# Patient Record
Sex: FEMALE | Race: WHITE | URBAN - NONMETROPOLITAN AREA
[De-identification: names, ages, dates, MRNs, and addresses within clinical notes are randomized per-mention and may not be internally consistent; named-entity substitution may affect disease eponyms.]

---

## 2017-01-01 ENCOUNTER — HOSPITAL ENCOUNTER (OUTPATIENT)
Dept: ULTRASOUND IMAGING | Facility: HOSPITAL | Age: 67
Discharge: HOME | End: 2017-06-22
Attending: NURSE PRACTITIONER | Admitting: NURSE PRACTITIONER
Payer: MEDICARE

## 2017-01-01 ENCOUNTER — TRANSFERRED RECORDS (OUTPATIENT)
Dept: HEALTH INFORMATION MANAGEMENT | Facility: HOSPITAL | Age: 67
End: 2017-01-01

## 2017-01-01 ENCOUNTER — HOSPITAL ENCOUNTER (OUTPATIENT)
Dept: CT IMAGING | Facility: HOSPITAL | Age: 67
Discharge: HOME | End: 2017-11-28
Attending: NURSE PRACTITIONER | Admitting: NURSE PRACTITIONER
Payer: MEDICARE

## 2017-01-01 ENCOUNTER — ONCOLOGY VISIT (OUTPATIENT)
Dept: ONCOLOGY | Facility: OTHER | Age: 67
End: 2017-01-01
Attending: NURSE PRACTITIONER
Payer: MEDICARE

## 2017-01-01 ENCOUNTER — HOSPITAL ENCOUNTER (OUTPATIENT)
Dept: CT IMAGING | Facility: HOSPITAL | Age: 67
Discharge: HOME | End: 2017-11-22
Attending: NURSE PRACTITIONER | Admitting: NURSE PRACTITIONER
Payer: MEDICARE

## 2017-01-01 ENCOUNTER — HOSPITAL ENCOUNTER (OUTPATIENT)
Dept: GENERAL RADIOLOGY | Facility: HOSPITAL | Age: 67
End: 2017-11-28
Attending: RADIOLOGY
Payer: MEDICARE

## 2017-01-01 ENCOUNTER — ONCOLOGY VISIT (OUTPATIENT)
Dept: ONCOLOGY | Facility: OTHER | Age: 67
End: 2017-01-01
Attending: INTERNAL MEDICINE
Payer: MEDICARE

## 2017-01-01 ENCOUNTER — TRANSFERRED RECORDS (OUTPATIENT)
Dept: HEALTH INFORMATION MANAGEMENT | Facility: CLINIC | Age: 67
End: 2017-01-01

## 2017-01-01 ENCOUNTER — APPOINTMENT (OUTPATIENT)
Dept: LAB | Facility: HOSPITAL | Age: 67
End: 2017-01-01
Attending: FAMILY MEDICINE
Payer: MEDICARE

## 2017-01-01 ENCOUNTER — INFUSION THERAPY VISIT (OUTPATIENT)
Dept: INFUSION THERAPY | Facility: OTHER | Age: 67
End: 2017-01-01
Attending: INTERNAL MEDICINE
Payer: MEDICARE

## 2017-01-01 ENCOUNTER — TELEPHONE (OUTPATIENT)
Dept: ONCOLOGY | Facility: OTHER | Age: 67
End: 2017-01-01

## 2017-01-01 VITALS
RESPIRATION RATE: 16 BRPM | HEART RATE: 78 BPM | BODY MASS INDEX: 35.65 KG/M2 | SYSTOLIC BLOOD PRESSURE: 139 MMHG | OXYGEN SATURATION: 98 % | DIASTOLIC BLOOD PRESSURE: 94 MMHG | WEIGHT: 214 LBS | HEIGHT: 65 IN | TEMPERATURE: 97.1 F

## 2017-01-01 VITALS
BODY MASS INDEX: 35.79 KG/M2 | SYSTOLIC BLOOD PRESSURE: 135 MMHG | HEART RATE: 96 BPM | RESPIRATION RATE: 16 BRPM | DIASTOLIC BLOOD PRESSURE: 79 MMHG | OXYGEN SATURATION: 94 % | TEMPERATURE: 96.8 F | HEIGHT: 65 IN | WEIGHT: 214.8 LBS

## 2017-01-01 VITALS
HEART RATE: 80 BPM | WEIGHT: 212 LBS | DIASTOLIC BLOOD PRESSURE: 87 MMHG | OXYGEN SATURATION: 96 % | BODY MASS INDEX: 35.32 KG/M2 | HEIGHT: 65 IN | RESPIRATION RATE: 18 BRPM | TEMPERATURE: 97.4 F | SYSTOLIC BLOOD PRESSURE: 142 MMHG

## 2017-01-01 VITALS
RESPIRATION RATE: 18 BRPM | SYSTOLIC BLOOD PRESSURE: 155 MMHG | OXYGEN SATURATION: 99 % | DIASTOLIC BLOOD PRESSURE: 99 MMHG

## 2017-01-01 VITALS
HEIGHT: 65 IN | DIASTOLIC BLOOD PRESSURE: 79 MMHG | RESPIRATION RATE: 16 BRPM | SYSTOLIC BLOOD PRESSURE: 135 MMHG | HEART RATE: 96 BPM | WEIGHT: 214.8 LBS | TEMPERATURE: 96.8 F | OXYGEN SATURATION: 94 % | BODY MASS INDEX: 35.79 KG/M2

## 2017-01-01 VITALS
WEIGHT: 212.1 LBS | HEART RATE: 100 BPM | RESPIRATION RATE: 18 BRPM | OXYGEN SATURATION: 96 % | HEIGHT: 65 IN | BODY MASS INDEX: 35.34 KG/M2 | SYSTOLIC BLOOD PRESSURE: 153 MMHG | DIASTOLIC BLOOD PRESSURE: 97 MMHG | TEMPERATURE: 97.7 F

## 2017-01-01 VITALS
WEIGHT: 214 LBS | OXYGEN SATURATION: 98 % | TEMPERATURE: 97.1 F | BODY MASS INDEX: 35.61 KG/M2 | DIASTOLIC BLOOD PRESSURE: 94 MMHG | SYSTOLIC BLOOD PRESSURE: 139 MMHG | RESPIRATION RATE: 16 BRPM | HEART RATE: 78 BPM

## 2017-01-01 VITALS
WEIGHT: 214.4 LBS | DIASTOLIC BLOOD PRESSURE: 93 MMHG | TEMPERATURE: 97.4 F | HEART RATE: 91 BPM | BODY MASS INDEX: 35.72 KG/M2 | RESPIRATION RATE: 24 BRPM | HEIGHT: 65 IN | SYSTOLIC BLOOD PRESSURE: 165 MMHG | OXYGEN SATURATION: 94 %

## 2017-01-01 DIAGNOSIS — C50.911 MALIGNANT NEOPLASM OF RIGHT FEMALE BREAST, UNSPECIFIED SITE OF BREAST: Primary | ICD-10-CM

## 2017-01-01 DIAGNOSIS — C50.919 TRIPLE NEGATIVE MALIGNANT NEOPLASM OF BREAST (H): Primary | ICD-10-CM

## 2017-01-01 DIAGNOSIS — D05.11 DCIS (DUCTAL CARCINOMA IN SITU), RIGHT: ICD-10-CM

## 2017-01-01 DIAGNOSIS — D64.9 ANEMIA, UNSPECIFIED TYPE: ICD-10-CM

## 2017-01-01 DIAGNOSIS — Z98.890 HISTORY OF LUNG BIOPSY: ICD-10-CM

## 2017-01-01 DIAGNOSIS — Z17.421 TRIPLE NEGATIVE MALIGNANT NEOPLASM OF BREAST (H): Primary | ICD-10-CM

## 2017-01-01 DIAGNOSIS — Z79.811 AROMATASE INHIBITOR USE: ICD-10-CM

## 2017-01-01 DIAGNOSIS — C50.911 MALIGNANT NEOPLASM OF RIGHT FEMALE BREAST (H): ICD-10-CM

## 2017-01-01 DIAGNOSIS — D50.9 IRON DEFICIENCY ANEMIA, UNSPECIFIED IRON DEFICIENCY ANEMIA TYPE: ICD-10-CM

## 2017-01-01 DIAGNOSIS — R91.8 LUNG MASS: ICD-10-CM

## 2017-01-01 DIAGNOSIS — F41.9 ANXIETY: ICD-10-CM

## 2017-01-01 DIAGNOSIS — R91.1 LUNG NODULE: ICD-10-CM

## 2017-01-01 DIAGNOSIS — D05.11 DUCTAL CARCINOMA IN SITU (DCIS) OF RIGHT BREAST: ICD-10-CM

## 2017-01-01 DIAGNOSIS — Z17.421 TRIPLE NEGATIVE MALIGNANT NEOPLASM OF BREAST (H): ICD-10-CM

## 2017-01-01 DIAGNOSIS — C50.919 TRIPLE NEGATIVE MALIGNANT NEOPLASM OF BREAST (H): ICD-10-CM

## 2017-01-01 DIAGNOSIS — C50.911 MALIGNANT NEOPLASM OF RIGHT FEMALE BREAST, UNSPECIFIED ESTROGEN RECEPTOR STATUS, UNSPECIFIED SITE OF BREAST (H): Primary | ICD-10-CM

## 2017-01-01 DIAGNOSIS — J01.90 ACUTE SINUSITIS, RECURRENCE NOT SPECIFIED, UNSPECIFIED LOCATION: ICD-10-CM

## 2017-01-01 DIAGNOSIS — Z51.81 ENCOUNTER FOR MONITORING CARDIOTOXIC DRUG THERAPY: ICD-10-CM

## 2017-01-01 DIAGNOSIS — Z79.899 ENCOUNTER FOR MONITORING CARDIOTOXIC DRUG THERAPY: ICD-10-CM

## 2017-01-01 DIAGNOSIS — C78.00 CARCINOMA OF RIGHT BREAST METASTATIC TO LUNG (H): ICD-10-CM

## 2017-01-01 DIAGNOSIS — C50.911 CARCINOMA OF RIGHT BREAST METASTATIC TO LUNG (H): ICD-10-CM

## 2017-01-01 LAB
ALT SERPL-CCNC: 32 U/L (ref 7–45)
ALT SERPL-CCNC: 53 U/L (ref 19–47)
ALT SERPL-CCNC: 53 U/L (ref 19–47)
ANION GAP SERPL CALCULATED.3IONS-SCNC: 8 MMOL/L (ref 3–14)
APTT PPP: 32 SEC (ref 24–37)
AST SERPL-CCNC: 28 U/L (ref 8–43)
AST SERPL-CCNC: 31 U/L (ref 9–34)
AST SERPL-CCNC: 35 U/L (ref 9–34)
BUN SERPL-MCNC: 17 MG/DL (ref 7–30)
CALCIUM SERPL-MCNC: 9 MG/DL (ref 8.5–10.1)
CHLORIDE SERPL-SCNC: 104 MMOL/L (ref 94–109)
CO2 SERPL-SCNC: 28 MMOL/L (ref 20–32)
COPATH REPORT: NORMAL
CREAT SERPL-MCNC: 0.76 MG/DL (ref 0.52–1.04)
CREAT SERPL-MCNC: 0.9 MG/DL (ref 0.6–1.1)
CREAT SERPL-MCNC: 0.9 MG/DL (ref 0.7–1.4)
CREAT SERPL-MCNC: 1 MG/DL (ref 0.7–1.4)
ERYTHROCYTE [DISTWIDTH] IN BLOOD BY AUTOMATED COUNT: 13.6 % (ref 10–15)
GFR SERPL CREATININE-BSD FRML MDRD: 58 ML/MIN/1.73M2
GFR SERPL CREATININE-BSD FRML MDRD: 66 ML/MIN/1.73M2
GFR SERPL CREATININE-BSD FRML MDRD: 75 ML/MIN/1.7M2
GLUCOSE SERPL-MCNC: 106 MG/DL (ref 64–112)
GLUCOSE SERPL-MCNC: 111 MG/DL (ref 70–99)
GLUCOSE SERPL-MCNC: 92 MG/DL (ref 64–112)
HCT VFR BLD AUTO: 40.5 % (ref 35–47)
HGB BLD-MCNC: 13.9 G/DL (ref 11.7–15.7)
INR PPP: 0.95 (ref 0.8–1.2)
MCH RBC QN AUTO: 31.4 PG (ref 26.5–33)
MCHC RBC AUTO-ENTMCNC: 34.3 G/DL (ref 31.5–36.5)
MCV RBC AUTO: 92 FL (ref 78–100)
PLATELET # BLD AUTO: 147 10E9/L (ref 150–450)
POTASSIUM SERPL-SCNC: 4.1 MEQ/L (ref 3.5–5.3)
POTASSIUM SERPL-SCNC: 4.1 MMOL/L (ref 3.4–5.3)
POTASSIUM SERPL-SCNC: 4.2 MEQ/L (ref 3.5–5.3)
RBC # BLD AUTO: 4.42 10E12/L (ref 3.8–5.2)
SODIUM SERPL-SCNC: 140 MMOL/L (ref 133–144)
WBC # BLD AUTO: 4.8 10E9/L (ref 4–11)

## 2017-01-01 PROCEDURE — 99214 OFFICE O/P EST MOD 30 MIN: CPT | Performed by: NURSE PRACTITIONER

## 2017-01-01 PROCEDURE — 25000128 H RX IP 250 OP 636: Performed by: NURSE PRACTITIONER

## 2017-01-01 PROCEDURE — 36415 COLL VENOUS BLD VENIPUNCTURE: CPT | Performed by: RADIOLOGY

## 2017-01-01 PROCEDURE — 25000125 ZZHC RX 250

## 2017-01-01 PROCEDURE — 96523 IRRIG DRUG DELIVERY DEVICE: CPT

## 2017-01-01 PROCEDURE — 99212 OFFICE O/P EST SF 10 MIN: CPT

## 2017-01-01 PROCEDURE — 88305 TISSUE EXAM BY PATHOLOGIST: CPT | Mod: TC | Performed by: RADIOLOGY

## 2017-01-01 PROCEDURE — 85027 COMPLETE CBC AUTOMATED: CPT | Performed by: RADIOLOGY

## 2017-01-01 PROCEDURE — 25000128 H RX IP 250 OP 636: Performed by: RADIOLOGY

## 2017-01-01 PROCEDURE — 80048 BASIC METABOLIC PNL TOTAL CA: CPT | Performed by: RADIOLOGY

## 2017-01-01 PROCEDURE — 85610 PROTHROMBIN TIME: CPT | Performed by: RADIOLOGY

## 2017-01-01 PROCEDURE — 93308 TTE F-UP OR LMTD: CPT | Mod: 26 | Performed by: INTERNAL MEDICINE

## 2017-01-01 PROCEDURE — 71260 CT THORAX DX C+: CPT | Mod: TC

## 2017-01-01 PROCEDURE — 88173 CYTOPATH EVAL FNA REPORT: CPT | Mod: TC | Performed by: RADIOLOGY

## 2017-01-01 PROCEDURE — 85730 THROMBOPLASTIN TIME PARTIAL: CPT | Performed by: RADIOLOGY

## 2017-01-01 PROCEDURE — 93308 TTE F-UP OR LMTD: CPT | Mod: TC

## 2017-01-01 PROCEDURE — 40000986 XR CHEST 1 VW: Mod: TC

## 2017-01-01 PROCEDURE — 00000155 ZZHCL STATISTIC H-CELL BLOCK W/STAIN: Performed by: RADIOLOGY

## 2017-01-01 PROCEDURE — 99215 OFFICE O/P EST HI 40 MIN: CPT | Performed by: INTERNAL MEDICINE

## 2017-01-01 PROCEDURE — 99212 OFFICE O/P EST SF 10 MIN: CPT | Mod: 25

## 2017-01-01 PROCEDURE — 32405 CT LUNG MEDIASTINUM BIOPSY: CPT | Mod: TC

## 2017-01-01 PROCEDURE — 25000125 ZZHC RX 250: Performed by: RADIOLOGY

## 2017-01-01 RX ORDER — LIDOCAINE HYDROCHLORIDE 10 MG/ML
10 INJECTION, SOLUTION EPIDURAL; INFILTRATION; INTRACAUDAL; PERINEURAL ONCE
Status: COMPLETED | OUTPATIENT
Start: 2017-01-01 | End: 2017-01-01

## 2017-01-01 RX ORDER — HEPARIN SODIUM (PORCINE) LOCK FLUSH IV SOLN 100 UNIT/ML 100 UNIT/ML
5 SOLUTION INTRAVENOUS
Status: CANCELLED | OUTPATIENT
Start: 2017-01-01

## 2017-01-01 RX ORDER — HEPARIN SODIUM,PORCINE 10 UNIT/ML
5-10 VIAL (ML) INTRAVENOUS EVERY 24 HOURS
Status: DISCONTINUED | OUTPATIENT
Start: 2017-01-01 | End: 2017-01-01 | Stop reason: HOSPADM

## 2017-01-01 RX ORDER — HEPARIN SODIUM,PORCINE 10 UNIT/ML
5-10 VIAL (ML) INTRAVENOUS
Status: DISCONTINUED | OUTPATIENT
Start: 2017-01-01 | End: 2017-01-01 | Stop reason: HOSPADM

## 2017-01-01 RX ORDER — HEPARIN SODIUM (PORCINE) LOCK FLUSH IV SOLN 100 UNIT/ML 100 UNIT/ML
500 SOLUTION INTRAVENOUS EVERY 8 HOURS
Status: DISCONTINUED | OUTPATIENT
Start: 2017-01-01 | End: 2017-01-01 | Stop reason: HOSPADM

## 2017-01-01 RX ORDER — HEPARIN SODIUM (PORCINE) LOCK FLUSH IV SOLN 100 UNIT/ML 100 UNIT/ML
SOLUTION INTRAVENOUS
Status: DISPENSED
Start: 2017-01-01 | End: 2017-01-01

## 2017-01-01 RX ORDER — DEXAMETHASONE 4 MG/1
8 TABLET ORAL 2 TIMES DAILY WITH MEALS
Qty: 12 TABLET | Refills: 7 | Status: CANCELLED | OUTPATIENT
Start: 2018-01-01 | End: 2018-01-01

## 2017-01-01 RX ORDER — LEVOFLOXACIN 500 MG/1
500 TABLET, FILM COATED ORAL DAILY
Qty: 10 TABLET | Refills: 0 | Status: SHIPPED | OUTPATIENT
Start: 2017-01-01 | End: 2017-01-01

## 2017-01-01 RX ORDER — MORPHINE SULFATE 2 MG/ML
2-4 INJECTION, SOLUTION INTRAMUSCULAR; INTRAVENOUS
Status: DISCONTINUED | OUTPATIENT
Start: 2017-01-01 | End: 2017-01-01 | Stop reason: HOSPADM

## 2017-01-01 RX ORDER — PROCHLORPERAZINE MALEATE 10 MG
10 TABLET ORAL EVERY 6 HOURS PRN
Qty: 30 TABLET | Refills: 5 | Status: SHIPPED | OUTPATIENT
Start: 2017-01-01 | End: 2018-01-01

## 2017-01-01 RX ORDER — MORPHINE SULFATE 2 MG/ML
INJECTION, SOLUTION INTRAMUSCULAR; INTRAVENOUS
Status: DISPENSED
Start: 2017-01-01 | End: 2017-01-01

## 2017-01-01 RX ORDER — HEPARIN SODIUM (PORCINE) LOCK FLUSH IV SOLN 100 UNIT/ML 100 UNIT/ML
500 SOLUTION INTRAVENOUS EVERY 8 HOURS
Status: CANCELLED
Start: 2017-01-01

## 2017-01-01 RX ORDER — ANASTROZOLE 1 MG/1
1 TABLET ORAL DAILY
Qty: 90 TABLET | Refills: 3 | Status: SHIPPED | OUTPATIENT
Start: 2017-01-01 | End: 2018-01-01

## 2017-01-01 RX ORDER — HEPARIN SODIUM (PORCINE) LOCK FLUSH IV SOLN 100 UNIT/ML 100 UNIT/ML
5 SOLUTION INTRAVENOUS
Status: DISCONTINUED | OUTPATIENT
Start: 2017-01-01 | End: 2017-01-01 | Stop reason: HOSPADM

## 2017-01-01 RX ORDER — MORPHINE SULFATE 2 MG/ML
2-4 INJECTION, SOLUTION INTRAMUSCULAR; INTRAVENOUS
Status: CANCELLED | OUTPATIENT
Start: 2017-01-01

## 2017-01-01 RX ORDER — IOPAMIDOL 612 MG/ML
100 INJECTION, SOLUTION INTRAVASCULAR ONCE
Status: COMPLETED | OUTPATIENT
Start: 2017-01-01 | End: 2017-01-01

## 2017-01-01 RX ORDER — HEPARIN SODIUM,PORCINE 10 UNIT/ML
5-10 VIAL (ML) INTRAVENOUS
Status: CANCELLED | OUTPATIENT
Start: 2017-01-01

## 2017-01-01 RX ORDER — FERROUS SULFATE 325(65) MG
325 TABLET ORAL
Qty: 100 TABLET | Refills: 0 | COMMUNITY
Start: 2017-01-01 | End: 2018-01-01

## 2017-01-01 RX ORDER — LIDOCAINE HYDROCHLORIDE 10 MG/ML
10 INJECTION, SOLUTION EPIDURAL; INFILTRATION; INTRACAUDAL; PERINEURAL ONCE
Status: CANCELLED | OUTPATIENT
Start: 2017-01-01

## 2017-01-01 RX ORDER — LIDOCAINE HYDROCHLORIDE 10 MG/ML
INJECTION, SOLUTION EPIDURAL; INFILTRATION; INTRACAUDAL; PERINEURAL
Status: COMPLETED
Start: 2017-01-01 | End: 2017-01-01

## 2017-01-01 RX ORDER — HEPARIN SODIUM (PORCINE) LOCK FLUSH IV SOLN 100 UNIT/ML 100 UNIT/ML
SOLUTION INTRAVENOUS
Status: DISCONTINUED
Start: 2017-01-01 | End: 2017-01-01 | Stop reason: WASHOUT

## 2017-01-01 RX ORDER — DEXAMETHASONE 4 MG/1
8 TABLET ORAL 2 TIMES DAILY WITH MEALS
Qty: 12 TABLET | Refills: 7 | Status: SHIPPED | OUTPATIENT
Start: 2017-01-01 | End: 2018-01-01

## 2017-01-01 RX ORDER — LORAZEPAM 0.5 MG/1
0.5 TABLET ORAL EVERY 4 HOURS PRN
Qty: 30 TABLET | Refills: 5 | Status: SHIPPED | OUTPATIENT
Start: 2017-01-01 | End: 2018-01-01

## 2017-01-01 RX ORDER — HEPARIN SODIUM,PORCINE 10 UNIT/ML
5-10 VIAL (ML) INTRAVENOUS EVERY 24 HOURS
Status: CANCELLED | OUTPATIENT
Start: 2017-01-01

## 2017-01-01 RX ORDER — NALOXONE HYDROCHLORIDE 0.4 MG/ML
.1-.4 INJECTION, SOLUTION INTRAMUSCULAR; INTRAVENOUS; SUBCUTANEOUS
Status: DISCONTINUED | OUTPATIENT
Start: 2017-01-01 | End: 2017-01-01 | Stop reason: HOSPADM

## 2017-01-01 RX ADMIN — SODIUM CHLORIDE, PRESERVATIVE FREE 500 UNITS: 5 INJECTION INTRAVENOUS at 09:53

## 2017-01-01 RX ADMIN — LIDOCAINE HYDROCHLORIDE: 10 INJECTION, SOLUTION EPIDURAL; INFILTRATION; INTRACAUDAL; PERINEURAL at 10:17

## 2017-01-01 RX ADMIN — LIDOCAINE HYDROCHLORIDE 100 MG: 10 INJECTION, SOLUTION EPIDURAL; INFILTRATION; INTRACAUDAL; PERINEURAL at 09:55

## 2017-01-01 RX ADMIN — IOPAMIDOL 100 ML: 612 INJECTION, SOLUTION INTRAVENOUS at 11:07

## 2017-01-01 RX ADMIN — SODIUM CHLORIDE, PRESERVATIVE FREE 500 UNITS: 5 INJECTION INTRAVENOUS at 11:50

## 2017-01-01 RX ADMIN — SODIUM CHLORIDE, PRESERVATIVE FREE 5 ML: 5 INJECTION INTRAVENOUS at 11:56

## 2017-01-01 ASSESSMENT — PATIENT HEALTH QUESTIONNAIRE - PHQ9
SUM OF ALL RESPONSES TO PHQ QUESTIONS 1-9: 1
SUM OF ALL RESPONSES TO PHQ QUESTIONS 1-9: 0
SUM OF ALL RESPONSES TO PHQ QUESTIONS 1-9: 3
SUM OF ALL RESPONSES TO PHQ QUESTIONS 1-9: 1
SUM OF ALL RESPONSES TO PHQ QUESTIONS 1-9: 0

## 2017-01-01 ASSESSMENT — PAIN SCALES - GENERAL
PAINLEVEL: NO PAIN (0)

## 2017-01-10 ENCOUNTER — TRANSFERRED RECORDS (OUTPATIENT)
Dept: HEALTH INFORMATION MANAGEMENT | Facility: HOSPITAL | Age: 67
End: 2017-01-10

## 2017-02-13 DIAGNOSIS — C50.911 MALIGNANT NEOPLASM OF RIGHT FEMALE BREAST, UNSPECIFIED SITE OF BREAST: ICD-10-CM

## 2017-02-13 DIAGNOSIS — F41.9 ANXIETY: ICD-10-CM

## 2017-03-08 ENCOUNTER — TRANSFERRED RECORDS (OUTPATIENT)
Dept: HEALTH INFORMATION MANAGEMENT | Facility: HOSPITAL | Age: 67
End: 2017-03-08

## 2017-03-10 ENCOUNTER — TELEPHONE (OUTPATIENT)
Dept: ONCOLOGY | Facility: OTHER | Age: 67
End: 2017-03-10

## 2017-03-10 NOTE — TELEPHONE ENCOUNTER
Left message for patient to call as she did not have her labs done at [facility].  Her appointment is next Wednesday.

## 2017-03-14 ENCOUNTER — TELEPHONE (OUTPATIENT)
Dept: ONCOLOGY | Facility: OTHER | Age: 67
End: 2017-03-14

## 2017-03-14 NOTE — TELEPHONE ENCOUNTER
Pt leaves v/m to return call in regards to labs.     Call to pt.   Pt states that she had labs drawn at [facility] on 3/8/17   she is calling to confirm that we have received labs. Informed pt that we will check, and if they havent been received will call for lab results for upcoming appt.     Jayne Harkins RN

## 2017-03-15 NOTE — TELEPHONE ENCOUNTER
Patient had labs drawn at [facility] and we have received them for her appointment

## 2017-03-21 ENCOUNTER — ONCOLOGY VISIT (OUTPATIENT)
Dept: ONCOLOGY | Facility: OTHER | Age: 67
End: 2017-03-21
Attending: NURSE PRACTITIONER
Payer: MEDICARE

## 2017-03-21 ENCOUNTER — INFUSION THERAPY VISIT (OUTPATIENT)
Dept: INFUSION THERAPY | Facility: OTHER | Age: 67
End: 2017-03-21
Attending: INTERNAL MEDICINE
Payer: MEDICARE

## 2017-03-21 VITALS
HEART RATE: 101 BPM | DIASTOLIC BLOOD PRESSURE: 84 MMHG | TEMPERATURE: 98 F | SYSTOLIC BLOOD PRESSURE: 146 MMHG | OXYGEN SATURATION: 96 % | HEIGHT: 65 IN | WEIGHT: 212 LBS | RESPIRATION RATE: 16 BRPM | BODY MASS INDEX: 35.32 KG/M2

## 2017-03-21 VITALS
OXYGEN SATURATION: 96 % | WEIGHT: 212 LBS | BODY MASS INDEX: 35.32 KG/M2 | DIASTOLIC BLOOD PRESSURE: 84 MMHG | RESPIRATION RATE: 16 BRPM | SYSTOLIC BLOOD PRESSURE: 146 MMHG | TEMPERATURE: 98 F | HEIGHT: 65 IN | HEART RATE: 101 BPM

## 2017-03-21 DIAGNOSIS — N95.1 SYMPTOMATIC MENOPAUSAL OR FEMALE CLIMACTERIC STATES: ICD-10-CM

## 2017-03-21 DIAGNOSIS — Z78.0 POSTMENOPAUSAL STATUS: ICD-10-CM

## 2017-03-21 DIAGNOSIS — Z51.81 ENCOUNTER FOR MONITORING CARDIOTOXIC DRUG THERAPY: ICD-10-CM

## 2017-03-21 DIAGNOSIS — C50.511 MALIGNANT NEOPLASM OF LOWER-OUTER QUADRANT OF RIGHT FEMALE BREAST (H): Primary | ICD-10-CM

## 2017-03-21 DIAGNOSIS — Z79.899 ENCOUNTER FOR MONITORING CARDIOTOXIC DRUG THERAPY: ICD-10-CM

## 2017-03-21 DIAGNOSIS — Z79.811 AROMATASE INHIBITOR USE: ICD-10-CM

## 2017-03-21 DIAGNOSIS — C50.911 MALIGNANT NEOPLASM OF RIGHT FEMALE BREAST, UNSPECIFIED SITE OF BREAST: Primary | ICD-10-CM

## 2017-03-21 DIAGNOSIS — D64.9 ANEMIA, UNSPECIFIED TYPE: ICD-10-CM

## 2017-03-21 DIAGNOSIS — D05.11 DCIS (DUCTAL CARCINOMA IN SITU), RIGHT: ICD-10-CM

## 2017-03-21 PROCEDURE — 96523 IRRIG DRUG DELIVERY DEVICE: CPT

## 2017-03-21 PROCEDURE — 25000128 H RX IP 250 OP 636: Performed by: NURSE PRACTITIONER

## 2017-03-21 PROCEDURE — 99215 OFFICE O/P EST HI 40 MIN: CPT | Performed by: NURSE PRACTITIONER

## 2017-03-21 PROCEDURE — 99212 OFFICE O/P EST SF 10 MIN: CPT

## 2017-03-21 RX ORDER — HEPARIN SODIUM (PORCINE) LOCK FLUSH IV SOLN 100 UNIT/ML 100 UNIT/ML
500 SOLUTION INTRAVENOUS EVERY 8 HOURS
Status: CANCELLED
Start: 2017-03-21

## 2017-03-21 RX ORDER — HEPARIN SODIUM (PORCINE) LOCK FLUSH IV SOLN 100 UNIT/ML 100 UNIT/ML
500 SOLUTION INTRAVENOUS EVERY 8 HOURS
Status: DISCONTINUED | OUTPATIENT
Start: 2017-03-21 | End: 2017-03-21 | Stop reason: HOSPADM

## 2017-03-21 RX ADMIN — SODIUM CHLORIDE, PRESERVATIVE FREE 500 UNITS: 5 INJECTION INTRAVENOUS at 13:25

## 2017-03-21 ASSESSMENT — PAIN SCALES - GENERAL: PAINLEVEL: NO PAIN (0)

## 2017-03-21 NOTE — MR AVS SNAPSHOT
After Visit Summary   3/21/2017    Ana Simmons    MRN: [id number]           Patient Information     Date Of Birth          1950        Visit Information        Provider Department      3/21/2017 2:00 PM Pettinelli, Ceci D, NP [facility]        Today's Diagnoses     Malignant neoplasm of lower-outer quadrant of right female breast (H)    -  1    Anemia, unspecified type        DCIS (ductal carcinoma in situ), right        Postmenopausal status        Aromatase inhibitor use        Encounter for monitoring cardiotoxic drug therapy        Symptomatic menopausal or female climacteric states          Care Instructions    We would like to see you back in 3 months. Please come 1week(s) prior for lab work. Our diagnostic imaging department will call you to schedule your Echo same day as your 3 month appointment. Also you will need to obtain DEXA scan as soon as able.  Your prescription for Tamoxifen has been sent to:   [facility] [place] [address]  Phone: [phone] Fax: [phone]   When you are in need of a refill, please call your pharmacy and they will send us a request. If you have any questions please call [phone]          Follow-ups after your visit        Your next 10 appointments already scheduled     Jun 22, 2017 10:00 AM CDT   Radiology with [facility] ECHO [facility] ([facility] )    [address] [place] [address] [phone]            Jun 22, 2017 11:00 AM CDT   Return Visit with Ceci Amaral NP   [facility] ([facility])    [address] [place] [address] [phone]              Who to contact     If you have questions or need follow up information about today's clinic visit or your schedule please contact [facility] directly at [phone].  Normal or non-critical lab and imaging results will be communicated to you by  "MyChart, letter or phone within 4 business days after the clinic has received the results. If you do not hear from us within 7 days, please contact the clinic through [facility] or phone. If you have a critical or abnormal lab result, we will notify you by phone as soon as possible.  Submit refill requests through e[employer]or or call your pharmacy and they will forward the refill request to us. Please allow 3 business days for your refill to be completed.          Additional Information About Your Visit        [employer] Information     [employer] gives you secure access to your electronic health record. If you see a primary care provider, you can also send messages to your care team and make appointments. If you have questions, please call your primary care clinic.  If you do not have a primary care provider, please call [phone] and they will assist you.        Care EveryWhere ID     This is your Care EveryWhere ID. This could be used by other organizations to access your [place] medical records  [id number]        Your Vitals Were     Pulse Temperature Respirations Height Pulse Oximetry BMI (Body Mass Index)    101 98  F (36.7  C) (Tympanic) 16 1.651 m (5' 5\") 96% 35.28 kg/m2       Blood Pressure from Last 3 Encounters:   03/21/17 146/84   03/21/17 146/84   12/13/16 138/75    Weight from Last 3 Encounters:   03/21/17 96.2 kg (212 lb)   03/21/17 96.2 kg (212 lb)   12/13/16 95.5 kg (210 lb 9.6 oz)              We Performed the Following     Echocardiogram Limited        Primary Care Provider Office Phone # Fax #    Mya SARAH Park [phone]       [facility] [address]   [place] [facility] [address]        Thank you!     Thank you for choosing [facility]  for your care. Our goal is always to provide you with excellent care. Hearing back from our patients is one way we can continue to improve our services. Please take a few minutes to complete the written survey that you may receive " in the mail after your visit with us. Thank you!             Your Updated Medication List - Protect others around you: Learn how to safely use, store and throw away your medicines at www.disposemymeds.org.          This list is accurate as of: 3/21/17 11:59 PM.  Always use your most recent med list.                   Brand Name Dispense Instructions for use    anastrozole 1 MG tablet    ARIMIDEX    90 tablet    Take 1 tablet (1 mg) by mouth daily       calcium-vitamin D 600-400 MG-UNIT per tablet    CALTRATE    90 tablet    Take 1 tablet by mouth 2 times daily       cyanocobalamin 1000 MCG tablet    vitamin  B-12     Take 1,000 mcg by mouth daily       ferrous sulfate 325 (65 FE) MG tablet    IRON     Take 325 mg by mouth 2 times daily       lidocaine-prilocaine cream    EMLA    30 g    Apply 30 g topically as needed for moderate pain       METFORMIN HCL PO      Take 500 mg by mouth daily (with breakfast)       MULTIVITAMIN ADULT PO          sertraline 50 MG tablet    ZOLOFT    45 tablet    Take 1 tablet (50 mg) by mouth daily *Take one and a half tab to equal 75 MG daily*       SIMVASTATIN PO      Take 20 mg by mouth At Bedtime       VITAMIN B6 PO      Take 50 mg by mouth daily

## 2017-03-21 NOTE — PROGRESS NOTES
Oncology Follow-up Visit:  March 21, 2017    Reason for Visit:  Patient presents with:  RECHECK: Follow up breast  other: survivorship  *_* Health Care Directive *_*: Patient has paperwork     Nursing Note and documentation reviewed: yes    HPI: This is a 66-year-old female patient who presents to the oncology/hematology clinic today in follow up of breast cancer.   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 The patient is essentially triple-negative breast cancer.  She underwent treatment and is currently on Arimidex 1mg daily for the DCIS which was initiated 11/22/2017.    She presents to the clinic today accompanied by her  telling me she is feeling well.  She is on calcium with vitamin D and takes this once daily.She does experience frequent hot flashes which seem to be worse when she is up moving around and denies any night sweats.  She does state her energy is coming back and remains active.  She does continue on an iron tablet daily and is questioning if she needs to continue this.  She tells me she does feel she needs to go in for an eye exam as it has been 2 years and she feels her eyes are weaker and get tired easily since chemotherapy.  She does also have some mild neuropathy to the right first 3 fingers and states this is improved but still has difficulty at times with fine motor skills.  She sees Dr. Park for her general medical issues.      Oncologic History: Patient underwent mammogram in January 2016 which revealed abnormalities of the right breast.  On 02/09/2016, a stereotactic biopsy of the right breast lesion at the 8 o'clock position revealed grade 3 DCIS of a solid micropapillary subtype.  Estrogen receptors were positive.  Also a biopsy of the right breast lesion at the 9 o'clock  position revealed a grade 2 DCIS of the solid micropapillary subtype.  Estrogen receptors were positive.  She underwent bilateral mastectomies on 3/30/16 by Dr. 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  There was also angiosarcomatous, liposarcomatous, malignant spindle cell and chondroid differentiation.  The tumor in the lower quadrant was distinct in the 2 DCIS lesions noted in the right breast.  Estrogen receptor positive at 2% with progesterone receptor negative; tumor was HER-2/renetta negative.  In terms of her DCIS, there was a 1.5 cm DCIS in the right breast as well as a smaller focus noted.  There was a sentinel lymph node that was negative. Chemotherapy was recommended.  She was evaluated by Dr. Lam with [facility] on 4/18/2016 he felt patient had a metaplastic carcinoma as well as DCIS of the right breast.  He did note that there was no consensus best treatment options for this patient, but he felt given that she likely had triple negative disease and if she had a pure ductal adenocarcinoma, he would favor treating her a triple-negative breast cancer that is greater than 0.5 cm.  He felt that dose-dense AC x 4 cycles plus Taxol weekly x 12 weeks would be ideal.  She was evaluated here by Dr. Celis with Medical Oncology on 5/3/2016 as she wanted to receive treatment closer to home.     Echocardiogram on 4/22/2016 showed an ejection fraction of 65%.     Current Chemo Regime/TX:  arimidex 1mg daily initiated 11/2017  Current Cycle: n/a  # of completed cycles: n/a     Previous treatment:  Adriamycin 60mg/m2 and Cytoxan 600mg/m2 every 2 weeks with Neulasta support 6mg SQ injection day 2 x 4 cycles; Taxol 80mg/m2 weekly x 12 weeks    Past Medical History:   Diagnosis Date     Breast cancer (H)      DMII (diabetes mellitus, type 2) (H)      HLD (hyperlipidemia)       Nonhealing surgical wound        Past Surgical History:   Procedure Laterality Date     APPENDECTOMY OPEN       C VAGINAL HYSTERECTOMY       INSERT PORT VASCULAR ACCESS Left 6/2/2016    Procedure: INSERT PORT VASCULAR ACCESS;  Surgeon: Micheline Davis MD;  Location: [facility] OR     MASTECTOMY Bilateral        Family History   Problem Relation Age of Onset     Lung Cancer Father      Prostate Cancer Paternal Grandfather        Social History     Social History     Marital status:      Spouse name: Braden     Number of children: 2     Years of education: N/A     Occupational History      Retired     Social History Main Topics     Smoking status: Former Smoker     Smokeless tobacco: Never Used     Alcohol use No      Comment: 1 kamille/ night, with chemo is not drinking     Drug use: No     Sexual activity: Not on file     Other Topics Concern     Not on file     Social History Narrative       Current Outpatient Prescriptions   Medication     Pyridoxine HCl (VITAMIN B6 PO)     sertraline (ZOLOFT) 50 MG tablet     anastrozole (ARIMIDEX) 1 MG tablet     calcium-vitamin D (CALTRATE) 600-400 MG-UNIT per tablet     cyanocobalamin (VITAMIN  B-12) 1000 MCG tablet     ferrous sulfate (IRON) 325 (65 FE) MG tablet     lidocaine-prilocaine (EMLA) cream     METFORMIN HCL PO     SIMVASTATIN PO     Multiple Vitamins-Minerals (MULTIVITAMIN ADULT PO)     No current facility-administered medications for this visit.         Allergies   Allergen Reactions     Cats        Review Of Systems:  Constitutional: denies fever, weight changes, chills, and night sweats.  Eyes: see HPI  Ears/Nose/Throat: denies ear pain, nose problems, difficulty swallowing  Respiratory: denies shortness of breath, cough   Skin: denies rash, lesions  Breast/Chest wall: denies pain, lumps   Cardiovascular: denies chest pain, palpitations, edema  Gastrointestinal: denies abdominal pain, bloating, nausea, vomiting, early satiety  Genitourinary:  "denies difficulty with urination, blood in urine  Musculoskeletal:denies new muscle pain, bone pain  Neurologic: denies lightheadedness, headaches  Psychiatric: denies anxiety, depression-feels she is doing well  Hematologic/Lymphatic/Immunologic: denies easy bruising, easy bleeding, lumps or bumps noted  Endocrine: Denies increased thirst    Physical Exam:  /84 (BP Location: Left arm, Patient Position: Chair, Cuff Size: Adult Large)  Pulse 101  Temp 98  F (36.7  C) (Tympanic)  Resp 16  Ht 1.651 m (5' 5\")  Wt 96.2 kg (212 lb)  SpO2 96%  BMI 35.28 kg/m2    GENERAL APPEARANCE: Healthy, alert and in no acute distress.  HEENT: Normocephalic, Sclerae anicteric. Oropharynx without ulcers, lesions, or thrush.  NECK: Supple. No asymmetry or masses, no thyromegaly.  LYMPHATICS: No palpable cervical, supraclavicular, axillary, or inguinal nodes   RESP: Lungs clear to auscultation bilaterally, respirations regular and easy  CARDIOVASCULAR: Regular rate and rhythm. Normal S1, S2; no murmur, gallop, or rub.  ABDOMEN: Soft, nontender. Bowel sounds auscultated all 4 quadrants. No palpable organomegaly or masses.  BREAST/Chest wall: no concerning lumps, masses or tenderness bilaterally; thicker ridge of tissue (approxiamtely 7x2cm)  to the surgical scar on the right-patient states this is not changed  MUSCULOSKELETAL: Extremities without gross deformities noted. No edema of bilateral lower extremities.  SKIN: No suspicious lesions or rashes.  NEURO: Alert and oriented x 3.  Gait steady.  PSYCHIATRIC: Mentation and affect appear normal.  Mood appropriate.    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  The patient is essentially triple-negative breast cancer.  She is on arimidex 1mg daily for the DCIS.  She will see me back in 3 months for follow up with a CBC, CMP, LDH and CA 27.29.      #2  Anemia:  Hgb WNL.  Patient questions continuing the iron tablet.  We will obtain a Iron/TIBC and Ferritin along with B12 and folate an another CBC and call her with these results.      #3  Monitoring for cardiotoxic therapy:  Last ECHO was almost 1 year ago and prior to treatment. Will obtain another ECHO the day she sees me for her appointment.    #4  Aromatase Inhibitor Therapy:  Discussed the possible negative effects this can have ion her bones and discussed the need for a DEXA scan to evaluate the density of her bones and also the need for calcium 1200mg daily through her diet or supplements in divided doses along with 1000unitis of Vitamin D3.      #5  Hot Flashes:  Discussed various treatments to include changing her depression medication form sertraline to effexor.  Discussed instituting gabapentin.  We also discussed switching her to another aromatase inhibitor.  Patient declines and would like to see how it goes until next f/u.  I did recommend 400units of Vitamin E daily.    Patient's Survivorship careplan was reviewed in it's entirety.   A copy of patient's Survivorship Care Plan was given to patient, a copy was sent for scanning into hermedical record, and a copy will be sent to her primary care provider.    I encouraged patient to call with any questions or concerns.    Approximately 70 minutes spent with the patient, with >75% of this time spent in counseling regarding her cancer diagnoses and the ongoing need for follow up and the recommendations for follow up. Time was spent in the psychosocial aspects of her cancer and the treatment she underwent along with the completion of treatment and how this can affect her  mental health.  We discussed the need for a DEXA scan and why and also the need for calcium and vitamin D and why in relation to the aromatase inhibitor she is on.  We discussed other side effects of this medication also.  We discussed the improvement in her hgb and the need to check iron studies prior to discontinuing the iron pill. Time was spent in coordination of her care.    Ceci SOLISP-BC

## 2017-03-21 NOTE — PROGRESS NOTES
Hand hygiene performed: yes   Mask donned by caregiver: yes   Site prepped with CHG: yes   Labs drawn: no   Dressing applied using aseptic technique: yes    Patients left sided port accessed using non-coring, 22 gauge, 1 needle. Port accessed per facility protocol. Port flushed easily, blood return noted.  No signs and symptoms of infection or infiltration.  Port flushed 10 mLs Normal Saline then Heparin 5mLs of 100 unit/mL.  Needle removed, small dressing applied.  Patient discharged with no complaints.

## 2017-03-21 NOTE — MR AVS SNAPSHOT
After Visit Summary   3/21/2017    Ana Simmons    MRN: [id number]           Patient Information     Date Of Birth          1950        Visit Information        Provider Department      3/21/2017 1:30 PM [facility] INF [facility]        Today's Diagnoses     Malignant neoplasm of right female breast, unspecified site of breast (H)    -  1       Follow-ups after your visit        Future tests that were ordered for you today     Open Future Orders        Priority Expected Expires Ordered    Vitamin B12 Routine 3/22/2017 4/22/2017 3/21/2017    Ferritin Routine 3/22/2017 4/22/2017 3/21/2017    Folate Routine 3/22/2017 4/22/2017 3/21/2017    CBC with platelets differential Routine 3/22/2017 4/22/2017 3/21/2017    Iron and iron binding capacity Routine 3/22/2017 4/22/2017 3/21/2017            Who to contact     If you have questions or need follow up information about today's clinic visit or your schedule please contact [facility] directly at [phone].  Normal or non-critical lab and imaging results will be communicated to you by MyChart, letter or phone within 4 business days after the clinic has received the results. If you do not hear from us within 7 days, please contact the clinic through My[employer] or phone. If you have a critical or abnormal lab result, we will notify you by phone as soon as possible.  Submit refill requests through [employer] or call your pharmacy and they will forward the refill request to us. Please allow 3 business days for your refill to be completed.          Additional Information About Your Visit        MyChart Information     [employer] gives you secure access to your electronic health record. If you see a primary care provider, you can also send messages to your care team and make appointments. If you have questions, please call your primary care clinic.  If you do not have a primary care provider, please call [phone] and they will assist  "you.        Care EveryWhere ID     This is your Care EveryWhere ID. This could be used by other organizations to access your [place] medical records  [id number]        Your Vitals Were     Pulse Temperature Respirations Height Pulse Oximetry BMI (Body Mass Index)    101 98  F (36.7  C) (Tympanic) 16 1.651 m (5' 5\") 96% 35.28 kg/m2       Blood Pressure from Last 3 Encounters:   03/21/17 146/84   03/21/17 146/84   12/13/16 138/75    Weight from Last 3 Encounters:   03/21/17 96.2 kg (212 lb)   03/21/17 96.2 kg (212 lb)   12/13/16 95.5 kg (210 lb 9.6 oz)              Today, you had the following     No orders found for display       Primary Care Provider Office Phone # Fax #    Mya Park [phone]       [facility] [address]   [place] [address]        Thank you!     [facility] you [facility]  [facility] your [facility]. Our goal is always to provide you with excellent care. Hearing back from our patients is one way we can continue to improve our services. Please take a few minutes to complete the written survey that you may receive in the mail after your visit with us. Thank you!             Your Updated Medication List - Protect others around you: Learn how to safely use, store and throw away your medicines at www.disposemymeds.org.          This list is accurate as of: 3/21/17  4:31 PM.  Always use your most recent med list.                   Brand Name Dispense Instructions for use    anastrozole 1 MG tablet    ARIMIDEX    90 tablet    Take 1 tablet (1 mg) by mouth daily       calcium-vitamin D 600-400 MG-UNIT per tablet    CALTRATE    90 tablet    Take 1 tablet by mouth 2 times daily       cyanocobalamin 1000 MCG tablet    vitamin  B-12     Take 1,000 mcg by mouth daily       ferrous sulfate 325 (65 FE) MG tablet    IRON     Take 325 mg by mouth 2 times daily       lidocaine-prilocaine cream    EMLA    30 g    Apply 30 g topically as needed for moderate pain    "    METFORMIN HCL PO      Take 500 mg by mouth daily (with breakfast)       MULTIVITAMIN ADULT PO          sertraline 50 MG tablet    ZOLOFT    45 tablet    Take 1 tablet (50 mg) by mouth daily *Take one and a half tab to equal 75 MG daily*       SIMVASTATIN PO      Take 20 mg by mouth At Bedtime       VITAMIN B6 PO      Take 50 mg by mouth daily

## 2017-03-21 NOTE — LETTER
March 22, 2017      Re:  Ana Simmons  [address]  [facility] [address]      Dear Dr. Park:  Enclosed is a copy of the survivorship care plan (SCP) that has been completed on your patient Ana Simmons.  This is being sent for your review and to include in your patient's medical record.  The SCP is to be used as a tool to facilitate your patient's care after their cancer treatment.  It includes diagnoses, treatment and potential physical and or psychosocial issues that may affect your patient after therapy has been completed.  Some issues may not develop until years after therapy.  Thank you in advance for taking the time to review your patient's survivorship care plan and for allowing our oncology team to participate in your patient's care.  We are happy to help facilitate any future care needs and look forward to continuing the patient's follow up in relation to their cancer diagnosis.  Feel free to contact our oncology team with any questions, suggestions or concerns.    Sincerely,    Nathalia Amaral [facility]-BC  Contact:  [phone]    Oncology Care Coordinator contact:  Briana Tran RN  [phone]

## 2017-03-22 PROBLEM — Z79.811 AROMATASE INHIBITOR USE: Status: ACTIVE | Noted: 2017-03-22

## 2017-03-22 NOTE — PATIENT INSTRUCTIONS
We would like to see you back in 3 months. Please come 1week(s) prior for lab work. Our diagnostic imaging department will call you to schedule your Echo same day as your 3 month appointment. Also you will need to obtain DEXA scan as soon as able.  Your prescription for Tamoxifen has been sent to:   [facility] - [place], [place] - [address]  [address]  [place] [address]  Phone: [phone] Fax: [phone]   When you are in need of a refill, please call your pharmacy and they will send us a request. If you have any questions please call [phone]

## 2017-03-23 DIAGNOSIS — Z78.0 POSTMENOPAUSAL STATUS: ICD-10-CM

## 2017-03-23 DIAGNOSIS — D05.11 DCIS (DUCTAL CARCINOMA IN SITU), RIGHT: ICD-10-CM

## 2017-03-23 DIAGNOSIS — C50.511 MALIGNANT NEOPLASM OF LOWER-OUTER QUADRANT OF RIGHT FEMALE BREAST (H): Primary | ICD-10-CM

## 2017-04-03 ENCOUNTER — TRANSFERRED RECORDS (OUTPATIENT)
Dept: HEALTH INFORMATION MANAGEMENT | Facility: HOSPITAL | Age: 67
End: 2017-04-03

## 2017-04-10 ENCOUNTER — TELEPHONE (OUTPATIENT)
Dept: ONCOLOGY | Facility: OTHER | Age: 67
End: 2017-04-10

## 2017-04-10 NOTE — TELEPHONE ENCOUNTER
Left message for patient to call so I can give her dexa results that are normal and  That she should do calcium and vitamin D discussed    Next dexa in two years

## 2017-04-11 ENCOUNTER — TRANSFERRED RECORDS (OUTPATIENT)
Dept: HEALTH INFORMATION MANAGEMENT | Facility: HOSPITAL | Age: 67
End: 2017-04-11

## 2017-04-12 ENCOUNTER — DOCUMENTATION ONLY (OUTPATIENT)
Dept: ONCOLOGY | Facility: OTHER | Age: 67
End: 2017-04-12

## 2017-04-21 NOTE — TELEPHONE ENCOUNTER
I let patient know that dexa is normal and to do calcium and vitamin D as discussed.  Next dexa in two years

## 2017-05-09 ENCOUNTER — TRANSFERRED RECORDS (OUTPATIENT)
Dept: HEALTH INFORMATION MANAGEMENT | Facility: HOSPITAL | Age: 67
End: 2017-05-09

## 2017-06-22 NOTE — MR AVS SNAPSHOT
After Visit Summary   6/22/2017    Ana Simmons    MRN: [id number]           Patient Information     Date Of Birth          1950        Visit Information        Provider Department      6/22/2017 11:00 AM Ceci Amaral NP [place] [facility]        Today's Diagnoses     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    -  1    DCIS (ductal carcinoma in situ), right          Care Instructions    We would like to see you back in 3 months. Please come 1week(s) prior for lab work.  When you are in need of a refill, please call your pharmacy and they will send us a request. If you have any questions please call [phone]          Follow-ups after your visit        Your next 10 appointments already scheduled     Sep 21, 2017 10:30 AM CDT   (Arrive by 10:15 AM)   Return Visit with JEFF Caballeroview Calin Hernandes ([facility] )    [address]  [place] [address]   [phone]              Future tests that were ordered for you today     Open Future Orders        Priority Expected Expires Ordered    CBC with platelets differential Routine 9/22/2017 11/22/2017 6/22/2017    Comprehensive metabolic panel Routine 9/22/2017 11/22/2017 6/22/2017    Lactate Dehydrogenase Routine 9/22/2017 11/22/2017 6/22/2017    Ca27.29  breast tumor marker Routine 9/22/2017 11/22/2017 6/22/2017    Folate Routine 9/22/2017 11/22/2017 6/22/2017    Ferritin Routine 9/22/2017 11/22/2017 6/22/2017    Vitamin B12 Routine 9/22/2017 11/22/2017 6/22/2017    Iron and iron binding capacity Routine 9/22/2017 11/22/2017 6/22/2017            Who to contact     If you have questions or need follow up information about today's clinic visit or your schedule please contact [facility] directly at [phone].  Normal or non-critical lab and imaging results will be communicated to you by MyChart, letter or phone within 4 business days after the clinic has received  the results. If you do not hear from us within 7 days, please contact the clinic through [employer]hake or phone. If you have a critical or abnormal lab result, we will notify you by phone as soon as possible.  Submit refill requests through [employer] or call your pharmacy and they will forward the refill request to us. Please allow 3 business days for your refill to be completed.          Additional Information About Your Visit        Wondershake Information     [employer] gives you secure access to your electronic health record. If you see a primary care provider, you can also send messages to your care team and make appointments. If you have questions, please call your primary care clinic.  If you do not have a primary care provider, please call [phone] and they will assist you.        Care EveryWhere ID     This is your Care EveryWhere ID. This could be used by other organizations to access your [place] medical records  [id number]        Your Vitals Were     Pulse Temperature Respirations Pulse Oximetry BMI (Body Mass Index)       78 97.1  F (36.2  C) (Tympanic) 16 98% 35.61 kg/m2        Blood Pressure from Last 3 Encounters:   06/22/17 (!) 144/94   03/21/17 146/84   03/21/17 146/84    Weight from Last 3 Encounters:   06/22/17 97.1 kg (214 lb)   03/21/17 96.2 kg (212 lb)   03/21/17 96.2 kg (212 lb)                 Today's Medication Changes          These changes are accurate as of: 6/22/17 11:37 AM.  If you have any questions, ask your nurse or doctor.               These medicines have changed or have updated prescriptions.        Dose/Directions    sertraline 50 MG tablet   Commonly known as:  ZOLOFT   This may have changed:    - how much to take  - additional instructions   Used for:  Malignant neoplasm of right female breast, unspecified site of breast (H), Anxiety        Dose:  50 mg   Take 1 tablet (50 mg) by mouth daily *Take one and a half tab to equal 75 MG daily*   Quantity:  45 tablet   Refills:  3                 Primary Care Provider Office Phone # Fax #    Mya Park [phone]       [facility] [address]   [facility] [place] [address]        Equal Access to Services     JOSE MARKS : Hadii aad ku hadnataliedale Soavril, waaxda luqadaha, qaybta kaalmada adeegyada, candy willardalireza pineda waltjacqueline gil. So [facility] [phone].    ATENCIÓN: Si habla español, tiene a stiles disposición servicios gratuitos de asistencia lingüística. Llame al [phone].    We comply with applicable federal civil rights laws and [place] laws. We do not discriminate on the basis of race, color, national origin, age, disability sex, sexual orientation or gender identity.            Thank you!     Thank you for choosing [facility]  for your care. Our goal is always to provide you with excellent care. Hearing back from our patients is one way we can continue to improve our services. Please take a few minutes to complete the written survey that you may receive in the mail after your visit with us. Thank you!             Your Updated Medication List - Protect others around you: Learn how to safely use, store and throw away your medicines at www.disposemymeds.org.          This list is accurate as of: 6/22/17 11:37 AM.  Always use your most recent med list.                   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       calcium-vitamin D 600-400 MG-UNIT per tablet    CALTRATE    90 tablet    Take 1 tablet by mouth 2 times daily    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       cyanocobalamin 1000 MCG tablet    vitamin  B-12     Take 250 mcg by mouth daily    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       ferrous sulfate 325 (65 FE) MG tablet    IRON     Take 325 mg by mouth 2 times daily    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        lidocaine-prilocaine cream    EMLA    30 g    Apply 30 g topically as needed for moderate pain    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       METFORMIN HCL PO      Take 500 mg by mouth daily (with breakfast)        sertraline 50 MG tablet    ZOLOFT    45 tablet    Take 1 tablet (50 mg) by mouth daily *Take one and a half tab to equal 75 MG daily*    Malignant neoplasm of right female breast, unspecified site of breast (H), Anxiety       SIMVASTATIN PO      Take 20 mg by mouth At Bedtime        VITAMIN B6 PO      Take 50 mg by mouth daily    Anemia, unspecified type, Malignant neoplasm of lower-outer quadrant of right female breast (H), DCIS (ductal carcinoma in situ), right, Postmenopausal status, Aromatase inhibitor use, Encounter for monitoring cardiotoxic drug therapy

## 2017-06-22 NOTE — MR AVS SNAPSHOT
After Visit Summary   6/22/2017    Ana Simmons    MRN: [id number]           Patient Information     Date Of Birth          1950        Visit Information        Provider Department      6/22/2017 11:30 AM HC INF RM [facility]        Today's Diagnoses     Malignant neoplasm of right female breast, unspecified site of breast (H)    -  1      Care Instructions    We will see you back in September for your port flush          Follow-ups after your visit        Your next 10 appointments already scheduled     Sep 21, 2017 10:00 AM CDT   Level O with HC INF RM 3308   [facility] ([facility] [place] )    [address]  [place] [address]   [phone]            Sep 21, 2017 10:30 AM CDT   (Arrive by 10:15 AM)   Return Visit with Ceci Amaral NP   [facility] ([facility] )    [address]  [place] [address]   [phone]              Future tests that were ordered for you today     Open Future Orders        Priority Expected Expires Ordered    CBC with platelets differential Routine 9/22/2017 11/22/2017 6/22/2017    Comprehensive metabolic panel Routine 9/22/2017 11/22/2017 6/22/2017    Lactate Dehydrogenase Routine 9/22/2017 11/22/2017 6/22/2017    Ca27.29  breast tumor marker Routine 9/22/2017 11/22/2017 6/22/2017    Folate Routine 9/22/2017 11/22/2017 6/22/2017    Ferritin Routine 9/22/2017 11/22/2017 6/22/2017    Vitamin B12 Routine 9/22/2017 11/22/2017 6/22/2017    Iron and iron binding capacity Routine 9/22/2017 11/22/2017 6/22/2017            Who to contact     If you have questions or need follow up information about today's clinic visit or your schedule please contact [facility] directly at [phone].  Normal or non-critical lab and imaging results will be communicated to you by MyChart, letter or phone within 4 business days after the clinic has received the results. If you do  "not hear from us within 7 days, please contact the clinic through [employer] or phone. If you have a critical or abnormal lab result, we will notify you by phone as soon as possible.  Submit refill requests through Peer[employer] or call your pharmacy and they will forward the refill request to us. Please allow 3 business days for your refill to be completed.          Additional Information About Your Visit        [employer] Information     [employer] gives you secure access to your electronic health record. If you see a primary care provider, you can also send messages to your care team and make appointments. If you have questions, please call your primary care clinic.  If you do not have a primary care provider, please call [phone] and they will assist you.        Care EveryWhere ID     This is your Care EveryWhere ID. This could be used by other organizations to access your [facility] medical records  [id number]        Your Vitals Were     Pulse Temperature Respirations Height Pulse Oximetry BMI (Body Mass Index)    78 97.1  F (36.2  C) (Tympanic) 16 1.651 m (5' 5\") 98% 35.61 kg/m2       Blood Pressure from Last 3 Encounters:   06/22/17 (!) 139/94   06/22/17 (!) 139/94   03/21/17 146/84    Weight from Last 3 Encounters:   06/22/17 97.1 kg (214 lb)   06/22/17 97.1 kg (214 lb)   03/21/17 96.2 kg (212 lb)              Today, you had the following     No orders found for display         Today's Medication Changes          These changes are accurate as of: 6/22/17 11:57 AM.  If you have any questions, ask your nurse or doctor.               These medicines have changed or have updated prescriptions.        Dose/Directions    sertraline 50 MG tablet   Commonly known as:  ZOLOFT   This may have changed:    - how much to take  - additional instructions   Used for:  Malignant neoplasm of right female breast, unspecified site of breast (H), Anxiety        Dose:  50 mg   Take 1 tablet (50 mg) by mouth daily *Take one and a half tab to " equal 75 MG daily*   Quantity:  45 tablet   Refills:  3                Primary Care Provider Office Phone # Fax #    [facility] [phone]       [facility] [address]   [facility] [address]        Equal Access to Services     JOSE MARKS AH: Rocio garcía hadnatalieo Soomaali, waaxda luqadaha, qaybta kaalmada adeegyada, waxcaty leroy montsealireza swainayanajacqueline gil. So [facility] [phone].    ATENCIÓN: Si habla español, tiene a stiles disposición servicios gratuitos de asistencia lingüística. Llame al [phone].    We comply with applicable federal civil rights laws and [place] laws. We do not discriminate on the basis of race, color, national origin, age, disability sex, sexual orientation or gender identity.            Thank you!     Thank you for choosing [facility]  for your care. Our goal is always to provide you with excellent care. Hearing back from our patients is one way we can continue to improve our services. Please take a few minutes to complete the written survey that you may receive in the mail after your visit with us. Thank you!             Your Updated Medication List - Protect others around you: Learn how to safely use, store and throw away your medicines at www.disposemymeds.org.          This list is accurate as of: 6/22/17 11:57 AM.  Always use your most recent med list.                   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       calcium-vitamin D 600-400 MG-UNIT per tablet    CALTRATE    90 tablet    Take 1 tablet by mouth 2 times daily    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       cyanocobalamin 1000 MCG tablet    vitamin  B-12     Take 250 mcg by mouth daily    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       ferrous sulfate 325 (65 FE) MG tablet    IRON     Take 325 mg by mouth  2 times daily    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       lidocaine-prilocaine cream    EMLA    30 g    Apply 30 g topically as needed for moderate pain    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       METFORMIN HCL PO      Take 500 mg by mouth daily (with breakfast)        sertraline 50 MG tablet    ZOLOFT    45 tablet    Take 1 tablet (50 mg) by mouth daily *Take one and a half tab to equal 75 MG daily*    Malignant neoplasm of right female breast, unspecified site of breast (H), Anxiety       SIMVASTATIN PO      Take 20 mg by mouth At Bedtime        VITAMIN B6 PO      Take 50 mg by mouth daily    Anemia, unspecified type, Malignant neoplasm of lower-outer quadrant of right female breast (H), DCIS (ductal carcinoma in situ), right, Postmenopausal status, Aromatase inhibitor use, Encounter for monitoring cardiotoxic drug therapy

## 2017-06-22 NOTE — PROGRESS NOTES
Hand hygiene performed: yes   Mask donned by caregiver: yes   Site prepped with CHG: yes   Labs drawn: no   Dressing applied using aseptic technique: yes   Comment: Receives monthly port flushes in [place] when not seeing a provider here.    Patients left sided port accessed using non-coring, 22 gauge, 3/4 needle. Port accessed per facility protocol. Port flushed easily, blood return noted.  No signs and symptoms of infection or infiltration.  Port flushed 10 mLs Normal Saline then Heparin 5mLs of 100 unit/mL.  Needle removed, small dressing applied.  Patient discharged with no complaints.

## 2017-06-22 NOTE — PROGRESS NOTES
Oncology Follow-up Visit:  June 22, 2017    Reason for Visit:  Patient presents with:  RECHECK: Follow up breast cancer  *_* Health Care Directive *_*: have paperwork     Nursing Note and documentation reviewed: yes    HPI:  This is a 66-year-old female patient who presents to the oncology/hematology clinic today in follow up of breast cancer.   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 The patient is essentially triple-negative breast cancer.  She underwent treatment and is currently on Arimidex 1mg daily for the DCIS which was initiated 11/22/2017.    She presents to the clinic today accompanied by her  stating she has been feeling well.  She feels her is energy has gotten much better and she remains very active.  She continues on 1 iron tablet daily and does question what her iron studies show today and if she should continue this.  She is tolerating and the Arimidex well and has started taking this at night per recommendation of her PCP related to the hot flashes and she feels this has improved.  She was experiencing lower extremity discomfort and was taken off of her simvastatin approximately a week ago and states she feels that the leg pain has improved.  She continues on Caltrate twice a day and did have a DEXA scan completed in April that was normal.  She does admit to increased anxiety just prior to this appointment and her BP is slightly elevated today.  She does follow every 6 months with Dr. Mya Park and will be seeing her again in December 2017.      Echocardiogram was completed today and those results are not available for for this appointment.    Oncologic History: Patient underwent mammogram in January 2016 which revealed abnormalities of the right breast.  On 02/09/2016, a  stereotactic biopsy of the right breast lesion at the 8 o'clock position revealed grade 3 DCIS of a solid micropapillary subtype.  Estrogen receptors were positive.  Also a biopsy of the right breast lesion at the 9 o'clock position revealed a grade 2 DCIS of the solid micropapillary subtype.  Estrogen receptors were positive.  She underwent bilateral mastectomies on 3/30/16 by Dr. 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  There was also angiosarcomatous, liposarcomatous, malignant spindle cell and chondroid differentiation.  The tumor in the lower quadrant was distinct in the 2 DCIS lesions noted in the right breast.  Estrogen receptor positive at 2% with progesterone receptor negative; tumor was HER-2/renetta negative.  In terms of her DCIS, there was a 1.5 cm DCIS in the right breast as well as a smaller focus noted.  There was a sentinel lymph node that was negative. Chemotherapy was recommended.  She was evaluated by Dr. Lam with [facility] on 4/18/2016 he felt patient had a metaplastic carcinoma as well as DCIS of the right breast.  He did note that there was no consensus best treatment options for this patient, but he felt given that she likely had triple negative disease and if she had a pure ductal adenocarcinoma, he would favor treating her a triple-negative breast cancer that is greater than 0.5 cm.  He felt that dose-dense AC x 4 cycles plus Taxol weekly x 12 weeks would be ideal.  She was evaluated here by Dr. Celis with Medical Oncology on 5/3/2016 as she wanted to receive treatment closer to home.      Echocardiogram on 4/22/2016 showed an ejection fraction of 65%.      Current Chemo Regime/TX:  arimidex 1mg daily initiated 11/2017  Current Cycle: n/a  # of completed cycles: n/a      Previous treatment:  Adriamycin 60mg/m2 and Cytoxan 600mg/m2 every 2  weeks with Neulasta support 6mg SQ injection day 2 x 4 cycles; Taxol 80mg/m2 weekly x 12 weeks    Current Chemo Regime/TX:  Current Cycle:  # of completed cycles:    Previous treatment:    Past Medical History:   Diagnosis Date     Breast cancer (H)      DMII (diabetes mellitus, type 2) (H)      HLD (hyperlipidemia)      Nonhealing surgical wound        Past Surgical History:   Procedure Laterality Date     APPENDECTOMY OPEN       C VAGINAL HYSTERECTOMY       INSERT PORT VASCULAR ACCESS Left 6/2/2016    Procedure: INSERT PORT VASCULAR ACCESS;  Surgeon: Micheline Davis MD;  Location: [facility] OR     MASTECTOMY Bilateral        Family History   Problem Relation Age of Onset     Lung Cancer Father      Prostate Cancer Paternal Grandfather        Social History     Social History     Marital status:      Spouse name: Braden     Number of children: 2     Years of education: N/A     Occupational History      Retired     Social History Main Topics     Smoking status: Former Smoker     Smokeless tobacco: Never Used     Alcohol use No      Comment: 1 kamille/ night, with chemo is not drinking     Drug use: No     Sexual activity: Not on file     Other Topics Concern     Not on file     Social History Narrative       Current Outpatient Prescriptions   Medication     Pyridoxine HCl (VITAMIN B6 PO)     sertraline (ZOLOFT) 50 MG tablet     anastrozole (ARIMIDEX) 1 MG tablet     calcium-vitamin D (CALTRATE) 600-400 MG-UNIT per tablet     cyanocobalamin (VITAMIN  B-12) 1000 MCG tablet     ferrous sulfate (IRON) 325 (65 FE) MG tablet     lidocaine-prilocaine (EMLA) cream     METFORMIN HCL PO     SIMVASTATIN PO     No current facility-administered medications for this visit.      Facility-Administered Medications Ordered in Other Visits   Medication     sodium chloride (PF) 0.9% PF flush 10 mL     heparin 100 UNIT/ML injection 500 Units        Allergies   Allergen Reactions     Cats        Review Of  Systems:  Constitutional: denies fever, weight changes, chills, and night sweats.  Eyes: denies blurred or double vision  Ears/Nose/Throat: denies ear pain, nose problems, difficulty swallowing  Respiratory: denies shortness of breath, cough   Skin: denies rash, lesions  Breast/Chest wall: denies pain, lumps  Cardiovascular: denies chest pain, palpitations, edema  Gastrointestinal: denies abdominal pain, bloating, nausea, vomiting, early satiety  Genitourinary: denies difficulty with urination, blood in urine  Musculoskeletal:denies new muscle pain, bone pain  Neurologic: denies lightheadedness, headaches, numbness or tingling  Psychiatric: denies anxiety, depression  Hematologic/Lymphatic/Immunologic: denies easy bruising, easy bleeding, lumps or bumps noted  Endocrine: Denies increased thirst      Physical Exam:  BP (!) 139/94  Pulse 78  Temp 97.1  F (36.2  C) (Tympanic)  Resp 16  Wt 97.1 kg (214 lb)  SpO2 98%  BMI 35.61 kg/m2    GENERAL APPEARANCE: Healthy, alert and in no acute distress.  HEENT: Normocephalic, Sclerae anicteric. Oropharynx without ulcers, lesions, or thrush.  NECK: Supple. No asymmetry or masses, no thyromegaly.  LYMPHATICS: No palpable cervical, supraclavicular, axillary, or inguinal nodes   RESP: Lungs clear to auscultation bilaterally, respirations regular and easy  CARDIOVASCULAR: Regular rate and rhythm. Normal S1, S2; no murmur, gallop, or rub.  ABDOMEN: Soft, nontender. Bowel sounds auscultated all 4 quadrants. No palpable organomegaly or masses.  BREAST/Chest wall: no concerning lumps, masses or tenderness bilaterally  MUSCULOSKELETAL: Extremities without gross deformities noted. No edema of bilateral lower extremities.  SKIN: No suspicious lesions or rashes.  NEURO: Alert and oriented x 3.  Gait steady.  PSYCHIATRIC: Mentation and affect appear normal.  Mood appropriate.    Laboratory:    Labwork completed on 6/13/2017 at the 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  The patient is essentially triple-negative breast cancer.  She is on arimidex 1mg daily for the DCIS.  She will follow up in 3 months with a CBC, CMP, LDH and CA 2729 along with CT scan of the chest.    #2  Monitoring for cardiotoxic therapy:  ECHO completed today.  Will call her with this result.     #3  Aromatase Inhibitor Therapy:  DEXA scan completed 4/2017 was normal.  She will continue on the calcium and vitamin D.  Next DEXA scan will be completed in April 2019.    #4 Iron deficiency anemia: Resolved.  Ferritin is a 51 and a did recommend that she continue with the 1 iron tablet daily.  We will recheck her iron studies when she returns in 3 months.  In addition we will obtain a B12 and folate.    I encouraged patient to call with any questions or concerns.      Ceci Amaral  [facility]

## 2017-06-22 NOTE — NURSING NOTE
"Chief Complaint   Patient presents with     RECHECK     Follow up breast cancer       Initial BP (!) 144/94 (BP Location: Left arm, Patient Position: Chair, Cuff Size: Adult Large)  Pulse 78  Temp 97.1  F (36.2  C) (Tympanic)  Resp 16  Wt 97.1 kg (214 lb)  SpO2 98%  BMI 35.61 kg/m2 Estimated body mass index is 35.61 kg/(m^2) as calculated from the following:    Height as of 3/21/17: 1.651 m (5' 5\").    Weight as of this encounter: 97.1 kg (214 lb).  Medication Reconciliation: complete     Valerie Garibay    "

## 2017-06-22 NOTE — PATIENT INSTRUCTIONS
We would like to see you back in 3 months. Please come 1week(s) prior for lab work.  When you are in need of a refill, please call your pharmacy and they will send us a request. If you have any questions please call [phone]

## 2017-09-05 NOTE — TELEPHONE ENCOUNTER
sertraline (ZOLOFT) 50 MG tablet     Last Written Prescription Date: 2/13/17  Last Fill Quantity: 45, # refills: 3  Last Office Visit with [facility] primary care provider:  6/22/17   Next 5 appointments (look out 90 days)     Sep 21, 2017 10:30 AM CDT   (Arrive by 10:15 AM)   Return Visit with Ceci Amaral NP   [facility] [place] ([facility] [place] )    [address] [place] [address]   [phone]                   Last PHQ-9 score on record=   PHQ-9 SCORE 6/22/2017   Total Score 1

## 2017-09-21 NOTE — NURSING NOTE
"Chief Complaint   Patient presents with     RECHECK     triple negative malignant neoplasm of breast       Initial /79  Pulse 96  Temp 96.8  F (36  C) (Tympanic)  Resp 16  Ht 1.651 m (5' 5\")  Wt 97.4 kg (214 lb 12.8 oz)  SpO2 94%  BMI 35.74 kg/m2 Estimated body mass index is 35.74 kg/(m^2) as calculated from the following:    Height as of this encounter: 1.651 m (5' 5\").    Weight as of this encounter: 97.4 kg (214 lb 12.8 oz).  Medication Reconciliation: complete   Padmini Cardenas      "

## 2017-09-21 NOTE — PROGRESS NOTES
Hand hygiene performed: yes   Mask donned by caregiver: yes   Site prepped with CHG: yes   Labs drawn: no   Dressing applied using aseptic technique: yes   Patients left sided port accessed using non-coring, 22 gauge, 3/4 inch needle. Port accessed per facility protocol. Port flushed easily, blood return noted.  No signs and symptoms of infection or infiltration.  Port flushed 10 mLs Normal Saline then Heparin 5mLs of 100 unit/mL.  Needle removed, small dressing applied.  Patient discharged with no complaints.  Erlinda Tejeda RN

## 2017-09-21 NOTE — PROGRESS NOTES
Oncology Follow-up Visit:  September 21, 2017    Reason for Visit:  Patient presents with:  RECHECK: triple negative malignant neoplasm of breast     Nursing Note and documentation reviewed: yes    HPI:  This is a 66-year-old female patient who presents to the oncology/hematology clinic today in follow up of breast cancer.   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 The patient is essentially triple-negative breast cancer.  She underwent treatment and is currently on Arimidex 1mg daily for the DCIS which was initiated 11/22/2017.      Patient presents to the clinic today accompanied by her .  She does state she's been having 2-3 weeks of nasal congestion and cough and denies any fevers.  She denies any shortness of breath.  The cough is productive at times of a green sputum.  She does complain of occasional sinus headache.  She denies any new areas of pain and does complain of occasional hot flashes that seem to occur with activity.  She is on calcium and vitamin D twice daily and she does continue on 1 iron tablet daily and questions whether or not she can discontinue this today.  She follows with Dr. Mya Romero as her PCP.    [place] do plan [place] mid[place] early November.    Oncologic History: Patient underwent mammogram in January 2016 which revealed abnormalities of the right breast.  On 02/09/2016, a stereotactic biopsy of the right breast lesion at the 8 o'clock position revealed grade 3 DCIS of a solid micropapillary subtype.  Estrogen receptors were positive.  Also a biopsy of the right breast lesion at the 9 o'clock position revealed a grade 2 DCIS of the solid micropapillary subtype.  Estrogen receptors were positive.  She underwent bilateral mastectomies on 3/30/16 by   Sara [facility] at [facility] with pathology revealing a 2 cm metaplastic carcinoma with epithelial mesenchymal components including adenocarcinoma.  There was also angiosarcomatous, liposarcomatous, malignant spindle cell and chondroid differentiation.  The tumor in the lower quadrant was distinct in the 2 DCIS lesions noted in the right breast.  Estrogen receptor positive at 2% with progesterone receptor negative; tumor was HER-2/renetta negative.  In terms of her DCIS, there was a 1.5 cm DCIS in the right breast as well as a smaller focus noted.  There was a sentinel lymph node that was negative. Chemotherapy was recommended.  She was evaluated by Dr. Lam with [facility] on 4/18/2016 he felt patient had a metaplastic carcinoma as well as DCIS of the right breast.  He did note that there was no consensus best treatment options for this patient, but he felt given that she likely had triple negative disease and if she had a pure ductal adenocarcinoma, he would favor treating her a triple-negative breast cancer that is greater than 0.5 cm.  He felt that dose-dense AC x 4 cycles plus Taxol weekly x 12 weeks would be ideal.  She was evaluated here by Dr. Celis with Medical Oncology on 5/3/2016 as she wanted to receive treatment closer to home.       Current Chemo Regime/TX:  arimidex 1mg daily initiated 11/2017  Current Cycle: n/a  # of completed cycles: n/a      Previous treatment:  Adriamycin 60mg/m2 and Cytoxan 600mg/m2 every 2 weeks with Neulasta support 6mg SQ injection day 2 x 4 cycles; Taxol 80mg/m2 weekly x 12 weeks    Past Medical History:   Diagnosis Date     Breast cancer (H)      DMII (diabetes mellitus, type 2) (H)      HLD (hyperlipidemia)      Nonhealing surgical wound        Past Surgical History:   Procedure Laterality Date     APPENDECTOMY OPEN       C VAGINAL HYSTERECTOMY       INSERT PORT VASCULAR ACCESS Left 6/2/2016    Procedure: INSERT PORT VASCULAR  ACCESS;  Surgeon: Micheline Davis MD;  Location: [facility] OR     MASTECTOMY Bilateral        Family History   Problem Relation Age of Onset     Lung Cancer Father      Prostate Cancer Paternal Grandfather        Social History     Social History     Marital status:      Spouse name: Braden     Number of children: 2     Years of education: N/A     Occupational History      Retired     Social History Main Topics     Smoking status: Former Smoker     Smokeless tobacco: Never Used     Alcohol use No      Comment: 1 kamille/ night, with chemo is not drinking     Drug use: No     Sexual activity: Not on file     Other Topics Concern     Not on file     Social History Narrative       Current Outpatient Prescriptions   Medication     sertraline (ZOLOFT) 50 MG tablet     Pyridoxine HCl (VITAMIN B6 PO)     anastrozole (ARIMIDEX) 1 MG tablet     calcium-vitamin D (CALTRATE) 600-400 MG-UNIT per tablet     cyanocobalamin (VITAMIN  B-12) 1000 MCG tablet     ferrous sulfate (IRON) 325 (65 FE) MG tablet     lidocaine-prilocaine (EMLA) cream     METFORMIN HCL PO     SIMVASTATIN PO     No current facility-administered medications for this visit.      Facility-Administered Medications Ordered in Other Visits   Medication     sodium chloride (PF) 0.9% PF flush 10 mL     heparin 100 UNIT/ML injection 500 Units        Allergies   Allergen Reactions     Cats        Review Of Systems:  Constitutional: denies fever, weight changes, chills, and night sweats.  Eyes: denies blurred or double vision  Ears/Nose/Throat: see HPI  Respiratory: denies shortness of breath  Skin: denies rash, lesions  Breast/Chest wall: denies pain, lumps   Cardiovascular: denies chest pain, palpitations, edema  Gastrointestinal: denies abdominal pain, bloating, nausea, vomiting, early satiety  Genitourinary: denies difficulty with urination, blood in urine  Musculoskeletal:denies new muscle pain, bone pain  Neurologic: denies lightheadedness,  "headaches, numbness or tingling  Psychiatric: denies anxiety, depression  Hematologic/Lymphatic/Immunologic: denies easy bruising, easy bleeding, lumps or bumps noted  Endocrine: Denies increased thirst    Physical Exam:  /79  Pulse 96  Temp 96.8  F (36  C) (Tympanic)  Resp 16  Ht 1.651 m (5' 5\")  Wt 97.4 kg (214 lb 12.8 oz)  SpO2 94%  BMI 35.74 kg/m2    GENERAL APPEARANCE: Healthy, alert and in no acute distress.  HEENT: Normocephalic, Sclerae anicteric. Oropharynx without ulcers, lesions, or thrush.  NECK: Supple. No asymmetry or masses, no thyromegaly.  LYMPHATICS: No palpable cervical, supraclavicular, axillary, or inguinal nodes   RESP: Lungs clear to auscultation bilaterally, respirations regular and easy  CARDIOVASCULAR: Regular rate and rhythm. Normal S1, S2; no murmur, gallop, or rub.  ABDOMEN: Soft, nontender. Bowel sounds auscultated all 4 quadrants. No palpable organomegaly or masses.  BREAST/Chest wall: no lumps, masses or tenderness bilaterally  MUSCULOSKELETAL: Extremities without gross deformities noted. No edema of bilateral lower extremities.  SKIN: No suspicious lesions or rashes.  NEURO: Alert and oriented x 3.  Gait steady.  PSYCHIATRIC: Mentation and affect appear normal.  Mood appropriate.    Laboratory:  Completed at [facility] on 9/14/2017:  CBC shows a platelet count of 143,000 otherwise essentially within normal limits  CMP: BUN of 20, creatinine 0.9, GFR 66, ALT 53, AST 31, alkaline phosphatase 83, sodium 140, potassium 4.1  B12 greater than 2000, folate 15.8  Iron 73/TIBC 358/UIBC 285/iron saturation 20  Ferritin 73     CA 27.29 16    Imaging Studies:      Completed at [facility] on 9/14/2017:  CT scan of the chest: New nodular pleural parenchymal density on the right.  Metastatic disease does need to be considered given the patient's clinical history.  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  The patient is essentially triple-negative breast cancer.  She is on arimidex 1mg daily for the DCIS.  She will follow up in 2 months with a CBC, CMP, LDH and CA 27.29 along with CT scan of the chest.  Labwork will be completed at [facility] one week prior to her appointment    #2  Aromatase Inhibitor Therapy:  DEXA scan completed 4/2017 was normal.  She will continue on the calcium and vitamin D.  Next DEXA scan will be completed in April 2019.     #4 Iron deficiency anemia: Resolved on 1 iron tablet daily.  She will discontinue and we will recheck in 2 months.    #4  Sinusitis:  Will treat with a 10 day course of Levaquin and I asked that she follow up with her PCP if any worsening or no improvement.    #5  Lung nodule:  Discussion with Dr. Celis and also Dr. Maciel with radiology in regards to recommendations.  Per Dr. Maciel, the area may be amenable to biopsy.  Discussed alternative with patient of waiting 2 months and rechecking this.  She is in favor of waiting 2 months.  We also discussed that this scan was completed while she has been sick with the upper respiratory symptoms.  Plan to repeat a CT scan of the chest here in 2 months.    I encouraged patient to call with any questions or concerns.      Ceci Amaral  FNP-BC

## 2017-09-21 NOTE — MR AVS SNAPSHOT
After Visit Summary   9/21/2017    Ana Simmons    MRN: [id number]           Patient Information     Date Of Birth          1950        Visit Information        Provider Department      9/21/2017 10:00 AM [facility] [address] [facility]        Today's Diagnoses     Malignant neoplasm of right female breast, unspecified estrogen receptor status, unspecified site of breast (H)    -  1      Care Instructions    If you have any questions or concerns please do not hesitate to call us! [phone]          Follow-ups after your visit        Your next 10 appointments already scheduled     Sep 21, 2017 10:30 AM CDT   (Arrive by 10:15 AM)   Return Visit with Ceci Amaral NP   [facility] [place] ([facility] [place] )    [address]  [place] [id number] [phone]              Who to contact     If you have questions or need follow up information about today's clinic visit or your schedule please contact [facility] directly at [phone].  Normal or non-critical lab and imaging results will be communicated to you by FLENShart, letter or phone within 4 business days after the clinic has received the results. If you do not hear from us within 7 days, please contact the clinic through FLENShart or phone. If you have a critical or abnormal lab result, we will notify you by phone as soon as possible.  Submit refill requests through Zoom Telephonics or call your pharmacy and they will forward the refill request to us. Please allow 3 business days for your refill to be completed.          Additional Information About Your Visit        FLENShart Information     [employer] Tele[employer] gives you secure access to your electronic health record. If you see a primary care provider, you can also send messages to your care team and make appointments. If you have questions, please call your primary care clinic.  If you do not have a primary care provider, please call [phone] and they  "will assist you.        Care EveryWhere ID     This is your Care EveryWhere ID. This could be used by other organizations to access your [place] medical records  [id number]        Your Vitals Were     Pulse Temperature Respirations Height Pulse Oximetry BMI (Body Mass Index)    96 96.8  F (36  C) (Tympanic) 16 1.651 m (5' 5\") 94% 35.74 kg/m2       Blood Pressure from Last 3 Encounters:   09/21/17 135/79   06/22/17 (!) 139/94   06/22/17 (!) 139/94    Weight from Last 3 Encounters:   09/21/17 97.4 kg (214 lb 12.8 oz)   06/22/17 97.1 kg (214 lb)   06/22/17 97.1 kg (214 lb)              Today, you had the following     No orders found for display       Primary Care Provider Office Phone # Fax #    Mya SMITH Selinbartolome [phone]       [facility] [address]   [facility] [id number]        Equal Access to Services     [facility] HospitalFIORDALIZA : Hadii janette garcía hadasho Soomaali, waaxda luqadaha, qaybta kaalmada adeegyada, candy nieves . So [facility] [phone].    ATENCIÓN: Si habla español, tiene a stiles disposición servicios gratuitos de asistencia lingüística. Llame al [phone].    We comply with applicable federal civil rights laws and [place] laws. We do not discriminate on the basis of race, color, national origin, age, disability sex, sexual orientation or gender identity.            Thank you!     Thank you for choosing [facility]3] [place]  for your care. Our goal is always to provide you with excellent care. Hearing back from our patients is one way we can continue to improve our services. Please take a few minutes to complete the written survey that you may receive in the mail after your visit with us. Thank you!             Your Updated Medication List - Protect others around you: Learn how to safely use, store and throw away your medicines at www.disposemymeds.org.          This list is accurate as of: 9/21/17 10:06 AM.  Always use your most recent med list.       "             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       calcium-vitamin D 600-400 MG-UNIT per tablet    CALTRATE    90 tablet    Take 1 tablet by mouth 2 times daily    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       cyanocobalamin 1000 MCG tablet    vitamin  B-12     Take 250 mcg by mouth daily    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       ferrous sulfate 325 (65 FE) MG tablet    IRON     Take 325 mg by mouth 2 times daily    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       lidocaine-prilocaine cream    EMLA    30 g    Apply 30 g topically as needed for moderate pain    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       METFORMIN HCL PO      Take 500 mg by mouth daily (with breakfast)        sertraline 50 MG tablet    ZOLOFT    45 tablet    TAKE 1 AND 1/2 TABLETS BY MOUTH ONCE DAILY TO EQUAL 75MG DAILY    Malignant neoplasm of right female breast (H), Anxiety       SIMVASTATIN PO      Take 20 mg by mouth At Bedtime        VITAMIN B6 PO      Take 50 mg by mouth daily    Anemia, unspecified type, Malignant neoplasm of lower-outer quadrant of right female breast (H), DCIS (ductal carcinoma in situ), right, Postmenopausal status, Aromatase inhibitor use, Encounter for monitoring cardiotoxic drug therapy

## 2017-09-21 NOTE — PATIENT INSTRUCTIONS
We would like to see you back in 2 months (on a Wednesday).  We will fax orders for you to have your labs done 1 week prior. We will plan the CT scan of your chest the same day of your appointment with me.  I would like you to start an antibiotic. Your prescription has been sent to:   [facility] - [place], [place] - [address]  [address]  [place] [address]  Phone: [phone] Fax: [phone]   When you are in need of a refill, please call your pharmacy and they will send us a request. If you have any questions please call [phone]  Other instructions:  Stop the iron tablet and we will recheck in 2 months

## 2017-09-21 NOTE — MR AVS SNAPSHOT
After Visit Summary   9/21/2017    Ana Simmons    MRN: [id number]           Patient Information     Date Of Birth          1950        Visit Information        Provider Department      9/21/2017 10:30 AM Ceci Amaral NP [facility]        Today's Diagnoses     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    -  1    Lung nodule        Acute sinusitis, recurrence not specified, unspecified location        Ductal carcinoma in situ (DCIS) of right breast        Iron deficiency anemia, unspecified iron deficiency anemia type        Aromatase inhibitor use          Care Instructions    We would like to see you back in 2 months (on a Wednesday).  We will fax orders for you to have your labs done 1 week prior. We will plan the CT scan of your chest the same day of your appointment with me.  I would like you to start an antibiotic. Your prescription has been sent to:   [facility] - [place], [place] - [address]  [address]  [place] [address]  Phone: [phone] Fax: [phone]   When you are in need of a refill, please call your pharmacy and they will send us a request. If you have any questions please call [phone]  Other instructions:  Stop the iron tablet and we will recheck in 2 months          Follow-ups after your visit        Your next 10 appointments already scheduled     Nov 22, 2017 10:30 AM CST   Radiology with HI CT SCAN   HI CT Scan ([facility] )    [address] [id number] [phone]            Nov 22, 2017 11:30 AM CST   (Arrive by 11:15 AM)   Return Visit with Ceci Amaral NP   [facility] ([facility] )    [address]  [facility] [address]   [phone]            Nov 22, 2017 12:00 PM CST   Level O with HC INF RM 3312   [facility] )    [address]  [facility] [address]   [phone]              [facility]  tests that were ordered for you today     Open Future Orders        Priority Expected Expires Ordered    CBC with platelets differential Routine 11/22/2017 12/22/2017 9/21/2017    Comprehensive metabolic panel Routine 11/22/2017 12/22/2017 9/21/2017    Lactate Dehydrogenase Routine 11/22/2017 12/22/2017 9/21/2017    Ca27.29  breast tumor marker Routine 11/22/2017 12/22/2017 9/21/2017    Ferritin Routine 11/22/2017 12/22/2017 9/21/2017    Iron and iron binding capacity Routine 11/22/2017 12/22/2017 9/21/2017            Who to contact     If you have questions or need follow up information about today's clinic visit or your schedule please contact [facility] directly at [phone].  Normal or non-critical lab and imaging results will be communicated to you by MyChart, letter or phone within 4 business days after the clinic has received the results. If you do not hear from us within 7 days, please contact the clinic through MyChart or phone. If you have a critical or abnormal lab result, we will notify you by phone as soon as possible.  Submit refill requests through Progeniq or call your pharmacy and they will forward the refill request to us. Please allow 3 business days for your refill to be completed.          Additional Information About Your Visit        Reddithart Information     Progeniq gives you secure access to your electronic health record. If you see a primary care provider, you can also send messages to your care team and make appointments. If you have questions, please call your primary care clinic.  If you do not have a primary care provider, please call [phone] and they will assist you.        Care EveryWhere ID     This is your Care EveryWhere ID. This could be used by other organizations to access your [facility] medical records  [id number]        Your Vitals Were     Pulse Temperature Respirations Height Pulse Oximetry BMI (Body Mass Index)    96 96.8  F (36  C) (Tympanic) 16 1.651 m  "(5' 5\") 94% 35.74 kg/m2       Blood Pressure from Last 3 Encounters:   09/21/17 135/79   09/21/17 135/79   06/22/17 (!) 139/94    Weight from Last 3 Encounters:   09/21/17 97.4 kg (214 lb 12.8 oz)   09/21/17 97.4 kg (214 lb 12.8 oz)   06/22/17 97.1 kg (214 lb)                 Today's Medication Changes          These changes are accurate as of: 9/21/17  2:37 PM.  If you have any questions, ask your nurse or doctor.               Start taking these medicines.        Dose/Directions    levofloxacin 500 MG tablet   Commonly known as:  LEVAQUIN   Used for:  Acute sinusitis, recurrence not specified, unspecified location, Lung nodule, 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   Started by:  Ceci Amaral NP        Dose:  500 mg   Take 1 tablet (500 mg) by mouth daily   Quantity:  10 tablet   Refills:  0         These medicines have changed or have updated prescriptions.        Dose/Directions    ferrous sulfate 325 (65 FE) MG tablet   Commonly known as:  IRON   This may have changed:  when to take this   Used for:  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, Lung nodule   Changed by:  Ceci Amaral NP        Dose:  325 mg   Take 1 tablet (325 mg) by mouth daily (with breakfast)   Quantity:  100 tablet   Refills:  0            Where to get your medicines      These medications were sent to [facility] [place] [address]   [address] [facility] [address]     Phone:  [phone]     levofloxacin 500 MG tablet                Primary Care Provider Office Phone # Fax #    [facility] [phone]       [facility] [address]   [facility] [address]        Equal Access to Services     JOSE MARKS AH: Rocio Meredith, momo johnson, starr zavala, candy [facility] [phone].    ATENCIÓN: Si habla español, tiene a stiles disposición servicios gratuitos de asistencia " lingüística. Clint al [phone].    We comply with applicable federal civil rights laws and [place] laws. We do not discriminate on the basis of race, color, national origin, age, disability sex, sexual orientation or gender identity.            Thank you!     Thank you for choosing [facility]  for your care. Our goal is always to provide you with excellent care. Hearing back from our patients is one way we can continue to improve our services. Please take a few minutes to complete the written survey that you may receive in the mail after your visit with us. Thank you!             Your Updated Medication List - Protect others around you: Learn how to safely use, store and throw away your medicines at www.disposemymeds.org.          This list is accurate as of: 9/21/17  2:37 PM.  Always use your most recent med list.                   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       calcium-vitamin D 600-400 MG-UNIT per tablet    CALTRATE    90 tablet    Take 1 tablet by mouth 2 times daily    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       cyanocobalamin 1000 MCG tablet    vitamin  B-12     Take 250 mcg by mouth daily    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       ferrous sulfate 325 (65 FE) MG tablet    IRON    100 tablet    Take 1 tablet (325 mg) by mouth daily (with breakfast)    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, Lung nodule       levofloxacin 500 MG tablet    LEVAQUIN    10 tablet    Take 1 tablet (500 mg) by mouth daily    Acute sinusitis, recurrence not specified, unspecified location, Lung nodule, 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       lidocaine-prilocaine cream    EMLA    30 g    Apply 30 g topically as needed for moderate pain    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        METFORMIN HCL PO      Take 500 mg by mouth daily (with breakfast)        sertraline 50 MG tablet    ZOLOFT    45 tablet    TAKE 1 AND 1/2 TABLETS BY MOUTH ONCE DAILY TO EQUAL 75MG DAILY    Malignant neoplasm of right female breast (H), Anxiety       SIMVASTATIN PO      Take 20 mg by mouth At Bedtime        VITAMIN B6 PO      Take 50 mg by mouth daily    Anemia, unspecified type, Malignant neoplasm of lower-outer quadrant of right female breast (H), DCIS (ductal carcinoma in situ), right, Postmenopausal status, Aromatase inhibitor use, Encounter for monitoring cardiotoxic drug therapy

## 2017-11-22 NOTE — PATIENT INSTRUCTIONS
We will plan a biopsy and call you with this appointment.      Other instructions: stay off of iron tablets

## 2017-11-22 NOTE — MR AVS SNAPSHOT
After Visit Summary   11/22/2017    Ana Simmons    MRN: [id number]           Patient Information     Date Of Birth          1950        Visit Information        Provider Department      11/22/2017 11:30 AM Ceci Amaral NP [facility]        Today's Diagnoses     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    -  1    Ductal carcinoma in situ (DCIS) of right breast        Lung mass        Anemia, unspecified type        Aromatase inhibitor use          Care Instructions    We will plan a biopsy and call you with this appointment.      Other instructions: stay off of iron tablets          Follow-ups after your visit        Your next 10 appointments already scheduled     Nov 28, 2017  9:00 AM CST   CT LUNG MEDIASTINUM BIOPSY with HICT1, HIIRRAD, HIXRRN   HI CT SCAN ([facility] )    [address] [place] [id number] [phone]           Plan for an adult to drive you home and stay with you until morning.  Tell your doctor in advance:   If you have any allergies.   If you are breastfeeding or there s any chance you are pregnant.  Please bring any scans or X-rays taken at other hospitals, if they may be helpful. Also bring a list of your medicines, including vitamins, minerals and over-the-counter drugs. It is safest to leave valuables at home.  If you take blood thinners, you may need to stop taking them a few days before treatment. Talk to your doctor before stopping these medicines. You will need a blood test the morning of your exam.   Stop taking Coumadin (warfarin) 5 days before treatment. Restart the day after treatment.   If you take aspirin, you may need to stop taking it 3 days before treatment.   If you take Plavix, Ticlid, Pletal or Persantine, you may need to stop taking them 5 days before your scan.  If you have diabetes:   If you take insulin, call your diabetes care team. Ask if you should adjust your insulin before this test.   If  your kidney function is normal, continue taking your metformin (Avandamet, Glucophage, Glucovance, Metaglip) on the day of your exam.   If your kidney function is abnormal, wait 48 hours before restarting this medicine.  If you have any questions, please call the imaging department where you will have your exam.  The day before your exam: Drink extra fluids at least six 8-ounce glasses (unless your doctor tells you to restrict fluids). The day of your exam: No eating or drinking for 4 hours before your test. You may take medicine with small sips of water.              Who to contact     If you have questions or need follow up information about today's clinic visit or your schedule please contact [facility] directly at [phone].  Normal or non-critical lab and imaging results will be communicated to you by AppVaulthart, letter or phone within 4 business days after the clinic has received the results. If you do not hear from us within 7 days, please contact the clinic through AppVaulthart or phone. If you have a critical or abnormal lab result, we will notify you by phone as soon as possible.  Submit refill requests through Splice or call your pharmacy and they will forward the refill request to us. Please allow 3 business days for your refill to be completed.          Additional Information About Your Visit        Splice Information     Splice gives you secure access to your electronic health record. If you see a primary care provider, you can also send messages to your care team and make appointments. If you have questions, please call your primary care clinic.  If you do not have a primary care provider, please call [phone] and they will assist you.        Care EveryWhere ID     This is your Care EveryWhere ID. This could be used by other organizations to access your [facility] medical records  [id number]        Your Vitals Were     Pulse Temperature Respirations Height Pulse Oximetry BMI (Body Mass  "Index)    80 97.4  F (36.3  C) (Tympanic) 18 1.651 m (5' 5\") 96% 35.28 kg/m2       Blood Pressure from Last 3 Encounters:   11/22/17 142/87   09/21/17 135/79   09/21/17 135/79    Weight from Last 3 Encounters:   11/22/17 96.2 kg (212 lb)   09/21/17 97.4 kg (214 lb 12.8 oz)   09/21/17 97.4 kg (214 lb 12.8 oz)                 Where to get your medicines      These medications were sent to [facility] - [place], [place] - [address]  [address], [facility] [address]     Phone:  [phone]     anastrozole 1 MG tablet          Primary Care Provider Office Phone # Fax #    Mya Park [phone]       [facility] [address]   [place] [facility] [address]        Equal Access to Services     ASHLEY [facility] AH: Hadii janette ku hadasho Soomaali, waaxda luqadaha, qaybta kaalmada adeegyada, waxay juan manuelin minerva nieves . So [facility] [phone].    ATENCIÓN: Si habla español, tiene a stiles disposición servicios gratuitos de asistencia lingüística. Llame al [phone].    We comply with applicable federal civil rights laws and [place] laws. We do not discriminate on the basis of race, color, national origin, age, disability, sex, sexual orientation, or gender identity.            Thank you!     Thank you for choosing [facility]  for your care. Our goal is always to provide you with excellent care. Hearing back from our patients is one way we can continue to improve our services. Please take a few minutes to complete the written survey that you may receive in the mail after your visit with us. Thank you!             Your Updated Medication List - Protect others around you: Learn how to safely use, store and throw away your medicines at www.disposemymeds.org.          This list is accurate as of: 11/22/17 11:59 PM.  Always use your most recent med list.                   Brand Name Dispense Instructions for use Diagnosis    anastrozole 1 MG tablet    ARIMIDEX    90 " tablet    Take 1 tablet (1 mg) by mouth daily    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, Ductal carcinoma in situ (DCIS) of right breast, Lung mass       calcium-vitamin D 600-400 MG-UNIT per tablet    CALTRATE    90 tablet    Take 1 tablet by mouth 2 times daily    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       cyanocobalamin 1000 MCG tablet    vitamin  B-12     Take 250 mcg by mouth daily    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       ferrous sulfate 325 (65 FE) MG tablet    IRON    100 tablet    Take 1 tablet (325 mg) by mouth daily (with breakfast)    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, Lung nodule       lidocaine-prilocaine cream    EMLA    30 g    Apply 30 g topically as needed for moderate pain    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       METFORMIN HCL PO      Take 500 mg by mouth daily (with breakfast)        sertraline 50 MG tablet    ZOLOFT    45 tablet    TAKE 1 AND 1/2 TABLETS BY MOUTH ONCE DAILY TO EQUAL 75MG DAILY    Malignant neoplasm of right female breast (H), Anxiety       SIMVASTATIN PO      Take 20 mg by mouth At Bedtime        VITAMIN B6 PO      Take 50 mg by mouth daily    Anemia, unspecified type, Malignant neoplasm of lower-outer quadrant of right female breast (H), DCIS (ductal carcinoma in situ), right, Postmenopausal status, Aromatase inhibitor use, Encounter for monitoring cardiotoxic drug therapy

## 2017-11-22 NOTE — NURSING NOTE
"Chief Complaint   Patient presents with     Cancer/recheck     2 month follow up/ triple neg breast cancer       Initial BP (!) 155/98 (BP Location: Left arm, Cuff Size: Adult Large)  Pulse 80  Temp 97.4  F (36.3  C) (Tympanic)  Resp 18  Ht 1.651 m (5' 5\")  Wt 96.2 kg (212 lb)  SpO2 96%  BMI 35.28 kg/m2 Estimated body mass index is 35.28 kg/(m^2) as calculated from the following:    Height as of this encounter: 1.651 m (5' 5\").    Weight as of this encounter: 96.2 kg (212 lb).  Medication Reconciliation: sandie Arguelles      "

## 2017-11-28 NOTE — DISCHARGE INSTRUCTIONS
[facility]  Interventional Radiology  Patient Instructions Following Biopsy    AFTER YOU GO HOME:    If you were given sedation DO NOT drive or operate machinery at home or at work for at least 24 hours    DO relax and take it easy for 48 hours, no strenuous activity for 24 hours    DO drink plenty of fluids    DO resume your regular diet, unless otherwise instructed by your Primary Physician    Keep the dressing dry and in place for 24 hours to prevent the site from re-opening and bleeding    DO NOT SMOKE FOR AT LEAST 24 HOURS. If you have been given any mediations that were to help you relax or sedate youd uring your procedure    DO NOT drink alcoholic beverages the day of your procedure    DO NOT take Aspirin or Ibuprofen for 48 hours    DO NOT do any strenuous exercise or lifting (>10 lbs) for at least 7 days following your procedure    DO NOT take a bath or shower for at least 12 hours following your procedure    DO NOT make any important or legal decisions for 24 hours following your procedure    There should be minimum drainage from the biopsy site    CALL THE PHYSICIAN IF:    You start bleeding from procedure site. If you do start to bleed from that site, lie down flat and hold pressure on the site for a minimum of 10 minutes. Your physician will tell you if you need to return to the hospital    You develop nausea or vomiting    You have excessive swelling, redness, or tenderness at the site    You have drainage that is green, yellow, thick white, or has a bad odor    You experience severe pain    You develop hives or a rash or unexplained itching    You develop shortness of breath    You develop a temperature of 101 degrees F or greater    {Call Physician IF Additional Adds:[id number]}        [facility] INTERVENTIONAL RADIOLOGY DEPARTMENT:    Procedure Physician Dr. Stout Date of Procedure: 11/27/2017                                 I have received and understand my  discharge instructions and I have all of my personal belongings.      __________________________________________________      Patient/Significant Other's Signature     Relationship          After Lung Biopsy procedure:    During this test or treatment, a needle will sometimes enter the lung. This can cause the lung to collapse (called a pneumothorax). Symptoms include severe chest pain, breathing problems or increased blood in your sputum (phlegm). Call 911 or go to the emergency room.

## 2017-11-28 NOTE — IP AVS SNAPSHOT
MRN:[id number]                      After Visit Summary   11/28/2017    Ana Simmons    MRN: [id number]           Visit Information        Provider Department      11/28/2017  9:00 AM HIXRRN; HIIRRAD; HICT1 [facility] CT SCAN           Review of your medicines      UNREVIEWED medicines. 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        Dose:  1 tablet   Take 1 tablet by mouth 2 times daily   Quantity:  90 tablet   Refills:  3       cyanocobalamin 1000 MCG tablet   Commonly known as:  vitamin  B-12   Used for:  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        Dose:  250 mcg   Take 250 mcg by mouth daily   Refills:  0       ferrous sulfate 325 (65 FE) MG tablet   Commonly known as:  IRON   Used for:  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, Lung nodule        Dose:  325 mg   Take 1 tablet (325 mg) by mouth daily (with breakfast)   Quantity:  100 tablet   Refills:  0       lidocaine-prilocaine cream   Commonly known as:  EMLA   Used for:  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        Dose:  30 g   Apply 30 g topically as needed for moderate pain   Quantity:  30 g   Refills:  3       METFORMIN HCL PO        Dose:  500 mg   Take 500 mg by mouth daily (with breakfast)   Refills:  0       sertraline 50 MG tablet   Commonly known as:  ZOLOFT   Used for:  Malignant neoplasm of right female breast (H), Anxiety        TAKE 1 AND 1/2 TABLETS BY MOUTH ONCE DAILY TO EQUAL 75MG DAILY   Quantity:  45 tablet   Refills:  2       SIMVASTATIN PO        Dose:  20 mg    Take 20 mg by mouth At Bedtime   Refills:  0       VITAMIN B6 PO   Used for:  Anemia, unspecified type, Malignant neoplasm of lower-outer quadrant of right female breast (H), DCIS (ductal carcinoma in situ), right, Postmenopausal status, Aromatase inhibitor use, Encounter for monitoring cardiotoxic drug therapy        Dose:  50 mg   Take 50 mg by mouth daily   Refills:  0                Protect others around you: Learn how to safely use, store and throw away your medicines at www.disposemymeds.org.         Follow-ups after your visit        Your next 10 appointments already scheduled     Dec 01, 2017 11:00 AM CST   (Arrive by 10:45 AM)   Return Visit with Ceci Amaral NP   [facility] [place] ([facility] [place] )    [address]  [place] [address]   [phone]               Care Instructions        Further instructions from your care team       [facility]  Interventional Radiology  Patient Instructions Following Biopsy    AFTER YOU GO HOME:    If you were given sedation DO NOT drive or operate machinery at home or at work for at least 24 hours    DO relax and take it easy for 48 hours, no strenuous activity for 24 hours    DO drink plenty of fluids    DO resume your regular diet, unless otherwise instructed by your Primary Physician    Keep the dressing dry and in place for 24 hours to prevent the site from re-opening and bleeding    DO NOT SMOKE FOR AT LEAST 24 HOURS. If you have been given any mediations that were to help you relax or sedate youd uring your procedure    DO NOT drink alcoholic beverages the day of your procedure    DO NOT take Aspirin or Ibuprofen for 48 hours    DO NOT do any strenuous exercise or lifting (>10 lbs) for at least 7 days following your procedure    DO NOT take a bath or shower for at least 12 hours following your procedure    DO NOT make any important or legal decisions for 24 hours following your  procedure    There should be minimum drainage from the biopsy site    CALL THE PHYSICIAN IF:    You start bleeding from procedure site. If you do start to bleed from that site, lie down flat and hold pressure on the site for a minimum of 10 minutes. Your physician will tell you if you need to return to the hospital    You develop nausea or vomiting    You have excessive swelling, redness, or tenderness at the site    You have drainage that is green, yellow, thick white, or has a bad odor    You experience severe pain    You develop hives or a rash or unexplained itching    You develop shortness of breath    You develop a temperature of 101 degrees F or greater    {Call Physician IF Additional Adds:[id number]}        [facility] INTERVENTIONAL RADIOLOGY DEPARTMENT:    Procedure Physician Dr. Stout Date of Procedure: 11/27/2017                                 I have received and understand my discharge instructions and I have all of my personal belongings.      __________________________________________________      Patient/Significant Other's Signature     Relationship          After Lung Biopsy procedure:    During this test or treatment, a needle will sometimes enter the lung. This can cause the lung to collapse (called a pneumothorax). Symptoms include severe chest pain, breathing problems or increased blood in your sputum (phlegm). Call 911 or go to the emergency room.        Additional Information About Your Visit        MyChart Information     [facility] gives you secure access to your electronic health record. If you see a primary care provider, you can also send messages to your care team and make appointments. If you have questions, please call your primary care clinic.  If you do not have a primary care provider, please call [phone] and they will assist you.        Care EveryWhere ID     This is your Care EveryWhere ID. This could be used by other organizations to access your [facility]  records  [id number]        Your Vitals Were     Blood Pressure Respirations Pulse Oximetry             147/91 (BP Location: Left arm) 18 98%          Primary Care Provider Office Phone # Fax #    [facility] [phone]      Equal Access to Services     TANAASHLEY SELINA : Rocio garcía hadasho Soomaali, waaxda luqadaha, qaybta kaalmada adeegyada, candy mariaa montsealireza campbellcuong godoyjacqueline nieves . So [facility] [phone].    ATENCIÓN: Si habla español, tiene a stiles disposición servicios gratuitos de asistencia lingüística. Clint al [phone].    We comply with applicable federal civil rights laws and [place] laws. We do not discriminate on the basis of race, color, national origin, age, disability, sex, sexual orientation, or gender identity.            Thank you!     Thank you for choosing [facility] for your care. Our goal is always to provide you with excellent care. Hearing back from our patients is one way we can continue to improve our services. Please take a few minutes to complete the written survey that you may receive in the mail after you visit with us. Thank you!             Medication List: This is a list of all your medications and when to take them. Check marks below indicate your daily home schedule. Keep this list as a reference.      Medications           Morning Afternoon Evening Bedtime As Needed    anastrozole 1 MG tablet   Commonly known as:  ARIMIDEX   Take 1 tablet (1 mg) by mouth daily                                calcium-vitamin D 600-400 MG-UNIT per tablet   Commonly known as:  CALTRATE   Take 1 tablet by mouth 2 times daily                                cyanocobalamin 1000 MCG tablet   Commonly known as:  vitamin  B-12   Take 250 mcg by mouth daily                                ferrous sulfate 325 (65 FE) MG tablet   Commonly known as:  IRON   Take 1 tablet (325 mg) by mouth daily (with breakfast)                                lidocaine-prilocaine cream   Commonly known as:   EMLA   Apply 30 g topically as needed for moderate pain                                METFORMIN HCL PO   Take 500 mg by mouth daily (with breakfast)                                sertraline 50 MG tablet   Commonly known as:  ZOLOFT   TAKE 1 AND 1/2 TABLETS BY MOUTH ONCE DAILY TO EQUAL 75MG DAILY                                SIMVASTATIN PO   Take 20 mg by mouth At Bedtime                                VITAMIN B6 PO   Take 50 mg by mouth daily

## 2017-11-28 NOTE — PROGRESS NOTES
Patient tolerated right lung biopsy well.  Did not require pain medication.  Lidocaine given prior to procedure.  Patient currently denies pain.  Post procedural xray completed.  Family presently with patient.  Vital signs as documented.

## 2017-11-28 NOTE — PROGRESS NOTES
Interventional Radiology Procedure Notes    Procedure: right lung biopsy    Radiologist:Dr Stout    Time Out: Prior to the start of the procedure and with procedural staff participation, I verbally confirmed the patient s identity using two indicators, relevant allergies, that the procedure was appropriate and matched the consent or emergent situation, and that the correct equipment/implants were available. Immediately prior to starting the procedure I conducted the Time Out with the procedural staff and re-confirmed the patient s name, procedure, and site/side. (The Joint Commission universal protocol was followed.)  Yes    Sedation:None. Local Anesthestic used  No sedation    Estimated Blood Loss: None     Complications: no complications    Condition: Stable    Comments: See dictated procedure note for full details     Gloria Lowe RN

## 2017-11-28 NOTE — IP AVS SNAPSHOT
HI CT SCAN    [address] [address]    Phone:  [phone]    Fax:  [phone]                                       After Visit Summary   11/28/2017    Ana Simmons    MRN: [id number]           After Visit Summary Signature Page     I have received my discharge instructions, and my questions have been answered. I have discussed any challenges I see with this plan with the nurse or doctor.    ..........................................................................................................................................  Patient/Patient Representative Signature      ..........................................................................................................................................  Patient Representative Print Name and Relationship to Patient    ..................................................               ................................................  Date                                            Time    ..........................................................................................................................................  Reviewed by Signature/Title    ...................................................              ..............................................  Date                                                            Time

## 2017-12-01 NOTE — PATIENT INSTRUCTIONS
We will place a referral for [facility].  We would like to see you back after your [facility] appointment.  Please let us know when this is scheduled.  When you are in need of a refill, please call your pharmacy and they will send us a request.     If you have any questions please call [phone]    Other instructions:  none

## 2017-12-01 NOTE — PROGRESS NOTES
Oncology Follow-up Visit:  November 30, 2017    Reason for Visit:  Patient presents with:  RECHECK: follow up going over biopsy results.     Nursing Note and documentation reviewed: yes    HPI:  This is a 67-year-old female patient who presents to the oncology/hematology clinic today for results of recent lung biopsy.   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 The patient is essentially triple-negative breast cancer.  She underwent treatment and is currently on Arimidex 1mg daily for the DCIS which was initiated 11/22/2016.      Patient had presented last week for regular follow up and CT scan of the chest was repeated related to a new small nodule noted on CT 2 months prior.  CT showed significant increase in the size of this mass and biopsy was recommended and completed.  She presents for these results today.    She admits to anxiety today naturally.  She has no further issues with any chest pains and has no SOB, no fevers.  She does have a slight cough.    Oncologic History:   Patient underwent mammogram in January 2016 which revealed abnormalities of the right breast.  On 02/09/2016, a stereotactic biopsy of the right breast lesion at the 8 o'clock position revealed grade 3 DCIS of a solid micropapillary subtype.  Estrogen receptors were positive.  Also a biopsy of the right breast lesion at the 9 o'clock position revealed a grade 2 DCIS of the solid micropapillary subtype.  Estrogen receptors were positive.  She underwent bilateral mastectomies on 3/30/16 by Dr. 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  There was also angiosarcomatous, liposarcomatous, malignant spindle cell  and chondroid differentiation.  The tumor in the lower quadrant was distinct in the 2 DCIS lesions noted in the right breast.  Estrogen receptor positive at 2% with progesterone receptor negative; tumor was HER-2/renetta negative.  In terms of her DCIS, there was a 1.5 cm DCIS in the right breast as well as a smaller focus noted.  There was a sentinel lymph node that was negative. Chemotherapy was recommended.  She was evaluated by Dr. Lam with [facility] Oncology/Hematology on 4/18/2016 he felt patient had a metaplastic carcinoma as well as DCIS of the right breast.  He did note that there was no consensus best treatment options for this patient, but he felt given that she likely had triple negative disease and if she had a pure ductal adenocarcinoma, he would favor treating her a triple-negative breast cancer that is greater than 0.5 cm.  He felt that dose-dense AC x 4 cycles plus Taxol weekly x 12 weeks would be ideal.  She was evaluated here by Dr. Celis with Medical Oncology on 5/3/2016 as she wanted to receive treatment closer to home.      Current Chemo Regime/TX:  arimidex 1mg daily initiated 11/2016  Current Cycle: n/a  # of completed cycles: n/a      Previous treatment:  Adriamycin 60mg/m2 and Cytoxan 600mg/m2 every 2 weeks with Neulasta support 6mg SQ injection day 2 x 4 cycles; Taxol 80mg/m2 weekly x 12 weeks    Past Medical History:   Diagnosis Date     Breast cancer (H)      DMII (diabetes mellitus, type 2) (H)      HLD (hyperlipidemia)      Nonhealing surgical wound        Past Surgical History:   Procedure Laterality Date     APPENDECTOMY OPEN       C VAGINAL HYSTERECTOMY       INSERT PORT VASCULAR ACCESS Left 6/2/2016    Procedure: INSERT PORT VASCULAR ACCESS;  Surgeon: Micheline Davis MD;  Location: [facility] OR     MASTECTOMY Bilateral        Family History   Problem Relation Age of Onset     Lung Cancer Father      Prostate Cancer Paternal Grandfather        Social History     Social History      "Marital status:      Spouse name: Braden     Number of children: 2     Years of education: N/A     Occupational History      Retired     Social History Main Topics     Smoking status: Former Smoker     Smokeless tobacco: Never Used     Alcohol use No      Comment: 1 kamille/ night, with chemo is not drinking     Drug use: No     Sexual activity: Not on file     Other Topics Concern     Not on file     Social History Narrative       Current Outpatient Prescriptions   Medication     anastrozole (ARIMIDEX) 1 MG tablet     ferrous sulfate (IRON) 325 (65 FE) MG tablet     sertraline (ZOLOFT) 50 MG tablet     Pyridoxine HCl (VITAMIN B6 PO)     calcium-vitamin D (CALTRATE) 600-400 MG-UNIT per tablet     cyanocobalamin (VITAMIN  B-12) 1000 MCG tablet     lidocaine-prilocaine (EMLA) cream     METFORMIN HCL PO     SIMVASTATIN PO     No current facility-administered medications for this visit.         Allergies   Allergen Reactions     Cats        Physical Exam:  BP (!) 153/97 (BP Location: Left arm, Cuff Size: Adult Large)  Pulse 100  Temp 97.7  F (36.5  C) (Tympanic)  Resp 18  Ht 1.651 m (5' 5\")  Wt 96.2 kg (212 lb 1.6 oz)  SpO2 96%  BMI 35.3 kg/m2    GENERAL APPEARANCE: Healthy, alert and in no acute distress.  HEENT: Normocephalic, Sclerae anicteric. Oropharynx without ulcers, lesions, or thrush.  NECK: Supple. No asymmetry or masses, no thyromegaly.  LYMPHATICS: No palpable cervical, supraclavicular, axillary, or inguinal nodes   RESP: Lungs clear to auscultation bilaterally, respirations regular and easy  CARDIOVASCULAR: Regular rate and rhythm. Normal S1, S2; no murmur, gallop, or rub.  NEURO: Alert and oriented x 3.  Gait steady.  PSYCHIATRIC: Mentation and affect appear normal.  Mood appropriate.    Laboratory:  None for today      Imaging Studies:      Results for orders placed or performed during the hospital encounter of 11/28/17   XR Chest 1 View    Narrative    PROCEDURE:  XR CHEST 1 " VW    HISTORY:  ; History of lung biopsy.     COMPARISON:  None.    FINDINGS:   The cardiac silhouette is normal in size. The pulmonary vasculature is  normal.  There is a mass seen in the right middle lobe anteriorly.  There is an infusion port with its tip in superior vena cava. No  pleural effusion or pneumothorax.      Impression    IMPRESSION:  Right middle lobe mass no pneumothorax is seen following  biopsy      ANDREW BACA MD     CT CHEST W CONTRAST  11/22/2017 11:15 AM     CLINICAL HISTORY: Female, age 67 years,  follow up breast cancer/PET  scan 11/2016; 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; DCIS  (ductal carcinoma in situ), right;     Comparison:  Outside CT scan of the chest dated 9/14/2017 performed at  [facility].     TECHNIQUE:  CT was performed of the chest  with IV contrast.   Sagittal, coronal and axial reconstructions were reviewed.      FINDINGS:  Chest CT:   Lungs : Dependent atelectasis in the right lung, compressive  atelectasis from pleural-based mass anteriorly within the right lung.  Thyroid: The visualized portions are normal.  Heart and Great Vessels:  Normal.  Lymph Nodes:  Normal.  Pleura: 7.5 x 3.7 x 5.3 cm pleural-based mass anteriorly within the  right hemithorax has increased significantly in size, previously  measuring approximately 8.5 x 12 mm. There is decreased density  anteriorly suggests central necrosis.     The patient has also developed a small right-sided pleural effusion.     Bony Structures:  Mild degenerative changes of the thoracic spine.  Esophagus/stomach: Small hiatal hernia is unchanged.     Visualized portions of the abdomen:  Liver:  The visualized portions are normal.  Gallbladder: Evidence gallstones, unchanged.  Spleen: The visualized portions are normal.  Pancreas: The visualized portions are normal.  Adrenal Glands: Normal  Kidneys: The visualized portions are normal.  Ureters: Not included.  Abdominal Aorta: The visualized  portions are normal.     Other Findings:  No lymphadenopathy or ascites in the upper abdomen.         IMPRESSION:  1.   Significant interval enlargement of pleural-based mass anteriorly  within the right hemithorax. The mass currently measures 7.5 cm x 3.7  cm x 5.3 cm transverse, AP and craniocaudal dimensions respectively.  Central necrosis is appreciated.     2.  No evidence of new lung nodule nor evidence of developing  lymphadenopathy.     3.  Small right-sided pleural effusion which is new compared to prior  study.     4.  Cholelithiasis, similar in appearance.     CELESTINO FARRELL MD    ASSESSMENT/PLAN:    #1  Lung Mass:  Significant increase in size of lung nodule with biopsy consistent with metastasis from the metaplastic carcinoma of the right breast.  Will send a referral to [facility] per her request.  Will plan follow up with Dr. Celis after this visit.    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  The patient is essentially triple-negative breast cancer.  See above.  Follow up to be determined.    I encouraged patient to call with any questions or concerns.    Approximately 30 minutes spent with the patient, with >75% of this time spent in counseling and discussion of the pathology of the lung mass along with the need for further recommendations.  Time was spent in discussion of next step. We discussed also the psychosocial aspect of now metastatic disease.  Active listening provided and all questions were answered to best of my ability.    Ceci Amaral  P-BC

## 2017-12-01 NOTE — MR AVS SNAPSHOT
After Visit Summary   12/1/2017    Ana Simmons    MRN: [id number]           Patient Information     Date Of Birth          1950        Visit Information        Provider Department      12/1/2017 11:00 AM Ceci Amaral NP [facility]        Care Instructions    We will place a referral for [facility].  We would like to see you back after your [facility] appointment.  Please let us know when this is scheduled.  When you are in need of a refill, please call your pharmacy and they will send us a request.     If you have any questions please call [phone]    Other instructions:  none          Follow-ups after your visit        Who to contact     If you have questions or need follow up information about today's clinic visit or your schedule please contact [facility] directly at [phone].  Normal or non-critical lab and imaging results will be communicated to you by MyChart, letter or phone within 4 business days after the clinic has received the results. If you do not hear from us within 7 days, please contact the clinic through Macromillhart or phone. If you have a critical or abnormal lab result, we will notify you by phone as soon as possible.  Submit refill requests through Flirtomatic or call your pharmacy and they will forward the refill request to us. Please allow 3 business days for your refill to be completed.          Additional Information About Your Visit        Macromillhart Information     Flirtomatic gives you secure access to your electronic health record. If you see a primary care provider, you can also send messages to your care team and make appointments. If you have questions, please call your primary care clinic.  If you do not have a primary care provider, please call [phone] and they will assist you.        Care EveryWhere ID     This is your Care EveryWhere ID. This could be used by other organizations to access your [employer]  "records  [id number]        Your Vitals Were     Pulse Temperature Respirations Height Pulse Oximetry BMI (Body Mass Index)    100 97.7  F (36.5  C) (Tympanic) 18 1.651 m (5' 5\") 96% 35.3 kg/m2       Blood Pressure from Last 3 Encounters:   12/01/17 (!) 153/97   11/28/17 (!) 155/99   11/22/17 142/87    Weight from Last 3 Encounters:   12/01/17 96.2 kg (212 lb 1.6 oz)   11/22/17 96.2 kg (212 lb)   09/21/17 97.4 kg (214 lb 12.8 oz)              Today, you had the following     No orders found for display       Primary Care Provider Office Phone # Fax #    Mya Park [phone]       [facility] [address]   [place] [address]        Equal Access to Services     ASHLEY MARKS : Hadii janette garciao Soavril, waaxda luqadaha, qaybta kaalmada adecuongyada, candy nieves . So [facility] [phone].    ATENCIÓN: Si warren horn, tiene a stiles disposición servicios gratuitos de asistencia lingüística. Llame al [phone].    We comply with applicable federal civil rights laws and [place] laws. We do not discriminate on the basis of race, color, national origin, age, disability, sex, sexual orientation, or gender identity.            Thank you!     Thank you for choosing [facility]  for your care. Our goal is always to provide you with excellent care. Hearing back from our patients is one way we can continue to improve our services. Please take a few minutes to complete the written survey that you may receive in the mail after your visit with us. Thank you!             Your Updated Medication List - Protect others around you: Learn how to safely use, store and throw away your medicines at www.disposemymeds.org.          This list is accurate as of: 12/1/17 11:36 AM.  Always use your most recent med list.                   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       ferrous sulfate 325 (65 FE) MG tablet    IRON    100 tablet    Take 1 tablet (325 mg) by mouth daily (with breakfast)    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, Lung nodule       lidocaine-prilocaine cream    EMLA    30 g    Apply 30 g topically as needed for moderate pain    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       METFORMIN HCL PO      Take 500 mg by mouth daily (with breakfast)        sertraline 50 MG tablet    ZOLOFT    45 tablet    TAKE 1 AND 1/2 TABLETS BY MOUTH ONCE DAILY TO EQUAL 75MG DAILY    Malignant neoplasm of right female breast (H), Anxiety       SIMVASTATIN PO      Take 20 mg by mouth At Bedtime        VITAMIN B6 PO      Take 50 mg by mouth daily    Anemia, unspecified type, Malignant neoplasm of lower-outer quadrant of right female breast (H), DCIS (ductal carcinoma in situ), right, Postmenopausal status, Aromatase inhibitor use, Encounter for monitoring cardiotoxic drug therapy

## 2017-12-01 NOTE — NURSING NOTE
"Chief Complaint   Patient presents with     RECHECK     follow up going over biopsy results.       Initial BP (!) 153/97 (BP Location: Left arm, Cuff Size: Adult Large)  Pulse 100  Temp 97.7  F (36.5  C) (Tympanic)  Resp 18  Ht 1.651 m (5' 5\")  Wt 96.2 kg (212 lb 1.6 oz)  SpO2 96%  BMI 35.3 kg/m2 Estimated body mass index is 35.3 kg/(m^2) as calculated from the following:    Height as of this encounter: 1.651 m (5' 5\").    Weight as of this encounter: 96.2 kg (212 lb 1.6 oz).  Medication Reconciliation: sandie Arguelles      "

## 2017-12-26 NOTE — TELEPHONE ENCOUNTER
Patient called and left message stating she needs to talk to Nathalia Amaral or her nurse, but did not specify a reason. She may be reached at [phone]. Thank you.

## 2017-12-27 NOTE — TELEPHONE ENCOUNTER
"Called patient and she's states,\"No worries I just panicked because I was coughing yesterday, I am ok\". Advised to seek UC/ER care if needed.Verbalized understanding.  "

## 2017-12-29 NOTE — PATIENT INSTRUCTIONS
We would like to see you back per calendar. Please come 1minute(s) prior for lab work.  When you are in need of a refill of your medications, please call your pharmacy and they will send us the request. If you have any questions please call [phone]

## 2017-12-29 NOTE — MR AVS SNAPSHOT
After Visit Summary   12/29/2017    Ana Simmons    MRN: [id number]           Patient Information     Date Of Birth          1950        Visit Information        Provider Department      12/29/2017 3:30 PM Mickie Celis MD [facility]        Today's Diagnoses     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    -  1      Care Instructions    We would like to see you back per calendar. Please come 1minute(s) prior for lab work.  When you are in need of a refill of your medications, please call your pharmacy and they will send us the request. If you have any questions please call [phone]            Follow-ups after your visit        Additional Services     Pharmacy IP Consult       Start IV infusion on 1.3.18 Gemzar and Docetaxel                  Your next 10 appointments already scheduled     Jan 03, 2018  2:00 PM CST   Level 3 with HC INF RM 3310   [facility] ([facility] [place] )    [address]  [place] [address]   [phone]            Ash 10, 2018  8:30 AM CST   Level O with HC INF RM 3310   [facility] ([facility] [place] )    [address]  [place] [address]   [phone]            Ash 10, 2018  9:00 AM CST   (Arrive by 8:45 AM)   Return Visit with Ceci Amaral NP   [facility] ([facility] [place] )    [address]  [place] [address]   [phone]            Ash 10, 2018  9:30 AM CST   Level 4 with HC INF RM 3310   [facility] ([facility] [place] )    [address]  [place] [address]   [phone]              Who to contact     If you have questions or need follow up information about today's clinic visit or your schedule please contact [facility] directly at [phone].  Normal or non-critical lab and imaging results will be communicated to you by MyChart, letter or phone within 4 business days after the  "clinic has received the results. If you do not hear from us within 7 days, please contact the clinic through Smart Mocha or phone. If you have a critical or abnormal lab result, we will notify you by phone as soon as possible.  Submit refill requests through Smart Mocha or call your pharmacy and they will forward the refill request to us. Please allow 3 business days for your refill to be completed.          Additional Information About Your Visit        Service Management GroupharGraduway Information     Smart Mocha gives you secure access to your electronic health record. If you see a primary care provider, you can also send messages to your care team and make appointments. If you have questions, please call your primary care clinic.  If you do not have a primary care provider, please call [phone] and they will assist you.        Care EveryWhere ID     This is your Care EveryWhere ID. This could be used by other organizations to access your [facility] medical records  [id number]        Your Vitals Were     Pulse Temperature Respirations Height Pulse Oximetry BMI (Body Mass Index)    91 97.4  F (36.3  C) (Tympanic) 24 1.651 m (5' 5\") 94% 35.68 kg/m2       Blood Pressure from Last 3 Encounters:   12/29/17 (!) 165/93   12/01/17 (!) 153/97   11/28/17 (!) 155/99    Weight from Last 3 Encounters:   12/29/17 97.3 kg (214 lb 6.4 oz)   12/01/17 96.2 kg (212 lb 1.6 oz)   11/22/17 96.2 kg (212 lb)              We Performed the Following     Pharmacy IP Consult          Today's Medication Changes          These changes are accurate as of: 12/29/17  4:29 PM.  If you have any questions, ask your nurse or doctor.               Start taking these medicines.        Dose/Directions    dexamethasone 4 MG tablet   Commonly known as:  DECADRON   Used for:  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   Started by:  Mickie Celis MD        Dose:  8 mg   Take 2 tablets (8 mg) by mouth 2 times daily (with meals) for 4 days Begin evening before DOCetaxel " treatment, continue on morning of treatment, and then for 4 additional doses.   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        Dose/Directions    ZOLOFT 50 MG tablet   This may have changed:  Another medication with the same name was removed. Continue taking this medication, and follow the directions you see here.   Generic drug:  sertraline   Changed by:  Mickie Celis MD        Dose:  50 mg   Take 50 mg by mouth daily   Refills:  0            Where to get your medicines      These medications were sent to [facility] [address]   [address], [facility] [address]     Phone:  [phone]     prochlorperazine 10 MG tablet         Some of these will need a paper prescription and others can be bought over the counter.  Ask your nurse if you have questions.     Bring a paper prescription for each of these medications     dexamethasone 4 MG tablet    LORazepam 0.5 MG tablet                Primary Care Provider Office Phone # Fax #    Mya Park [phone]       [facility] [address]   [facility] [address]        Equal Access to Services     ASHLEY MARKS AH: Rocio Meredith, momo johnson, qaybta adriannaalcandy gibbons. So [facility]  [phone].    ATENCIÓN: Si warren horn, tiene a stiles disposición servicios gratuitos de asistencia lingüística. Clint khan [phone].    We comply with applicable federal civil rights laws and [place] laws. We do not discriminate on the basis of race, color, national origin, age, disability, sex, sexual orientation, or gender identity.            Thank you!     Thank you for choosing [facility]  for your care. Our goal is always to provide you with excellent care. Hearing back from our patients is one way we can continue to improve our services. Please take a few minutes to complete the written survey that you may receive in the mail after your visit with us. Thank you!             Your Updated Medication List - Protect others around you: Learn how to safely use, store and throw away your medicines at www.disposemymeds.org.          This list is accurate as of: 12/29/17  4:29 PM.  Always use your most recent med list.                   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       LORazepam 0.5 MG tablet    ATIVAN    30 tablet    Take 1 tablet (0.5 mg) by mouth every 4 hours as needed (Anxiety, Nausea/Vomiting or Sleep)    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       METFORMIN HCL PO      Take 500 mg by mouth daily (with breakfast)        prochlorperazine 10 MG tablet    COMPAZINE    30 tablet    Take 1 tablet (10 mg) by mouth every 6 hours as needed (Nausea/Vomiting)    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       ZOLOFT 50 MG tablet   Generic drug:  sertraline      Take 50 mg by mouth daily

## 2017-12-29 NOTE — NURSING NOTE
"Chief Complaint   Patient presents with     RECHECK     follow up triple neg malignant neoplasn of breast and [place] follow up       Initial /90  Pulse 91  Temp 97.4  F (36.3  C) (Tympanic)  Resp 20  Ht 1.651 m (5' 5\")  Wt 97.3 kg (214 lb 6.4 oz)  SpO2 94%  BMI 35.68 kg/m2 Estimated body mass index is 35.68 kg/(m^2) as calculated from the following:    Height as of this encounter: 1.651 m (5' 5\").    Weight as of this encounter: 97.3 kg (214 lb 6.4 oz).  Medication Reconciliation: complete   Cristina Dye LPN      "

## 2018-01-01 ENCOUNTER — INFUSION THERAPY VISIT (OUTPATIENT)
Dept: INFUSION THERAPY | Facility: OTHER | Age: 68
End: 2018-01-01
Attending: NURSE PRACTITIONER
Payer: MEDICARE

## 2018-01-01 ENCOUNTER — TELEPHONE (OUTPATIENT)
Dept: ONCOLOGY | Facility: OTHER | Age: 68
End: 2018-01-01

## 2018-01-01 ENCOUNTER — ONCOLOGY VISIT (OUTPATIENT)
Dept: ONCOLOGY | Facility: OTHER | Age: 68
End: 2018-01-01
Attending: NURSE PRACTITIONER
Payer: MEDICARE

## 2018-01-01 ENCOUNTER — CARE COORDINATION (OUTPATIENT)
Dept: CARE COORDINATION | Facility: OTHER | Age: 68
End: 2018-01-01

## 2018-01-01 ENCOUNTER — APPOINTMENT (OUTPATIENT)
Dept: ULTRASOUND IMAGING | Facility: HOSPITAL | Age: 68
End: 2018-01-01
Attending: FAMILY MEDICINE
Payer: MEDICARE

## 2018-01-01 ENCOUNTER — INFUSION THERAPY VISIT (OUTPATIENT)
Dept: INFUSION THERAPY | Facility: OTHER | Age: 68
End: 2018-01-01
Attending: INTERNAL MEDICINE
Payer: MEDICARE

## 2018-01-01 ENCOUNTER — HOSPITAL ENCOUNTER (EMERGENCY)
Facility: HOSPITAL | Age: 68
Discharge: HOME | End: 2018-01-13
Attending: FAMILY MEDICINE | Admitting: FAMILY MEDICINE
Payer: MEDICARE

## 2018-01-01 ENCOUNTER — COMMUNICATION - GICH (OUTPATIENT)
Dept: ONCOLOGY | Facility: OTHER | Age: 68
End: 2018-01-01

## 2018-01-01 ENCOUNTER — TRANSFERRED RECORDS (OUTPATIENT)
Dept: HEALTH INFORMATION MANAGEMENT | Facility: CLINIC | Age: 68
End: 2018-01-01

## 2018-01-01 ENCOUNTER — APPOINTMENT (OUTPATIENT)
Dept: GENERAL RADIOLOGY | Facility: HOSPITAL | Age: 68
End: 2018-01-01
Attending: RADIOLOGY
Payer: MEDICARE

## 2018-01-01 ENCOUNTER — HOSPITAL ENCOUNTER (OUTPATIENT)
Dept: CT IMAGING | Facility: HOSPITAL | Age: 68
Discharge: HOME | End: 2018-03-12
Attending: NURSE PRACTITIONER | Admitting: NURSE PRACTITIONER
Payer: MEDICARE

## 2018-01-01 ENCOUNTER — PATIENT OUTREACH (OUTPATIENT)
Dept: ONCOLOGY | Facility: OTHER | Age: 68
End: 2018-01-01

## 2018-01-01 ENCOUNTER — APPOINTMENT (OUTPATIENT)
Dept: CT IMAGING | Facility: HOSPITAL | Age: 68
End: 2018-01-01
Attending: FAMILY MEDICINE
Payer: MEDICARE

## 2018-01-01 ENCOUNTER — HOSPITAL ENCOUNTER (OUTPATIENT)
Dept: GENERAL RADIOLOGY | Facility: HOSPITAL | Age: 68
Discharge: HOME | End: 2018-04-27
Attending: NURSE PRACTITIONER | Admitting: NURSE PRACTITIONER
Payer: MEDICARE

## 2018-01-01 ENCOUNTER — APPOINTMENT (OUTPATIENT)
Dept: ULTRASOUND IMAGING | Facility: HOSPITAL | Age: 68
End: 2018-01-01
Attending: EMERGENCY MEDICINE
Payer: MEDICARE

## 2018-01-01 ENCOUNTER — TELEPHONE (OUTPATIENT)
Dept: INFUSION THERAPY | Facility: OTHER | Age: 68
End: 2018-01-01

## 2018-01-01 ENCOUNTER — APPOINTMENT (OUTPATIENT)
Dept: GENERAL RADIOLOGY | Facility: HOSPITAL | Age: 68
End: 2018-01-01
Attending: EMERGENCY MEDICINE
Payer: MEDICARE

## 2018-01-01 ENCOUNTER — HOSPITAL ENCOUNTER (OUTPATIENT)
Dept: GENERAL RADIOLOGY | Facility: HOSPITAL | Age: 68
Discharge: HOME | End: 2018-03-22
Attending: NURSE PRACTITIONER | Admitting: NURSE PRACTITIONER
Payer: MEDICARE

## 2018-01-01 ENCOUNTER — ONCOLOGY VISIT (OUTPATIENT)
Dept: ONCOLOGY | Facility: OTHER | Age: 68
End: 2018-01-01
Attending: GENETIC COUNSELOR, MS
Payer: MEDICARE

## 2018-01-01 ENCOUNTER — TRANSFERRED RECORDS (OUTPATIENT)
Dept: HEALTH INFORMATION MANAGEMENT | Facility: HOSPITAL | Age: 68
End: 2018-01-01

## 2018-01-01 ENCOUNTER — HOSPITAL ENCOUNTER (EMERGENCY)
Facility: HOSPITAL | Age: 68
Discharge: HOME | End: 2018-01-10
Attending: FAMILY MEDICINE | Admitting: FAMILY MEDICINE
Payer: MEDICARE

## 2018-01-01 ENCOUNTER — HOSPITAL ENCOUNTER (OUTPATIENT)
Dept: ULTRASOUND IMAGING | Facility: HOSPITAL | Age: 68
Discharge: HOME | End: 2018-05-04
Attending: NURSE PRACTITIONER | Admitting: NURSE PRACTITIONER
Payer: MEDICARE

## 2018-01-01 ENCOUNTER — HOSPITAL ENCOUNTER (OUTPATIENT)
Dept: ULTRASOUND IMAGING | Facility: HOSPITAL | Age: 68
Discharge: HOME | End: 2018-01-19
Attending: EMERGENCY MEDICINE | Admitting: EMERGENCY MEDICINE
Payer: MEDICARE

## 2018-01-01 ENCOUNTER — HOSPITAL ENCOUNTER (OUTPATIENT)
Dept: CARDIOLOGY | Facility: HOSPITAL | Age: 68
Discharge: HOME | End: 2018-03-14
Attending: INTERNAL MEDICINE | Admitting: INTERNAL MEDICINE
Payer: MEDICARE

## 2018-01-01 ENCOUNTER — APPOINTMENT (OUTPATIENT)
Dept: GENERAL RADIOLOGY | Facility: HOSPITAL | Age: 68
End: 2018-01-01
Attending: FAMILY MEDICINE
Payer: MEDICARE

## 2018-01-01 ENCOUNTER — HOSPITAL ENCOUNTER (EMERGENCY)
Facility: HOSPITAL | Age: 68
Discharge: HOME | End: 2018-01-15
Attending: EMERGENCY MEDICINE | Admitting: EMERGENCY MEDICINE
Payer: MEDICARE

## 2018-01-01 ENCOUNTER — HOSPITAL ENCOUNTER (OUTPATIENT)
Dept: GENERAL RADIOLOGY | Facility: HOSPITAL | Age: 68
Discharge: HOME | End: 2018-02-15
Attending: NURSE PRACTITIONER | Admitting: NURSE PRACTITIONER
Payer: MEDICARE

## 2018-01-01 ENCOUNTER — HOSPITAL ENCOUNTER (OUTPATIENT)
Dept: CT IMAGING | Facility: HOSPITAL | Age: 68
Discharge: HOME | End: 2018-05-11
Attending: NURSE PRACTITIONER | Admitting: NURSE PRACTITIONER
Payer: MEDICARE

## 2018-01-01 ENCOUNTER — HOSPITAL ENCOUNTER (OUTPATIENT)
Dept: ULTRASOUND IMAGING | Facility: HOSPITAL | Age: 68
End: 2018-03-22
Attending: INTERNAL MEDICINE
Payer: MEDICARE

## 2018-01-01 ENCOUNTER — TELEPHONE (OUTPATIENT)
Dept: INTERVENTIONAL RADIOLOGY/VASCULAR | Facility: HOSPITAL | Age: 68
End: 2018-01-01

## 2018-01-01 VITALS
TEMPERATURE: 97.2 F | WEIGHT: 193.6 LBS | HEIGHT: 65 IN | HEART RATE: 112 BPM | BODY MASS INDEX: 32.26 KG/M2 | SYSTOLIC BLOOD PRESSURE: 123 MMHG | OXYGEN SATURATION: 91 % | RESPIRATION RATE: 18 BRPM | DIASTOLIC BLOOD PRESSURE: 67 MMHG

## 2018-01-01 VITALS
HEIGHT: 65 IN | BODY MASS INDEX: 32.04 KG/M2 | DIASTOLIC BLOOD PRESSURE: 67 MMHG | WEIGHT: 192.3 LBS | TEMPERATURE: 97.8 F | RESPIRATION RATE: 20 BRPM | OXYGEN SATURATION: 94 % | SYSTOLIC BLOOD PRESSURE: 128 MMHG | HEART RATE: 87 BPM

## 2018-01-01 VITALS
HEIGHT: 65 IN | WEIGHT: 213.3 LBS | RESPIRATION RATE: 24 BRPM | OXYGEN SATURATION: 89 % | SYSTOLIC BLOOD PRESSURE: 157 MMHG | TEMPERATURE: 96.9 F | DIASTOLIC BLOOD PRESSURE: 91 MMHG | HEART RATE: 105 BPM | BODY MASS INDEX: 35.54 KG/M2

## 2018-01-01 VITALS
RESPIRATION RATE: 20 BRPM | SYSTOLIC BLOOD PRESSURE: 145 MMHG | TEMPERATURE: 97.4 F | DIASTOLIC BLOOD PRESSURE: 66 MMHG | HEIGHT: 65 IN | WEIGHT: 202.1 LBS | HEART RATE: 100 BPM | BODY MASS INDEX: 33.67 KG/M2 | OXYGEN SATURATION: 95 %

## 2018-01-01 VITALS
OXYGEN SATURATION: 90 % | HEIGHT: 65 IN | TEMPERATURE: 97 F | RESPIRATION RATE: 20 BRPM | WEIGHT: 212 LBS | HEART RATE: 90 BPM | DIASTOLIC BLOOD PRESSURE: 78 MMHG | BODY MASS INDEX: 35.32 KG/M2 | SYSTOLIC BLOOD PRESSURE: 184 MMHG

## 2018-01-01 VITALS
SYSTOLIC BLOOD PRESSURE: 137 MMHG | BODY MASS INDEX: 34.47 KG/M2 | RESPIRATION RATE: 18 BRPM | TEMPERATURE: 98.8 F | OXYGEN SATURATION: 93 % | WEIGHT: 206.9 LBS | HEART RATE: 100 BPM | HEIGHT: 65 IN | DIASTOLIC BLOOD PRESSURE: 59 MMHG

## 2018-01-01 VITALS
HEART RATE: 80 BPM | HEIGHT: 65 IN | DIASTOLIC BLOOD PRESSURE: 77 MMHG | RESPIRATION RATE: 18 BRPM | BODY MASS INDEX: 33.02 KG/M2 | OXYGEN SATURATION: 99 % | WEIGHT: 198.2 LBS | SYSTOLIC BLOOD PRESSURE: 139 MMHG | TEMPERATURE: 96.7 F

## 2018-01-01 VITALS
OXYGEN SATURATION: 95 % | TEMPERATURE: 97.4 F | HEIGHT: 65 IN | SYSTOLIC BLOOD PRESSURE: 138 MMHG | WEIGHT: 202.1 LBS | DIASTOLIC BLOOD PRESSURE: 72 MMHG | BODY MASS INDEX: 33.67 KG/M2 | HEART RATE: 78 BPM | RESPIRATION RATE: 20 BRPM

## 2018-01-01 VITALS
OXYGEN SATURATION: 90 % | WEIGHT: 213.2 LBS | SYSTOLIC BLOOD PRESSURE: 149 MMHG | RESPIRATION RATE: 18 BRPM | DIASTOLIC BLOOD PRESSURE: 87 MMHG | TEMPERATURE: 98.3 F | HEART RATE: 100 BPM | BODY MASS INDEX: 35.52 KG/M2 | HEIGHT: 65 IN

## 2018-01-01 VITALS
BODY MASS INDEX: 32.09 KG/M2 | HEIGHT: 65 IN | DIASTOLIC BLOOD PRESSURE: 62 MMHG | TEMPERATURE: 97 F | OXYGEN SATURATION: 93 % | RESPIRATION RATE: 20 BRPM | HEART RATE: 104 BPM | SYSTOLIC BLOOD PRESSURE: 136 MMHG | WEIGHT: 192.6 LBS

## 2018-01-01 VITALS
TEMPERATURE: 98.5 F | RESPIRATION RATE: 18 BRPM | DIASTOLIC BLOOD PRESSURE: 77 MMHG | BODY MASS INDEX: 32.77 KG/M2 | SYSTOLIC BLOOD PRESSURE: 137 MMHG | HEIGHT: 65 IN | HEART RATE: 107 BPM | OXYGEN SATURATION: 96 % | WEIGHT: 196.7 LBS

## 2018-01-01 VITALS
TEMPERATURE: 98.3 F | BODY MASS INDEX: 33.87 KG/M2 | OXYGEN SATURATION: 94 % | HEIGHT: 65 IN | RESPIRATION RATE: 24 BRPM | WEIGHT: 203.3 LBS | HEART RATE: 58 BPM | SYSTOLIC BLOOD PRESSURE: 126 MMHG | DIASTOLIC BLOOD PRESSURE: 62 MMHG

## 2018-01-01 VITALS
OXYGEN SATURATION: 94 % | HEART RATE: 110 BPM | WEIGHT: 203.3 LBS | HEIGHT: 65 IN | DIASTOLIC BLOOD PRESSURE: 65 MMHG | TEMPERATURE: 98.3 F | BODY MASS INDEX: 33.87 KG/M2 | RESPIRATION RATE: 24 BRPM | SYSTOLIC BLOOD PRESSURE: 117 MMHG

## 2018-01-01 VITALS
TEMPERATURE: 98 F | SYSTOLIC BLOOD PRESSURE: 151 MMHG | RESPIRATION RATE: 24 BRPM | HEART RATE: 107 BPM | WEIGHT: 213.5 LBS | HEIGHT: 65 IN | BODY MASS INDEX: 35.57 KG/M2 | OXYGEN SATURATION: 88 % | DIASTOLIC BLOOD PRESSURE: 101 MMHG

## 2018-01-01 VITALS
HEART RATE: 96 BPM | TEMPERATURE: 98.8 F | BODY MASS INDEX: 32.07 KG/M2 | RESPIRATION RATE: 18 BRPM | WEIGHT: 192.5 LBS | OXYGEN SATURATION: 99 % | HEIGHT: 65 IN

## 2018-01-01 VITALS
OXYGEN SATURATION: 91 % | RESPIRATION RATE: 18 BRPM | TEMPERATURE: 97 F | SYSTOLIC BLOOD PRESSURE: 136 MMHG | WEIGHT: 192.6 LBS | DIASTOLIC BLOOD PRESSURE: 62 MMHG | HEIGHT: 65 IN | BODY MASS INDEX: 32.09 KG/M2 | HEART RATE: 106 BPM

## 2018-01-01 VITALS
HEART RATE: 105 BPM | DIASTOLIC BLOOD PRESSURE: 61 MMHG | BODY MASS INDEX: 32.36 KG/M2 | SYSTOLIC BLOOD PRESSURE: 124 MMHG | HEIGHT: 65 IN | OXYGEN SATURATION: 94 % | WEIGHT: 194.2 LBS | RESPIRATION RATE: 18 BRPM | TEMPERATURE: 98.9 F

## 2018-01-01 VITALS
RESPIRATION RATE: 22 BRPM | HEART RATE: 105 BPM | DIASTOLIC BLOOD PRESSURE: 60 MMHG | BODY MASS INDEX: 31.54 KG/M2 | SYSTOLIC BLOOD PRESSURE: 126 MMHG | HEIGHT: 65 IN | WEIGHT: 189.3 LBS | TEMPERATURE: 97.4 F | OXYGEN SATURATION: 95 %

## 2018-01-01 VITALS
HEART RATE: 58 BPM | WEIGHT: 203.3 LBS | HEIGHT: 65 IN | TEMPERATURE: 98.3 F | OXYGEN SATURATION: 94 % | SYSTOLIC BLOOD PRESSURE: 126 MMHG | RESPIRATION RATE: 24 BRPM | DIASTOLIC BLOOD PRESSURE: 62 MMHG | BODY MASS INDEX: 33.87 KG/M2

## 2018-01-01 VITALS
BODY MASS INDEX: 34.47 KG/M2 | HEIGHT: 65 IN | DIASTOLIC BLOOD PRESSURE: 59 MMHG | OXYGEN SATURATION: 93 % | TEMPERATURE: 98.8 F | RESPIRATION RATE: 18 BRPM | SYSTOLIC BLOOD PRESSURE: 137 MMHG | WEIGHT: 206.9 LBS | HEART RATE: 100 BPM

## 2018-01-01 VITALS
DIASTOLIC BLOOD PRESSURE: 82 MMHG | RESPIRATION RATE: 18 BRPM | SYSTOLIC BLOOD PRESSURE: 139 MMHG | OXYGEN SATURATION: 94 %

## 2018-01-01 VITALS
HEIGHT: 65 IN | BODY MASS INDEX: 32.09 KG/M2 | OXYGEN SATURATION: 91 % | RESPIRATION RATE: 18 BRPM | WEIGHT: 192.6 LBS | HEART RATE: 100 BPM | DIASTOLIC BLOOD PRESSURE: 62 MMHG | SYSTOLIC BLOOD PRESSURE: 132 MMHG | TEMPERATURE: 97 F

## 2018-01-01 VITALS
HEIGHT: 65 IN | OXYGEN SATURATION: 93 % | BODY MASS INDEX: 34.47 KG/M2 | TEMPERATURE: 98.8 F | RESPIRATION RATE: 18 BRPM | HEART RATE: 100 BPM | DIASTOLIC BLOOD PRESSURE: 59 MMHG | WEIGHT: 206.9 LBS | SYSTOLIC BLOOD PRESSURE: 137 MMHG

## 2018-01-01 VITALS
DIASTOLIC BLOOD PRESSURE: 67 MMHG | OXYGEN SATURATION: 91 % | SYSTOLIC BLOOD PRESSURE: 123 MMHG | BODY MASS INDEX: 32.26 KG/M2 | TEMPERATURE: 97.2 F | HEIGHT: 65 IN | RESPIRATION RATE: 18 BRPM | HEART RATE: 112 BPM | WEIGHT: 193.6 LBS

## 2018-01-01 VITALS
HEIGHT: 65 IN | HEART RATE: 102 BPM | OXYGEN SATURATION: 88 % | TEMPERATURE: 98.2 F | SYSTOLIC BLOOD PRESSURE: 138 MMHG | RESPIRATION RATE: 18 BRPM | DIASTOLIC BLOOD PRESSURE: 91 MMHG

## 2018-01-01 VITALS
HEIGHT: 65 IN | SYSTOLIC BLOOD PRESSURE: 133 MMHG | OXYGEN SATURATION: 93 % | RESPIRATION RATE: 18 BRPM | HEART RATE: 90 BPM | DIASTOLIC BLOOD PRESSURE: 71 MMHG | TEMPERATURE: 98.9 F

## 2018-01-01 VITALS
HEIGHT: 65 IN | HEART RATE: 92 BPM | SYSTOLIC BLOOD PRESSURE: 132 MMHG | RESPIRATION RATE: 18 BRPM | BODY MASS INDEX: 32.92 KG/M2 | OXYGEN SATURATION: 94 % | TEMPERATURE: 97.7 F | DIASTOLIC BLOOD PRESSURE: 68 MMHG | WEIGHT: 197.6 LBS

## 2018-01-01 VITALS
RESPIRATION RATE: 18 BRPM | DIASTOLIC BLOOD PRESSURE: 66 MMHG | OXYGEN SATURATION: 95 % | HEART RATE: 106 BPM | BODY MASS INDEX: 32.76 KG/M2 | HEIGHT: 65 IN | WEIGHT: 196.6 LBS | SYSTOLIC BLOOD PRESSURE: 124 MMHG | TEMPERATURE: 98.6 F

## 2018-01-01 VITALS
SYSTOLIC BLOOD PRESSURE: 108 MMHG | DIASTOLIC BLOOD PRESSURE: 60 MMHG | OXYGEN SATURATION: 95 % | RESPIRATION RATE: 18 BRPM | TEMPERATURE: 96.1 F

## 2018-01-01 VITALS
BODY MASS INDEX: 33.47 KG/M2 | HEART RATE: 94 BPM | DIASTOLIC BLOOD PRESSURE: 83 MMHG | TEMPERATURE: 98.4 F | HEIGHT: 65 IN | SYSTOLIC BLOOD PRESSURE: 140 MMHG | WEIGHT: 200.9 LBS | OXYGEN SATURATION: 97 % | RESPIRATION RATE: 20 BRPM

## 2018-01-01 VITALS — WEIGHT: 196.4 LBS | BODY MASS INDEX: 32.72 KG/M2 | HEIGHT: 65 IN

## 2018-01-01 VITALS
BODY MASS INDEX: 32.9 KG/M2 | TEMPERATURE: 98.1 F | WEIGHT: 197.5 LBS | OXYGEN SATURATION: 95 % | HEIGHT: 65 IN | DIASTOLIC BLOOD PRESSURE: 59 MMHG | SYSTOLIC BLOOD PRESSURE: 115 MMHG | HEART RATE: 101 BPM | RESPIRATION RATE: 18 BRPM

## 2018-01-01 VITALS
OXYGEN SATURATION: 92 % | HEART RATE: 110 BPM | SYSTOLIC BLOOD PRESSURE: 154 MMHG | DIASTOLIC BLOOD PRESSURE: 105 MMHG | RESPIRATION RATE: 18 BRPM | TEMPERATURE: 98 F

## 2018-01-01 VITALS
OXYGEN SATURATION: 92 % | HEART RATE: 93 BPM | BODY MASS INDEX: 33.54 KG/M2 | RESPIRATION RATE: 16 BRPM | SYSTOLIC BLOOD PRESSURE: 138 MMHG | TEMPERATURE: 96.7 F | HEIGHT: 65 IN | DIASTOLIC BLOOD PRESSURE: 81 MMHG | WEIGHT: 201.3 LBS

## 2018-01-01 DIAGNOSIS — C50.911 BREAST CANCER METASTASIZED TO LUNG, RIGHT (H): ICD-10-CM

## 2018-01-01 DIAGNOSIS — C50.919: Primary | ICD-10-CM

## 2018-01-01 DIAGNOSIS — C50.911 MALIGNANT NEOPLASM OF RIGHT FEMALE BREAST, UNSPECIFIED ESTROGEN RECEPTOR STATUS, UNSPECIFIED SITE OF BREAST (H): ICD-10-CM

## 2018-01-01 DIAGNOSIS — C78.01 MALIGNANT NEOPLASM METASTATIC TO RIGHT LUNG (H): ICD-10-CM

## 2018-01-01 DIAGNOSIS — Z17.1 MALIGNANT NEOPLASM OF LOWER-OUTER QUADRANT OF RIGHT BREAST OF FEMALE, ESTROGEN RECEPTOR NEGATIVE (H): Primary | ICD-10-CM

## 2018-01-01 DIAGNOSIS — D48.60 PHYLLODES NEOPLASM OF BREAST: Primary | ICD-10-CM

## 2018-01-01 DIAGNOSIS — C50.919 TRIPLE NEGATIVE MALIGNANT NEOPLASM OF BREAST (H): Primary | ICD-10-CM

## 2018-01-01 DIAGNOSIS — D48.60 PHYLLODES NEOPLASM OF BREAST: ICD-10-CM

## 2018-01-01 DIAGNOSIS — C50.919: ICD-10-CM

## 2018-01-01 DIAGNOSIS — G89.3 NEOPLASM RELATED PAIN: Primary | ICD-10-CM

## 2018-01-01 DIAGNOSIS — C78.01 MALIGNANT NEOPLASM METASTATIC TO RIGHT LUNG (H): Primary | ICD-10-CM

## 2018-01-01 DIAGNOSIS — R79.81 LOW OXYGEN SATURATION: ICD-10-CM

## 2018-01-01 DIAGNOSIS — Z53.9 ERRONEOUS ENCOUNTER--DISREGARD: Primary | ICD-10-CM

## 2018-01-01 DIAGNOSIS — C50.919 TRIPLE NEGATIVE MALIGNANT NEOPLASM OF BREAST (H): ICD-10-CM

## 2018-01-01 DIAGNOSIS — Z17.421 TRIPLE NEGATIVE MALIGNANT NEOPLASM OF BREAST (H): Primary | ICD-10-CM

## 2018-01-01 DIAGNOSIS — J90 PLEURAL EFFUSION: ICD-10-CM

## 2018-01-01 DIAGNOSIS — R10.11 RUQ ABDOMINAL PAIN: ICD-10-CM

## 2018-01-01 DIAGNOSIS — R06.02 SHORTNESS OF BREATH: ICD-10-CM

## 2018-01-01 DIAGNOSIS — D05.11 DUCTAL CARCINOMA IN SITU (DCIS) OF RIGHT BREAST: ICD-10-CM

## 2018-01-01 DIAGNOSIS — Z17.421 TRIPLE NEGATIVE MALIGNANT NEOPLASM OF BREAST (H): ICD-10-CM

## 2018-01-01 DIAGNOSIS — Z17.1 MALIGNANT NEOPLASM OF LOWER-OUTER QUADRANT OF RIGHT BREAST OF FEMALE, ESTROGEN RECEPTOR NEGATIVE (H): ICD-10-CM

## 2018-01-01 DIAGNOSIS — C50.511 MALIGNANT NEOPLASM OF LOWER-OUTER QUADRANT OF RIGHT BREAST OF FEMALE, ESTROGEN RECEPTOR NEGATIVE (H): Primary | ICD-10-CM

## 2018-01-01 DIAGNOSIS — R06.02 SOB (SHORTNESS OF BREATH): ICD-10-CM

## 2018-01-01 DIAGNOSIS — C50.511 MALIGNANT NEOPLASM OF LOWER-OUTER QUADRANT OF RIGHT BREAST OF FEMALE, ESTROGEN RECEPTOR NEGATIVE (H): ICD-10-CM

## 2018-01-01 DIAGNOSIS — I82.90 FIBRIN THROMBUS: ICD-10-CM

## 2018-01-01 DIAGNOSIS — C78.01 METASTATIC LUNG CARCINOMA, RIGHT (H): Primary | ICD-10-CM

## 2018-01-01 DIAGNOSIS — F51.01 PRIMARY INSOMNIA: ICD-10-CM

## 2018-01-01 DIAGNOSIS — R07.89 CHEST HEAVINESS: ICD-10-CM

## 2018-01-01 DIAGNOSIS — D64.9 ANEMIA, UNSPECIFIED TYPE: ICD-10-CM

## 2018-01-01 DIAGNOSIS — R60.9 EDEMA, UNSPECIFIED TYPE: ICD-10-CM

## 2018-01-01 DIAGNOSIS — R53.81 DECLINING PERFORMANCE STATUS: ICD-10-CM

## 2018-01-01 DIAGNOSIS — R05.9 COUGH: ICD-10-CM

## 2018-01-01 DIAGNOSIS — F41.9 ANXIETY AND DEPRESSION: ICD-10-CM

## 2018-01-01 DIAGNOSIS — C50.911 MALIGNANT NEOPLASM OF RIGHT FEMALE BREAST, UNSPECIFIED ESTROGEN RECEPTOR STATUS, UNSPECIFIED SITE OF BREAST (H): Primary | ICD-10-CM

## 2018-01-01 DIAGNOSIS — Z79.899 ENCOUNTER FOR MONITORING CARDIOTOXIC DRUG THERAPY: ICD-10-CM

## 2018-01-01 DIAGNOSIS — J90 PLEURAL EFFUSION ON RIGHT: ICD-10-CM

## 2018-01-01 DIAGNOSIS — Z71.89 ADVANCED CARE PLANNING/COUNSELING DISCUSSION: ICD-10-CM

## 2018-01-01 DIAGNOSIS — Z51.81 ENCOUNTER FOR MONITORING CARDIOTOXIC DRUG THERAPY: ICD-10-CM

## 2018-01-01 DIAGNOSIS — F32.A ANXIETY AND DEPRESSION: ICD-10-CM

## 2018-01-01 DIAGNOSIS — R21 RASH AND NONSPECIFIC SKIN ERUPTION: ICD-10-CM

## 2018-01-01 DIAGNOSIS — Z79.01 LONG TERM CURRENT USE OF ANTICOAGULANT THERAPY: ICD-10-CM

## 2018-01-01 DIAGNOSIS — C78.00 BREAST CANCER METASTASIZED TO LUNG, RIGHT (H): ICD-10-CM

## 2018-01-01 DIAGNOSIS — K59.00 CONSTIPATION, UNSPECIFIED CONSTIPATION TYPE: ICD-10-CM

## 2018-01-01 DIAGNOSIS — J32.9 SINUSITIS, UNSPECIFIED CHRONICITY, UNSPECIFIED LOCATION: ICD-10-CM

## 2018-01-01 DIAGNOSIS — R63.0 DECREASED APPETITE: ICD-10-CM

## 2018-01-01 DIAGNOSIS — R10.11 ABDOMINAL PAIN, RIGHT UPPER QUADRANT: ICD-10-CM

## 2018-01-01 DIAGNOSIS — T82.9XXA: ICD-10-CM

## 2018-01-01 DIAGNOSIS — F43.23 ADJUSTMENT DISORDER WITH MIXED ANXIETY AND DEPRESSED MOOD: ICD-10-CM

## 2018-01-01 DIAGNOSIS — Z80.8 FAMILY HISTORY OF BONE CANCER: ICD-10-CM

## 2018-01-01 DIAGNOSIS — C50.911 MALIGNANT PHYLLODES TUMOR OF RIGHT BREAST (H): Primary | ICD-10-CM

## 2018-01-01 DIAGNOSIS — G89.3 NEOPLASM RELATED PAIN: ICD-10-CM

## 2018-01-01 DIAGNOSIS — C50.911 MALIGNANT PHYLLODES TUMOR OF RIGHT BREAST (H): ICD-10-CM

## 2018-01-01 DIAGNOSIS — Z76.89 HEALTH CARE HOME: Primary | ICD-10-CM

## 2018-01-01 LAB
ALBUMIN SERPL-MCNC: 2.5 G/DL (ref 3.4–5)
ALBUMIN SERPL-MCNC: 2.6 G/DL (ref 3.4–5)
ALBUMIN SERPL-MCNC: 2.6 G/DL (ref 3.4–5)
ALBUMIN SERPL-MCNC: 2.8 G/DL (ref 3.4–5)
ALBUMIN SERPL-MCNC: 2.9 G/DL (ref 3.4–5)
ALBUMIN SERPL-MCNC: 2.9 G/DL (ref 3.4–5)
ALBUMIN SERPL-MCNC: 3 G/DL (ref 3.4–5)
ALBUMIN SERPL-MCNC: 3.1 G/DL (ref 3.4–5)
ALBUMIN SERPL-MCNC: 3.2 G/DL (ref 3.4–5)
ALBUMIN SERPL-MCNC: 3.5 G/DL (ref 3.4–5)
ALP SERPL-CCNC: 105 U/L (ref 40–150)
ALP SERPL-CCNC: 108 U/L (ref 40–150)
ALP SERPL-CCNC: 145 U/L (ref 40–150)
ALP SERPL-CCNC: 157 U/L (ref 40–150)
ALP SERPL-CCNC: 158 U/L (ref 40–150)
ALP SERPL-CCNC: 170 U/L (ref 40–150)
ALP SERPL-CCNC: 182 U/L (ref 40–150)
ALP SERPL-CCNC: 80 U/L (ref 40–150)
ALP SERPL-CCNC: 83 U/L (ref 40–150)
ALP SERPL-CCNC: 87 U/L (ref 40–150)
ALP SERPL-CCNC: 91 U/L (ref 40–150)
ALP SERPL-CCNC: 93 U/L (ref 40–150)
ALP SERPL-CCNC: 95 U/L (ref 40–150)
ALP SERPL-CCNC: 99 U/L (ref 40–150)
ALT SERPL W P-5'-P-CCNC: 100 U/L (ref 0–50)
ALT SERPL W P-5'-P-CCNC: 123 U/L (ref 0–50)
ALT SERPL W P-5'-P-CCNC: 124 U/L (ref 0–50)
ALT SERPL W P-5'-P-CCNC: 124 U/L (ref 0–50)
ALT SERPL W P-5'-P-CCNC: 22 U/L (ref 0–50)
ALT SERPL W P-5'-P-CCNC: 25 U/L (ref 0–50)
ALT SERPL W P-5'-P-CCNC: 26 U/L (ref 0–50)
ALT SERPL W P-5'-P-CCNC: 32 U/L (ref 0–50)
ALT SERPL W P-5'-P-CCNC: 40 U/L (ref 0–50)
ALT SERPL W P-5'-P-CCNC: 41 U/L (ref 0–50)
ALT SERPL W P-5'-P-CCNC: 47 U/L (ref 0–50)
ALT SERPL W P-5'-P-CCNC: 49 U/L (ref 0–50)
ALT SERPL W P-5'-P-CCNC: 62 U/L (ref 0–50)
ALT SERPL W P-5'-P-CCNC: 66 U/L (ref 0–50)
ALT SERPL-CCNC: 120 U/L (ref 19–47)
ALT SERPL-CCNC: 36 U/L (ref 19–47)
AMYLASE SERPL-CCNC: 32 U/L (ref 30–110)
ANION GAP SERPL CALCULATED.3IONS-SCNC: 10 MMOL/L (ref 3–14)
ANION GAP SERPL CALCULATED.3IONS-SCNC: 10 MMOL/L (ref 3–14)
ANION GAP SERPL CALCULATED.3IONS-SCNC: 5 MMOL/L (ref 3–14)
ANION GAP SERPL CALCULATED.3IONS-SCNC: 6 MMOL/L (ref 3–14)
ANION GAP SERPL CALCULATED.3IONS-SCNC: 6 MMOL/L (ref 3–14)
ANION GAP SERPL CALCULATED.3IONS-SCNC: 7 MMOL/L (ref 3–14)
ANION GAP SERPL CALCULATED.3IONS-SCNC: 7 MMOL/L (ref 3–14)
ANION GAP SERPL CALCULATED.3IONS-SCNC: 8 MMOL/L (ref 3–14)
ANION GAP SERPL CALCULATED.3IONS-SCNC: 9 MMOL/L (ref 3–14)
ANION GAP SERPL CALCULATED.3IONS-SCNC: 9 MMOL/L (ref 3–14)
APPEARANCE FLD: NORMAL
APTT PPP: 35 SEC (ref 24–37)
AST SERPL W P-5'-P-CCNC: 19 U/L (ref 0–45)
AST SERPL W P-5'-P-CCNC: 22 U/L (ref 0–45)
AST SERPL W P-5'-P-CCNC: 22 U/L (ref 0–45)
AST SERPL W P-5'-P-CCNC: 24 U/L (ref 0–45)
AST SERPL W P-5'-P-CCNC: 25 U/L (ref 0–45)
AST SERPL W P-5'-P-CCNC: 25 U/L (ref 0–45)
AST SERPL W P-5'-P-CCNC: 26 U/L (ref 0–45)
AST SERPL W P-5'-P-CCNC: 27 U/L (ref 0–45)
AST SERPL W P-5'-P-CCNC: 35 U/L (ref 0–45)
AST SERPL W P-5'-P-CCNC: 35 U/L (ref 0–45)
AST SERPL W P-5'-P-CCNC: 36 U/L (ref 0–45)
AST SERPL W P-5'-P-CCNC: 44 U/L (ref 0–45)
AST SERPL W P-5'-P-CCNC: 56 U/L (ref 0–45)
AST SERPL W P-5'-P-CCNC: 58 U/L (ref 0–45)
AST SERPL-CCNC: 38 U/L (ref 9–34)
AST SERPL-CCNC: 52 U/L (ref 9–34)
BACTERIA SPEC CULT: NO GROWTH
BACTERIA SPEC CULT: NORMAL
BASOPHILS # BLD AUTO: 0 10E9/L (ref 0–0.2)
BASOPHILS NFR BLD AUTO: 0 %
BASOPHILS NFR BLD AUTO: 0.1 %
BASOPHILS NFR BLD AUTO: 0.2 %
BASOPHILS NFR BLD AUTO: 0.3 %
BASOPHILS NFR BLD AUTO: 0.4 %
BASOPHILS NFR BLD AUTO: 2 %
BILIRUB DIRECT SERPL-MCNC: 0.1 MG/DL (ref 0–0.2)
BILIRUB DIRECT SERPL-MCNC: 0.2 MG/DL (ref 0–0.2)
BILIRUB SERPL-MCNC: 0.3 MG/DL (ref 0.2–1.3)
BILIRUB SERPL-MCNC: 0.4 MG/DL (ref 0.2–1.3)
BILIRUB SERPL-MCNC: 0.5 MG/DL (ref 0.2–1.3)
BUN SERPL-MCNC: 10 MG/DL (ref 7–30)
BUN SERPL-MCNC: 12 MG/DL (ref 7–30)
BUN SERPL-MCNC: 12 MG/DL (ref 7–30)
BUN SERPL-MCNC: 13 MG/DL (ref 7–30)
BUN SERPL-MCNC: 14 MG/DL (ref 7–30)
BUN SERPL-MCNC: 15 MG/DL (ref 7–30)
BUN SERPL-MCNC: 16 MG/DL (ref 7–30)
BUN SERPL-MCNC: 17 MG/DL (ref 7–30)
BUN SERPL-MCNC: 19 MG/DL (ref 7–30)
BUN SERPL-MCNC: 19 MG/DL (ref 7–30)
CALCIUM SERPL-MCNC: 7.8 MG/DL (ref 8.5–10.1)
CALCIUM SERPL-MCNC: 8.4 MG/DL (ref 8.5–10.1)
CALCIUM SERPL-MCNC: 8.5 MG/DL (ref 8.5–10.1)
CALCIUM SERPL-MCNC: 8.5 MG/DL (ref 8.5–10.1)
CALCIUM SERPL-MCNC: 8.7 MG/DL (ref 8.5–10.1)
CALCIUM SERPL-MCNC: 8.8 MG/DL (ref 8.5–10.1)
CALCIUM SERPL-MCNC: 8.9 MG/DL (ref 8.5–10.1)
CALCIUM SERPL-MCNC: 9.2 MG/DL (ref 8.5–10.1)
CANCER AG27-29 SERPL-ACNC: 18 U/ML (ref 0–39)
CANCER AG27-29 SERPL-ACNC: 24 U/ML (ref 0–39)
CANCER AG27-29 SERPL-ACNC: 6 U/ML (ref 0–39)
CEA SERPL-MCNC: <0.5 UG/L (ref 0–2.5)
CEA SERPL-MCNC: <0.5 UG/L (ref 0–2.5)
CHLORIDE SERPL-SCNC: 100 MMOL/L (ref 94–109)
CHLORIDE SERPL-SCNC: 101 MMOL/L (ref 94–109)
CHLORIDE SERPL-SCNC: 102 MMOL/L (ref 94–109)
CHLORIDE SERPL-SCNC: 104 MMOL/L (ref 94–109)
CHLORIDE SERPL-SCNC: 105 MMOL/L (ref 94–109)
CHLORIDE SERPL-SCNC: 105 MMOL/L (ref 94–109)
CHLORIDE SERPL-SCNC: 106 MMOL/L (ref 94–109)
CO2 SERPL-SCNC: 25 MMOL/L (ref 20–32)
CO2 SERPL-SCNC: 25 MMOL/L (ref 20–32)
CO2 SERPL-SCNC: 26 MMOL/L (ref 20–32)
CO2 SERPL-SCNC: 26 MMOL/L (ref 20–32)
CO2 SERPL-SCNC: 28 MMOL/L (ref 20–32)
CO2 SERPL-SCNC: 28 MMOL/L (ref 20–32)
CO2 SERPL-SCNC: 29 MMOL/L (ref 20–32)
CO2 SERPL-SCNC: 31 MMOL/L (ref 20–32)
COLOR FLD: NORMAL
COPATH REPORT: NORMAL
CREAT SERPL-MCNC: 0.59 MG/DL (ref 0.52–1.04)
CREAT SERPL-MCNC: 0.63 MG/DL (ref 0.52–1.04)
CREAT SERPL-MCNC: 0.66 MG/DL (ref 0.52–1.04)
CREAT SERPL-MCNC: 0.69 MG/DL (ref 0.52–1.04)
CREAT SERPL-MCNC: 0.7 MG/DL (ref 0.7–1.4)
CREAT SERPL-MCNC: 0.71 MG/DL (ref 0.52–1.04)
CREAT SERPL-MCNC: 0.72 MG/DL (ref 0.52–1.04)
CREAT SERPL-MCNC: 0.74 MG/DL (ref 0.52–1.04)
CREAT SERPL-MCNC: 0.75 MG/DL (ref 0.52–1.04)
CREAT SERPL-MCNC: 0.76 MG/DL (ref 0.52–1.04)
CREAT SERPL-MCNC: 0.93 MG/DL (ref 0.52–1.04)
D DIMER PPP DDU-MCNC: 993 NG/ML D-DU (ref 0–300)
DIFFERENTIAL METHOD BLD: ABNORMAL
EOSINOPHIL # BLD AUTO: 0 10E9/L (ref 0–0.7)
EOSINOPHIL # BLD AUTO: 0.1 10E9/L (ref 0–0.7)
EOSINOPHIL # BLD AUTO: 0.3 10E9/L (ref 0–0.7)
EOSINOPHIL # BLD AUTO: 0.3 10E9/L (ref 0–0.7)
EOSINOPHIL NFR BLD AUTO: 0 %
EOSINOPHIL NFR BLD AUTO: 0.7 %
EOSINOPHIL NFR BLD AUTO: 0.8 %
EOSINOPHIL NFR BLD AUTO: 0.9 %
EOSINOPHIL NFR BLD AUTO: 1 %
EOSINOPHIL NFR BLD AUTO: 1 %
EOSINOPHIL NFR BLD AUTO: 1.4 %
EOSINOPHIL NFR BLD AUTO: 1.7 %
EOSINOPHIL NFR BLD AUTO: 1.7 %
EOSINOPHIL NFR BLD AUTO: 2 %
EOSINOPHIL NFR BLD AUTO: 3.3 %
EOSINOPHIL NFR BLD AUTO: 5 %
EOSINOPHIL NFR BLD AUTO: 5 %
ERYTHROCYTE [DISTWIDTH] IN BLOOD BY AUTOMATED COUNT: 13 % (ref 10–15)
ERYTHROCYTE [DISTWIDTH] IN BLOOD BY AUTOMATED COUNT: 13.2 % (ref 10–15)
ERYTHROCYTE [DISTWIDTH] IN BLOOD BY AUTOMATED COUNT: 13.2 % (ref 10–15)
ERYTHROCYTE [DISTWIDTH] IN BLOOD BY AUTOMATED COUNT: 13.3 % (ref 10–15)
ERYTHROCYTE [DISTWIDTH] IN BLOOD BY AUTOMATED COUNT: 13.6 % (ref 10–15)
ERYTHROCYTE [DISTWIDTH] IN BLOOD BY AUTOMATED COUNT: 14.1 % (ref 10–15)
ERYTHROCYTE [DISTWIDTH] IN BLOOD BY AUTOMATED COUNT: 15.9 % (ref 10–15)
ERYTHROCYTE [DISTWIDTH] IN BLOOD BY AUTOMATED COUNT: 16.1 % (ref 10–15)
ERYTHROCYTE [DISTWIDTH] IN BLOOD BY AUTOMATED COUNT: 16.1 % (ref 10–15)
ERYTHROCYTE [DISTWIDTH] IN BLOOD BY AUTOMATED COUNT: 16.3 % (ref 10–15)
ERYTHROCYTE [DISTWIDTH] IN BLOOD BY AUTOMATED COUNT: 16.4 % (ref 10–15)
ERYTHROCYTE [DISTWIDTH] IN BLOOD BY AUTOMATED COUNT: 16.5 % (ref 10–15)
ERYTHROCYTE [DISTWIDTH] IN BLOOD BY AUTOMATED COUNT: 17.2 % (ref 10–15)
ERYTHROCYTE [DISTWIDTH] IN BLOOD BY AUTOMATED COUNT: 17.2 % (ref 10–15)
ERYTHROCYTE [DISTWIDTH] IN BLOOD BY AUTOMATED COUNT: 17.7 % (ref 10–15)
ERYTHROCYTE [DISTWIDTH] IN BLOOD BY AUTOMATED COUNT: 18 % (ref 10–15)
ERYTHROCYTE [DISTWIDTH] IN BLOOD BY AUTOMATED COUNT: 18 % (ref 10–15)
FERRITIN SERPL-MCNC: 630 NG/ML (ref 8–252)
GFR SERPL CREATININE-BSD FRML MDRD: 59 ML/MIN/1.7M2
GFR SERPL CREATININE-BSD FRML MDRD: 75 ML/MIN/1.7M2
GFR SERPL CREATININE-BSD FRML MDRD: 77 ML/MIN/1.7M2
GFR SERPL CREATININE-BSD FRML MDRD: 78 ML/MIN/1.7M2
GFR SERPL CREATININE-BSD FRML MDRD: 80 ML/MIN/1.7M2
GFR SERPL CREATININE-BSD FRML MDRD: 82 ML/MIN/1.7M2
GFR SERPL CREATININE-BSD FRML MDRD: 84 ML/MIN/1.7M2
GFR SERPL CREATININE-BSD FRML MDRD: 88 ML/MIN/1.73M2
GFR SERPL CREATININE-BSD FRML MDRD: 89 ML/MIN/1.7M2
GFR SERPL CREATININE-BSD FRML MDRD: >90 ML/MIN/1.7M2
GFR SERPL CREATININE-BSD FRML MDRD: >90 ML/MIN/1.7M2
GLUCOSE SERPL-MCNC: 104 MG/DL (ref 64–112)
GLUCOSE SERPL-MCNC: 106 MG/DL (ref 70–99)
GLUCOSE SERPL-MCNC: 107 MG/DL (ref 64–112)
GLUCOSE SERPL-MCNC: 107 MG/DL (ref 70–99)
GLUCOSE SERPL-MCNC: 118 MG/DL (ref 70–99)
GLUCOSE SERPL-MCNC: 119 MG/DL (ref 70–99)
GLUCOSE SERPL-MCNC: 121 MG/DL (ref 70–99)
GLUCOSE SERPL-MCNC: 125 MG/DL (ref 64–112)
GLUCOSE SERPL-MCNC: 136 MG/DL (ref 70–99)
GLUCOSE SERPL-MCNC: 139 MG/DL (ref 70–99)
GLUCOSE SERPL-MCNC: 146 MG/DL (ref 70–99)
GLUCOSE SERPL-MCNC: 167 MG/DL (ref 70–99)
GLUCOSE SERPL-MCNC: 221 MG/DL (ref 70–99)
GRAM STN SPEC: NORMAL
HCT VFR BLD AUTO: 27.9 % (ref 35–47)
HCT VFR BLD AUTO: 28.2 % (ref 35–47)
HCT VFR BLD AUTO: 30 % (ref 35–47)
HCT VFR BLD AUTO: 30.4 % (ref 35–47)
HCT VFR BLD AUTO: 31.2 % (ref 35–47)
HCT VFR BLD AUTO: 32.1 % (ref 35–47)
HCT VFR BLD AUTO: 32.2 % (ref 35–47)
HCT VFR BLD AUTO: 32.4 % (ref 35–47)
HCT VFR BLD AUTO: 33.3 % (ref 35–47)
HCT VFR BLD AUTO: 33.9 % (ref 35–47)
HCT VFR BLD AUTO: 34.2 % (ref 35–47)
HCT VFR BLD AUTO: 34.5 % (ref 35–47)
HCT VFR BLD AUTO: 35 % (ref 35–47)
HCT VFR BLD AUTO: 36 % (ref 35–47)
HCT VFR BLD AUTO: 36.4 % (ref 35–47)
HCT VFR BLD AUTO: 37.4 % (ref 35–47)
HCT VFR BLD AUTO: 38.4 % (ref 35–47)
HGB BLD-MCNC: 10 G/DL (ref 11.7–15.7)
HGB BLD-MCNC: 10.5 G/DL (ref 11.7–15.7)
HGB BLD-MCNC: 10.8 G/DL (ref 11.7–15.7)
HGB BLD-MCNC: 10.9 G/DL (ref 11.7–15.7)
HGB BLD-MCNC: 10.9 G/DL (ref 11.7–15.7)
HGB BLD-MCNC: 11.3 G/DL (ref 11.7–15.7)
HGB BLD-MCNC: 11.5 G/DL (ref 11.7–15.7)
HGB BLD-MCNC: 11.7 G/DL (ref 11.7–15.7)
HGB BLD-MCNC: 12 G/DL (ref 11.7–15.7)
HGB BLD-MCNC: 12.2 G/DL (ref 11.7–15.7)
HGB BLD-MCNC: 12.8 G/DL (ref 11.7–15.7)
HGB BLD-MCNC: 8.9 G/DL (ref 11.7–15.7)
HGB BLD-MCNC: 9 G/DL (ref 11.7–15.7)
HGB BLD-MCNC: 9.5 G/DL (ref 11.7–15.7)
HGB BLD-MCNC: 9.8 G/DL (ref 11.7–15.7)
HGB BLD-MCNC: 9.9 G/DL (ref 11.7–15.7)
HGB BLD-MCNC: 9.9 G/DL (ref 11.7–15.7)
IMM GRANULOCYTES # BLD: 0 10E9/L (ref 0–0.4)
IMM GRANULOCYTES # BLD: 0.1 10E9/L (ref 0–0.4)
IMM GRANULOCYTES NFR BLD: 0 %
IMM GRANULOCYTES NFR BLD: 0.2 %
IMM GRANULOCYTES NFR BLD: 0.3 %
IMM GRANULOCYTES NFR BLD: 0.4 %
IMM GRANULOCYTES NFR BLD: 0.6 %
IMM GRANULOCYTES NFR BLD: 0.7 %
IMM GRANULOCYTES NFR BLD: 0.9 %
IMM GRANULOCYTES NFR BLD: 0.9 %
IMM GRANULOCYTES NFR BLD: 1 %
IMM GRANULOCYTES NFR BLD: 1.1 %
IMM GRANULOCYTES NFR BLD: 1.3 %
INR PPP: 1.04 (ref 0.8–1.2)
INR PPP: 1.08 (ref 0.8–1.2)
INR PPP: 1.13 (ref 0.8–1.2)
INR PPP: 1.27 (ref 0.8–1.2)
INR PPP: 1.37 (ref 0.8–1.2)
INR PPP: 1.68 (ref 0.8–1.2)
IRON SATN MFR SERPL: 16 % (ref 15–46)
IRON SERPL-MCNC: 41 UG/DL (ref 35–180)
LAB SCANNED RESULT: ABNORMAL
LDH SERPL L TO P-CCNC: 167 U/L (ref 81–234)
LDH SERPL L TO P-CCNC: 167 U/L (ref 81–234)
LIPASE SERPL-CCNC: 62 U/L (ref 73–393)
LYMPHOCYTES # BLD AUTO: 0.2 10E9/L (ref 0.8–5.3)
LYMPHOCYTES # BLD AUTO: 0.3 10E9/L (ref 0.8–5.3)
LYMPHOCYTES # BLD AUTO: 0.4 10E9/L (ref 0.8–5.3)
LYMPHOCYTES # BLD AUTO: 0.5 10E9/L (ref 0.8–5.3)
LYMPHOCYTES # BLD AUTO: 0.6 10E9/L (ref 0.8–5.3)
LYMPHOCYTES # BLD AUTO: 0.7 10E9/L (ref 0.8–5.3)
LYMPHOCYTES # BLD AUTO: 0.8 10E9/L (ref 0.8–5.3)
LYMPHOCYTES # BLD AUTO: 0.8 10E9/L (ref 0.8–5.3)
LYMPHOCYTES # BLD AUTO: 0.9 10E9/L (ref 0.8–5.3)
LYMPHOCYTES # BLD AUTO: 0.9 10E9/L (ref 0.8–5.3)
LYMPHOCYTES # BLD AUTO: 1 10E9/L (ref 0.8–5.3)
LYMPHOCYTES # BLD AUTO: 1 10E9/L (ref 0.8–5.3)
LYMPHOCYTES # BLD AUTO: 1.1 10E9/L (ref 0.8–5.3)
LYMPHOCYTES # BLD AUTO: 1.2 10E9/L (ref 0.8–5.3)
LYMPHOCYTES # BLD AUTO: 1.3 10E9/L (ref 0.8–5.3)
LYMPHOCYTES NFR BLD AUTO: 10.9 %
LYMPHOCYTES NFR BLD AUTO: 11.5 %
LYMPHOCYTES NFR BLD AUTO: 11.8 %
LYMPHOCYTES NFR BLD AUTO: 13.2 %
LYMPHOCYTES NFR BLD AUTO: 13.6 %
LYMPHOCYTES NFR BLD AUTO: 15.1 %
LYMPHOCYTES NFR BLD AUTO: 15.2 %
LYMPHOCYTES NFR BLD AUTO: 15.3 %
LYMPHOCYTES NFR BLD AUTO: 16.8 %
LYMPHOCYTES NFR BLD AUTO: 19.3 %
LYMPHOCYTES NFR BLD AUTO: 20.9 %
LYMPHOCYTES NFR BLD AUTO: 3 %
LYMPHOCYTES NFR BLD AUTO: 30.6 %
LYMPHOCYTES NFR BLD AUTO: 40 %
LYMPHOCYTES NFR BLD AUTO: 5.8 %
LYMPHOCYTES NFR BLD AUTO: 7.3 %
LYMPHOCYTES NFR BLD AUTO: 7.9 %
MCH RBC QN AUTO: 28.5 PG (ref 26.5–33)
MCH RBC QN AUTO: 28.8 PG (ref 26.5–33)
MCH RBC QN AUTO: 28.9 PG (ref 26.5–33)
MCH RBC QN AUTO: 29 PG (ref 26.5–33)
MCH RBC QN AUTO: 29 PG (ref 26.5–33)
MCH RBC QN AUTO: 29.1 PG (ref 26.5–33)
MCH RBC QN AUTO: 29.2 PG (ref 26.5–33)
MCH RBC QN AUTO: 29.3 PG (ref 26.5–33)
MCH RBC QN AUTO: 29.6 PG (ref 26.5–33)
MCH RBC QN AUTO: 29.8 PG (ref 26.5–33)
MCH RBC QN AUTO: 30 PG (ref 26.5–33)
MCH RBC QN AUTO: 30.2 PG (ref 26.5–33)
MCH RBC QN AUTO: 30.2 PG (ref 26.5–33)
MCH RBC QN AUTO: 30.3 PG (ref 26.5–33)
MCH RBC QN AUTO: 30.3 PG (ref 26.5–33)
MCH RBC QN AUTO: 30.6 PG (ref 26.5–33)
MCH RBC QN AUTO: 30.9 PG (ref 26.5–33)
MCHC RBC AUTO-ENTMCNC: 30.5 G/DL (ref 31.5–36.5)
MCHC RBC AUTO-ENTMCNC: 30.9 G/DL (ref 31.5–36.5)
MCHC RBC AUTO-ENTMCNC: 31.3 G/DL (ref 31.5–36.5)
MCHC RBC AUTO-ENTMCNC: 31.3 G/DL (ref 31.5–36.5)
MCHC RBC AUTO-ENTMCNC: 31.6 G/DL (ref 31.5–36.5)
MCHC RBC AUTO-ENTMCNC: 31.7 G/DL (ref 31.5–36.5)
MCHC RBC AUTO-ENTMCNC: 31.9 G/DL (ref 31.5–36.5)
MCHC RBC AUTO-ENTMCNC: 32.6 G/DL (ref 31.5–36.5)
MCHC RBC AUTO-ENTMCNC: 33 G/DL (ref 31.5–36.5)
MCHC RBC AUTO-ENTMCNC: 33.3 G/DL (ref 31.5–36.5)
MCHC RBC AUTO-ENTMCNC: 33.5 G/DL (ref 31.5–36.5)
MCHC RBC AUTO-ENTMCNC: 33.9 G/DL (ref 31.5–36.5)
MCHC RBC AUTO-ENTMCNC: 34.3 G/DL (ref 31.5–36.5)
MCV RBC AUTO: 90 FL (ref 78–100)
MCV RBC AUTO: 91 FL (ref 78–100)
MCV RBC AUTO: 92 FL (ref 78–100)
MCV RBC AUTO: 93 FL (ref 78–100)
MCV RBC AUTO: 94 FL (ref 78–100)
MCV RBC AUTO: 95 FL (ref 78–100)
MISCELLANEOUS TEST: NORMAL
MONOCYTES # BLD AUTO: 0.1 10E9/L (ref 0–1.3)
MONOCYTES # BLD AUTO: 0.2 10E9/L (ref 0–1.3)
MONOCYTES # BLD AUTO: 0.3 10E9/L (ref 0–1.3)
MONOCYTES # BLD AUTO: 0.4 10E9/L (ref 0–1.3)
MONOCYTES # BLD AUTO: 0.5 10E9/L (ref 0–1.3)
MONOCYTES # BLD AUTO: 0.6 10E9/L (ref 0–1.3)
MONOCYTES NFR BLD AUTO: 0.8 %
MONOCYTES NFR BLD AUTO: 1 %
MONOCYTES NFR BLD AUTO: 1.9 %
MONOCYTES NFR BLD AUTO: 2.9 %
MONOCYTES NFR BLD AUTO: 3.6 %
MONOCYTES NFR BLD AUTO: 4.5 %
MONOCYTES NFR BLD AUTO: 4.5 %
MONOCYTES NFR BLD AUTO: 4.7 %
MONOCYTES NFR BLD AUTO: 5.2 %
MONOCYTES NFR BLD AUTO: 5.2 %
MONOCYTES NFR BLD AUTO: 5.4 %
MONOCYTES NFR BLD AUTO: 6.6 %
MONOCYTES NFR BLD AUTO: 7.2 %
MONOCYTES NFR BLD AUTO: 7.8 %
MONOCYTES NFR BLD AUTO: 8.1 %
MONOCYTES NFR BLD AUTO: 9 %
MONOCYTES NFR BLD AUTO: 9.2 %
MONOS+MACROS NFR FLD MANUAL: 85 %
NEUTROPHILS # BLD AUTO: 1 10E9/L (ref 1.6–8.3)
NEUTROPHILS # BLD AUTO: 1.7 10E9/L (ref 1.6–8.3)
NEUTROPHILS # BLD AUTO: 1.9 10E9/L (ref 1.6–8.3)
NEUTROPHILS # BLD AUTO: 2.4 10E9/L (ref 1.6–8.3)
NEUTROPHILS # BLD AUTO: 3.5 10E9/L (ref 1.6–8.3)
NEUTROPHILS # BLD AUTO: 4.3 10E9/L (ref 1.6–8.3)
NEUTROPHILS # BLD AUTO: 4.8 10E9/L (ref 1.6–8.3)
NEUTROPHILS # BLD AUTO: 4.9 10E9/L (ref 1.6–8.3)
NEUTROPHILS # BLD AUTO: 5.1 10E9/L (ref 1.6–8.3)
NEUTROPHILS # BLD AUTO: 5.3 10E9/L (ref 1.6–8.3)
NEUTROPHILS # BLD AUTO: 5.4 10E9/L (ref 1.6–8.3)
NEUTROPHILS # BLD AUTO: 6.4 10E9/L (ref 1.6–8.3)
NEUTROPHILS # BLD AUTO: 7.2 10E9/L (ref 1.6–8.3)
NEUTROPHILS # BLD AUTO: 7.4 10E9/L (ref 1.6–8.3)
NEUTROPHILS # BLD AUTO: 7.9 10E9/L (ref 1.6–8.3)
NEUTROPHILS # BLD AUTO: 8.4 10E9/L (ref 1.6–8.3)
NEUTROPHILS # BLD AUTO: 8.8 10E9/L (ref 1.6–8.3)
NEUTROPHILS NFR BLD AUTO: 49 %
NEUTROPHILS NFR BLD AUTO: 58.4 %
NEUTROPHILS NFR BLD AUTO: 67.7 %
NEUTROPHILS NFR BLD AUTO: 69.9 %
NEUTROPHILS NFR BLD AUTO: 75.1 %
NEUTROPHILS NFR BLD AUTO: 75.4 %
NEUTROPHILS NFR BLD AUTO: 75.5 %
NEUTROPHILS NFR BLD AUTO: 75.8 %
NEUTROPHILS NFR BLD AUTO: 76.3 %
NEUTROPHILS NFR BLD AUTO: 79.4 %
NEUTROPHILS NFR BLD AUTO: 82.3 %
NEUTROPHILS NFR BLD AUTO: 82.5 %
NEUTROPHILS NFR BLD AUTO: 84.9 %
NEUTROPHILS NFR BLD AUTO: 85.2 %
NEUTROPHILS NFR BLD AUTO: 89.7 %
NEUTROPHILS NFR BLD AUTO: 92 %
NEUTROPHILS NFR BLD AUTO: 93.1 %
NEUTS BAND # BLD AUTO: 0.3 10E9/L (ref 0–0.6)
NEUTS BAND NFR BLD MANUAL: 4 %
NEUTS BAND NFR FLD MANUAL: 15 %
NRBC # BLD AUTO: 0 10*3/UL
NRBC BLD AUTO-RTO: 0 /100
NT-PROBNP SERPL-MCNC: 478 PG/ML (ref 0–900)
PLATELET # BLD AUTO: 124 10E9/L (ref 150–450)
PLATELET # BLD AUTO: 154 10E9/L (ref 150–450)
PLATELET # BLD AUTO: 181 10E9/L (ref 150–450)
PLATELET # BLD AUTO: 198 10E9/L (ref 150–450)
PLATELET # BLD AUTO: 202 10E9/L (ref 150–450)
PLATELET # BLD AUTO: 213 10E9/L (ref 150–450)
PLATELET # BLD AUTO: 215 10E9/L (ref 150–450)
PLATELET # BLD AUTO: 238 10E9/L (ref 150–450)
PLATELET # BLD AUTO: 253 10E9/L (ref 150–450)
PLATELET # BLD AUTO: 256 10E9/L (ref 150–450)
PLATELET # BLD AUTO: 277 10E9/L (ref 150–450)
PLATELET # BLD AUTO: 293 10E9/L (ref 150–450)
PLATELET # BLD AUTO: 308 10E9/L (ref 150–450)
PLATELET # BLD AUTO: 327 10E9/L (ref 150–450)
PLATELET # BLD AUTO: 327 10E9/L (ref 150–450)
PLATELET # BLD AUTO: 85 10E9/L (ref 150–450)
PLATELET # BLD AUTO: 86 10E9/L (ref 150–450)
POTASSIUM SERPL-SCNC: 3.4 MMOL/L (ref 3.4–5.3)
POTASSIUM SERPL-SCNC: 3.7 MEQ/L (ref 3.5–5.3)
POTASSIUM SERPL-SCNC: 3.7 MMOL/L (ref 3.4–5.3)
POTASSIUM SERPL-SCNC: 3.7 MMOL/L (ref 3.4–5.3)
POTASSIUM SERPL-SCNC: 3.8 MEQ/L (ref 3.5–5.3)
POTASSIUM SERPL-SCNC: 3.8 MEQ/L (ref 3.5–5.3)
POTASSIUM SERPL-SCNC: 3.8 MMOL/L (ref 3.4–5.3)
POTASSIUM SERPL-SCNC: 3.8 MMOL/L (ref 3.4–5.3)
POTASSIUM SERPL-SCNC: 3.9 MMOL/L (ref 3.4–5.3)
POTASSIUM SERPL-SCNC: 3.9 MMOL/L (ref 3.4–5.3)
POTASSIUM SERPL-SCNC: 4 MMOL/L (ref 3.4–5.3)
POTASSIUM SERPL-SCNC: 4.1 MMOL/L (ref 3.4–5.3)
POTASSIUM SERPL-SCNC: 4.5 MMOL/L (ref 3.4–5.3)
PROT SERPL-MCNC: 5.9 G/DL (ref 6.8–8.8)
PROT SERPL-MCNC: 6.4 G/DL (ref 6.8–8.8)
PROT SERPL-MCNC: 6.5 G/DL (ref 6.8–8.8)
PROT SERPL-MCNC: 6.5 G/DL (ref 6.8–8.8)
PROT SERPL-MCNC: 6.6 G/DL (ref 6.8–8.8)
PROT SERPL-MCNC: 6.6 G/DL (ref 6.8–8.8)
PROT SERPL-MCNC: 6.7 G/DL (ref 6.8–8.8)
PROT SERPL-MCNC: 6.9 G/DL (ref 6.8–8.8)
PROT SERPL-MCNC: 7 G/DL (ref 6.8–8.8)
PROT SERPL-MCNC: 7.1 G/DL (ref 6.8–8.8)
RBC # BLD AUTO: 2.97 10E12/L (ref 3.8–5.2)
RBC # BLD AUTO: 3.02 10E12/L (ref 3.8–5.2)
RBC # BLD AUTO: 3.24 10E12/L (ref 3.8–5.2)
RBC # BLD AUTO: 3.28 10E12/L (ref 3.8–5.2)
RBC # BLD AUTO: 3.38 10E12/L (ref 3.8–5.2)
RBC # BLD AUTO: 3.42 10E12/L (ref 3.8–5.2)
RBC # BLD AUTO: 3.44 10E12/L (ref 3.8–5.2)
RBC # BLD AUTO: 3.48 10E12/L (ref 3.8–5.2)
RBC # BLD AUTO: 3.68 10E12/L (ref 3.8–5.2)
RBC # BLD AUTO: 3.69 10E12/L (ref 3.8–5.2)
RBC # BLD AUTO: 3.7 10E12/L (ref 3.8–5.2)
RBC # BLD AUTO: 3.76 10E12/L (ref 3.8–5.2)
RBC # BLD AUTO: 3.88 10E12/L (ref 3.8–5.2)
RBC # BLD AUTO: 4 10E12/L (ref 3.8–5.2)
RBC # BLD AUTO: 4.03 10E12/L (ref 3.8–5.2)
RBC # BLD AUTO: 4.11 10E12/L (ref 3.8–5.2)
RBC # BLD AUTO: 4.22 10E12/L (ref 3.8–5.2)
RBC # FLD: NORMAL /UL
SODIUM SERPL-SCNC: 136 MMOL/L (ref 133–144)
SODIUM SERPL-SCNC: 137 MMOL/L (ref 133–144)
SODIUM SERPL-SCNC: 138 MMOL/L (ref 133–144)
SODIUM SERPL-SCNC: 139 MMOL/L (ref 133–144)
SODIUM SERPL-SCNC: 140 MMOL/L (ref 133–144)
SPECIMEN SOURCE FLD: NORMAL
SPECIMEN SOURCE: NORMAL
TIBC SERPL-MCNC: 250 UG/DL (ref 240–430)
TOXIC GRANULES BLD QL SMEAR: PRESENT
TROPONIN I SERPL-MCNC: <0.015 UG/L (ref 0–0.04)
TROPONIN I SERPL-MCNC: <0.015 UG/L (ref 0–0.04)
WBC # BLD AUTO: 10 10E9/L (ref 4–11)
WBC # BLD AUTO: 10.2 10E9/L (ref 4–11)
WBC # BLD AUTO: 10.3 10E9/L (ref 4–11)
WBC # BLD AUTO: 2.1 10E9/L (ref 4–11)
WBC # BLD AUTO: 2.7 10E9/L (ref 4–11)
WBC # BLD AUTO: 2.8 10E9/L (ref 4–11)
WBC # BLD AUTO: 2.8 10E9/L (ref 4–11)
WBC # BLD AUTO: 5 10E9/L (ref 4–11)
WBC # BLD AUTO: 5.4 10E9/L (ref 4–11)
WBC # BLD AUTO: 5.6 10E9/L (ref 4–11)
WBC # BLD AUTO: 6 10E9/L (ref 4–11)
WBC # BLD AUTO: 6.8 10E9/L (ref 4–11)
WBC # BLD AUTO: 7 10E9/L (ref 4–11)
WBC # BLD AUTO: 7.2 10E9/L (ref 4–11)
WBC # BLD AUTO: 7.8 10E9/L (ref 4–11)
WBC # BLD AUTO: 7.9 10E9/L (ref 4–11)
WBC # BLD AUTO: 8.3 10E9/L (ref 4–11)
WBC # FLD AUTO: 342 /UL

## 2018-01-01 PROCEDURE — 96374 THER/PROPH/DIAG INJ IV PUSH: CPT

## 2018-01-01 PROCEDURE — G0463 HOSPITAL OUTPT CLINIC VISIT: HCPCS

## 2018-01-01 PROCEDURE — 96413 CHEMO IV INFUSION 1 HR: CPT

## 2018-01-01 PROCEDURE — 99285 EMERGENCY DEPT VISIT HI MDM: CPT | Mod: 25

## 2018-01-01 PROCEDURE — 99215 OFFICE O/P EST HI 40 MIN: CPT | Performed by: NURSE PRACTITIONER

## 2018-01-01 PROCEDURE — 36415 COLL VENOUS BLD VENIPUNCTURE: CPT | Mod: ZL | Performed by: NURSE PRACTITIONER

## 2018-01-01 PROCEDURE — 85025 COMPLETE CBC W/AUTO DIFF WBC: CPT | Mod: ZL | Performed by: INTERNAL MEDICINE

## 2018-01-01 PROCEDURE — 25000128 H RX IP 250 OP 636: Performed by: INTERNAL MEDICINE

## 2018-01-01 PROCEDURE — 25000128 H RX IP 250 OP 636

## 2018-01-01 PROCEDURE — G0463 HOSPITAL OUTPT CLINIC VISIT: HCPCS | Mod: 25

## 2018-01-01 PROCEDURE — 85730 THROMBOPLASTIN TIME PARTIAL: CPT | Performed by: FAMILY MEDICINE

## 2018-01-01 PROCEDURE — 25000128 H RX IP 250 OP 636: Performed by: NURSE PRACTITIONER

## 2018-01-01 PROCEDURE — 99214 OFFICE O/P EST MOD 30 MIN: CPT | Performed by: NURSE PRACTITIONER

## 2018-01-01 PROCEDURE — 99285 EMERGENCY DEPT VISIT HI MDM: CPT | Performed by: FAMILY MEDICINE

## 2018-01-01 PROCEDURE — 83540 ASSAY OF IRON: CPT | Mod: ZL | Performed by: NURSE PRACTITIONER

## 2018-01-01 PROCEDURE — 93010 ELECTROCARDIOGRAM REPORT: CPT | Performed by: INTERNAL MEDICINE

## 2018-01-01 PROCEDURE — 80053 COMPREHEN METABOLIC PANEL: CPT | Mod: ZL | Performed by: INTERNAL MEDICINE

## 2018-01-01 PROCEDURE — 96375 TX/PRO/DX INJ NEW DRUG ADDON: CPT

## 2018-01-01 PROCEDURE — 94640 AIRWAY INHALATION TREATMENT: CPT

## 2018-01-01 PROCEDURE — 71260 CT THORAX DX C+: CPT | Mod: TC

## 2018-01-01 PROCEDURE — 71046 X-RAY EXAM CHEST 2 VIEWS: CPT | Mod: TC

## 2018-01-01 PROCEDURE — 96417 CHEMO IV INFUS EACH ADDL SEQ: CPT

## 2018-01-01 PROCEDURE — 85610 PROTHROMBIN TIME: CPT | Mod: ZL | Performed by: NURSE PRACTITIONER

## 2018-01-01 PROCEDURE — 96367 TX/PROPH/DG ADDL SEQ IV INF: CPT

## 2018-01-01 PROCEDURE — 25000128 H RX IP 250 OP 636: Performed by: RADIOLOGY

## 2018-01-01 PROCEDURE — 25000128 H RX IP 250 OP 636: Mod: JW | Performed by: INTERNAL MEDICINE

## 2018-01-01 PROCEDURE — 83615 LACTATE (LD) (LDH) ENZYME: CPT | Mod: ZL | Performed by: NURSE PRACTITIONER

## 2018-01-01 PROCEDURE — 96415 CHEMO IV INFUSION ADDL HR: CPT

## 2018-01-01 PROCEDURE — 82728 ASSAY OF FERRITIN: CPT | Mod: ZL | Performed by: NURSE PRACTITIONER

## 2018-01-01 PROCEDURE — 36415 COLL VENOUS BLD VENIPUNCTURE: CPT | Performed by: FAMILY MEDICINE

## 2018-01-01 PROCEDURE — 96523 IRRIG DRUG DELIVERY DEVICE: CPT

## 2018-01-01 PROCEDURE — 93005 ELECTROCARDIOGRAM TRACING: CPT | Mod: 59

## 2018-01-01 PROCEDURE — 85025 COMPLETE CBC W/AUTO DIFF WBC: CPT | Mod: ZL | Performed by: NURSE PRACTITIONER

## 2018-01-01 PROCEDURE — 76604 US EXAM CHEST: CPT | Mod: TC

## 2018-01-01 PROCEDURE — 96377 APPLICATON ON-BODY INJECTOR: CPT

## 2018-01-01 PROCEDURE — 94640 AIRWAY INHALATION TREATMENT: CPT | Mod: 76

## 2018-01-01 PROCEDURE — 96377 APPLICATON ON-BODY INJECTOR: CPT | Mod: 59

## 2018-01-01 PROCEDURE — 27210238 US THORACENTESIS

## 2018-01-01 PROCEDURE — 85610 PROTHROMBIN TIME: CPT | Performed by: FAMILY MEDICINE

## 2018-01-01 PROCEDURE — 99213 OFFICE O/P EST LOW 20 MIN: CPT | Performed by: NURSE PRACTITIONER

## 2018-01-01 PROCEDURE — 80053 COMPREHEN METABOLIC PANEL: CPT | Mod: ZL | Performed by: NURSE PRACTITIONER

## 2018-01-01 PROCEDURE — 88305 TISSUE EXAM BY PATHOLOGIST: CPT | Mod: TC | Performed by: RADIOLOGY

## 2018-01-01 PROCEDURE — 80076 HEPATIC FUNCTION PANEL: CPT | Mod: ZL | Performed by: NURSE PRACTITIONER

## 2018-01-01 PROCEDURE — 83615 LACTATE (LD) (LDH) ENZYME: CPT | Mod: ZL | Performed by: INTERNAL MEDICINE

## 2018-01-01 PROCEDURE — 87205 SMEAR GRAM STAIN: CPT | Performed by: RADIOLOGY

## 2018-01-01 PROCEDURE — 89051 BODY FLUID CELL COUNT: CPT | Performed by: RADIOLOGY

## 2018-01-01 PROCEDURE — 00000155 ZZHCL STATISTIC H-CELL BLOCK W/STAIN: Performed by: RADIOLOGY

## 2018-01-01 PROCEDURE — 86300 IMMUNOASSAY TUMOR CA 15-3: CPT | Mod: ZL | Performed by: NURSE PRACTITIONER

## 2018-01-01 PROCEDURE — 93306 TTE W/DOPPLER COMPLETE: CPT | Mod: TC

## 2018-01-01 PROCEDURE — 40000986 XR CHEST 1 VW: Mod: TC

## 2018-01-01 PROCEDURE — 96365 THER/PROPH/DIAG IV INF INIT: CPT

## 2018-01-01 PROCEDURE — 36415 COLL VENOUS BLD VENIPUNCTURE: CPT | Mod: ZL | Performed by: INTERNAL MEDICINE

## 2018-01-01 PROCEDURE — 96374 THER/PROPH/DIAG INJ IV PUSH: CPT | Mod: 59

## 2018-01-01 PROCEDURE — 25000125 ZZHC RX 250: Performed by: RADIOLOGY

## 2018-01-01 PROCEDURE — 85025 COMPLETE CBC W/AUTO DIFF WBC: CPT | Performed by: FAMILY MEDICINE

## 2018-01-01 PROCEDURE — 83550 IRON BINDING TEST: CPT | Mod: ZL | Performed by: NURSE PRACTITIONER

## 2018-01-01 PROCEDURE — 76705 ECHO EXAM OF ABDOMEN: CPT | Mod: TC

## 2018-01-01 PROCEDURE — 40000275 ZZH STATISTIC RCP TIME EA 10 MIN

## 2018-01-01 PROCEDURE — 25000128 H RX IP 250 OP 636: Performed by: FAMILY MEDICINE

## 2018-01-01 PROCEDURE — 27210238 US THORACENTESIS: Mod: TC

## 2018-01-01 PROCEDURE — 82378 CARCINOEMBRYONIC ANTIGEN: CPT | Mod: ZL | Performed by: NURSE PRACTITIONER

## 2018-01-01 PROCEDURE — 25000132 ZZH RX MED GY IP 250 OP 250 PS 637: Mod: GY | Performed by: NURSE PRACTITIONER

## 2018-01-01 PROCEDURE — 87075 CULTR BACTERIA EXCEPT BLOOD: CPT | Performed by: RADIOLOGY

## 2018-01-01 PROCEDURE — 80076 HEPATIC FUNCTION PANEL: CPT | Mod: ZL | Performed by: INTERNAL MEDICINE

## 2018-01-01 PROCEDURE — 82150 ASSAY OF AMYLASE: CPT | Mod: ZL | Performed by: NURSE PRACTITIONER

## 2018-01-01 PROCEDURE — 87070 CULTURE OTHR SPECIMN AEROBIC: CPT | Performed by: RADIOLOGY

## 2018-01-01 PROCEDURE — 85025 COMPLETE CBC W/AUTO DIFF WBC: CPT | Performed by: EMERGENCY MEDICINE

## 2018-01-01 PROCEDURE — 25000132 ZZH RX MED GY IP 250 OP 250 PS 637: Mod: GY | Performed by: FAMILY MEDICINE

## 2018-01-01 PROCEDURE — 25000125 ZZHC RX 250: Performed by: NURSE PRACTITIONER

## 2018-01-01 PROCEDURE — 84484 ASSAY OF TROPONIN QUANT: CPT | Performed by: FAMILY MEDICINE

## 2018-01-01 PROCEDURE — 36415 COLL VENOUS BLD VENIPUNCTURE: CPT | Mod: ZL

## 2018-01-01 PROCEDURE — A9270 NON-COVERED ITEM OR SERVICE: HCPCS | Mod: GY | Performed by: NURSE PRACTITIONER

## 2018-01-01 PROCEDURE — 99284 EMERGENCY DEPT VISIT MOD MDM: CPT | Performed by: FAMILY MEDICINE

## 2018-01-01 PROCEDURE — 99215 OFFICE O/P EST HI 40 MIN: CPT | Performed by: INTERNAL MEDICINE

## 2018-01-01 PROCEDURE — 80053 COMPREHEN METABOLIC PANEL: CPT | Performed by: EMERGENCY MEDICINE

## 2018-01-01 PROCEDURE — 71046 X-RAY EXAM CHEST 2 VIEWS: CPT | Mod: TC,59

## 2018-01-01 PROCEDURE — 99284 EMERGENCY DEPT VISIT MOD MDM: CPT | Performed by: EMERGENCY MEDICINE

## 2018-01-01 PROCEDURE — 86300 IMMUNOASSAY TUMOR CA 15-3: CPT | Mod: ZL | Performed by: INTERNAL MEDICINE

## 2018-01-01 PROCEDURE — A9270 NON-COVERED ITEM OR SERVICE: HCPCS | Mod: GY | Performed by: FAMILY MEDICINE

## 2018-01-01 PROCEDURE — 25000128 H RX IP 250 OP 636: Mod: JW

## 2018-01-01 PROCEDURE — 85610 PROTHROMBIN TIME: CPT | Performed by: EMERGENCY MEDICINE

## 2018-01-01 PROCEDURE — 82378 CARCINOEMBRYONIC ANTIGEN: CPT | Mod: ZL | Performed by: INTERNAL MEDICINE

## 2018-01-01 PROCEDURE — 36415 COLL VENOUS BLD VENIPUNCTURE: CPT | Performed by: EMERGENCY MEDICINE

## 2018-01-01 PROCEDURE — 88108 CYTOPATH CONCENTRATE TECH: CPT | Mod: TC | Performed by: RADIOLOGY

## 2018-01-01 PROCEDURE — 25000125 ZZHC RX 250: Performed by: FAMILY MEDICINE

## 2018-01-01 PROCEDURE — 71275 CT ANGIOGRAPHY CHEST: CPT | Mod: TC

## 2018-01-01 PROCEDURE — 40000797 XR CHEST 1 VW

## 2018-01-01 PROCEDURE — 83690 ASSAY OF LIPASE: CPT | Mod: ZL | Performed by: NURSE PRACTITIONER

## 2018-01-01 PROCEDURE — 93306 TTE W/DOPPLER COMPLETE: CPT | Mod: 26 | Performed by: INTERNAL MEDICINE

## 2018-01-01 PROCEDURE — 36598 INJ W/FLUOR EVAL CV DEVICE: CPT | Mod: TC

## 2018-01-01 PROCEDURE — 83880 ASSAY OF NATRIURETIC PEPTIDE: CPT | Performed by: FAMILY MEDICINE

## 2018-01-01 RX ORDER — SODIUM CHLORIDE 9 MG/ML
1000 INJECTION, SOLUTION INTRAVENOUS CONTINUOUS PRN
Status: CANCELLED
Start: 2018-01-01

## 2018-01-01 RX ORDER — ALBUTEROL SULFATE 0.83 MG/ML
2.5 SOLUTION RESPIRATORY (INHALATION)
Status: CANCELLED | OUTPATIENT
Start: 2018-01-01

## 2018-01-01 RX ORDER — LORAZEPAM 2 MG/ML
0.5 INJECTION INTRAMUSCULAR EVERY 4 HOURS PRN
Status: CANCELLED
Start: 2018-01-01

## 2018-01-01 RX ORDER — ALBUTEROL SULFATE 90 UG/1
1-2 AEROSOL, METERED RESPIRATORY (INHALATION)
Status: CANCELLED
Start: 2018-01-01

## 2018-01-01 RX ORDER — DEXAMETHASONE SODIUM PHOSPHATE 10 MG/ML
12 INJECTION, SOLUTION INTRAMUSCULAR; INTRAVENOUS ONCE
Status: COMPLETED | OUTPATIENT
Start: 2018-01-01 | End: 2018-01-01

## 2018-01-01 RX ORDER — METHYLPREDNISOLONE SODIUM SUCCINATE 125 MG/2ML
125 INJECTION, POWDER, LYOPHILIZED, FOR SOLUTION INTRAMUSCULAR; INTRAVENOUS
Status: CANCELLED
Start: 2018-01-01

## 2018-01-01 RX ORDER — HEPARIN SODIUM (PORCINE) LOCK FLUSH IV SOLN 100 UNIT/ML 100 UNIT/ML
500 SOLUTION INTRAVENOUS ONCE
Status: CANCELLED
Start: 2018-01-01 | End: 2018-01-01

## 2018-01-01 RX ORDER — DEXAMETHASONE 4 MG/1
8 TABLET ORAL SEE ADMIN INSTRUCTIONS
Qty: 2 TABLET | Refills: 0 | Status: SHIPPED | OUTPATIENT
Start: 2018-01-01 | End: 2018-01-01

## 2018-01-01 RX ORDER — HEPARIN SODIUM (PORCINE) LOCK FLUSH IV SOLN 100 UNIT/ML 100 UNIT/ML
500 SOLUTION INTRAVENOUS ONCE
Status: COMPLETED | OUTPATIENT
Start: 2018-01-01 | End: 2018-01-01

## 2018-01-01 RX ORDER — HEPARIN SODIUM (PORCINE) LOCK FLUSH IV SOLN 100 UNIT/ML 100 UNIT/ML
5 SOLUTION INTRAVENOUS
Status: DISCONTINUED | OUTPATIENT
Start: 2018-01-01 | End: 2018-01-01 | Stop reason: HOSPADM

## 2018-01-01 RX ORDER — HEPARIN SODIUM,PORCINE 10 UNIT/ML
5-10 VIAL (ML) INTRAVENOUS
Status: DISCONTINUED | OUTPATIENT
Start: 2018-01-01 | End: 2018-01-01 | Stop reason: HOSPADM

## 2018-01-01 RX ORDER — ALBUTEROL SULFATE 90 UG/1
2 AEROSOL, METERED RESPIRATORY (INHALATION) EVERY 4 HOURS PRN
COMMUNITY

## 2018-01-01 RX ORDER — PALONOSETRON 0.05 MG/ML
0.25 INJECTION, SOLUTION INTRAVENOUS ONCE
Status: CANCELLED
Start: 2018-01-01

## 2018-01-01 RX ORDER — MEPERIDINE HYDROCHLORIDE 25 MG/ML
25 INJECTION INTRAMUSCULAR; INTRAVENOUS; SUBCUTANEOUS EVERY 30 MIN PRN
Status: CANCELLED | OUTPATIENT
Start: 2018-01-01

## 2018-01-01 RX ORDER — DIPHENHYDRAMINE HYDROCHLORIDE 50 MG/ML
50 INJECTION INTRAMUSCULAR; INTRAVENOUS
Status: CANCELLED
Start: 2018-01-01

## 2018-01-01 RX ORDER — HEPARIN SODIUM (PORCINE) LOCK FLUSH IV SOLN 100 UNIT/ML 100 UNIT/ML
SOLUTION INTRAVENOUS
Status: COMPLETED
Start: 2018-01-01 | End: 2018-01-01

## 2018-01-01 RX ORDER — EPINEPHRINE 0.3 MG/.3ML
0.3 INJECTION SUBCUTANEOUS EVERY 5 MIN PRN
Status: CANCELLED | OUTPATIENT
Start: 2018-01-01

## 2018-01-01 RX ORDER — EPINEPHRINE 1 MG/ML
0.3 INJECTION, SOLUTION, CONCENTRATE INTRAVENOUS EVERY 5 MIN PRN
Status: CANCELLED | OUTPATIENT
Start: 2018-01-01

## 2018-01-01 RX ORDER — WARFARIN SODIUM 1 MG/1
1 TABLET ORAL DAILY
Qty: 90 TABLET | Refills: 1 | Status: SHIPPED | OUTPATIENT
Start: 2018-01-01

## 2018-01-01 RX ORDER — ACETAMINOPHEN 325 MG/1
650 TABLET ORAL ONCE
Status: COMPLETED | OUTPATIENT
Start: 2018-01-01 | End: 2018-01-01

## 2018-01-01 RX ORDER — HEPARIN SODIUM (PORCINE) LOCK FLUSH IV SOLN 100 UNIT/ML 100 UNIT/ML
500 SOLUTION INTRAVENOUS EVERY 8 HOURS
Status: DISCONTINUED | OUTPATIENT
Start: 2018-01-01 | End: 2018-01-01 | Stop reason: HOSPADM

## 2018-01-01 RX ORDER — CODEINE PHOSPHATE AND GUAIFENESIN 10; 100 MG/5ML; MG/5ML
1-2 SOLUTION ORAL EVERY 4 HOURS PRN
Qty: 420 ML | Refills: 0 | Status: SHIPPED | OUTPATIENT
Start: 2018-01-01 | End: 2018-01-01

## 2018-01-01 RX ORDER — HEPARIN SODIUM (PORCINE) LOCK FLUSH IV SOLN 100 UNIT/ML 100 UNIT/ML
5 SOLUTION INTRAVENOUS
Status: CANCELLED | OUTPATIENT
Start: 2018-01-01

## 2018-01-01 RX ORDER — FUROSEMIDE 20 MG
10 TABLET ORAL 2 TIMES DAILY
Qty: 60 TABLET | Refills: 0 | Status: SHIPPED | OUTPATIENT
Start: 2018-01-01

## 2018-01-01 RX ORDER — PALONOSETRON 0.05 MG/ML
0.25 INJECTION, SOLUTION INTRAVENOUS ONCE
Status: COMPLETED | OUTPATIENT
Start: 2018-01-01 | End: 2018-01-01

## 2018-01-01 RX ORDER — HEPARIN SODIUM (PORCINE) LOCK FLUSH IV SOLN 100 UNIT/ML 100 UNIT/ML
500 SOLUTION INTRAVENOUS EVERY 8 HOURS
Status: CANCELLED
Start: 2018-01-01

## 2018-01-01 RX ORDER — MORPHINE SULFATE 15 MG/1
15 TABLET, FILM COATED, EXTENDED RELEASE ORAL EVERY 12 HOURS
Qty: 60 TABLET | Refills: 0 | Status: SHIPPED | OUTPATIENT
Start: 2018-01-01 | End: 2018-01-01 | Stop reason: DRUGHIGH

## 2018-01-01 RX ORDER — PREDNISONE 20 MG/1
20 TABLET ORAL DAILY
Qty: 4 TABLET | Refills: 0 | Status: SHIPPED | OUTPATIENT
Start: 2018-01-01 | End: 2018-01-01

## 2018-01-01 RX ORDER — HYDROMORPHONE HYDROCHLORIDE 2 MG/1
2 TABLET ORAL EVERY 4 HOURS PRN
Qty: 60 TABLET | Refills: 0 | Status: SHIPPED | OUTPATIENT
Start: 2018-01-01 | End: 2018-01-01

## 2018-01-01 RX ORDER — DEXAMETHASONE 4 MG/1
8 TABLET ORAL SEE ADMIN INSTRUCTIONS
Qty: 4 TABLET | Refills: 0 | Status: SHIPPED | OUTPATIENT
Start: 2018-01-01 | End: 2018-01-01

## 2018-01-01 RX ORDER — LOPERAMIDE HYDROCHLORIDE 2 MG/1
2 TABLET ORAL 4 TIMES DAILY PRN
COMMUNITY

## 2018-01-01 RX ORDER — LIDOCAINE 40 MG/G
CREAM TOPICAL
Status: DISCONTINUED | OUTPATIENT
Start: 2018-01-01 | End: 2018-01-01 | Stop reason: HOSPADM

## 2018-01-01 RX ORDER — SENNOSIDES 8.6 MG
1 TABLET ORAL DAILY
COMMUNITY

## 2018-01-01 RX ORDER — DEXAMETHASONE 4 MG/1
8 TABLET ORAL SEE ADMIN INSTRUCTIONS
Qty: 2 TABLET | Refills: 0 | Status: CANCELLED | OUTPATIENT
Start: 2018-01-01

## 2018-01-01 RX ORDER — LORAZEPAM 0.5 MG/1
0.5 TABLET ORAL EVERY 4 HOURS PRN
Qty: 30 TABLET | Refills: 5 | Status: SHIPPED | OUTPATIENT
Start: 2018-01-01

## 2018-01-01 RX ORDER — IOPAMIDOL 612 MG/ML
100 INJECTION, SOLUTION INTRAVASCULAR ONCE
Status: COMPLETED | OUTPATIENT
Start: 2018-01-01 | End: 2018-01-01

## 2018-01-01 RX ORDER — DRONABINOL 2.5 MG/1
2.5 CAPSULE ORAL
Qty: 60 CAPSULE | Refills: 0 | Status: SHIPPED | OUTPATIENT
Start: 2018-01-01

## 2018-01-01 RX ORDER — MORPHINE SULFATE 30 MG/1
30 TABLET, FILM COATED, EXTENDED RELEASE ORAL EVERY 12 HOURS
Qty: 60 TABLET | Refills: 0 | Status: SHIPPED | OUTPATIENT
Start: 2018-01-01

## 2018-01-01 RX ORDER — DOXYCYCLINE 100 MG/1
100 CAPSULE ORAL 2 TIMES DAILY
Qty: 20 CAPSULE | Refills: 0 | Status: SHIPPED | OUTPATIENT
Start: 2018-01-01 | End: 2018-01-01

## 2018-01-01 RX ORDER — SODIUM CHLORIDE 9 MG/ML
INJECTION, SOLUTION INTRAVENOUS CONTINUOUS
Status: DISCONTINUED | OUTPATIENT
Start: 2018-01-01 | End: 2018-01-01 | Stop reason: HOSPADM

## 2018-01-01 RX ORDER — PROCHLORPERAZINE MALEATE 10 MG
10 TABLET ORAL EVERY 6 HOURS PRN
Qty: 30 TABLET | Refills: 5 | Status: SHIPPED | OUTPATIENT
Start: 2018-01-01

## 2018-01-01 RX ORDER — PREDNISONE 20 MG/1
20 TABLET ORAL ONCE
Status: COMPLETED | OUTPATIENT
Start: 2018-01-01 | End: 2018-01-01

## 2018-01-01 RX ORDER — ALBUTEROL SULFATE 0.83 MG/ML
2.5 SOLUTION RESPIRATORY (INHALATION)
Status: COMPLETED | OUTPATIENT
Start: 2018-01-01 | End: 2018-01-01

## 2018-01-01 RX ORDER — LORAZEPAM 0.5 MG/1
0.5 TABLET ORAL ONCE
Status: COMPLETED | OUTPATIENT
Start: 2018-01-01 | End: 2018-01-01

## 2018-01-01 RX ORDER — DEXAMETHASONE 4 MG/1
TABLET ORAL
Qty: 12 TABLET | Refills: 0 | Status: SHIPPED | OUTPATIENT
Start: 2018-01-01 | End: 2018-01-01

## 2018-01-01 RX ORDER — LIDOCAINE 40 MG/G
CREAM TOPICAL
Status: CANCELLED | OUTPATIENT
Start: 2018-01-01

## 2018-01-01 RX ORDER — MORPHINE SULFATE 15 MG/1
15 TABLET ORAL EVERY 4 HOURS PRN
Qty: 60 TABLET | Refills: 0 | Status: SHIPPED | OUTPATIENT
Start: 2018-01-01

## 2018-01-01 RX ORDER — MORPHINE SULFATE 15 MG/1
15 TABLET ORAL EVERY 4 HOURS PRN
Qty: 60 TABLET | Refills: 0 | Status: SHIPPED | OUTPATIENT
Start: 2018-01-01 | End: 2018-01-01

## 2018-01-01 RX ORDER — HEPARIN SODIUM,PORCINE 10 UNIT/ML
5-10 VIAL (ML) INTRAVENOUS
Status: CANCELLED | OUTPATIENT
Start: 2018-01-01

## 2018-01-01 RX ORDER — CODEINE PHOSPHATE AND GUAIFENESIN 10; 100 MG/5ML; MG/5ML
1-2 SOLUTION ORAL EVERY 4 HOURS PRN
Qty: 420 ML | Refills: 0 | Status: SHIPPED | OUTPATIENT
Start: 2018-01-01

## 2018-01-01 RX ORDER — IOPAMIDOL 755 MG/ML
75 INJECTION, SOLUTION INTRAVASCULAR ONCE
Status: COMPLETED | OUTPATIENT
Start: 2018-01-01 | End: 2018-01-01

## 2018-01-01 RX ORDER — SIMVASTATIN 20 MG
20 TABLET ORAL
COMMUNITY
End: 2018-01-01

## 2018-01-01 RX ORDER — IOPAMIDOL 612 MG/ML
50 INJECTION, SOLUTION INTRAVASCULAR ONCE
Status: COMPLETED | OUTPATIENT
Start: 2018-01-01 | End: 2018-01-01

## 2018-01-01 RX ORDER — NYSTATIN 10B UNIT
1 POWDER (EA) MISCELLANEOUS 2 TIMES DAILY
Qty: 1 EACH | Refills: 1 | Status: SHIPPED | OUTPATIENT
Start: 2018-01-01

## 2018-01-01 RX ORDER — DEXAMETHASONE 4 MG/1
8 TABLET ORAL
Qty: 6 TABLET | Refills: 7 | Status: SHIPPED | OUTPATIENT
Start: 2018-01-01 | End: 2019-04-05

## 2018-01-01 RX ADMIN — SODIUM CHLORIDE 150 MG: 9 INJECTION, SOLUTION INTRAVENOUS at 11:51

## 2018-01-01 RX ADMIN — PEGFILGRASTIM 6 MG: KIT SUBCUTANEOUS at 12:08

## 2018-01-01 RX ADMIN — SODIUM CHLORIDE, PRESERVATIVE FREE 500 UNITS: 5 INJECTION INTRAVENOUS at 14:03

## 2018-01-01 RX ADMIN — LIDOCAINE HYDROCHLORIDE 1 ML: 10 INJECTION, SOLUTION INFILTRATION; PERINEURAL at 12:35

## 2018-01-01 RX ADMIN — SODIUM CHLORIDE 250 ML: 9 INJECTION, SOLUTION INTRAVENOUS at 10:54

## 2018-01-01 RX ADMIN — DOXORUBICIN HYDROCHLORIDE 60 MG: 2 INJECTABLE, LIPOSOMAL INTRAVENOUS at 13:14

## 2018-01-01 RX ADMIN — ALTEPLASE 2 MG: 2.2 INJECTION, POWDER, LYOPHILIZED, FOR SOLUTION INTRAVENOUS at 09:50

## 2018-01-01 RX ADMIN — SODIUM CHLORIDE 250 ML: 9 INJECTION, SOLUTION INTRAVENOUS at 10:55

## 2018-01-01 RX ADMIN — IOPAMIDOL 75 ML: 755 INJECTION, SOLUTION INTRAVENOUS at 10:31

## 2018-01-01 RX ADMIN — SODIUM CHLORIDE 1330 MG: 9 INJECTION, SOLUTION INTRAVENOUS at 11:52

## 2018-01-01 RX ADMIN — SODIUM CHLORIDE, PRESERVATIVE FREE 500 UNITS: 5 INJECTION INTRAVENOUS at 15:09

## 2018-01-01 RX ADMIN — DEXAMETHASONE SODIUM PHOSPHATE 12 MG: 10 INJECTION, SOLUTION INTRAMUSCULAR; INTRAVENOUS at 11:49

## 2018-01-01 RX ADMIN — SODIUM CHLORIDE, PRESERVATIVE FREE 500 UNITS: 5 INJECTION INTRAVENOUS at 11:51

## 2018-01-01 RX ADMIN — SODIUM CHLORIDE 150 MG: 9 INJECTION, SOLUTION INTRAVENOUS at 11:21

## 2018-01-01 RX ADMIN — SODIUM CHLORIDE 1330 MG: 9 INJECTION, SOLUTION INTRAVENOUS at 11:12

## 2018-01-01 RX ADMIN — IOPAMIDOL 100 ML: 612 INJECTION, SOLUTION INTRAVENOUS at 10:52

## 2018-01-01 RX ADMIN — ALBUTEROL SULFATE 2.5 MG: 2.5 SOLUTION RESPIRATORY (INHALATION) at 10:44

## 2018-01-01 RX ADMIN — DEXTROSE MONOHYDRATE 250 ML: 50 INJECTION, SOLUTION INTRAVENOUS at 13:11

## 2018-01-01 RX ADMIN — DIATRIZOATE MEGLUMINE AND DIATRIZOATE SODIUM 30 ML: 660; 100 SOLUTION ORAL; RECTAL at 11:49

## 2018-01-01 RX ADMIN — PALONOSETRON HYDROCHLORIDE 0.25 MG: 0.25 INJECTION INTRAVENOUS at 10:56

## 2018-01-01 RX ADMIN — SODIUM CHLORIDE, PRESERVATIVE FREE 500 UNITS: 5 INJECTION INTRAVENOUS at 14:46

## 2018-01-01 RX ADMIN — SODIUM CHLORIDE, PRESERVATIVE FREE 500 UNITS: 5 INJECTION INTRAVENOUS at 14:53

## 2018-01-01 RX ADMIN — DEXAMETHASONE SODIUM PHOSPHATE 12 MG: 10 INJECTION, SOLUTION INTRAMUSCULAR; INTRAVENOUS at 09:54

## 2018-01-01 RX ADMIN — SODIUM CHLORIDE 1330 MG: 9 INJECTION, SOLUTION INTRAVENOUS at 12:44

## 2018-01-01 RX ADMIN — DEXAMETHASONE SODIUM PHOSPHATE 12 MG: 10 INJECTION, SOLUTION INTRAMUSCULAR; INTRAVENOUS at 09:35

## 2018-01-01 RX ADMIN — SODIUM CHLORIDE 250 ML: 9 INJECTION, SOLUTION INTRAVENOUS at 08:51

## 2018-01-01 RX ADMIN — ALTEPLASE 2 MG: 2.2 INJECTION, POWDER, LYOPHILIZED, FOR SOLUTION INTRAVENOUS at 12:26

## 2018-01-01 RX ADMIN — DEXAMETHASONE SODIUM PHOSPHATE 12 MG: 10 INJECTION, SOLUTION INTRAMUSCULAR; INTRAVENOUS at 12:32

## 2018-01-01 RX ADMIN — SODIUM CHLORIDE 1330 MG: 9 INJECTION, SOLUTION INTRAVENOUS at 10:03

## 2018-01-01 RX ADMIN — GEMCITABINE 1500 MG: 38 INJECTION, SOLUTION INTRAVENOUS at 09:35

## 2018-01-01 RX ADMIN — DEXAMETHASONE SODIUM PHOSPHATE 12 MG: 10 INJECTION, SOLUTION INTRAMUSCULAR; INTRAVENOUS at 14:58

## 2018-01-01 RX ADMIN — SODIUM CHLORIDE, PRESERVATIVE FREE 500 UNITS: 5 INJECTION INTRAVENOUS at 14:25

## 2018-01-01 RX ADMIN — ALTEPLASE 2 MG: 2.2 INJECTION, POWDER, LYOPHILIZED, FOR SOLUTION INTRAVENOUS at 12:00

## 2018-01-01 RX ADMIN — DEXAMETHASONE SODIUM PHOSPHATE 12 MG: 10 INJECTION, SOLUTION INTRAMUSCULAR; INTRAVENOUS at 11:13

## 2018-01-01 RX ADMIN — IOPAMIDOL 10 ML: 612 INJECTION, SOLUTION INTRAVENOUS at 12:29

## 2018-01-01 RX ADMIN — ALBUTEROL SULFATE 2.5 MG: 2.5 SOLUTION RESPIRATORY (INHALATION) at 10:04

## 2018-01-01 RX ADMIN — SODIUM CHLORIDE 1330 MG: 9 INJECTION, SOLUTION INTRAVENOUS at 13:30

## 2018-01-01 RX ADMIN — DOCETAXEL 160 MG: 80 INJECTION, SOLUTION, CONCENTRATE INTRAVENOUS at 11:11

## 2018-01-01 RX ADMIN — SODIUM CHLORIDE, PRESERVATIVE FREE 500 UNITS: 5 INJECTION INTRAVENOUS at 14:27

## 2018-01-01 RX ADMIN — PREDNISONE 20 MG: 20 TABLET ORAL at 10:56

## 2018-01-01 RX ADMIN — SODIUM CHLORIDE 1330 MG: 900 INJECTION, SOLUTION INTRAVENOUS at 11:58

## 2018-01-01 RX ADMIN — SODIUM CHLORIDE, PRESERVATIVE FREE 500 UNITS: 5 INJECTION INTRAVENOUS at 11:20

## 2018-01-01 RX ADMIN — SODIUM CHLORIDE, PRESERVATIVE FREE 500 UNITS: 5 INJECTION INTRAVENOUS at 10:12

## 2018-01-01 RX ADMIN — HEPARIN 5 ML: 100 SYRINGE at 12:01

## 2018-01-01 RX ADMIN — SODIUM CHLORIDE, PRESERVATIVE FREE 500 UNITS: 5 INJECTION INTRAVENOUS at 12:17

## 2018-01-01 RX ADMIN — PALONOSETRON HYDROCHLORIDE 0.25 MG: 0.25 INJECTION INTRAVENOUS at 11:10

## 2018-01-01 RX ADMIN — PEGFILGRASTIM 6 MG: KIT SUBCUTANEOUS at 14:04

## 2018-01-01 RX ADMIN — DEXTROSE 500 ML: 5 SOLUTION INTRAVENOUS at 13:57

## 2018-01-01 RX ADMIN — SODIUM CHLORIDE 250 ML: 9 INJECTION, SOLUTION INTRAVENOUS at 13:26

## 2018-01-01 RX ADMIN — SODIUM CHLORIDE 250 ML: 9 INJECTION, SOLUTION INTRAVENOUS at 09:49

## 2018-01-01 RX ADMIN — GEMCITABINE 2000 MG: 38 INJECTION, SOLUTION INTRAVENOUS at 09:33

## 2018-01-01 RX ADMIN — GEMCITABINE 1500 MG: 38 INJECTION, SOLUTION INTRAVENOUS at 10:36

## 2018-01-01 RX ADMIN — SODIUM CHLORIDE 250 ML: 9 INJECTION, SOLUTION INTRAVENOUS at 12:31

## 2018-01-01 RX ADMIN — PALONOSETRON HYDROCHLORIDE 0.25 MG: 0.25 INJECTION INTRAVENOUS at 11:46

## 2018-01-01 RX ADMIN — SODIUM CHLORIDE 250 ML: 9 INJECTION, SOLUTION INTRAVENOUS at 11:10

## 2018-01-01 RX ADMIN — GEMCITABINE 1500 MG: 38 INJECTION, SOLUTION INTRAVENOUS at 10:17

## 2018-01-01 RX ADMIN — GEMCITABINE 1900 MG: 38 INJECTION, SOLUTION INTRAVENOUS at 15:39

## 2018-01-01 RX ADMIN — HEPARIN SODIUM (PORCINE) LOCK FLUSH IV SOLN 100 UNIT/ML 5 ML: 100 SOLUTION at 12:01

## 2018-01-01 RX ADMIN — DEXAMETHASONE SODIUM PHOSPHATE 12 MG: 10 INJECTION, SOLUTION INTRAMUSCULAR; INTRAVENOUS at 09:02

## 2018-01-01 RX ADMIN — ALBUTEROL SULFATE 2.5 MG: 2.5 SOLUTION RESPIRATORY (INHALATION) at 11:12

## 2018-01-01 RX ADMIN — HEPARIN SODIUM (PORCINE) LOCK FLUSH IV SOLN 100 UNIT/ML 5 ML: 100 SOLUTION at 11:03

## 2018-01-01 RX ADMIN — IOPAMIDOL 100 ML: 612 INJECTION, SOLUTION INTRAVENOUS at 11:49

## 2018-01-01 RX ADMIN — DOCETAXEL 120 MG: 80 INJECTION, SOLUTION, CONCENTRATE INTRAVENOUS at 14:43

## 2018-01-01 RX ADMIN — SODIUM CHLORIDE, PRESERVATIVE FREE 5 ML: 5 INJECTION INTRAVENOUS at 11:03

## 2018-01-01 RX ADMIN — DEXAMETHASONE SODIUM PHOSPHATE 12 MG: 10 INJECTION, SOLUTION INTRAMUSCULAR; INTRAVENOUS at 08:51

## 2018-01-01 RX ADMIN — SODIUM CHLORIDE, PRESERVATIVE FREE 500 UNITS: 5 INJECTION INTRAVENOUS at 17:12

## 2018-01-01 RX ADMIN — DOXORUBICIN HYDROCHLORIDE 60 MG: 2 INJECTION, SUSPENSION, LIPOSOMAL INTRAVENOUS at 13:59

## 2018-01-01 RX ADMIN — GEMCITABINE 1500 MG: 38 INJECTION, SOLUTION INTRAVENOUS at 13:01

## 2018-01-01 RX ADMIN — SODIUM CHLORIDE 250 ML: 9 INJECTION, SOLUTION INTRAVENOUS at 14:57

## 2018-01-01 RX ADMIN — SODIUM CHLORIDE, PRESERVATIVE FREE 500 UNITS: 5 INJECTION INTRAVENOUS at 11:15

## 2018-01-01 RX ADMIN — PEGFILGRASTIM 6 MG: KIT SUBCUTANEOUS at 15:21

## 2018-01-01 RX ADMIN — DOXORUBICIN HYDROCHLORIDE 60 MG: 2 INJECTABLE, LIPOSOMAL INTRAVENOUS at 13:34

## 2018-01-01 RX ADMIN — LORAZEPAM 0.5 MG: 0.5 TABLET ORAL at 14:29

## 2018-01-01 RX ADMIN — SODIUM CHLORIDE, PRESERVATIVE FREE 500 UNITS: 5 INJECTION INTRAVENOUS at 17:04

## 2018-01-01 RX ADMIN — SODIUM CHLORIDE: 9 INJECTION, SOLUTION INTRAVENOUS at 11:40

## 2018-01-01 RX ADMIN — ACETAMINOPHEN 650 MG: 325 TABLET, FILM COATED ORAL at 11:16

## 2018-01-01 RX ADMIN — SODIUM CHLORIDE 250 ML: 9 INJECTION, SOLUTION INTRAVENOUS at 09:34

## 2018-01-01 RX ADMIN — SODIUM CHLORIDE 150 MG: 9 INJECTION, SOLUTION INTRAVENOUS at 11:22

## 2018-01-01 RX ADMIN — DEXTROSE MONOHYDRATE 250 ML: 50 INJECTION, SOLUTION INTRAVENOUS at 13:26

## 2018-01-01 RX ADMIN — SODIUM CHLORIDE, PRESERVATIVE FREE 500 UNITS: 5 INJECTION INTRAVENOUS at 14:44

## 2018-01-01 RX ADMIN — DIPHENHYDRAMINE HYDROCHLORIDE 50 MG: 50 INJECTION, SOLUTION INTRAMUSCULAR; INTRAVENOUS at 11:41

## 2018-01-01 RX ADMIN — DEXAMETHASONE SODIUM PHOSPHATE 12 MG: 10 INJECTION, SOLUTION INTRAMUSCULAR; INTRAVENOUS at 11:00

## 2018-01-01 RX ADMIN — PREDNISONE 20 MG: 20 TABLET ORAL at 10:27

## 2018-01-01 RX ADMIN — SODIUM CHLORIDE: 9 INJECTION, SOLUTION INTRAVENOUS at 10:18

## 2018-01-01 RX ADMIN — DOCETAXEL 120 MG: 80 INJECTION, SOLUTION, CONCENTRATE INTRAVENOUS at 12:38

## 2018-01-01 RX ADMIN — HEPARIN 500 UNITS: 100 SYRINGE at 12:25

## 2018-01-01 RX ADMIN — SODIUM CHLORIDE, PRESERVATIVE FREE 500 UNITS: 5 INJECTION INTRAVENOUS at 15:50

## 2018-01-01 ASSESSMENT — ENCOUNTER SYMPTOMS
CHOKING: 0
COUGH: 1
CHILLS: 0
PSYCHIATRIC NEGATIVE: 1
PSYCHIATRIC NEGATIVE: 1
NAUSEA: 1
EYES NEGATIVE: 1
ABDOMINAL DISTENTION: 0
HEADACHES: 0
WHEEZING: 0
FREQUENCY: 0
ABDOMINAL PAIN: 0
FEVER: 0
ARTHRALGIAS: 0
BACK PAIN: 0
NAUSEA: 0
WEAKNESS: 1
SHORTNESS OF BREATH: 1
CHEST TIGHTNESS: 0
FATIGUE: 1
FEVER: 0
FATIGUE: 1
NEUROLOGICAL NEGATIVE: 1
SHORTNESS OF BREATH: 1
WHEEZING: 0
CHEST TIGHTNESS: 1
ABDOMINAL PAIN: 0
DIAPHORESIS: 0
APPETITE CHANGE: 1
DYSURIA: 0
DIZZINESS: 0
ACTIVITY CHANGE: 1
PALPITATIONS: 0
DIAPHORESIS: 0
DYSURIA: 0
ACTIVITY CHANGE: 1
SHORTNESS OF BREATH: 1

## 2018-01-01 ASSESSMENT — PAIN SCALES - GENERAL
PAINLEVEL: NO PAIN (0)
PAINLEVEL: MILD PAIN (2)
PAINLEVEL: MILD PAIN (3)
PAINLEVEL: MODERATE PAIN (4)
PAINLEVEL: NO PAIN (0)
PAINLEVEL: SEVERE PAIN (7)
PAINLEVEL: MODERATE PAIN (4)
PAINLEVEL: NO PAIN (0)

## 2018-01-01 ASSESSMENT — PATIENT HEALTH QUESTIONNAIRE - PHQ9
SUM OF ALL RESPONSES TO PHQ QUESTIONS 1-9: 1
SUM OF ALL RESPONSES TO PHQ QUESTIONS 1-9: 0
SUM OF ALL RESPONSES TO PHQ QUESTIONS 1-9: 4
SUM OF ALL RESPONSES TO PHQ QUESTIONS 1-9: 0
SUM OF ALL RESPONSES TO PHQ QUESTIONS 1-9: 2
SUM OF ALL RESPONSES TO PHQ QUESTIONS 1-9: 5
SUM OF ALL RESPONSES TO PHQ QUESTIONS 1-9: 5
SUM OF ALL RESPONSES TO PHQ QUESTIONS 1-9: 1

## 2018-01-01 NOTE — PROGRESS NOTES
HEMATOLOGY ONCOLOGY CLINIC NOTE       DATE OF VISIT:  12/29/2017.       HISTORY OF PRESENT ILLNESS:  Ms. 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  We had seen her originally in consultation at the request of Dr. Mya Park on 05/03/2016.  At that time we were asked to evaluate her for DCIS of the right breast as well as metaplastic carcinoma of the right breast.  The patient had presented with an abnormal mammogram through her primary care provider on 06/11/2016.  Findings were there was residual calcification in the upper outer quadrant of the right breast.  On 02/09/2016, she underwent stereotactic biopsy right breast lesion at the 8 o'clock position, which revealed grade 3 DCIS with solid micropapillary subtype.  Estrogen receptor was positive.  Biopsy of the right breast lesion at 9 o'clock position revealed a grade 2 DCIS with solid micropapillary subtype.  Estrogen receptors were positive.  The patient went on to have bilateral mastectomy performed at the [facility] [place].  Pathology revealed that she had a 2 cm metaplastic carcinoma with  mesenchymal components including adenocarcinoma.  There was also evidence of angiosarcoma, liposarcoma spindle cells contrary differentiation.  Estrogen receptor was positive at 2%, progesterone receptor was negative, HER-2/renetta was negative.  The tumor was triple negative.  There was also a 1.5 cm DCIS component of the right breast.  There was 1 sentinel lymph node that was obtained that was negative.  She was seen at the [facility] and was told that she would likely need chemotherapy.  The patient subsequently was seen by Dr. Misty Lam at [facility] who saw the patient on 04/18/2016.  Assessment was that the patient had metaplastic carcinoma as well as DCIS of the right breast and had noted a metaplastic tumor which did contain an adenocarcinoma,  angiosarcoma spindle cell component.  He did note, there were concerns for treatment.  We felt given the fact that she likely had triple negative disease and appeared to have a ductal adenocarcinoma it was favored treating her for triple-negative breast cancer.  Given the fact that the tumor size is greater than 0.5 cm, felt that dose-dense AC followed by weekly paclitaxel would be ideal.  The patient subsequently was seen by us on 05/03/2016.  We did note that she had a CT chest which revealed 2 small lung nodules, one was a tiny 2 mm lung nodule in the left lower lobe as well as a small 4 mm nodule in the right upper lobe.  There was no lymphadenopathy.  CT chest, abdomen and pelvis revealed no findings of metastatic disease in the abdomen and pelvis.  Bone scan revealed abnormal uptake in the cervical and thoracic spine, right consistent with arthritis.  There was no evidence of metastatic disease.  When we saw the patient, she had been seen by surgery who had noted a wound infection of the left post-mastectomy site.  She was placed on antibiotics.  When we saw the patient we felt we would treat her as a triple negative patient with dose-dense AC as well as followed by 12 weeks of Taxol.  The patient also was seen by Dr. Khanh Deluna of Radiation therapy.  Follow up radiation therapy was not indicated.  Patient had an echocardiogram on 04/21/2016 with ejection fraction of 55%, normal ventricular size and systolic function.  She also had a PET scan done on 05/05/2016.  The findings were there was no evidence of metastatic disease.  She also had an MRI of the brain at [facility] which was negative for metastatic disease.  This was performed on 05/09/2016.  She was seen by the wound care center for wound care.  She was cleared for chemotherapy.  Since we saw the patient, we elected who felt that the patient was stable enough to start dose-dense AC.  She tolerated it reasonably well with some bone pain  associated with Neulasta injections completed 4 cycles of AC then went on to weekly Taxol.  She  completed 12 weekly cycles.  Last dose of Taxol was on 10/31/2016.  She subsequently was restaged with a PET scan.  This was performed on 11/17.  The findings were there was the PET scan was negative for metastatic disease.  MRI brain was also negative.  When we saw the patient on we felt given the fact she was ER positive, IL positive DCIS would be a candidate for Arimidex therapy and was started on Arimidex.  She did reasonably well.  The plan was to continue surveillance.  Subsequently, had a CT chest done at [facility] on 07/14/2017 and the findings were that there was a new nodule in the lower lumbar nodes on the right measuring 8.5 mm in size and a repeat CT was suggested.  Subsequently repeated CT chest was done on 11/26/2017 and the findings were that there was significant interval enlargement of a pleural-based mass anteriorly within the right hemithorax mass currently measured 7.5 cm, grade 3.7 cm x 5.3 cm transverse AP craniocaudal dimensions respectively, central necrosis was appreciated with a small right-sided pleural effusion.  We elected to proceed with CT-guided biopsy of this and it came back consistent metaplastic carcinoma consistent with previous diagnosis of breast cancer.  We elected to send the patient for a second opinion given the complex nature of the diagnosis.  The patient saw Dr. Sean Tineo, breast oncologist on 12/27/2017.  His impression was that the patient had received a dose of between 222 - 240 mg per meter squared with her doxorubicin.  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  Also reviewed pathology from the original mastectomy specimen revealed DCIS as well as a 2 cm phyllodes tumor.  This is typically treated with  anthracyclines and as such, the use of doxorubicin was very reasonable.  Her tumor was acting very aggressively substantial to grow the last 3 months.  She did have a CT chest at the [facility] as well as potential growth in November 20th.  Her pleural space is probably involved.  She also had a CT of the abdomen and pelvis did not show any evidence of metastatic disease.  She did note a couple of small nodular lesions in the pleural space as well.  He recommended chemotherapy felt that given her previous doxorubicin exposure the best treatment would be docetaxel and gemcitabine.  We did discuss the case with Dr. Castelan today and he confirmed that the patient would require sarcoma like treatment for her phyllodes tumor with docetaxel given at a dose of 75 mg/m2 on day 8 and Gemzar given at a dose of 900 mg/m2 on day 1 and day 8 with day 9 Neulasta support a 21-day cycle.  Given the growth of the tumor, we felt she did not need any further imaging including PET scans or bone scans.  The patient is now here to start therapy.  She still has her PET scans.  She says she still gets shortness of breath and has some pain when she takes a deep breath.  Her pulse ox is 94%.  She denies any fevers, night sweats, weight loss, abdominal pain.      PHYSICAL EXAMINATION:   GENERAL:  She is a middle-aged white female in no acute distress.   VITAL SIGNS:  Blood pressure 160/89, pulse 71, respirations 20, temperature 97.4.   HEENT:  Atraumatic, normocephalic.  Oropharynx erythematous.   NECK:  Supple.   LUNGS:  Reveal decreased breath sounds at the right base.   HEART:  Regular rhythm, S1, S2 normal.   ABDOMEN:  Obese, soft, nontender.   LYMPHATICS:  No cervical, supraclavicular or axillary nodes.   EXTREMITIES:  No edema.   NEUROLOGIC:  Nonfocal.      LABORATORY DATA:  Not done.      IMPRESSION:  Stage I, N0 M0 by anaplastic carcinoma of the right breast, felt to be a triple-negative breast cancer.  The patient with  dose-dense AC followed by Taxol.  The patient completed chemotherapy.  Course complicated by minimal neuropathy completed chemotherapy on 10/31/2016 and was treated with Arimidex.  Given the ER positive DCIS now with development of metastatic disease in the right lung, enlarging right lung mass as per [place] the diagnosis of phyllodes tumor or metastatic.  The patient was treated promptly with Adriamycin-based therapy, but now has progressed and will need as per Dr. Sean Tineo docetaxel gemcitabine, which is effective in the sarcoma malignant tumors.  She will be treated with gemcitabine 900 mg/m2 on day 1 and day 8 and docetaxel 75 mg per meter squared daily given every 21 days.  Will start next week on .  Otherwise, will have the patient be seen by acute admission for tissue disease.  The chemotherapy would like to restage after 3 cycles of chemotherapy.  Otherwise, we will see the patient with this second cycle of chemotherapy.  Side effects chemotherapy was addressed include alopecia, cytopenias, nausea, fluid overload neuropathy.  Patient in 3 months to proceed as soon as possible.      40 minutes was spent with the patient, greater than half of that time spent on counseling, spent time ordering chemotherapy, lab work and pre meds.  Will obtain CBC, CMP, LDH, CA 27-29 with the first cycle day 1.         LEO LEE MD             D: 2017 18:16   T: 2017 12:16   MT: ALINE      Name:     LESLIE BENAVIDEZ   MRN:      [id number]        Account:      [id number]   :      1950           Visit Date:   2017      Document: [id number]       cc: Mya Tineo MD

## 2018-01-03 NOTE — LETTER
[facility] [place]  [address]  [facility] [id number] [phone]      January 11, 2018      Ana Melgarxon  [address] [facility] [address]      EMERGENCY CARE PLAN  Presenting Problem Treatment Plan   Questions or concerns during clinic hours    24 hour Nurse line available - Call main clinic line and follow prompts I will call the clinic directly: [phone]  ONCOLOGY [phone]    24 Hour Nurse Line [phone]- Follow prompts and press option for nurse advisor    [facility]  [address] [place] [address]   Patient needs to schedule an appointment  I will call the scheduling team during business hours at [phone]   Same day treatment   I will call the clinic first, then urgent care if needed   Clinic Care Coordinators [facility]  RN ONCOLOGY CARE COORDINATOR  [phone]  RN Clinic Care Coordinator  [phone]    [phone]   Crisis Services:  Behavioral or Mental Health [facility]al [facility]  [phone]   Emergency treatment--Immediately CALL 911

## 2018-01-03 NOTE — PATIENT INSTRUCTIONS
Gemcitabine Hydrochloride Solution for injection  What is this medicine?  GEMCITABINE (chani SIT a been) is a chemotherapy drug. This medicine is used to treat many types of cancer like breast cancer, lung cancer, pancreatic cancer, and ovarian cancer.  This medicine may be used for other purposes; ask your health care provider or pharmacist if you have questions.  What should I tell my health care provider before I take this medicine?  They need to know if you have any of these conditions:    blood disorders    infection    kidney disease    liver disease    recent or ongoing radiation therapy    an unusual or allergic reaction to gemcitabine, other chemotherapy, other medicines, foods, dyes, or preservatives    pregnant or trying to get pregnant    breast-feeding  How should I use this medicine?  This drug is given as an infusion into a vein. It is administered in a hospital or clinic by a specially trained health care professional.  Talk to your pediatrician regarding the use of this medicine in children. Special care may be needed.  Overdosage: If you think you have taken too much of this medicine contact a poison control center or emergency room at once.  NOTE: This medicine is only for you. Do not share this medicine with others.  What if I miss a dose?  It is important not to miss your dose. Call your doctor or health care professional if you are unable to keep an appointment.  What may interact with this medicine?    medicines to increase blood counts like filgrastim, pegfilgrastim, sargramostim    some other chemotherapy drugs like cisplatin    vaccines  Talk to your doctor or health care professional before taking any of these medicines:    acetaminophen    aspirin    ibuprofen    ketoprofen    naproxen  This list may not describe all possible interactions. Give your health care provider a list of all the medicines, herbs, non-prescription drugs, or dietary supplements you use. Also tell them if you smoke,  drink alcohol, or use illegal drugs. Some items may interact with your medicine.  What should I watch for while using this medicine?  Visit your doctor for checks on your progress. This drug may make you feel generally unwell. This is not uncommon, as chemotherapy can affect healthy cells as well as cancer cells. Report any side effects. Continue your course of treatment even though you feel ill unless your doctor tells you to stop.  In some cases, you may be given additional medicines to help with side effects. Follow all directions for their use.  Call your doctor or health care professional for advice if you get a fever, chills or sore throat, or other symptoms of a cold or flu. Do not treat yourself. This drug decreases your body's ability to fight infections. Try to avoid being around people who are sick.  This medicine may increase your risk to bruise or bleed. Call your doctor or health care professional if you notice any unusual bleeding.  Be careful brushing and flossing your teeth or using a toothpick because you may get an infection or bleed more easily. If you have any dental work done, tell your dentist you are receiving this medicine.  Avoid taking products that contain aspirin, acetaminophen, ibuprofen, naproxen, or ketoprofen unless instructed by your doctor. These medicines may hide a fever.  Women should inform their doctor if they wish to become pregnant or think they might be pregnant. There is a potential for serious side effects to an unborn child. Talk to your health care professional or pharmacist for more information. Do not breast-feed an infant while taking this medicine.  What side effects may I notice from receiving this medicine?  Side effects that you should report to your doctor or health care professional as soon as possible:    allergic reactions like skin rash, itching or hives, swelling of the face, lips, or tongue    low blood counts - this medicine may decrease the number of  white blood cells, red blood cells and platelets. You may be at increased risk for infections and bleeding.    signs of infection - fever or chills, cough, sore throat, pain or difficulty passing urine    signs of decreased platelets or bleeding - bruising, pinpoint red spots on the skin, black, tarry stools, blood in the urine    signs of decreased red blood cells - unusually weak or tired, fainting spells, lightheadedness    breathing problems    chest pain    mouth sores    nausea and vomiting    pain, swelling, redness at site where injected    pain, tingling, numbness in the hands or feet    stomach pain    swelling of ankles, feet, hands    unusual bleeding  Side effects that usually do not require medical attention (report to your doctor or health care professional if they continue or are bothersome):    constipation    diarrhea    hair loss    loss of appetite    stomach upset  This list may not describe all possible side effects. Call your doctor for medical advice about side effects. You may report side effects to FDA at 5-234-FDA-5551.  Where should I keep my medicine?  This drug is given in a hospital or clinic and will not be stored at home.  NOTE:This sheet is a summary. It may not cover all possible information. If you have questions about this medicine, talk to your doctor, pharmacist, or health care provider. Copyright  2016 Gold Standard

## 2018-01-03 NOTE — PROGRESS NOTES
"Chemotherapy Education  Patient is a 67 year old  here today for chemotherapy education, accompanied by spouse, Braden.  Pt has a cancer diagnosis of breast with mets to lung and she has no main concern.  Patient has been treated in this department in the past.  Their Oncologist is Dr. Celis.  Reviewed the following with the patient and their support person:  General chemotherapy information, including ways it is excreted from the body and cleaning and containment of vomitus or other bodily fluid, use of the bathroom, sexual health and intimacy, what to do if needing to miss a treatment, when to call a provider and the need for staff to wear protective equipment.  Importance of Central line care (port) or IV site care.  Treatment regimen; Gemcitabine on day 1 and 8, docetaxel on day 8.  21 day cycle.   and rationale for strict adherence, specific medication names including pre-treatment medications (including dexamethasone day before  and at home scheduled or as needed medications, delivery methods, and side effects and management; including skin changes/hand-foot syndrome, anemia, neutropenia, thrombocytopenia, diarrhea/constipation, hair loss syndrome, memory changes/ \"chemobrain\", mouth sores, taste changes, neuropathy, fatigue, myelosuppression, and risk of extravasation or infiltration.  Infection prevention, and monitoring of lab values, what lab tests and what changes of these values meant, along with the possibility of hydration or blood product transfusion, or the need to defer or hold treatment.    General orientation to the Medical Oncology department, Infusion Services department, Huc/scheduling, bathrooms and usual flow of the treatment day provided as well as introduction to the Infusion nurses.  Introduced to Care Coordinator  No barriers to learning identified. Patient and family verbalized understanding of all written and verbal information. All questions answered to patients satisfaction.  Informed " consent reviewed and accurate.    Left sided power port accessed with 19 gauge 3/4 inch 90 degree bent non coring needle.  Line flushed with 10 cc's normal saline.  Needle secured with sterile transparent dressing.  10 cc's blood discarded, and blood taken for 4 tubes of ordered labs.  Patient tolerated well.  Denies pain and discomfort at this time.  Port flushes easily without resistance.    Hand hygiene performed: yes   Mask donned by caregiver: yes Site prepped with CHG: yes Labs drawn: yes Dressing applied using aseptic technique: yes   Todays lab values: WBC 7.9, ANC 7.4, , HGB 12.8, AST 35, ALT 62, Alkaline Phosphatase 91, Creatinine 0.75.     Patient meets parameters for today's infusion.  Denies questions or concerns regarding today's infusion and/or medications being administered.      Patient identified with two identifiers, order verified, and verbal consent for today's infusion obtained from patient. Written consent for treatment is on file and valid.    1539: IV pump verified with Gemcitabine 1,900 dose, drug, and rate of administration by second RNDenise. Infusion administered per protocol. Patient tolerated infusion well, no signs or symptoms of adverse reaction noted. Patient denies pain nor discomfort.     Needle removed, tip intact. Site clean, dry and intact. Covered with a sterile bandage, slight pressure applied for 30 seconds. Pt instructed to leave bandage intact for a minimum of one hour, and to call with questions or concerns. Copy of appointments, discharge instructions, and after visit summary (AVS) provided to patient. Patient states understanding, discharged ambulatory.

## 2018-01-03 NOTE — MR AVS SNAPSHOT
After Visit Summary   1/3/2018    Ana Simmons    MRN: [id number]           Patient Information     Date Of Birth          1950        Visit Information        Provider Department      1/3/2018 2:00 PM  INF RM 3310 [facility] [place]        Today's Diagnoses     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    -  1    Cough        Malignant neoplasm of right female breast, unspecified estrogen receptor status, unspecified site of breast (H)          Care Instructions      Gemcitabine Hydrochloride Solution for injection  What is this medicine?  GEMCITABINE (chani SIT a been) is a chemotherapy drug. This medicine is used to treat many types of cancer like breast cancer, lung cancer, pancreatic cancer, and ovarian cancer.  This medicine may be used for other purposes; ask your health care provider or pharmacist if you have questions.  What should I tell my health care provider before I take this medicine?  They need to know if you have any of these conditions:    blood disorders    infection    kidney disease    liver disease    recent or ongoing radiation therapy    an unusual or allergic reaction to gemcitabine, other chemotherapy, other medicines, foods, dyes, or preservatives    pregnant or trying to get pregnant    breast-feeding  How should I use this medicine?  This drug is given as an infusion into a vein. It is administered in a hospital or clinic by a specially trained health care professional.  Talk to your pediatrician regarding the use of this medicine in children. Special care may be needed.  Overdosage: If you think you have taken too much of this medicine contact a poison control center or emergency room at once.  NOTE: This medicine is only for you. Do not share this medicine with others.  What if I miss a dose?  It is important not to miss your dose. Call your doctor or health care professional if you are unable to keep an appointment.  What may interact with this  medicine?    medicines to increase blood counts like filgrastim, pegfilgrastim, sargramostim    some other chemotherapy drugs like cisplatin    vaccines  Talk to your doctor or health care professional before taking any of these medicines:    acetaminophen    aspirin    ibuprofen    ketoprofen    naproxen  This list may not describe all possible interactions. Give your health care provider a list of all the medicines, herbs, non-prescription drugs, or dietary supplements you use. Also tell them if you smoke, drink alcohol, or use illegal drugs. Some items may interact with your medicine.  What should I watch for while using this medicine?  Visit your doctor for checks on your progress. This drug may make you feel generally unwell. This is not uncommon, as chemotherapy can affect healthy cells as well as cancer cells. Report any side effects. Continue your course of treatment even though you feel ill unless your doctor tells you to stop.  In some cases, you may be given additional medicines to help with side effects. Follow all directions for their use.  Call your doctor or health care professional for advice if you get a fever, chills or sore throat, or other symptoms of a cold or flu. Do not treat yourself. This drug decreases your body's ability to fight infections. Try to avoid being around people who are sick.  This medicine may increase your risk to bruise or bleed. Call your doctor or health care professional if you notice any unusual bleeding.  Be careful brushing and flossing your teeth or using a toothpick because you may get an infection or bleed more easily. If you have any dental work done, tell your dentist you are receiving this medicine.  Avoid taking products that contain aspirin, acetaminophen, ibuprofen, naproxen, or ketoprofen unless instructed by your doctor. These medicines may hide a fever.  Women should inform their doctor if they wish to become pregnant or think they might be pregnant. There  is a potential for serious side effects to an unborn child. Talk to your health care professional or pharmacist for more information. Do not breast-feed an infant while taking this medicine.  What side effects may I notice from receiving this medicine?  Side effects that you should report to your doctor or health care professional as soon as possible:    allergic reactions like skin rash, itching or hives, swelling of the face, lips, or tongue    low blood counts - this medicine may decrease the number of white blood cells, red blood cells and platelets. You may be at increased risk for infections and bleeding.    signs of infection - fever or chills, cough, sore throat, pain or difficulty passing urine    signs of decreased platelets or bleeding - bruising, pinpoint red spots on the skin, black, tarry stools, blood in the urine    signs of decreased red blood cells - unusually weak or tired, fainting spells, lightheadedness    breathing problems    chest pain    mouth sores    nausea and vomiting    pain, swelling, redness at site where injected    pain, tingling, numbness in the hands or feet    stomach pain    swelling of ankles, feet, hands    unusual bleeding  Side effects that usually do not require medical attention (report to your doctor or health care professional if they continue or are bothersome):    constipation    diarrhea    hair loss    loss of appetite    stomach upset  This list may not describe all possible side effects. Call your doctor for medical advice about side effects. You may report side effects to FDA at 6-438-FDA-9576.  Where should I keep my medicine?  This drug is given in a hospital or clinic and will not be stored at home.  NOTE:This sheet is a summary. It may not cover all possible information. If you have questions about this medicine, talk to your doctor, pharmacist, or health care provider. Copyright  2016 [employer] Standard                Follow-ups after your visit        Your next  10 appointments already scheduled     Ash 10, 2018  8:30 AM CST   Level O with HC INF RM 3310   [facility] [place] ([facility] [place] )    [address]  [place] [address]   [phone]            Ash 10, 2018  9:00 AM CST   (Arrive by 8:45 AM)   Return Visit with Ceci Amaral NP   [facility] [place] ([facility] [place] )    [address]  [place] [address]   [phone]            Ash 10, 2018  9:30 AM CST   Level 4 with HC INF RM 3310   [facility] [place] ([facility] [place] )    [address]  [place] [address]   [phone]              Who to contact     If you have questions or need follow up information about today's clinic visit or your schedule please contact [facility] directly at [phone].  Normal or non-critical lab and imaging results will be communicated to you by [employer], letter or phone within 4 business days after the clinic has received the results. If you do not hear from us within 7 days, please contact the clinic through [employer] or phone. If you have a critical or abnormal lab result, we will notify you by phone as soon as possible.  Submit refill requests through [employer] or call your pharmacy and they will forward the refill request to us. Please allow 3 business days for your refill to be completed.          Additional Information About Your Visit        [employer] Information     [employer] gives you secure access to your electronic health record. If you see a primary care provider, you can also send messages to your care team and make appointments. If you have questions, please call your primary care clinic.  If you do not have a primary care provider, please call [phone] and they will assist you.        Care EveryWhere ID     This is your Care EveryWhere ID. This could be used by other organizations to access your [facility] medical records  [id number]        Your Vitals Were     Pulse  "Temperature Respirations Height Pulse Oximetry BMI (Body Mass Index)    99 97  F (36.1  C) (Oral) 20 1.651 m (5' 5\") 90% 35.28 kg/m2       Blood Pressure from Last 3 Encounters:   01/03/18 154/78   12/29/17 (!) 165/93   12/01/17 (!) 153/97    Weight from Last 3 Encounters:   01/03/18 96.2 kg (212 lb)   12/29/17 97.3 kg (214 lb 6.4 oz)   12/01/17 96.2 kg (212 lb 1.6 oz)              We Performed the Following     Ca27.29  breast tumor marker     CBC with platelets differential     CEA     Comprehensive metabolic panel     Lactate Dehydrogenase          Today's Medication Changes          These changes are accurate as of: 1/3/18  5:07 PM.  If you have any questions, ask your nurse or doctor.               Start taking these medicines.        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   Take 5-10 mLs by mouth every 4 hours as needed for cough   Quantity:  420 mL   Refills:  0         These medicines have changed or have updated prescriptions.        Dose/Directions    * dexamethasone 4 MG tablet   Commonly known as:  DECADRON   This may have changed:  Another medication with the same name was added. Make sure you understand how and when to take each.   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   Quantity:  12 tablet   Refills:  7       * dexamethasone 4 MG tablet   Commonly known as:  DECADRON   This may have changed:  You were already taking a medication with the same name, and this prescription was added. Make sure you understand how and when to take each.   Used for:  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 " (H), Cough        Dose:  8 mg   Take 2 tablets (8 mg) by mouth See Admin Instructions   Quantity:  4 tablet   Refills:  0       * Notice:  This list has 2 medication(s) that are the same as other medications prescribed for you. Read the directions carefully, and ask your doctor or other care provider to review them with you.         Where to get your medicines      These medications were sent to [facility] - [place], [place] - [address]  [address], [facility] [address]     Phone:  [phone]     dexamethasone 4 MG tablet         Some of these will need a paper prescription and others can be bought over the counter.  Ask your nurse if you have questions.     Bring a paper prescription for each of these medications     guaiFENesin-codeine 100-10 MG/5ML Soln solution                Primary Care Provider Office Phone # Fax #    Mya Park [phone]       [facility] [address]   [facility] [address]        Equal Access to Services     JOSE MARKS AH: Hadii aad ku hadasho Soomaali, waaxda luqadaha, qaybta kaalmada adeegyada, waxay idiin hayaan miriam nieves . So [facility] [phone].    ATENCIÓN: Si habla español, tiene a stiles disposición servicios gratuitos de asistencia lingüística. Llame al [phone].    We comply with applicable federal civil rights laws and [place] laws. We do not discriminate on the basis of race, color, national origin, age, disability, sex, sexual orientation, or gender identity.            Thank you!     Thank you for choosing [facility]  for your care. Our goal is always to provide you with excellent care. Hearing back from our patients is one way we can continue to improve our services. Please take a few minutes to complete the written survey that you may receive in the mail after your visit with us. Thank you!             Your Updated Medication List - Protect others around you: Learn how to safely use, store and  throw away your medicines at www.disposemymeds.org.          This list is accurate as of: 1/3/18  5:07 PM.  Always use your most recent med list.                   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       LORazepam 0.5 MG tablet    ATIVAN    30 tablet    Take 1 tablet (0.5 mg) by mouth every 4 hours as needed (Anxiety, Nausea/Vomiting or Sleep)    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       METFORMIN HCL PO      Take 500 mg by mouth daily (with breakfast)        prochlorperazine 10 MG tablet    COMPAZINE    30 tablet    Take 1 tablet (10 mg) by mouth every 6 hours as needed  (Nausea/Vomiting)    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 Read the directions carefully, and ask your doctor or other care provider to review them with you.

## 2018-01-10 NOTE — PATIENT INSTRUCTIONS
We would like to see you back per calendar. Start Decadron this evening.. When you are in need of a refill of your medications, please call your pharmacy and they will send us the request. If you have any questions please call [phone]

## 2018-01-10 NOTE — PROGRESS NOTES
Oncology Follow-up Visit:  January 10, 2018    Reason for Visit:  Patient presents with:  Shortness of Breath     Nursing Note and documentation reviewed: yes    HPI:   This is a 67-year-old female patient who presents to the oncology/hematology clinic today in a non-routine fashion.  She was to receive Cycle 1 day 8 today of a new chemotherapy regime for metastatic breast cancer.  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 The patient is essentially triple-negative breast cancer.  She underwent treatment was found to have metastasis to the right lung in November 2017.  Patient was evaluated by Dr. 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  He recommended docetaxel with gemcitabine and this was initiated on 1/3/2018.      She presents today with shortness of breath stating this has been worsening since Friday.  She does have some pain with inspiration and also pain up into the right shoulder and continues with a cough.  The shortness of breath is at rest and with any activity.  She denies any fevers and denies any production of sputum.  She denies any swelling.  She had no difficulty with any nausea with chemotherapy.    Oncologic History:     Patient underwent mammogram in January 2016 which revealed abnormalities of the right breast.  On 02/09/2016, a stereotactic biopsy of the right breast lesion at the 8 o'clock position revealed grade 3 DCIS of a solid micropapillary subtype.  Estrogen receptors were positive.  Also a biopsy of the right breast lesion at the 9 o'clock position revealed a grade 2 DCIS of the solid micropapillary subtype.  Estrogen receptors were positive.  She underwent bilateral  mastectomies on 3/30/16 by Dr. Sara Cooley at the [facility] with pathology revealing a 2 cm metaplastic carcinoma with epithelial mesenchymal components including adenocarcinoma.  There was also angiosarcomatous, liposarcomatous, malignant spindle cell and chondroid differentiation.  The tumor in the lower quadrant was distinct in the 2 DCIS lesions noted in the right breast.  Estrogen receptor positive at 2% with progesterone receptor negative; tumor was HER-2/renetta negative.  In terms of her DCIS, there was a 1.5 cm DCIS in the right breast as well as a smaller focus noted.  There was a sentinel lymph node that was negative. Chemotherapy was recommended.  She was evaluated by Dr. Lam with [facility]ology on 4/18/2016 he felt patient had a metaplastic carcinoma as well as DCIS of the right breast.  He did note that there was no consensus best treatment options for this patient, but he felt given that she likely had triple negative disease and if she had a pure ductal adenocarcinoma, he would favor treating her a triple-negative breast cancer that is greater than 0.5 cm.  He felt that dose-dense AC x 4 cycles plus Taxol weekly x 12 weeks would be ideal.  She was evaluated here by Dr. Celis with Medical Oncology on 5/3/2016 as she wanted to receive treatment closer to home.    She underwent treatment was found to have metastasis to the right lung in November 2017.  Patient was evaluated by Dr. 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  He recommended docetaxel with gemcitabine and this was initiated on 1/3/2018.        Current Chemo Regime/TX:  Gemcitabine 900mg/m2 with docetaxel 75mg/m2 day 1 and day 8 every 21 days with nuelasta support day 9.  Current Cycle: 1 day 8  # of completed cycles: n/a      Previous treatment:  Adriamycin 60mg/m2 and Cytoxan 600mg/m2 every 2  weeks with Neulasta support 6mg SQ injection day 2 x 4 cycles; Taxol 80mg/m2 weekly x 12 weeks; arimidex 1mg daily initiated 11/2016    Past Medical History:   Diagnosis Date     Breast cancer (H)      DMII (diabetes mellitus, type 2) (H)      HLD (hyperlipidemia)      Nonhealing surgical wound        Past Surgical History:   Procedure Laterality Date     APPENDECTOMY OPEN       C VAGINAL HYSTERECTOMY       INSERT PORT VASCULAR ACCESS Left 6/2/2016    Procedure: INSERT PORT VASCULAR ACCESS;  Surgeon: Micheline Davis MD;  Location: [facility] OR     MASTECTOMY Bilateral        Family History   Problem Relation Age of Onset     Lung Cancer Father      Myocardial Infarction Father      Coronary Artery Disease Father      DIABETES Father      Hyperlipidemia Father      Hypertension Father      Prostate Cancer Paternal Grandfather      DIABETES Mother      Hyperlipidemia Mother      Thyroid Disease Mother      Thyroid Disease Maternal Grandmother      Thyroid Disease Daughter      Breast Cancer No family hx of      Colon Cancer No family hx of      Depression No family hx of      Asthma No family hx of        Social History     Social History     Marital status:      Spouse name: Braden     Number of children: 2     Years of education: N/A     Occupational History      Retired     Social History Main Topics     Smoking status: Former Smoker     Smokeless tobacco: Never Used     Alcohol use No      Comment: 1 kamille/ night, with chemo is not drinking     Drug use: No     Sexual activity: Not on file     Other Topics Concern     Parent/Sibling W/ Cabg, Mi Or Angioplasty Before 65f 55m? Yes     father     Social History Narrative       No current facility-administered medications for this visit.      Current Outpatient Prescriptions   Medication     dexamethasone (DECADRON) 4 MG tablet     guaiFENesin-codeine (CHERATUSSIN AC) 100-10 MG/5ML SOLN solution     sertraline (ZOLOFT) 50 MG tablet     prochlorperazine  (COMPAZINE) 10 MG tablet     LORazepam (ATIVAN) 0.5 MG tablet     anastrozole (ARIMIDEX) 1 MG tablet     Pyridoxine HCl (VITAMIN B6 PO)     calcium-vitamin D (CALTRATE) 600-400 MG-UNIT per tablet     cyanocobalamin (VITAMIN  B-12) 1000 MCG tablet     lidocaine-prilocaine (EMLA) cream     METFORMIN HCL PO     SIMVASTATIN PO     Facility-Administered Medications Ordered in Other Visits   Medication     0.9% sodium chloride infusion     albuterol neb solution 2.5 mg        Allergies   Allergen Reactions     Cats        ECOG Performance Status: 3 at best related to the SOB    Physical Exam:  There were no vitals taken for this visit.    GENERAL APPEARANCE: Healthy, alert and respirations are labored.  HEENT: Normocephalic, Sclerae anicteric.   NECK: Supple. No asymmetry or masses, no thyromegaly.  LYMPHATICS: No palpable cervical, supraclavicular, axillary, or inguinal nodes   RESP: Lungs auscultated with expiratory stridor, respirations are labored  CARDIOVASCULAR: Regular rate and rhythm. Normal S1, S2; no murmur, gallop, or rub.  MUSCULOSKELETAL: Extremities without gross deformities noted. No edema of bilateral lower extremities.  NEURO: Alert and oriented x 3.  Gait steady.  PSYCHIATRIC: Anxious;  Mood appropriate.    Laboratory:  Results for orders placed or performed in visit on 01/10/18   CBC with platelets differential   Result Value Ref [place]    WBC 2.8 (L) 4.0 - 11.0 10e9/L    RBC Count 3.88 3.8 - 5.2 10e12/L    Hemoglobin 12.0 11.7 - 15.7 g/dL    Hematocrit 35.0 35.0 - 47.0 %    MCV 90 78 - 100 fl    MCH 30.9 26.5 - 33.0 pg    MCHC 34.3 31.5 - 36.5 g/dL    RDW 13.0 10.0 - 15.0 %    Platelet Count 124 (L) 150 - 450 10e9/L    Diff Method Automated Method     % Neutrophils 84.9 %    % Lymphocytes 11.8 %    % Monocytes 2.9 %    % Eosinophils 0.0 %    % Basophils 0.0 %    % Immature Granulocytes 0.4 %    Nucleated RBCs 0 0 /100    Absolute Neutrophil 2.4 1.6 - 8.3 10e9/L    Absolute Lymphocytes 0.3 (L) 0.8 - 5.3  10e9/L    Absolute Monocytes 0.1 0.0 - 1.3 10e9/L    Absolute Eosinophils 0.0 0.0 - 0.7 10e9/L    Absolute Basophils 0.0 0.0 - 0.2 10e9/L    Abs Immature Granulocytes 0.0 0 - 0.4 10e9/L    Absolute Nucleated RBC 0.0    Hepatic panel   Result Value Ref [place]    Bilirubin Direct 0.2 0.0 - 0.2 mg/dL    Bilirubin Total 0.5 0.2 - 1.3 mg/dL    Albumin 3.0 (L) 3.4 - 5.0 g/dL    Protein Total 7.0 6.8 - 8.8 g/dL    Alkaline Phosphatase 158 (H) 40 - 150 U/L     (H) 0 - 50 U/L    AST 58 (H) 0 - 45 U/L   Comprehensive metabolic panel   Result Value Ref [place]    Sodium 137 133 - 144 mmol/L    Potassium 3.7 3.4 - 5.3 mmol/L    Chloride 101 94 - 109 mmol/L    Carbon Dioxide 26 20 - 32 mmol/L    Anion Gap 10 3 - 14 mmol/L    Glucose 167 (H) 70 - 99 mg/dL    Urea Nitrogen 17 7 - 30 mg/dL    Creatinine 0.59 0.52 - 1.04 mg/dL    GFR Estimate >90 >60 mL/min/1.7m2    GFR Estimate If Black >90 >60 mL/min/1.7m2    Calcium 8.4 (L) 8.5 - 10.1 mg/dL    Bilirubin Total 0.5 0.2 - 1.3 mg/dL    Albumin 3.0 (L) 3.4 - 5.0 g/dL    Protein Total 7.0 6.8 - 8.8 g/dL    Alkaline Phosphatase 157 (H) 40 - 150 U/L     (H) 0 - 50 U/L    AST 56 (H) 0 - 45 U/L   D-Dimer ([facility])   Result Value Ref [place]    D-Dimer ng/mL 993 (H) 0 - 300 ng/ml D-DU       Imaging Studies:  none for today      ASSESSMENT/PLAN:    #1 shortness of breath:  O2 sats 88% with short ambulation and 90-91% at rest with obvious labored breathing.   Question etiology being possible pulmonary emboli versus tumor growth compromising her airway.  Related to respiratory symptoms, I would like her evaluated in the emergency room.  She was brought to the emergency room with O2 at 2 L via wheelchair.  I did speak with Dr. Isaac Gaston in regards to patient's situation.    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  The patient is essentially triple-negative breast cancer.  She underwent treatment then was found to have metastasis to the right lung in November 2017.  She was seen at the [facility] with recommendation for gemcitabine and docetaxel.  Patient was to receive day 8 today of her neida cycle and was sent to the ER for evaluation.  See above.  Follow up to be determined.    Ceci SOLISP-BC

## 2018-01-10 NOTE — PROGRESS NOTES
Patients left sided port accessed using non-coring, 19 gauge, 3/4 needle. Port accessed per facility protocol.  Hand hygiene performed: yes   Mask donned by caregiver: yes Site prepped with CHG: yes Labs drawn: yes Dressing applied using aseptic technique: yes Port flushed easily, without resistance. Flushed with 10 cc's normal saline.   Immediate blood return noted. 10 cc blood discarded.  2 vials blood draw and sent to lab for results.  Port flushed with 20 cc 0.9% normal saline and 5 cc heparinized saline.  Nathalia Amaral, NABOR, FNP-BC, AOCNP in to visit with patient with order received to do a CT with contrast and a d dimer.  D dimer drawn.  Patient becoming more dyspnic.  Per Nathalia Amaral, patient to be evaluated in ER.  See her note.  Port left accessed for ED.  Patient and family escorted to ED.     1449:  Patient returns from ED after having thoracentesis.  Port left accessed for tomorrows infusion.    Flushed per MAR

## 2018-01-10 NOTE — PROGRESS NOTES
Met with patient,  and daughter to inquire about resources that may be available for them at this time. They live in the [facility] and drive [address] to get care our area.  She has had breast cancer in the past and recovered.  It is now in [place].  Today she had a thoracentesis and feels much better.  Business card provided and made self available when and if needed.

## 2018-01-10 NOTE — NURSING NOTE
"Chief Complaint   Patient presents with     Shortness of Breath       Initial BP (!) 151/101  Pulse 107  Temp 98  F (36.7  C) (Tympanic)  Resp 24  Ht 5' 5\" (1.651 m)  Wt 213 lb 8 oz (96.8 kg)  SpO2 (!) 88%  BMI 35.53 kg/m2 Estimated body mass index is 35.53 kg/(m^2) as calculated from the following:    Height as of this encounter: 5' 5\" (1.651 m).    Weight as of this encounter: 213 lb 8 oz (96.8 kg).  Medication Reconciliation: complete   Oxygen sats 88- 91% walked patient 15 feet approxiamately and sats 88%. Oxygen was started at 2L/min per NC and was brought to the ER via wheel chair for assessment.  Cristina Dye LPN      "

## 2018-01-10 NOTE — PROGRESS NOTES
Right lung draining yellow and red fluid.  Total fluid 1430 ml.  Fluid will be sent to lab.  Patient to X-ray for post procedure films.

## 2018-01-10 NOTE — ED AVS SNAPSHOT
[facility]    [address]    [place] [address]    Phone:  [phone]                                       Ana Simmons   MRN: [id number]    Department:  [facility]   Date of Visit:  1/10/2018           Patient Information     Date Of Birth          1950        Your diagnoses for this visit were:     Pleural effusion on right     Breast cancer metastasized to lung, right (H)        You were seen by Radha Fatima MD.        Discharge Instructions           Pleural Effusion    The pleura is a smooth double membrane that surrounds the lungs. It separates the lungs from the chest wall. One side of the pleura attaches to the lung. The other side attaches to the chest wall. This membrane makes it easier for the chest to inflate and deflate as you breathe without rubbing against the ribs. You normally have a small amount of lubricating fluid (pleural fluid) between the pleural membranes.  A pleural effusion is when too much fluid collects in the space between the two pleural membranes (pleural space). As the amount of fluid increases, it begins to press on the lung. This makes it harder to take a full breath.  There are two types of pleural effusion:    Inflammatory. This is caused by a lung disease like pneumonia or lung cancer, which irritates the pleura.    Noninflammatory. This is caused by abnormal fluid pressures inside the lungs. The pressure can be caused by congestive heart failure (CHF). In CHF, extra fluid collects inside the lung tissues because of a weakened heart muscle. This extra fluid then leaks into the pleural space.  Pleural effusion may cause any of these symptoms:    Shortness of breath    Rapid breathing    Cough or hiccups    Sharp chest pain that hurts more with coughing or deep breathing  A small pleural effusion may cause no symptoms at all.  Treatment will be directed at the cause of the pleural effusion. If you are having a lot of  trouble breathing, a thoracentesis procedure may be done to remove the fluid from the pleural space. This involves placing a needle or tube (catheter) through the chest wall into the pleural space. This usually gives relief right away. But the fluid may gradually return.  Home care  Follow these guidelines when caring for yourself at home:    Rest until you feel better. Exerting yourself may make your symptoms worse.    Your healthcare provider may have prescribed medicines to treat the underlying cause of the pleural effusion. Take these exactly as directed.  Follow-up care  Follow up with your healthcare provider, or as advised.  When to seek medical advice  Call your healthcare provider right away if any of these occur:    Fever of 100.4 F (38 C) or higher  Call 911 or get immediate medical care  Contact emergency services right away if any of the following occur:    Shortness of breath gets worse    Chest pain gets worse    Coughing up blood    Weakness, dizziness, or fainting  Date Last Reviewed: 9/13/2015 2000-2017 [employer]. [address] [place] [address]. All rights reserved. This information is not intended as a substitute for professional medical care. Always follow your healthcare professional's instructions.      What to expect when you have contrast    During your exam, we will inject  contrast  into your vein or artery. (Contrast is a clear liquid with iodine in it. It shows up on X-rays.)    You may feel warm or hot. You may have a metal taste in your mouth and a slight upset stomach. You may also feel pressure near the kidneys and bladder. These effects will last about 1 to 3 minutes.    Please tell us if you have:    Sneezing     Itching    Hives     Swelling in the face    A hoarse voice    Breathing problems    Other new symptoms    Serious problems are rare.  They may include:    Irregular heartbeat     Seizures    Kidney failure              Tissue damage    Shock       Death    If you have any problems during the exam, we  will treat them right away.    When you get home    Call your hospital if you have any new symptoms in the next 2 days, like hives or swelling. (Phone numbers are at the bottom of this page.) Or call your family doctor.     If you have wheezing or trouble breathing, call 911.    Self-care    -Keep taking your regular medicines.    The contrast will pass out of your body in your  Urine(pee). This will happen in the next 24 hours. You  will not feel this. Your urine will not  change color.    If you have kidney problems or take metformin    Drink 4 to 8 large glasses of water for the next  2 days, if you are not on a fluid restriction.    ?If you take metformin (Glucophage or Glucovance) for diabetes, keep taking it.        ?Special instructions: follow instructions above.    I have read and understand the above information.    Patient Sign Here:______________________________________Date:________Time:______    Staff Sign Here:________________________________________Date:_______Time:______      Radiology Departments:     ?[facility]: [phone] ?[facility]: [phone]     ?[place]: [phone] ?[facility]:[phone]      ?[place]: [phone]  ?[facility]: [phone]  ?[facility]:[phone]    ?[facility]:[phone]  ?[facility]:[phone]    Future Appointments        Provider Department Dept Phone Center    1/11/2018 8:30 AM HC INF RM [address] [facility] [place] [phone] [place]    1/25/2018 8:30 AM HC INF RM [address] [facility] [place] [phone] [place]    2/1/2018 8:30 AM HC INF RM [address] [facility] [place] [phone] [place]    2/1/2018 9:00 AM Ceci Amaral NP [facility] [place] [phone] [place]    2/1/2018 9:30 AM HC INF RM [address] [facility] [place] [phone] [place]    2/15/2018 8:30 AM HC INF RM [address] [facility] [place] [phone] [place]    2/22/2018  8:30 AM HC INF RM [address] [facility] [place] [phone] [place]    2/22/2018 9:00 AM Ceci Amaral NP [facility] [place] [phone] [place]    2/22/2018 9:30 AM HC INF RM [address] [facility] [place] [phone] [place]         Review of your medicines      Our records show that you are taking the medicines listed below. If these are incorrect, please call your family doctor or clinic.        Dose / Directions Last dose taken    anastrozole 1 MG tablet   Commonly known as:  ARIMIDEX   Dose:  1 mg   Quantity:  90 tablet        Take 1 tablet (1 mg) by mouth daily   Refills:  3        calcium-vitamin D 600-400 MG-UNIT per tablet   Commonly known as:  CALTRATE   Dose:  1 tablet   Quantity:  90 tablet        Take 1 tablet by mouth 2 times daily   Refills:  3        cyanocobalamin 1000 MCG tablet   Commonly known as:  vitamin  B-12   Dose:  250 mcg        Take 250 mcg by mouth daily   Refills:  0        dexamethasone 4 MG tablet   Commonly known as:  DECADRON   Dose:  8 mg   Quantity:  4 tablet        Take 2 tablets (8 mg) by mouth See Admin Instructions   Refills:  0        guaiFENesin-codeine 100-10 MG/5ML Soln solution   Commonly known as:  CHERATUSSIN AC   Dose:  1-2 tsp.   Quantity:  420 mL        Take 5-10 mLs by mouth every 4 hours as needed for cough   Refills:  0        lidocaine-prilocaine cream   Commonly known as:  EMLA   Dose:  30 g   Quantity:  30 g        Apply 30 g topically as needed for moderate pain   Refills:  3        LORazepam 0.5 MG tablet   Commonly known as:  ATIVAN   Dose:  0.5 mg   Quantity:  30 tablet        Take 1 tablet (0.5 mg) by mouth every 4 hours as needed (Anxiety, Nausea/Vomiting or Sleep)   Refills:  5        METFORMIN HCL PO   Dose:  500 mg        Take 500 mg by mouth daily (with breakfast)   Refills:  0        prochlorperazine 10 MG tablet   Commonly known as:  COMPAZINE   Dose:  10 mg   Quantity:  30 tablet        Take 1 tablet (10 mg) by  mouth every 6 hours as needed (Nausea/Vomiting)   Refills:  5        SIMVASTATIN PO   Dose:  20 mg        Take 20 mg by mouth At Bedtime   Refills:  0        VITAMIN B6 PO   Dose:  50 mg        Take 50 mg by mouth daily   Refills:  0        ZOLOFT 50 MG tablet   Dose:  50 mg   Generic drug:  sertraline        Take 50 mg by mouth daily   Refills:  0                Procedures and tests performed during your visit     Anaerobic bacterial culture    CTA Angiogram Chest w/o & w Contrast    Cardiac Continuous Monitoring    Cell count with differential fluid    Cytology    EKG 12 lead    Fluid Culture Aerobic Bacterial    Gram stain    INR    Partial thromboplastin time    Troponin I    US Thoracentesis    XR Chest 1 View      Orders Needing Specimen Collection     None      Pending Results     Date and Time Order Name Status Description    1/10/2018 1226 Anaerobic bacterial culture In process     1/10/2018 1226 Fluid Culture Aerobic Bacterial In process     1/10/2018 1226 Cytology In process             Pending Culture Results     Date and Time Order Name Status Description    1/10/2018 1226 Anaerobic bacterial culture In process     1/10/2018 1226 Fluid Culture Aerobic Bacterial In process     1/10/2018 1226 Cytology In process             Thank you for choosing [facility]       Thank you for choosing [facility] for your care. Our goal is always to provide you with excellent care. Hearing back from our patients is one way we can continue to improve our services. Please take a few minutes to complete the written survey that you may receive in the mail after you visit with us. Thank you!        [employer] Information     [employer] gives you secure access to your electronic health record. If you see a primary care provider, you can also send messages to your care team and make appointments. If you have questions, please call your primary care clinic.  If you do not have a primary care provider, please call [phone] and they will  assist you.        Care EveryWhere ID     This is your Care EveryWhere ID. This could be used by other organizations to access your [facility] medical records  [id number]        Equal Access to Services     JOSE MARKS : Rocio Meredith, momo johnson, starr zavala, candy gil. So [facility] [phone].    ATENCIÓN: Si habla español, tiene a stiles disposición servicios gratuitos de asistencia lingüística. Llame al [phone].    We comply with applicable federal civil rights laws and [place] laws. We do not discriminate on the basis of race, color, national origin, age, disability, sex, sexual orientation, or gender identity.            After Visit Summary       This is your record. Keep this with you and show to your community pharmacist(s) and doctor(s) at your next visit.

## 2018-01-10 NOTE — ED AVS SNAPSHOT
[facility]    [address] [address]    Phone:  [phone]                                       Ana Simmons   MRN: [id number]    Department:  [facility]   Date of Visit:  1/10/2018           After Visit Summary Signature Page     I have received my discharge instructions, and my questions have been answered. I have discussed any challenges I see with this plan with the nurse or doctor.    ..........................................................................................................................................  Patient/Patient Representative Signature      ..........................................................................................................................................  Patient Representative Print Name and Relationship to Patient    ..................................................               ................................................  Date                                            Time    ..........................................................................................................................................  Reviewed by Signature/Title    ...................................................              ..............................................  Date                                                            Time

## 2018-01-10 NOTE — ED PROVIDER NOTES
History     Chief Complaint   Patient presents with     Shortness of Breath     increasing sob for 1 week,88%room air     HPI  Ana Simmons is a 67 year old female with known metastatic breast cancer to her right lung who presents to the ED from chemotherapy with shortness of breath. Her dyspnea started last Friday and has continued to worsen. She feels as if she cannot catch her breath. She has not had fever, chest pain or URI symptoms. Patient is day 8 of her chemotherapy and has not historically had shortness of breath.     On arrival to the ED her oxygen saturation was 88% and improved to 94% on 3L via nasal cannula.     Problem List:    Patient Active Problem List    Diagnosis Date Noted     Aromatase inhibitor use 03/22/2017     Priority: Medium     Ductal carcinoma in situ (DCIS) of right breast 03/21/2017     Priority: Medium     Anemia, unspecified type 10/17/2016     Priority: Medium     Health Care Home 08/29/2016     Priority: Medium     Oncology       ACP (advance care planning) 08/09/2016     Priority: Medium     Advance Care Planning 8/9/2016: ACP Review of Chart / Resources Provided:  Reviewed chart for advance care plan.  Ana Simmons has been provided information and resources to begin or update their advance care plan.  Added by Lucretia Cates 07/05/2016     Priority: Medium     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 05/03/2016     Priority: Medium     Malignant neoplasm of lower-outer quadrant of right female breast (H) 05/03/2016     Priority: Medium        Past Medical History:    Past Medical History:   Diagnosis Date     Breast cancer (H)      DMII (diabetes mellitus, type 2) (H)      HLD (hyperlipidemia)      Nonhealing surgical wound        Past Surgical History:    Past Surgical History:   Procedure Laterality Date     APPENDECTOMY OPEN       C VAGINAL HYSTERECTOMY       INSERT PORT VASCULAR ACCESS Left 6/2/2016    Procedure: INSERT PORT VASCULAR  ACCESS;  Surgeon: Micheline Davis MD;  Location: [facility]     MASTECTOMY Bilateral        Family History:    Family History   Problem Relation Age of Onset     Lung Cancer Father      Myocardial Infarction Father      Coronary Artery Disease Father      DIABETES Father      Hyperlipidemia Father      Hypertension Father      Prostate Cancer Paternal Grandfather      DIABETES Mother      Hyperlipidemia Mother      Thyroid Disease Mother      Thyroid Disease Maternal Grandmother      Thyroid Disease Daughter      Breast Cancer No family hx of      Colon Cancer No family hx of      Depression No family hx of      Asthma No family hx of        Social History:  Marital Status:   [2]  Social History   Substance Use Topics     Smoking status: Former Smoker     Smokeless tobacco: Never Used     Alcohol use No      Comment: 1 kamille/ night, with chemo is not drinking        Medications:      dexamethasone (DECADRON) 4 MG tablet   guaiFENesin-codeine (CHERATUSSIN AC) 100-10 MG/5ML SOLN solution   sertraline (ZOLOFT) 50 MG tablet   prochlorperazine (COMPAZINE) 10 MG tablet   LORazepam (ATIVAN) 0.5 MG tablet   anastrozole (ARIMIDEX) 1 MG tablet   Pyridoxine HCl (VITAMIN B6 PO)   calcium-vitamin D (CALTRATE) 600-400 MG-UNIT per tablet   cyanocobalamin (VITAMIN  B-12) 1000 MCG tablet   lidocaine-prilocaine (EMLA) cream   METFORMIN HCL PO   SIMVASTATIN PO       Review of Systems   Constitutional: Negative for chills, diaphoresis and fever.   HENT: Negative for congestion.    Eyes: Negative.    Respiratory: Positive for shortness of breath. Negative for choking, chest tightness and wheezing.         Chest pressure   Cardiovascular: Negative for chest pain and leg swelling.   Gastrointestinal: Negative for abdominal distention, abdominal pain and nausea.   Genitourinary: Negative for decreased urine volume and dysuria.   Musculoskeletal: Negative for arthralgias.   Neurological: Negative for dizziness and headaches.    Psychiatric/Behavioral: Negative.        Physical Exam   BP: (!) 194/115 (Simultaneous filing. User may not have seen previous data.)  Pulse: 106  Heart Rate: 107  Temp: 97.7  F (36.5  C)  Resp: (!) 28  SpO2: (!) 88 % (Simultaneous filing. User may not have seen previous data.)      Physical Exam   Constitutional: She is oriented to person, place, and time. She appears well-developed and well-nourished. She appears distressed.   Tearful    HENT:   Head: Normocephalic and atraumatic.   Eyes: Conjunctivae and EOM are normal. No scleral icterus.   Neck: Normal range of motion. Neck supple.   Cardiovascular: Regular rhythm and normal heart sounds.   No extrasystoles are present. Tachycardia present.    Pulmonary/Chest: Breath sounds normal. Accessory muscle usage present. Tachypnea noted. She is in respiratory distress. She has no decreased breath sounds. She has no wheezes. She has no rhonchi. She has no rales.   Abdominal: Soft. Bowel sounds are normal. She exhibits no distension. There is no tenderness.   Musculoskeletal: Normal range of motion. She exhibits no edema.   Neurological: She is alert and oriented to person, place, and time.   Skin: Skin is warm and dry. No rash noted. She is not diaphoretic. No erythema.   Psychiatric: She has a normal mood and affect.   Nursing note and vitals reviewed.      ED Course     ED Course     Procedures               EKG Interpretation:      Interpreted by Dr. Lazcano  Time reviewed: 1118  Symptoms at time of EKG: dyspnea   Rhythm: normal sinus   Rate: 100-110  Axis: Normal  Ectopy: none  Conduction: normal  ST Segments/ T Waves: Non-specific ST-T wave changes  Q Waves: none  Comparison to prior: No old EKG available    Clinical Impression: non-specific EKG      Labs Ordered and Resulted from Time of ED Arrival Up to the Time of Departure from the ED   INR   PARTIAL THROMBOPLASTIN TIME   TROPONIN I   CARDIAC CONTINUOUS MONITORING     Results for orders placed or  "performed during the hospital encounter of 01/10/18   CTA Angiogram Chest w/o & w Contrast    Addendum: 1/10/2018    A dictation error is present in the body of the report. \"Renal  metastasis is present\" should read \"No adrenal metastasis is present.\"  The impression remains unchanged.    SHAYY TILLMAN MD      Narrative    CTA ANGIOGRAM CHEST CONTRAST    HISTORY:  cancer patient, known right lung tumor, sudden onset dyspnea  in chemo; . 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 IV    TECHNIQUE:  Initial pre-contrast  and localizer images were  obtained.  Contrast enhanced helical CT pulmonary angiography was then  performed.  Routine transaxial and post-processed (multiplanar and/or  MIP) reformations were obtained.    COMPARISON:  11/22/2017    MEDS/CONTRAST: ISOVUE 370    75ML    PULMONARY CTA FINDINGS:  This is a diagnostic quality helical CT  pulmonary angiogram.  There is no evidence of acute pulmonary  embolism.    OTHER FINDINGS:      There is a large right pleural effusion with associated right to left  mediastinal shift. There is near total right lung atelectasis. There  is nodular enhancing material along the anterior right pleural surface  concerning for metastatic disease, with the largest component  measuring 3.6 x 2.5 cm. Abnormal hypoenhancement of the anterior  collapsed right lung may reflect progressive right lung metastatic  disease.    Heart size is stable. No pericardial effusion is seen. No left-sided  effusion is seen. There is some passive atelectasis of the left lung  due to midline shift. No consolidation or mass is seen on the left.  Renal metastasis is seen. No definite suspicious osseous lesion is  identified.      Impression    IMPRESSION:    No acute pulmonary emboli.    Large right pleural effusion with associated essentially complete  right lung atelectasis, right to left midline shift of the mediastinum  and " partial left lung atelectasis. Nodular enhancing material within  the anterior right lung and anterior right pleural surface are  concerning for progressive intrathoracic metastatic disease on the  right. Consider a right thoracentesis.    SHAYY TILLMAN MD   US Thoracentesis    Narrative    Procedure: US THORACENTESIS    HISTORY: Large right pleural effusion, suspect malignant. Metastatic  atypical breast cancer.    COMPARISON: CT chest at 10:30 AM    TECHNIQUE: The risks and benefits of ultrasound-guided thoracentesis  were discussed with the patient who expressed understanding and  willingness to proceed..     ultrasound was performed in the seated position. The skin  overlying the deepest pocket of fluid in the right chest was marked,  prepped and draped in standard sterile fashion. The skin and  subcutaneous tissues down to the pleural surface were anesthetized  with approximately 6 cc 1%lidocaine. Under ultrasound guidance, a yueh  needle was advanced into the pleural space with spontaneous return of  red fluid. Care was taken to avoid inadvertent entry of air into the  pleural space. Tubing was attached to the catheter and a vacuum  suction bottle. A total of 1400 cc of pleural fluid was withdrawn. The  patient tolerated the procedure well with no immediate complications.  The fluid will be sent for analysis as ordered. Followup radiographs  chest are pending.      Impression    IMPRESSION:     Successful ultrasound-guided right thoracentesis.      SHAYY TILLMAN MD   XR Chest 1 View    Narrative    PROCEDURE:  XR CHEST 1 VW    HISTORY: post thora; . .    COMPARISON:  CT chest at 10:30 AM.    FINDINGS:  The large right pleural effusion is smaller. There is improved  aeration of the right lung. Right to left midline shift of the  mediastinal structures is improved. A left chest port is in  appropriate position. No right pneumothorax is seen.      Impression    IMPRESSION:  No evidence of  pneumothorax following right  thoracentesis. Improved although still moderate to large right pleural  effusion.      SHAYY TILLMAN MD   INR   Result Value Ref [facility]    INR 1.13 0.80 - 1.20   Partial thromboplastin time   Result Value Ref [facility]    PTT 35 24.00 - 37.00 sec   Troponin I   Result Value Ref [facility]    Troponin I ES <0.015 0.000 - 0.045 ug/L   Cell count with differential fluid   Result Value Ref [facility]    Body Fluid Analysis Source Pleural Fluid:     % Neutrophils Fluid 15 %    % Mono/Macro Fluid 85 %    Color Fluid Bloody     Appearance Fluid Cloudy     RBC Fluid 36843 /uL    WBC Fluid 342 /uL   Gram stain   Result Value Ref [facility]    Specimen Description Pleural fluid     Gram Stain Many  WBC'S seen       Gram Stain No organisms seen     Gram Stain       Gram stain and culture performed on concentrated specimen     Assessments & Plan (with Medical Decision Making)   Pt has a large right pleural effusion that is likely causing her shortness of breath and chest pressure. She does feel better on 3L of oxygen. Plan is to drain the effusion with thoracentesis and then return to oncology. Other labs including INR, PTT, and troponin are wnl. Labs prior to the ED included CBC, BMP, and D-dimer. CBC was notable for low WBC count and D-dimer was elevated. BMP revealed elevated AST, ALT, and alk phos.  Spoke with patient about the thoracentesis, she states her breathing is much improved, does understand that the fluid may reaccumulate, but is now aware of it and can react faster.        I have reviewed the nursing notes.    I have reviewed the findings, diagnosis, plan and need for follow up with the patient.    New Prescriptions    No medications on file       Final diagnoses:   Pleural effusion on right   Breast cancer metastasized to lung, right (H)       1/10/2018   [facility]    The note was prepared by Bev Gaston MS3, under direction of Dr. Lazcano.   Patient examined along with  the medical student.  I concur with her assessment and note.       Radha Fatima MD  01/10/18 0901

## 2018-01-10 NOTE — DISCHARGE INSTRUCTIONS
Pleural Effusion    The pleura is a smooth double membrane that surrounds the lungs. It separates the lungs from the chest wall. One side of the pleura attaches to the lung. The other side attaches to the chest wall. This membrane makes it easier for the chest to inflate and deflate as you breathe without rubbing against the ribs. You normally have a small amount of lubricating fluid (pleural fluid) between the pleural membranes.  A pleural effusion is when too much fluid collects in the space between the two pleural membranes (pleural space). As the amount of fluid increases, it begins to press on the lung. This makes it harder to take a full breath.  There are two types of pleural effusion:    Inflammatory. This is caused by a lung disease like pneumonia or lung cancer, which irritates the pleura.    Noninflammatory. This is caused by abnormal fluid pressures inside the lungs. The pressure can be caused by congestive heart failure (CHF). In CHF, extra fluid collects inside the lung tissues because of a weakened heart muscle. This extra fluid then leaks into the pleural space.  Pleural effusion may cause any of these symptoms:    Shortness of breath    Rapid breathing    Cough or hiccups    Sharp chest pain that hurts more with coughing or deep breathing  A small pleural effusion may cause no symptoms at all.  Treatment will be directed at the cause of the pleural effusion. If you are having a lot of trouble breathing, a thoracentesis procedure may be done to remove the fluid from the pleural space. This involves placing a needle or tube (catheter) through the chest wall into the pleural space. This usually gives relief right away. But the fluid may gradually return.  Home care  Follow these guidelines when caring for yourself at home:    Rest until you feel better. Exerting yourself may make your symptoms worse.    Your healthcare provider may have prescribed medicines to treat the underlying cause of the  pleural effusion. Take these exactly as directed.  Follow-up care  Follow up with your healthcare provider, or as advised.  When to seek medical advice  Call your healthcare provider right away if any of these occur:    Fever of 100.4 F (38 C) or higher  Call 911 or get immediate medical care  Contact emergency services right away if any of the following occur:    Shortness of breath gets worse    Chest pain gets worse    Coughing up blood    Weakness, dizziness, or fainting  Date Last Reviewed: 9/13/2015 2000-2017 [employer]. [address] [place] [id number]. All rights reserved. This information is not intended as a substitute for professional medical care. Always follow your healthcare professional's instructions.      What to expect when you have contrast    During your exam, we will inject  contrast  into your vein or artery. (Contrast is a clear liquid with iodine in it. It shows up on X-rays.)    You may feel warm or hot. You may have a metal taste in your mouth and a slight upset stomach. You may also feel pressure near the kidneys and bladder. These effects will last about 1 to 3 minutes.    Please tell us if you have:    Sneezing     Itching    Hives     Swelling in the face    A hoarse voice    Breathing problems    Other new symptoms    Serious problems are rare.  They may include:    Irregular heartbeat     Seizures    Kidney failure              Tissue damage    Shock      Death    If you have any problems during the exam, we  will treat them right away.    When you get home    Call your hospital if you have any new symptoms in the next 2 days, like hives or swelling. (Phone numbers are at the bottom of this page.) Or call your family doctor.     If you have wheezing or trouble breathing, call 911.    Self-care    -Keep taking your regular medicines.    The contrast will pass out of your body in your  Urine(pee). This will happen in the next 24 hours. You  will not feel this.  Your urine will not  change color.    If you have kidney problems or take metformin    Drink 4 to 8 large glasses of water for the next  2 days, if you are not on a fluid restriction.    ?If you take metformin (Glucophage or Glucovance) for diabetes, keep taking it.        ?Special instructions: follow instructions above.    I have read and understand the above information.    Patient Sign Here:______________________________________Date:________Time:______    Staff Sign Here:________________________________________Date:_______Time:______      Radiology Departments:     ?[facility]: [phone] ?[place]: [phone]     ?[place]: [phone] ?[facility]:[phone]      ?[place]: [phone]  ?[facility]: [phone]  ?[place]:[phone]    ?[facility]:[phone]  ?[facility]:[phone]

## 2018-01-10 NOTE — ED NOTES
Pt returned from DI approx 1 hour prior. Pt breathing easier.  Pt on room air.  Pt denies any pain at this time.  Pt to return to chemo, report given to RN. Pt port left accessed.

## 2018-01-10 NOTE — MR AVS SNAPSHOT
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 Start Decadron this evening.. When you are in need of a refill of your medications, please call your pharmacy and they will send us the request. If you have any questions please call [phone]          Follow-ups after your visit        Your next 10 appointments already scheduled     Jan 11, 2018  8:30 AM CST   Level 4 with HC INF RM 3302   [facility] [place] ([facility] [place] )    [address]  [place] [address]   [phone]            Jan 25, 2018  8:30 AM CST   Level 3 with HC INF RM 3306   [facility] [place] ([facility] [place] )    [address]  [place] [address]   [phone]            Feb 01, 2018  8:30 AM CST   Level O with HC INF RM 3306   [facility] [place] ([facility] [place] )    [address]  [place] [address]   [phone]            Feb 01, 2018  9:00 AM CST   (Arrive by 8:45 AM)   Return Visit with Ceci Amaral NP   [facility] [place] ([facility] [place] )    [address]  [place] [address]   [phone]            Feb 01, 2018  9:30 AM CST   Level 4 with HC INF RM 3306   [facility] [place] ([facility] [place] )    [address]  [place] [address]   [phone]            Feb 15, 2018  8:30 AM CST   Level 3 with HC INF RM 3306   [facility] [place] )    [address]  [place] [address]   [phone]            Feb 22, 2018  8:30 AM CST   Level O with HC INF RM 3308   [facility] ([facility] [place] )    [address]  [place] [address]   [phone]            Feb 22, 2018  9:00 AM CST   (Arrive by 8:45 AM)   Return Visit with Ceci Amaral NP   [facility] ([facility] [place] )    [address]  [place] [address]   [phone]            Feb 22, 2018  9:30 AM CST   Level 4 with HC INF RM 3308   [facility] ([facility] [place] )    [address]  [place] [address]   [phone]              Future tests that were ordered for you today     Open Standing Orders        Priority Remaining Interval Expires Ordered    Glucose monitor nursing POCT Routine 47129/18259 PRN  1/10/2018    Wound care: Dressing change Routine 1/1 DAILY  1/10/2018    Oxygen: Nasal cannula Routine [id number]/26514 PRN  1/10/2018    Oxygen: Nasal cannula, Oxygen mask STAT [id number] CONTINUOUS  1/10/2018            Who to contact     If you have questions or need follow up information about today's clinic visit or your schedule please contact [facility] directly at [phone].  Normal or non-critical lab and imaging results will be communicated to you by [employer]iWorkshart, letter or phone within 4 business days after the clinic has received the results. If you do not hear from us within 7 days, please contact the clinic through Rebitt or phone. If you have a critical or abnormal lab result, we will notify you by phone as soon as possible.  Submit refill requests through Fixetude or call your pharmacy and they will forward the refill request to us. Please allow 3 business days for your refill to be completed.          Additional Information About Your Visit        Amer[employer] Information     Fix[employer]ude gives you secure access to your electronic health record. If you see  "a primary care provider, you can also send messages to your care team and make appointments. If you have questions, please call your primary care clinic.  If you do not have a primary care provider, please call [phone] and they will assist you.        Care EveryWhere ID     This is your Care EveryWhere ID. This could be used by other organizations to access your [facility] medical records  [id number]        Your Vitals Were     Pulse Temperature Respirations Height Pulse Oximetry BMI (Body Mass Index)    107 98  F (36.7  C) (Tympanic) 24 1.651 m (5' 5\") 88% 35.53 kg/m2       Blood Pressure from Last 3 Encounters:   01/10/18 (!) 154/105   01/10/18 (!) 151/101   01/10/18 (!) 157/91    Weight from Last 3 Encounters:   01/10/18 96.8 kg (213 lb 8 oz)   01/10/18 96.8 kg (213 lb 4.8 oz)   01/03/18 96.2 kg (212 lb)              Today, you had the following     No orders found for display         Where to get your medicines      These medications were sent to [facility] - [place], [place] - [address]  [address], [facility] [address]     Phone:  [phone]     dexamethasone 4 MG tablet          Primary Care Provider Office Phone # Fax #    Mya Park [phone]       [facility] [address]   [facility] [id number]        Equal Access to Services     ASHLEY MARKS : Hadii janette garcía hadasho Soomaali, waaxda luqadaha, qaybta kaalmada candy zavala . So [facility] [phone].    ATENCIÓN: Si habla español, tiene a stiles disposición servicios gratuitos de asistencia lingüística. Llame al [phone].    We comply with applicable federal civil rights laws and [place] laws. We do not discriminate on the basis of race, color, national origin, age, disability, sex, sexual orientation, or gender identity.            Thank you!     Thank you for choosing [facility]  for your care. Our goal is always to provide you with excellent care. " Hearing back from our patients is one way we can continue to improve our services. Please take a few minutes to complete the written survey that you may receive in the mail after your visit with us. Thank you!             Your Updated Medication List - Protect others around you: Learn how to safely use, store and throw away your medicines at www.disposemymeds.org.          This list is accurate as of: 1/10/18  2:54 PM.  Always use your most recent med list.                   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       dexamethasone 4 MG tablet    DECADRON    2 tablet    Take 2 tablets (8 mg) by mouth See Admin Instructions    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, Cough, Malignant phyllodes tumor of breast, unspecified laterality (H), Malignant neoplasm metastatic to right lung (H), SOB (shortness of breath)       guaiFENesin-codeine 100-10 MG/5ML Soln solution    CHERATUSSIN AC    420 mL    Take 5-10 mLs by mouth every 4 hours as needed for cough    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, Cough       lidocaine-prilocaine cream    EMLA    30 g    Apply 30 g topically as needed for moderate pain    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       LORazepam 0.5 MG tablet    ATIVAN    30 tablet    Take 1 tablet (0.5 mg) by mouth every 4 hours as needed (Anxiety, Nausea/Vomiting or Sleep)    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       METFORMIN HCL PO      Take 500 mg by  mouth daily (with breakfast)        prochlorperazine 10 MG tablet    COMPAZINE    30 tablet    Take 1 tablet (10 mg) by mouth every 6 hours as needed (Nausea/Vomiting)    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

## 2018-01-11 PROBLEM — D48.60 PHYLLODES NEOPLASM OF BREAST: Status: ACTIVE | Noted: 2018-01-01

## 2018-01-11 PROBLEM — C78.01 MALIGNANT NEOPLASM METASTATIC TO RIGHT LUNG (H): Status: ACTIVE | Noted: 2018-01-01

## 2018-01-11 NOTE — PATIENT INSTRUCTIONS
Docetaxel Solution for injection  What is this medicine?  DOCETAXEL (trejo se TAX el) is a chemotherapy drug. It targets fast dividing cells, like cancer cells, and causes these cells to die. This medicine is used to treat many types of cancers like breast cancer, certain stomach cancers, head and neck cancer, lung cancer, and prostate cancer.  This medicine may be used for other purposes; ask your health care provider or pharmacist if you have questions.  What should I tell my health care provider before I take this medicine?  They need to know if you have any of these conditions:    infection (especially a virus infection such as chickenpox, cold sores, or herpes)    liver disease    low blood counts, like low white cell, platelet, or red cell counts    an unusual or allergic reaction to docetaxel, polysorbate 80, other chemotherapy agents, other medicines, foods, dyes, or preservatives    pregnant or trying to get pregnant    breast-feeding  How should I use this medicine?  This drug is given as an infusion into a vein. It is administered in a hospital or clinic by a specially trained health care professional.  Talk to your pediatrician regarding the use of this medicine in children. Special care may be needed.  Overdosage: If you think you have taken too much of this medicine contact a poison control center or emergency room at once.  NOTE: This medicine is only for you. Do not share this medicine with others.  What if I miss a dose?  It is important not to miss your dose. Call your doctor or health care professional if you are unable to keep an appointment.  What may interact with this medicine?    cyclosporine    erythromycin    ketoconazole    medicines to increase blood counts like filgrastim, pegfilgrastim, sargramostim    vaccines  Talk to your doctor or health care professional before taking any of these medicines:    acetaminophen    aspirin    ibuprofen    ketoprofen    naproxen  This list may not  describe all possible interactions. Give your health care provider a list of all the medicines, herbs, non-prescription drugs, or dietary supplements you use. Also tell them if you smoke, drink alcohol, or use illegal drugs. Some items may interact with your medicine.  What should I watch for while using this medicine?  Your condition will be monitored carefully while you are receiving this medicine. You will need important blood work done while you are taking this medicine.  This drug may make you feel generally unwell. This is not uncommon, as chemotherapy can affect healthy cells as well as cancer cells. Report any side effects. Continue your course of treatment even though you feel ill unless your doctor tells you to stop.  In some cases, you may be given additional medicines to help with side effects. Follow all directions for their use.  Call your doctor or health care professional for advice if you get a fever, chills or sore throat, or other symptoms of a cold or flu. Do not treat yourself. This drug decreases your body's ability to fight infections. Try to avoid being around people who are sick.  This medicine may increase your risk to bruise or bleed. Call your doctor or health care professional if you notice any unusual bleeding.  This medicine may contain alcohol in the product. You may get drowsy or dizzy. Do not drive, use machinery, or do anything that needs mental alertness until you know how this medicine affects you. Do not stand or sit up quickly, especially if you are an older patient. This reduces the risk of dizzy or fainting spells. Avoid alcoholic drinks.  Do not become pregnant while taking this medicine. Women should inform their doctor if they wish to become pregnant or think they might be pregnant. There is a potential for serious side effects to an unborn child. Talk to your health care professional or pharmacist for more information. Do not breast-feed an infant while taking this  medicine.  What side effects may I notice from receiving this medicine?  Side effects that you should report to your doctor or health care professional as soon as possible:    allergic reactions like skin rash, itching or hives, swelling of the face, lips, or tongue    low blood counts - This drug may decrease the number of white blood cells, red blood cells and platelets. You may be at increased risk for infections and bleeding.    signs of infection - fever or chills, cough, sore throat, pain or difficulty passing urine    signs of decreased platelets or bleeding - bruising, pinpoint red spots on the skin, black, tarry stools, nosebleeds    signs of decreased red blood cells - unusually weak or tired, fainting spells, lightheadedness    breathing problems    fast or irregular heartbeat    low blood pressure    mouth sores    nausea and vomiting    pain, swelling, redness or irritation at the injection site    pain, tingling, numbness in the hands or feet    swelling of the ankle, feet, hands    weight gain  Side effects that usually do not require medical attention (report to your prescriber or health care professional if they continue or are bothersome):    bone pain    complete hair loss including hair on your head, underarms, pubic hair, eyebrows, and eyelashes    diarrhea    excessive tearing    changes in the color of fingernails    loosening of the fingernails    nausea    muscle pain    red flush to skin    sweating    weak or tired  This list may not describe all possible side effects. Call your doctor for medical advice about side effects. You may report side effects to FDA at 0-746-FDA-1886.  Where should I keep my medicine?  This drug is given in a hospital or clinic and will not be stored at home.  NOTE:This sheet is a summary. It may not cover all possible information. If you have questions about this medicine, talk to your doctor, pharmacist, or health care provider. Copyright  2016 Gold  Standard        Gemcitabine Hydrochloride Solution for injection  What is this medicine?  GEMCITABINE (chani SIT a been) is a chemotherapy drug. This medicine is used to treat many types of cancer like breast cancer, lung cancer, pancreatic cancer, and ovarian cancer.  This medicine may be used for other purposes; ask your health care provider or pharmacist if you have questions.  What should I tell my health care provider before I take this medicine?  They need to know if you have any of these conditions:    blood disorders    infection    kidney disease    liver disease    recent or ongoing radiation therapy    an unusual or allergic reaction to gemcitabine, other chemotherapy, other medicines, foods, dyes, or preservatives    pregnant or trying to get pregnant    breast-feeding  How should I use this medicine?  This drug is given as an infusion into a vein. It is administered in a hospital or clinic by a specially trained health care professional.  Talk to your pediatrician regarding the use of this medicine in children. Special care may be needed.  Overdosage: If you think you have taken too much of this medicine contact a poison control center or emergency room at once.  NOTE: This medicine is only for you. Do not share this medicine with others.  What if I miss a dose?  It is important not to miss your dose. Call your doctor or health care professional if you are unable to keep an appointment.  What may interact with this medicine?    medicines to increase blood counts like filgrastim, pegfilgrastim, sargramostim    some other chemotherapy drugs like cisplatin    vaccines  Talk to your doctor or health care professional before taking any of these medicines:    acetaminophen    aspirin    ibuprofen    ketoprofen    naproxen  This list may not describe all possible interactions. Give your health care provider a list of all the medicines, herbs, non-prescription drugs, or dietary supplements you use. Also tell them  if you smoke, drink alcohol, or use illegal drugs. Some items may interact with your medicine.  What should I watch for while using this medicine?  Visit your doctor for checks on your progress. This drug may make you feel generally unwell. This is not uncommon, as chemotherapy can affect healthy cells as well as cancer cells. Report any side effects. Continue your course of treatment even though you feel ill unless your doctor tells you to stop.  In some cases, you may be given additional medicines to help with side effects. Follow all directions for their use.  Call your doctor or health care professional for advice if you get a fever, chills or sore throat, or other symptoms of a cold or flu. Do not treat yourself. This drug decreases your body's ability to fight infections. Try to avoid being around people who are sick.  This medicine may increase your risk to bruise or bleed. Call your doctor or health care professional if you notice any unusual bleeding.  Be careful brushing and flossing your teeth or using a toothpick because you may get an infection or bleed more easily. If you have any dental work done, tell your dentist you are receiving this medicine.  Avoid taking products that contain aspirin, acetaminophen, ibuprofen, naproxen, or ketoprofen unless instructed by your doctor. These medicines may hide a fever.  Women should inform their doctor if they wish to become pregnant or think they might be pregnant. There is a potential for serious side effects to an unborn child. Talk to your health care professional or pharmacist for more information. Do not breast-feed an infant while taking this medicine.  What side effects may I notice from receiving this medicine?  Side effects that you should report to your doctor or health care professional as soon as possible:    allergic reactions like skin rash, itching or hives, swelling of the face, lips, or tongue    low blood counts - this medicine may decrease the  number of white blood cells, red blood cells and platelets. You may be at increased risk for infections and bleeding.    signs of infection - fever or chills, cough, sore throat, pain or difficulty passing urine    signs of decreased platelets or bleeding - bruising, pinpoint red spots on the skin, black, tarry stools, blood in the urine    signs of decreased red blood cells - unusually weak or tired, fainting spells, lightheadedness    breathing problems    chest pain    mouth sores    nausea and vomiting    pain, swelling, redness at site where injected    pain, tingling, numbness in the hands or feet    stomach pain    swelling of ankles, feet, hands    unusual bleeding  Side effects that usually do not require medical attention (report to your doctor or health care professional if they continue or are bothersome):    constipation    diarrhea    hair loss    loss of appetite    stomach upset  This list may not describe all possible side effects. Call your doctor for medical advice about side effects. You may report side effects to FDA at 2-305-FDA-3995.  Where should I keep my medicine?  This drug is given in a hospital or clinic and will not be stored at home.  NOTE:This sheet is a summary. It may not cover all possible information. If you have questions about this medicine, talk to your doctor, pharmacist, or health care provider. Copyright  2016 Gold Standard

## 2018-01-11 NOTE — PROGRESS NOTES
Clinic Care Coordination Contact  Care Team Conversations    OCC RN visited face to face with pt today.  Also present at the visit was a dtr and her .  Discussed and given to the pt today was the [facility] Care Plan.  Pt was informed of the various uses of this plan and suggested to hang in a prominent place in the home.  Pt also given a copy of the [facility] leaflet.  Pt informed of the contact information attached and to check it out if ever would be needed.  She stated that the application for the [facility] program was made last night.  She stated she learned more about this program once they went on line to apply.  She was informed the [facility] inquiry is held up as the contact is out of the office until next week; she was informed that she may not qualify for this as she has received this once already.  She was given contact information pertaining to the [facility] application for her to follow up on the application; she was informed she may not qualify for this program due to living in [facility] [place].   She stated understanding this information discussed today.  She states how much better she feels today than yesterday.  The visit ended with questions answered and understanding stated.  OCC will continue to follow up as needed.    New Care Coordination Referral ordered today.    Aimee Saunders RN  [facility] Care Coordinator

## 2018-01-11 NOTE — PROGRESS NOTES
Patient is a 67 year old female here accompanied by spouse and daughter today for infusion of gemcitabine and docetaxel under the orders of Dr. Celis.  Left  sided power port accessed from yesterdays encounter.  Flushes with ease.  Blood return noted.  Transparent dressing intact.  Yesterdays lab values: WBC 2.8, ANC 2.4, , HGB 12.0, AST 58, , Alkaline Phosphatase 158.     Patient meets parameters for today's infusion.  Denies questions or concerns regarding today's infusion and/or medications being administered.      Patient identified with two identifiers, order verified, and verbal consent for today's infusion obtained from patient. Written consent for treatment is on file and valid.    0933: IV pump verified with Gemcitabine 2,000 mg dose, drug, and rate of administration by second RN, well. Infusion administered per protocol. Patient tolerated infusion well, no signs or symptoms of adverse reaction noted. Patient denies pain nor discomfort.   1111:  IV pump verified with docetaxel 160 mg dose, drug, and rate of administration by second RN, Erlinda Tejeda. Infusion administered per protocol. Patient tolerated infusion well, no signs or symptoms of adverse reaction noted. Patient denies pain nor discomfort.  1205:  Neulasta onpro injector applied to right lower abdoman.  Patient instructed to remove at 6:00 pm.  Lot number on box:  [id number].  Lot number on cassette:  [id number]  [id number]   1216:  Needle removed, tip intact. Site clean, dry and intact. Covered with a sterile bandage, slight pressure applied for 30 seconds. Pt instructed to leave bandage intact for a minimum of one hour, and to call with questions or concerns. Copy of appointments, discharge instructions, and after visit summary (AVS) provided to patient. Patient states understanding, discharged ambulatory.

## 2018-01-11 NOTE — PROGRESS NOTES
"Clinic Care Coordination Contact  Care Team Conversations  Ed   OCC RN visited face to face with pt and her  today.  Pt's dtr had phoned this writer in advance of the appt with concerns that the pt may not give accurate information to some questions.  She stated the pt had diarrhea over the weekend.  She stated the pt does ambulate but not far and very slowly.  She observed the pt had been laying around more this weekend.  She stated her father could give more detail to these issues, if needed.  This writer informed her the pt would be questioned about these items.  Pt stated she felt pretty good.  She did state she had some diarrhea but felt this was \"normal\" for her.  She admitted to walking short distances, only in the home as it was too cold out to do anything else.  Stated she was more tired also.  Pt stated appreciation for the information shared with this writer.  Pt turned in applications for financial resources for the [employer] and [employer].  Pt informed the [facility] may not be accepted R/T living in [facility].  Pt was informed of a program - for [facility] residents, entitled [facility] Crisis [facility]; this writer would obtain more information and get back with the pt.  Visit ended with questions answered and understanding stated.      [employer] application sent via interoffice mail to [facility]; [employer] application mailed to [facility] [employer] this day.      Aimee Saunders RN  [facility] Oncology Care Coordinator      "

## 2018-01-11 NOTE — NURSING NOTE
Clinic Care Coordination Contact  Care Team Conversations    Face to Face Oncology visit    OCC RN visited/met pt during the face to face Medical Oncology/Dr Celis visit today.  Pt stated familiarity with the Care Coordination program from past experience.  States she would like a refresher again.  OCC nurse gave pt a new folder with the information as described immediately below.  The information contained in the folder was explained and discussed. Pt lives in [place]; therefore the use of some services such as the Support Group is unlikely, as stated by the pt.        Resources given to patient to assist with their journey with in cancer folder:    [place] Emergency Care Plan    Care Coordinator Contact information   Honoring Choices pamphlet and discussion on advanced health care directive               ACS card   Look Good Feel Better   Support Group    Nutrition booklet from [facility] on eating during and after chemotherapy   Aromatherapy    Vaccination information   Cancer Rehab       Financial, Billing, or insurance questions; need for financial counselor: Pt stated concern about this issue.  States she had received an [employer] card in the past (2016) while receiving treatment then and was interested in obtaining this again, if possible.  Other financial options discussed as recorded below.    [employer] Application - Application submitted by this writer   [facility] Application for those on [facility]  - Application submitted by this writer - pt understands she may not qualify due to living in [facility]  Pt informed of a program offered by [facility] entitled [facility] Crisis Fund.  Pt informed this writer would check into this program to get more details about this and get back to her.  Understanding stated.       Do you have any spiritual needs?  She stated she has adequate support at this time at home and through her azul.      Concerns with nutrition? None at this  "time       Pharmacy   Any questions regarding your medications?   None at this time   Informed patient will receive Chemotherapy Education by Oncology staff prior to receiving her first treatment.        Do you have any concerns with being able to care for yourself at home?  None - states she has support of family and declines services at this time.      Concerns with physical health?    Cancer Rehab referral made - None at this time      Anuj explained to patient.  Pt is considered \"Active\"       Do you have any other questions or needs that we have not discussed?  None at this time    Visit ended with questions answered and understanding stated.      Aimee Saunders RN  [facility] Oncology Care Coordinator            "

## 2018-01-11 NOTE — PROGRESS NOTES
Clinic Care Coordination Contact  Care Team Conversations    OCC RN visited with pt face to face after she came back to Medical Oncology following her visit to the ED today.  Pt stated she felt so much better after having the fluid taken off her lungs.  Pt was greatly improved as evidenced by smiling, more communicative and sitting upright without breathing effort.  Pt expressed concern over not hearing from the [employer] and [employer] applications yet.  This writer discussed the [facility] Cancer Fund program with her, their dtr and  present.  They were given a brochure and informed the application is only done On Line and did not have to be completed by the pt, per information this writer researched with [facility] Coordinator.  They stated interest in applying for this financial resource.  Also discussed was information pertaining to the [facility].  It was suggested they contact them to obtain more information.  Visit ended with questions answered and understanding stated.      Aimee Saunders RN  FR[facility] Oncology Care Coordinator

## 2018-01-11 NOTE — MR AVS SNAPSHOT
After Visit Summary   1/11/2018    Ana Simmons    MRN: [id number]           Patient Information     Date Of Birth          1950        Visit Information        Provider Department      1/11/2018 8:30 AM [facility] [address] 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  DOCETAXEL (trejo se TAX el) is a chemotherapy drug. It targets fast dividing cells, like cancer cells, and causes these cells to die. This medicine is used to treat many types of cancers like breast cancer, certain stomach cancers, head and neck cancer, lung cancer, and prostate cancer.  This medicine may be used for other purposes; ask your health care provider or pharmacist if you have questions.  What should I tell my health care provider before I take this medicine?  They need to know if you have any of these conditions:    infection (especially a virus infection such as chickenpox, cold sores, or herpes)    liver disease    low blood counts, like low white cell, platelet, or red cell counts    an unusual or allergic reaction to docetaxel, polysorbate 80, other chemotherapy agents, other medicines, foods, dyes, or preservatives    pregnant or trying to get pregnant    breast-feeding  How should I use this medicine?  This drug is given as an infusion into a vein. It is administered in a hospital or clinic by a specially trained health care professional.  Talk to your pediatrician regarding the use of this medicine in children. Special care may be needed.  Overdosage: If you think you have taken too much of this medicine contact a poison control center or emergency room at once.  NOTE: This medicine is only for you. Do not share this medicine with others.  What if I miss  a dose?  It is important not to miss your dose. Call your doctor or health care professional if you are unable to keep an appointment.  What may interact with this medicine?    cyclosporine    erythromycin    ketoconazole    medicines to increase blood counts like filgrastim, pegfilgrastim, sargramostim    vaccines  Talk to your doctor or health care professional before taking any of these medicines:    acetaminophen    aspirin    ibuprofen    ketoprofen    naproxen  This list may not describe all possible interactions. Give your health care provider a list of all the medicines, herbs, non-prescription drugs, or dietary supplements you use. Also tell them if you smoke, drink alcohol, or use illegal drugs. Some items may interact with your medicine.  What should I watch for while using this medicine?  Your condition will be monitored carefully while you are receiving this medicine. You will need important blood work done while you are taking this medicine.  This drug may make you feel generally unwell. This is not uncommon, as chemotherapy can affect healthy cells as well as cancer cells. Report any side effects. Continue your course of treatment even though you feel ill unless your doctor tells you to stop.  In some cases, you may be given additional medicines to help with side effects. Follow all directions for their use.  Call your doctor or health care professional for advice if you get a fever, chills or sore throat, or other symptoms of a cold or flu. Do not treat yourself. This drug decreases your body's ability to fight infections. Try to avoid being around people who are sick.  This medicine may increase your risk to bruise or bleed. Call your doctor or health care professional if you notice any unusual bleeding.  This medicine may contain alcohol in the product. You may get drowsy or dizzy. Do not drive, use machinery, or do anything that needs mental alertness until you know how this medicine affects you.  Do not stand or sit up quickly, especially if you are an older patient. This reduces the risk of dizzy or fainting spells. Avoid alcoholic drinks.  Do not become pregnant while taking this medicine. Women should inform their doctor if they wish to become pregnant or think they might be pregnant. There is a potential for serious side effects to an unborn child. Talk to your health care professional or pharmacist for more information. Do not breast-feed an infant while taking this medicine.  What side effects may I notice from receiving this medicine?  Side effects that you should report to your doctor or health care professional as soon as possible:    allergic reactions like skin rash, itching or hives, swelling of the face, lips, or tongue    low blood counts - This drug may decrease the number of white blood cells, red blood cells and platelets. You may be at increased risk for infections and bleeding.    signs of infection - fever or chills, cough, sore throat, pain or difficulty passing urine    signs of decreased platelets or bleeding - bruising, pinpoint red spots on the skin, black, tarry stools, nosebleeds    signs of decreased red blood cells - unusually weak or tired, fainting spells, lightheadedness    breathing problems    fast or irregular heartbeat    low blood pressure    mouth sores    nausea and vomiting    pain, swelling, redness or irritation at the injection site    pain, tingling, numbness in the hands or feet    swelling of the ankle, feet, hands    weight gain  Side effects that usually do not require medical attention (report to your prescriber or health care professional if they continue or are bothersome):    bone pain    complete hair loss including hair on your head, underarms, pubic hair, eyebrows, and eyelashes    diarrhea    excessive tearing    changes in the color of fingernails    loosening of the fingernails    nausea    muscle pain    red flush to skin    sweating    weak or  tired  This list may not describe all possible side effects. Call your doctor for medical advice about side effects. You may report side effects to FDA at 2-864-FDA-0270.  Where should I keep my medicine?  This drug is given in a hospital or clinic and will not be stored at home.  NOTE:This sheet is a summary. It may not cover all possible information. If you have questions about this medicine, talk to your doctor, pharmacist, or health care provider. Copyright  2016 Gold Standard        Gemcitabine Hydrochloride Solution for injection  What is this medicine?  GEMCITABINE (chani SIT a been) is a chemotherapy drug. This medicine is used to treat many types of cancer like breast cancer, lung cancer, pancreatic cancer, and ovarian cancer.  This medicine may be used for other purposes; ask your health care provider or pharmacist if you have questions.  What should I tell my health care provider before I take this medicine?  They need to know if you have any of these conditions:    blood disorders    infection    kidney disease    liver disease    recent or ongoing radiation therapy    an unusual or allergic reaction to gemcitabine, other chemotherapy, other medicines, foods, dyes, or preservatives    pregnant or trying to get pregnant    breast-feeding  How should I use this medicine?  This drug is given as an infusion into a vein. It is administered in a hospital or clinic by a specially trained health care professional.  Talk to your pediatrician regarding the use of this medicine in children. Special care may be needed.  Overdosage: If you think you have taken too much of this medicine contact a poison control center or emergency room at once.  NOTE: This medicine is only for you. Do not share this medicine with others.  What if I miss a dose?  It is important not to miss your dose. Call your doctor or health care professional if you are unable to keep an appointment.  What may interact with this medicine?    medicines  to increase blood counts like filgrastim, pegfilgrastim, sargramostim    some other chemotherapy drugs like cisplatin    vaccines  Talk to your doctor or health care professional before taking any of these medicines:    acetaminophen    aspirin    ibuprofen    ketoprofen    naproxen  This list may not describe all possible interactions. Give your health care provider a list of all the medicines, herbs, non-prescription drugs, or dietary supplements you use. Also tell them if you smoke, drink alcohol, or use illegal drugs. Some items may interact with your medicine.  What should I watch for while using this medicine?  Visit your doctor for checks on your progress. This drug may make you feel generally unwell. This is not uncommon, as chemotherapy can affect healthy cells as well as cancer cells. Report any side effects. Continue your course of treatment even though you feel ill unless your doctor tells you to stop.  In some cases, you may be given additional medicines to help with side effects. Follow all directions for their use.  Call your doctor or health care professional for advice if you get a fever, chills or sore throat, or other symptoms of a cold or flu. Do not treat yourself. This drug decreases your body's ability to fight infections. Try to avoid being around people who are sick.  This medicine may increase your risk to bruise or bleed. Call your doctor or health care professional if you notice any unusual bleeding.  Be careful brushing and flossing your teeth or using a toothpick because you may get an infection or bleed more easily. If you have any dental work done, tell your dentist you are receiving this medicine.  Avoid taking products that contain aspirin, acetaminophen, ibuprofen, naproxen, or ketoprofen unless instructed by your doctor. These medicines may hide a fever.  Women should inform their doctor if they wish to become pregnant or think they might be pregnant. There is a potential for  serious side effects to an unborn child. Talk to your health care professional or pharmacist for more information. Do not breast-feed an infant while taking this medicine.  What side effects may I notice from receiving this medicine?  Side effects that you should report to your doctor or health care professional as soon as possible:    allergic reactions like skin rash, itching or hives, swelling of the face, lips, or tongue    low blood counts - this medicine may decrease the number of white blood cells, red blood cells and platelets. You may be at increased risk for infections and bleeding.    signs of infection - fever or chills, cough, sore throat, pain or difficulty passing urine    signs of decreased platelets or bleeding - bruising, pinpoint red spots on the skin, black, tarry stools, blood in the urine    signs of decreased red blood cells - unusually weak or tired, fainting spells, lightheadedness    breathing problems    chest pain    mouth sores    nausea and vomiting    pain, swelling, redness at site where injected    pain, tingling, numbness in the hands or feet    stomach pain    swelling of ankles, feet, hands    unusual bleeding  Side effects that usually do not require medical attention (report to your doctor or health care professional if they continue or are bothersome):    constipation    diarrhea    hair loss    loss of appetite    stomach upset  This list may not describe all possible side effects. Call your doctor for medical advice about side effects. You may report side effects to FDA at 8-510-FDA-9638.  Where should I keep my medicine?  This drug is given in a hospital or clinic and will not be stored at home.  NOTE:This sheet is a summary. It may not cover all possible information. If you have questions about this medicine, talk to your doctor, pharmacist, or health care provider. Copyright  2016 Gold Standard                Follow-ups after your visit        Your next 10 appointments  already scheduled     Jan 25, 2018  8:30 AM CST   Level 3 with HC INF RM 3306   [facility] [place] ([facility] [place] [facility]    [address]  [place] [address]   [phone]            Feb 01, 2018  8:30 AM CST   Level O with HC INF RM 3306   [facility] [place] ([facility] [place] )    [address]  [place] [address]   [phone]            Feb 01, 2018  9:00 AM CST   (Arrive by 8:45 AM)   Return Visit with Ceci Amaral NP   [facility] [place] ([facility] [place] [facility]    [address]  [place] [address]   [phone]            Feb 01, 2018  9:30 AM CST   Level 4 with HC INF RM 3306   [facility] [place] ([facility] [place] )    [address]  [place] [address]   [phone]            Feb 15, 2018  8:30 AM CST   Level 3 with HC INF RM 3306   [facility] [place] ([facility] [place] )    [address]  [place] [address]   [phone]            Feb 22, 2018  8:30 AM CST   Level O with HC INF RM 3308   [facility] [place] ([facility] [place] )    [address]  [place] [address]   [phone]            Feb 22, 2018  9:00 AM CST   (Arrive by 8:45 AM)   Return Visit with Ceci Amaral NP   [facility] [place] ([facility] [place] [facility]    [address]  [place] [address]   [phone]            Feb 22, 2018  9:30 AM CST   Level 4 with HC INF RM 3308   [facility] [place] ([facility] [place] )    [address]  [place] [address]   [phone]              Future tests that were ordered for you today     Open Future Orders        Priority Expected Expires Ordered    CBC with platelets differential Routine 1/17/2018 3/17/2018 1/11/2018    Comprehensive metabolic panel Routine 1/17/2018 3/17/2018 1/11/2018            Who to contact     If you have questions or need follow up information about today's clinic visit or your  "schedule please contact [facility] directly at [phone].  Normal or non-critical lab and imaging results will be communicated to you by MyChart, letter or phone within 4 business days after the clinic has received the results. If you do not hear from us within 7 days, please contact the clinic through MyChart or phone. If you have a critical or abnormal lab result, we will notify you by phone as soon as possible.  Submit refill requests through [employer] or call your pharmacy and they will forward the refill request to us. Please allow 3 business days for your refill to be completed.          Additional Information About Your Visit        [employer] Information     [employer] gives you secure access to your electronic health record. If you see a primary care provider, you can also send messages to your care team and make appointments. If you have questions, please call your primary care clinic.  If you do not have a primary care provider, please call [phone] and they will assist you.        Care EveryWhere ID     This is your Care EveryWhere ID. This could be used by other organizations to access your [facility] medical records  [id number]        Your Vitals Were     Pulse Temperature Respirations Height Pulse Oximetry BMI (Body Mass Index)    100 98.3  F (36.8  C) (Tympanic) 18 1.651 m (5' 5\") 90% 35.48 kg/m2       Blood Pressure from Last 3 Encounters:   01/11/18 149/87   01/10/18 (!) 154/105   01/10/18 (!) 151/101    Weight from Last 3 Encounters:   01/11/18 96.7 kg (213 lb 3.2 oz)   01/10/18 96.8 kg (213 lb 8 oz)   01/10/18 96.8 kg (213 lb 4.8 oz)              Today, you had the following     No orders found for display       Primary Care Provider Office Phone # Fax #    Mya Park [phone]       [facility] [address]   [facility] [address]        Equal Access to Services     JOSE MARKS AH: Rocio Meredith, wavanda alex, qaybta kaalmakimber " candy zavalacuong wiliamwilly laYumikoaan ah. So [facility] [phone].    ATENCIÓN: Si ayushla justina, tiene a stiles disposición servicios gratuitos de asistencia lingüística. Clint al [phone].    We comply with applicable federal civil rights laws and [place] laws. We do not discriminate on the basis of race, color, national origin, age, disability, sex, sexual orientation, or gender identity.            Thank you!     Thank you for choosing [facility]  for your [facility]. Our goal is always to provide you with excellent care. Hearing back from our patients is one way we can continue to improve our services. Please take a few minutes to complete the written survey that you may receive in the mail after your visit with us. Thank you!             Your Updated Medication List - Protect others around you: Learn how to safely use, store and throw away your medicines at www.disposemymeds.org.          This list is accurate as of: 1/11/18 12:18 PM.  Always use your most recent med list.                   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       dexamethasone 4 MG tablet    DECADRON    2 tablet    Take 2 tablets (8 mg) by mouth See Admin Instructions    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, Cough, Malignant phyllodes tumor of breast, unspecified laterality (H), Malignant neoplasm metastatic to right lung (H), SOB (shortness of breath)       guaiFENesin-codeine 100-10 MG/5ML  Soln solution    CHERATUSSIN AC    420 mL    Take 5-10 mLs by mouth every 4 hours as needed for cough    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, Cough       lidocaine-prilocaine cream    EMLA    30 g    Apply 30 g topically as needed for moderate pain    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       LORazepam 0.5 MG tablet    ATIVAN    30 tablet    Take 1 tablet (0.5 mg) by mouth every 4 hours as needed (Anxiety, Nausea/Vomiting or Sleep)    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       METFORMIN HCL PO      Take 500 mg by mouth daily (with breakfast)        prochlorperazine 10 MG tablet    COMPAZINE    30 tablet    Take 1 tablet (10 mg) by mouth every 6 hours as needed (Nausea/Vomiting)    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

## 2018-01-13 NOTE — ED NOTES
Discharge papers given to pt.  Verbalizes understanding of d/c dx, to keep scheduled appts, and to speak with oncologist regarding further thoracentesis or drain placement.  Left chest port de-accessed per protocol and heparinized.  Pt tolerated without incident.  Site WDL.  Denies SOB. VSS as charted.  Discharged to home with family and friends.  W/C to ED doors.

## 2018-01-13 NOTE — ED NOTES
Pt presents to the ED with spouse and dtr with c/o SOB. Pt oxygen sats 94-95% on room air.  Pt here Wednesday and had a thoracentesis and site to right mid back is WDL.  Recvd chemo on Thursday for lung cancer dx.  Assessment is complete.  Call light and warm blanket is given.  Monitors on pt.  Family at the bedside.  Updated MD on pt condition.

## 2018-01-13 NOTE — ED NOTES
Pt presents with hx of increasing SOB since Last night. Hx of breast cancer with mets to lungs. Undergoing chemo (last on THursday), Last Wednesday had 1.5 litres of fluid removed from her lung In radiology. Denies fevers.

## 2018-01-13 NOTE — ED AVS SNAPSHOT
[facility]    [address] [address]    Phone:  [phone]                                       Ana Simmons   MRN: [id number]    Department:  [facility]   Date of Visit:  1/13/2018           After Visit Summary Signature Page     I have received my discharge instructions, and my questions have been answered. I have discussed any challenges I see with this plan with the nurse or doctor.    ..........................................................................................................................................  Patient/Patient Representative Signature      ..........................................................................................................................................  Patient Representative Print Name and Relationship to Patient    ..................................................               ................................................  Date                                            Time    ..........................................................................................................................................  Reviewed by Signature/Title    ...................................................              ..............................................  Date                                                            Time

## 2018-01-13 NOTE — ED NOTES
Pt completed thoracentesis procedure with 1400 mls removed from right side.  Pt tolerated with intermittent cough.  C/O feeling hot.  Cold compress applies to neck and pt fanned.

## 2018-01-13 NOTE — ED NOTES
Pt has returned from xray dept.  BP cuff and sat monitor being placed on pt at present.  Pt noted to have less coughing at this time.  Family and friends at the bedside.

## 2018-01-13 NOTE — ED AVS SNAPSHOT
[facility]    [address]    [place] [address]    Phone:  [phone]                                       Ana Simmons   MRN: [id number]    Department:  [facility]   Date of Visit:  1/13/2018           Patient Information     Date Of Birth          1950        Your diagnoses for this visit were:     Malignant neoplasm metastatic to right lung (H)     Pleural effusion right       You were seen by Radha Fatima MD.      Follow-up Information     Follow up with oncology. Call in 2 days.    Why:  regarding ongoing treatment of effusion - port vs recurrent thoracentesis        Discharge Instructions       Unchanged from previous.  Return to ED if you get suddenly more short of breath.    Future Appointments        Provider Department Dept Phone Center    1/25/2018 8:30 AM HC INF RM 3306 [facility] [phone] [facility] [place]    1/25/2018 9:00 AM Ceci Amaral NP [facility] [phone] [place]    1/25/2018 9:30 AM HC INF RM 3306 [facility] [phone] [place]    2/1/2018 8:30 AM HC INF RM 3306 [facility] [phone] [place]    2/15/2018 8:30 AM HC INF RM 3306 [facility] [phone] [place]    2/22/2018 8:30 AM HC INF RM 3308 [facility] [phone] [place]    2/22/2018 9:00 AM Ceci Amaral NP [facility] [place] [phone] [place]    2/22/2018 9:30 AM HC INF RM 3308 [facility] [place] [phone] [place]         Review of your medicines      Our records show that you are taking the medicines listed below. If these are incorrect, please call your family doctor or clinic.        Dose / Directions Last dose taken    anastrozole 1 MG tablet   Commonly known as:  ARIMIDEX   Dose:  1 mg   Quantity:  90 tablet        Take 1 tablet (1 mg) by mouth daily   Refills:  3        calcium-vitamin D 600-400 MG-UNIT per  tablet   Commonly known as:  CALTRATE   Dose:  1 tablet   Quantity:  90 tablet        Take 1 tablet by mouth 2 times daily   Refills:  3        cyanocobalamin 1000 MCG tablet   Commonly known as:  vitamin  B-12   Dose:  2500 mcg        Take 2,500 mcg by mouth daily   Refills:  0        dexamethasone 4 MG tablet   Commonly known as:  DECADRON   Dose:  8 mg   Quantity:  2 tablet        Take 2 tablets (8 mg) by mouth See Admin Instructions   Refills:  0        guaiFENesin-codeine 100-10 MG/5ML Soln solution   Commonly known as:  CHERATUSSIN AC   Dose:  1-2 tsp.   Quantity:  420 mL        Take 5-10 mLs by mouth every 4 hours as needed for cough   Refills:  0        lidocaine-prilocaine cream   Commonly known as:  EMLA   Dose:  30 g   Quantity:  30 g        Apply 30 g topically as needed for moderate pain   Refills:  3        LORazepam 0.5 MG tablet   Commonly known as:  ATIVAN   Dose:  0.5 mg   Quantity:  30 tablet        Take 1 tablet (0.5 mg) by mouth every 4 hours as needed (Anxiety, Nausea/Vomiting or Sleep)   Refills:  5        METFORMIN HCL PO   Dose:  500 mg        Take 500 mg by mouth daily (with breakfast)   Refills:  0        prochlorperazine 10 MG tablet   Commonly known as:  COMPAZINE   Dose:  10 mg   Quantity:  30 tablet        Take 1 tablet (10 mg) by mouth every 6 hours as needed (Nausea/Vomiting)   Refills:  5        SIMVASTATIN PO   Dose:  20 mg        Take 20 mg by mouth At Bedtime   Refills:  0        VITAMIN B6 PO   Dose:  50 mg        Take 50 mg by mouth daily   Refills:  0        ZOLOFT 50 MG tablet   Dose:  50 mg   Generic drug:  sertraline        Take 50 mg by mouth daily   Refills:  0                Procedures and tests performed during your visit     CBC with platelets differential    Cardiac Continuous Monitoring    EKG 12 lead    EKG 12-lead, tracing only    INR    Nt probnp inpatient    Pulse oximetry nursing    Troponin I    US Thoracentesis    Vital signs    XR Chest 1 View    XR Chest 2  Views      Orders Needing Specimen Collection     None      Pending Results     Date and Time Order Name Status Description    1/13/2018 1529 XR Chest 1 View In process     1/13/2018 1454 US Thoracentesis In process             Pending Culture Results     No orders found from 1/11/2018 to 1/14/2018.            Thank you for choosing [facility]       Thank you for choosing [facility] for your care. Our goal is always to provide you with excellent care. Hearing back from our patients is one way we can continue to improve our services. Please take a few minutes to complete the written survey that you may receive in the mail after you visit with us. Thank you!        [facility] Information     [employer] gives you secure access to your electronic health record. If you see a primary care provider, you can also send messages to your care team and make appointments. If you have questions, please call your primary care clinic.  If you do not have a primary care provider, please call [phone] and they will assist you.        Care EveryWhere ID     This is your Care EveryWhere ID. This could be used by other organizations to access your [facility] medical records  [id number]        Equal Access to Services     JOSE MARKS : Hadii janette Meredith, wavanda lubenedictadaha, qaybta kaalalexey zavala, candy nieves . So [facility] [phone].    ATENCIÓN: Si habla español, tiene a stiles disposición servicios gratuitos de asistencia lingüística. Llame al [phone].    We comply with applicable federal civil rights laws and [place] laws. We do not discriminate on the basis of race, color, national origin, age, disability, sex, sexual orientation, or gender identity.            After Visit Summary       This is your record. Keep this with you and show to your community pharmacist(s) and doctor(s) at your next visit.

## 2018-01-13 NOTE — ED PROVIDER NOTES
History     Chief Complaint   Patient presents with     Shortness of Breath     hx of breast cancer with mets to lung. SOB no fever     HPI  Ana Simmons is a 67 year old female who has increasing shortness of breath since last night.  She states she had chemo on Thursday and was feeling great until last night, and by this morning she was markedly short of breath.  She is not feeling otherwise ill, has not had a fever or any symptoms of infection, and is in no acute distress at this time.  Respiratory rate is 28.    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  Ana Simmons has been provided information and resources to begin or update their advance care plan.  Added by Lucretia Cates 07/05/2016     Priority: Medium     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 05/03/2016     Priority: Medium     Malignant neoplasm of lower-outer quadrant of right female breast (H) 05/03/2016     Priority: Medium        Past Medical History:    Past Medical History:   Diagnosis Date     Breast cancer (H)      DMII (diabetes mellitus, type 2) (H)      HLD (hyperlipidemia)      Nonhealing surgical wound        Past Surgical History:    Past Surgical History:   Procedure Laterality Date     APPENDECTOMY OPEN       C VAGINAL HYSTERECTOMY       INSERT PORT VASCULAR ACCESS Left 6/2/2016     Procedure: INSERT PORT VASCULAR ACCESS;  Surgeon: Micheline Davis MD;  Location: [facility]     MASTECTOMY Bilateral        Family History:    Family History   Problem Relation Age of Onset     Lung Cancer Father      Myocardial Infarction Father      Coronary Artery Disease Father      DIABETES Father      Hyperlipidemia Father      Hypertension Father      Prostate Cancer Paternal Grandfather      DIABETES Mother      Hyperlipidemia Mother      Thyroid Disease Mother      Thyroid Disease Maternal Grandmother      Thyroid Disease Daughter      Breast Cancer No family hx of      Colon Cancer No family hx of      Depression No family hx of      Asthma No family hx of        Social History:  Marital Status:   [2]  Social History   Substance Use Topics     Smoking status: Former Smoker     Smokeless tobacco: Never Used     Alcohol use No      Comment: 1 kamille/ night, with chemo is not drinking        Medications:      dexamethasone (DECADRON) 4 MG tablet   guaiFENesin-codeine (CHERATUSSIN AC) 100-10 MG/5ML SOLN solution   sertraline (ZOLOFT) 50 MG tablet   LORazepam (ATIVAN) 0.5 MG tablet   anastrozole (ARIMIDEX) 1 MG tablet   Pyridoxine HCl (VITAMIN B6 PO)   calcium-vitamin D (CALTRATE) 600-400 MG-UNIT per tablet   cyanocobalamin (VITAMIN  B-12) 1000 MCG tablet   METFORMIN HCL PO   SIMVASTATIN PO   prochlorperazine (COMPAZINE) 10 MG tablet   lidocaine-prilocaine (EMLA) cream         Review of Systems   Constitutional: Positive for activity change and fatigue. Negative for diaphoresis and fever.   HENT: Negative.    Respiratory: Positive for cough and shortness of breath. Negative for wheezing.    Cardiovascular: Negative for chest pain, palpitations and leg swelling.   Gastrointestinal: Negative for abdominal pain.   Genitourinary: Negative for dysuria, frequency and urgency.   Musculoskeletal: Negative for back pain.   Neurological: Negative.    Psychiatric/Behavioral: Negative.        Physical Exam   BP:  "155/86  Pulse: 102  Heart Rate: 97  Temp: 97.5  F (36.4  C)  Resp: (!) 28  Height: 165.1 cm (5' 5\")  SpO2: 94 %      Physical Exam   Constitutional: She is oriented to person, place, and time. She appears well-developed and well-nourished. No distress.   HENT:   Head: Normocephalic and atraumatic.   Neck: Normal range of motion. Neck supple.   Cardiovascular: Normal rate, regular rhythm, normal heart sounds and intact distal pulses.    No murmur heard.  Pulmonary/Chest: Effort normal. No respiratory distress. She has decreased breath sounds in the right middle field. She has no wheezes. She has no rhonchi. She has no rales.   Abdominal: Soft. Bowel sounds are normal. She exhibits no distension. There is no tenderness.   Musculoskeletal: Normal range of motion. She exhibits no edema.   Neurological: She is alert and oriented to person, place, and time.   Skin: Skin is warm and dry.   Psychiatric: She has a normal mood and affect.   Nursing note and vitals reviewed.      ED Course     ED Course     Procedures         EKG Interpretation:      Interpreted by Radha Gaston  Time reviewed: 1045  Symptoms at time of EKG: dyspnea   Rhythm: normal sinus   Rate: normal  Axis: normal  Ectopy: none  Conduction: normal  ST Segments/ T Waves: Nonspecific ST-T wave changes  Q Waves: none  Comparison to prior: Unchanged from 1/11/18    Clinical Impression: Nonspecific EKG      Labs Ordered and Resulted from Time of ED Arrival Up to the Time of Departure from the ED   CBC WITH PLATELETS DIFFERENTIAL - Abnormal; Notable for the following:        Result Value    RBC Count 3.69 (*)     Hemoglobin 11.3 (*)     Hematocrit 33.3 (*)     Platelet Count 85 (*)     Absolute Lymphocytes 0.2 (*)     All other components within normal limits   INR   NT PROBNP INPATIENT   TROPONIN I   VITAL SIGNS   PULSE OXIMETRY NURSING   CARDIAC CONTINUOUS MONITORING       Assessments & Plan (with Medical Decision Making)   Doing much better, " breathing well.  States there was a lot more coughing with this tap, but that has now calmed down.  Discussed with family and patient having a port vs recurrent thoracentesis, they will discuss this with Nathalia in oncology and make a decision based on that discussion.    I have reviewed the nursing notes.    I have reviewed the findings, diagnosis, plan and need for follow up with the patient.       New Prescriptions    No medications on file       Final diagnoses:   Malignant neoplasm metastatic to right lung (H)   Pleural effusion - right       1/13/2018   [facility]     Radha Fatima MD  01/13/18 6443

## 2018-01-15 NOTE — TELEPHONE ENCOUNTER
Patient called and stated her oxygen level is very low again this morning, and it was also over the weekend.  She stated she returned to the ER and they drained more fluid off her lung, as they did during her last Oncology visit.  She'd like to know what to do to keep this from happening, if there's a drain they can place, or something she should do. Routed to Oncology pool for nurse review.

## 2018-01-15 NOTE — ED AVS SNAPSHOT
[facility]    [address] [address]    Phone:  [phone]                                       Ana Simmons   MRN: [id number]    Department:  [facility]   Date of Visit:  1/15/2018           After Visit Summary Signature Page     I have received my discharge instructions, and my questions have been answered. I have discussed any challenges I see with this plan with the nurse or doctor.    ..........................................................................................................................................  Patient/Patient Representative Signature      ..........................................................................................................................................  Patient Representative Print Name and Relationship to Patient    ..................................................               ................................................  Date                                            Time    ..........................................................................................................................................  Reviewed by Signature/Title    ...................................................              ..............................................  Date                                                            Time

## 2018-01-15 NOTE — ED AVS SNAPSHOT
[facility]    [address]    [place] [address]    Phone:  [phone]                                       Ana Simmons   MRN: [id number]    Department:  [facility]   Date of Visit:  1/15/2018           Patient Information     Date Of Birth          1950        Your diagnoses for this visit were:     Malignant neoplasm metastatic to right lung (H)     Pleural effusion        You were seen by Marco Bonds MD.      Follow-up Information     Follow up with [facility]    Specialty:  Family Practice    Why:  As needed    Contact information:    [facility]  [address]   [facility] [id number] [phone]          Discharge Instructions       Keron Perez you again had some relief from the fluid removal.  After our discussion and per your request, arrangements have been made with the Imaging department to have you come back Monday--Wednesday--Friday for repeat thoracenteses in the XR dep't without having to come to the ER.  Hope this works for you going forward until you clarify with [facility] in 10 days what may need to change.  Good luck--hopefully your chemo will kick in.      Future Appointments        Provider Department Dept Phone Center    1/25/2018 8:30 AM HC INF RM 3306 [facility] [phone] [facility] [place]    1/25/2018 9:00 AM Ceci Amaral NP [facility] [phone] [place]    1/25/2018 9:30 AM HC INF RM 3306 [facility] [place] [phone] [place]    2/1/2018 8:30 AM HC INF RM 3306 [facility] [place] [phone] [place]    2/15/2018 8:30 AM HC INF RM 3306 [facility] [place] [phone] [place]    2/22/2018 8:30 AM HC INF RM 3308 [facility] [place] [phone] [place]    2/22/2018 9:00 AM Ceci Amaral NP [facility] [place] [phone] [place]    2/22/2018 9:30 AM HC INF RM 3308 [facility] [place] [phone] [place]       ED Discharge Orders     US Thoracentesis                    Review of your medicines      Our records show that you are taking the medicines listed below. If these are incorrect, please call your family doctor or clinic.        Dose / Directions Last dose taken    anastrozole 1 MG tablet   Commonly known as:  ARIMIDEX   Dose:  1 mg   Quantity:  90 tablet        Take 1 tablet (1 mg) by mouth daily   Refills:  3        calcium-vitamin D 600-400 MG-UNIT per tablet   Commonly known as:  CALTRATE   Dose:  1 tablet   Quantity:  90 tablet        Take 1 tablet by mouth 2 times daily   Refills:  3        cyanocobalamin 1000 MCG tablet   Commonly known as:  vitamin  B-12   Dose:  2500 mcg        Take 2,500 mcg by mouth daily   Refills:  0        dexamethasone 4 MG tablet   Commonly known as:  DECADRON   Dose:  8 mg   Quantity:  2 tablet        Take 2 tablets (8 mg) by mouth See Admin Instructions   Refills:  0        guaiFENesin-codeine 100-10 MG/5ML Soln solution   Commonly known as:  CHERATUSSIN AC   Dose:  1-2 tsp.   Quantity:  420 mL        Take 5-10 mLs by mouth every 4 hours as needed for cough   Refills:  0        lidocaine-prilocaine cream   Commonly known as:  EMLA   Dose:  30 g   Quantity:  30 g        Apply 30 g topically as needed for moderate pain   Refills:  3        LORazepam 0.5 MG tablet   Commonly known as:  ATIVAN   Dose:  0.5 mg   Quantity:  30 tablet        Take 1 tablet (0.5 mg) by mouth every 4 hours as needed (Anxiety, Nausea/Vomiting or Sleep)   Refills:  5        METFORMIN HCL PO   Dose:  500 mg        Take 500 mg by mouth daily (with breakfast)   Refills:  0        prochlorperazine 10 MG tablet   Commonly known as:  COMPAZINE   Dose:  10 mg   Quantity:  30 tablet        Take 1 tablet (10 mg) by mouth every 6 hours as needed (Nausea/Vomiting)   Refills:  5        SIMVASTATIN PO   Dose:  20 mg        Take 20 mg by mouth At Bedtime   Refills:  0        VITAMIN B6 PO   Dose:  50 mg        Take 50  mg by mouth daily   Refills:  0        ZOLOFT 50 MG tablet   Dose:  50 mg   Generic drug:  sertraline        Take 50 mg by mouth daily   Refills:  0                Procedures and tests performed during your visit     CBC with platelets differential    Comprehensive metabolic panel    INR    Peripheral IV catheter    US Thoracentesis    XR Chest 1 View    XR Chest 2 Views      Orders Needing Specimen Collection     None      Pending Results     Date and Time Order Name Status Description    1/13/2018 1041 EKG CARDIAC - HIM SCAN Preliminary             Pending Culture Results     No orders found from 1/13/2018 to 1/16/2018.            Thank you for choosing [facility]       Thank you for choosing [facility] for your care. Our goal is always to provide you with excellent care. Hearing back from our patients is one way we can continue to improve our services. Please take a few minutes to complete the written survey that you may receive in the mail after you visit with us. Thank you!        [facility] Information     [employer] gives you secure access to your electronic health record. If you see a primary care provider, you can also send messages to your care team and make appointments. If you have questions, please call your primary care clinic.  If you do not have a primary care provider, please call [phone] and they will assist you.        Care EveryWhere ID     This is your Care EveryWhere ID. This could be used by other organizations to access your [facility] medical records  [id number]        Equal Access to Services     JOSE MARKS : Rocio garciao Soavril, waaxda luqadaha, qaybta kaalmada adeegwilsonda, candy gil. So [facility] [phone].    ATENCIÓN: Si habla español, tiene a stiles disposición servicios gratuitos de asistencia lingüística. Llame al [phone].    We comply with applicable federal civil rights laws and [place] laws. We do not discriminate on the basis of race, color,  national origin, age, disability, sex, sexual orientation, or gender identity.            After Visit Summary       This is your record. Keep this with you and show to your community pharmacist(s) and doctor(s) at your next visit.

## 2018-01-15 NOTE — ED NOTES
"Dr. Bonds in with patient. Per Radiology RN, 1L of lise clear fluid obtained from thoracentesis. Patient tolerated procedure well, SpO2 remained 92-93%. Patient reports her breathing \"feels easier\"  "

## 2018-01-15 NOTE — TELEPHONE ENCOUNTER
Call placed to patient to discuss reason for her call. Per pt, she had shortness of breath over the weekend (saturday) that lead [place] reports that she had fluid around her lungs again and they took off 1.4Liters of fluid. Pt called to discuss with RN about having the same symptoms as this past weekend. This Rn discussed with Dr. Celis about having pt return to the ER. Dr. Celis agreed with this plan. Return call made to patient. Pt reports increasing SOB, and O2Sat of 92% and 93%. Pt also inquiring about a permanent drain placement instead of having to come in to the ER all the time. Discussed with Dr. Celis. Per Dr. Celis, he recommends pt come to the ER and discuss with the Er provider about general surgery referral and having the ER doctor call Dr. Celis to discuss further. Return call to patient made, informed of the above recommendation from Dr. Celis. Pt states she will be coming in to the ER today and will speak with the ER doctor.   Abeba Cox RN

## 2018-01-15 NOTE — DISCHARGE INSTRUCTIONS
Ana,  Glad you again had some relief from the fluid removal.  After our discussion and per your request, arrangements have been made with the Imaging department to have you come back Monday--Wednesday--Friday for repeat thoracenteses in the XR dep't without having to come to the ER.  Hope this works for you going forward until you clarify with [facility] in 10 days what may need to change.  Good luck--hopefully your chemo will kick in.

## 2018-01-15 NOTE — ED PROVIDER NOTES
History     Chief Complaint   Patient presents with     Shortness of Breath     c/o lung filling with fld again and feels it needs to be drained, notes currently on chemo for lung ca     HPI  Ana Simmons is a 67 year old female who is being treated by [facility] and Dr. Celis for breast ca metastatic to her right lung with now a recurring symptomatic pleural effusion that has been tapped twice in the past 5 days ( 1/10 and 1/13).  Again became dyspneic over the last 48 hours so comes down from [place].  She is anticipating her gemcitabine with docetaxel on day 1 & 8 out of 21 day cycle to start drying up the effusion.  Cytology of effusion block was negative for malignant cells by [facility] on 1/10.      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  Ana Simmons has been provided information and resources to begin or update their advance care plan.  Added by Lucretia Cates 07/05/2016     Priority: Medium     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 05/03/2016     Priority: Medium     Malignant neoplasm of lower-outer quadrant of right female breast (H) 05/03/2016     Priority: Medium        Past Medical History:    Past Medical History:   Diagnosis Date     Breast cancer (H)      DMII (diabetes mellitus, type 2) (H)      HLD (hyperlipidemia)      Nonhealing surgical  wound        Past Surgical History:    Past Surgical History:   Procedure Laterality Date     APPENDECTOMY OPEN       C VAGINAL HYSTERECTOMY       INSERT PORT VASCULAR ACCESS Left 6/2/2016    Procedure: INSERT PORT VASCULAR ACCESS;  Surgeon: Micheline Davis MD;  Location: [facility]     MASTECTOMY Bilateral        Family History:    Family History   Problem Relation Age of Onset     Lung Cancer Father      Myocardial Infarction Father      Coronary Artery Disease Father      DIABETES Father      Hyperlipidemia Father      Hypertension Father      Prostate Cancer Paternal Grandfather      DIABETES Mother      Hyperlipidemia Mother      Thyroid Disease Mother      Thyroid Disease Maternal Grandmother      Thyroid Disease Daughter      Breast Cancer No family hx of      Colon Cancer No family hx of      Depression No family hx of      Asthma No family hx of        Social History:  Marital Status:   [2]  Social History   Substance Use Topics     Smoking status: Former Smoker     Smokeless tobacco: Never Used     Alcohol use No      Comment: 1 kamille/ night, with chemo is not drinking        Medications:      sertraline (ZOLOFT) 50 MG tablet   LORazepam (ATIVAN) 0.5 MG tablet   anastrozole (ARIMIDEX) 1 MG tablet   Pyridoxine HCl (VITAMIN B6 PO)   calcium-vitamin D (CALTRATE) 600-400 MG-UNIT per tablet   cyanocobalamin (VITAMIN  B-12) 1000 MCG tablet   METFORMIN HCL PO   SIMVASTATIN PO   dexamethasone (DECADRON) 4 MG tablet   guaiFENesin-codeine (CHERATUSSIN AC) 100-10 MG/5ML SOLN solution   prochlorperazine (COMPAZINE) 10 MG tablet   lidocaine-prilocaine (EMLA) cream         Review of Systems   Constitutional: Positive for activity change, appetite change and fatigue.   HENT: Negative.    Respiratory: Positive for chest tightness and shortness of breath.    Cardiovascular: Positive for chest pain.   Gastrointestinal: Positive for nausea.   Genitourinary: Negative.    Neurological: Positive for weakness.        Physical Exam   BP: 131/71  Heart Rate: 107  Temp: 97.5  F (36.4  C)  Resp: 22  SpO2: 94 %      Physical Exam   Constitutional: She is oriented to person, place, and time. She appears well-developed and well-nourished. She appears distressed.   Pleasant spunky woman who appears somewhat dyspneic   HENT:   Head: Normocephalic and atraumatic.   Eyes: Conjunctivae and EOM are normal. Pupils are equal, round, and reactive to light.   Neck: Normal range of motion. Neck supple.   Cardiovascular: Normal rate and regular rhythm.    Pulmonary/Chest: She is in respiratory distress. She has no wheezes. She has rales.   Diminished sounds right hemithorax   Abdominal: Soft. Bowel sounds are normal. She exhibits no distension. There is no tenderness. There is no rebound and no guarding.   Musculoskeletal: Normal range of motion. She exhibits no edema.   Neurological: She is alert and oriented to person, place, and time.   Skin: Skin is warm. She is not diaphoretic.     ED Course     ED Course     Procedures    Critical Care time:  none        Labs Ordered and Resulted from Time of ED Arrival Up to the Time of Departure from the ED   CBC WITH PLATELETS DIFFERENTIAL - Abnormal; Notable for the following:        Result Value    WBC 2.1 (*)     Platelet Count 86 (*)     Absolute Neutrophil 1.0 (*)     All other components within normal limits   COMPREHENSIVE METABOLIC PANEL - Abnormal; Notable for the following:     Glucose 146 (*)     Calcium 7.8 (*)     Albumin 2.6 (*)     Protein Total 5.9 (*)      (*)     All other components within normal limits   INR   PERIPHERAL IV CATHETER     Assessments & Plan (with Medical Decision Making)   Ana appears to need another thoracentesis.  She questions whether a pleurX implant might work.  This device for placement not available here according to Surgeon Jose F.  Patient prefers not to go to [place] for consult and placement of this because of the distance and possibility of  technical problems from keeping it open.  Arrangements made after her successful 1000cc tap today to come directly to radiology on M-W-F for this if she is symptomatic.  This plan will be reviewed with Provatas next week.  I have reviewed the nursing notes.    I have reviewed the findings, diagnosis, plan and need for follow up with the patient.       New Prescriptions    No medications on file       Final diagnoses:   Malignant neoplasm metastatic to right lung (H)   Pleural effusion       1/15/2018   [facility]     Marco Bonds MD  01/15/18 [id number]

## 2018-01-15 NOTE — ED NOTES
"68 y/o female presents with c/o shortness of breath and \"feeling like I need the fluid removed again.\" Patient states a hx of breast cancer with mets to lungs diagnosed in 2016. Patient states she has had 2 thoracentesis completed in last week, last one on Saturday. 1.5L and 1.4L taken from R lung each time. Patient states she was told by Oncology that \"I'll need to keep getting it tapped until the tumor shrinks\" Currently on chemo, last one last week. Patient denies chest pain.  Hx of lymph nodes removed on R - extremity restriction band placed.  at bedside.   "

## 2018-01-15 NOTE — PLAN OF CARE
Right lung draining yellow and lise clear fluid.  Total fluid 1000 ml.  Fluid will not be sent to lab.  Patient to X-ray for post procedure films.  Dr Stout confirmed OK to return to ER.  Patient back to ER via wheelchair.  Transferred back to ER cart from wheelchair.  RADHA Escobar RN, updated regarding amount drainage, color, vital signs, site of procedure visualized, tegaderm in place with minimal bleeding underneath.

## 2018-01-15 NOTE — PLAN OF CARE
There were  no complications and patient has no symptoms..      Tolerated procedure well.    Procedure: image guided thoracentesis    Radiologist:Dr Stout    Time Out: Prior to the start of the procedure and with procedural staff participation, I verbally confirmed the patient s identity using two indicators, relevant allergies, that the procedure was appropriate and matched the consent or emergent situation, and that the correct equipment/implants were available. Immediately prior to starting the procedure I conducted the Time Out with the procedural staff and re-confirmed the patient s name, procedure, and site/side. (The Joint Commission universal protocol was followed.)  Yes    Position:  dangling    Pain:  denies    Sedation:None. Local Anesthestic used  No sedation    Estimated Blood Loss: Minimal     Condition: Stable    Comments: See dictated procedure note for full details     Gloria Lowe RN

## 2018-01-16 NOTE — TELEPHONE ENCOUNTER
Patient phoned to followup on yesterdays procedure (thoracentesis).  Denies pain or drainage from site.  Denies any temperature.  Did speak with patient regarding the possibility of having thoracentesis on Wednesday in [place].  Patient was very appreciative of the possibility of this will work on and keep patient updated regarding.

## 2018-01-19 NOTE — PROGRESS NOTES
Pt arrived at appointment for thoracentesis Dr. villa does Ultrasound to show there is not enough fluid to do procedure today.  Pt has large 5 x 3 cm reddened outline area after last thoracentesis done in [facility], triple antibiotic ointment applied and encouraged pt to follow up with primary physician.

## 2018-01-19 NOTE — TELEPHONE ENCOUNTER
Patient notified to have a CBC on Monday at [facility] or [facility] on Monday agreeable to plan

## 2018-01-19 NOTE — TELEPHONE ENCOUNTER
Called patient to update that if she does want Pleurx drain that it could be placed at [facility]. Patient does not want at this time.Nathalia Amaral NP updated.

## 2018-01-19 NOTE — TELEPHONE ENCOUNTER
Nathalia Amaral,NP updated on 1.17.18 CBC,CMP. Called patient and updated that PLT's low and to watch for S/S of bleeding. LFT's elevated,but stable.Need to recheck next week. Patient verbalized understanding.Patient has appointment next week for labs and infusion on 1.25.18. Updated that Care Coordinator, Aimee Hawkins is coordinating Pleurx drain placement and will update her next week. At this time, patient is in clinic for SOB,and cough. Influenza testing in progress at this time. Did not have thoracentesis today d/t no need at this time per patient and is to call on Monday with S/S of SOB and may/may not have at that time. Recheck labs sooner? Please advise.Thank you

## 2018-01-19 NOTE — TELEPHONE ENCOUNTER
Request from Nathalia Amaral NP to call [facility] Interventional Radiology dept and speak with Denise Marquez, IR. [phone]. No answer, voicemail stated to call [phone]. Call placed to other number. No answer, left message to return call.     Inquiring about pt getting Pleur-Ex catheter placed. Can Oncologist or NP here place the order for the catheter or does patient need to see Pulmonary at [facility] first, then get catheter. If have to see Pulm at [facility], how quickly can she get in.     Will wait for return call.     Abeba Cox RN

## 2018-01-19 NOTE — TELEPHONE ENCOUNTER
New order to recheck CBC on Monday.Will have checked at [facility] or prior to thorencitis on Monday.Patient verbalized understanding.Orders placed and sent to [facility].

## 2018-01-24 NOTE — PROGRESS NOTES
Oncology Follow-up Visit:  January 24, 2018    Reason for Visit:  Patient presents with:  RECHECK: Follow up right breast cancer     Nursing Note and documentation reviewed: yes    HPI:  This is a 67-year-old female patient who presents to the oncology/hematology clinic today for evaluation prior to receiving cycle 2 day 1 chemotherapy.  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 The patient is essentially triple-negative breast cancer.  She underwent treatment was found to have metastasis to the right lung in November 2017.  Patient was evaluated by Dr. 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  He recommended docetaxel with gemcitabine and this was initiated on 1/3/2018.      She had presented for cycle 1 day 8 on 1/10/2018 with increased shortness of breath and dropping O2 sats.  She was evaluated in the ED was noted pleural effusion and 1400 liters of fluid was drained from the right lung.  She received day 8 on 1/11/2018.  She had an another thoracentesis completed on 1/13/2018 with 1400cc drained with the last on 1/17/18 with only 100cc drained.      She states today that she is feeling much better and her shortness of breath has greatly improved.  She does continue with a dry tight cough at times.  This is increased in the morning when she has a lot of mucus accumulated.  She denies any nasal congestion and denies any fevers.  She does have occasional headaches but feels this is more related to when she doesn't drink a lot of fluids and this is not new.  Her appetite is decreased and she does feel full fast.  She states foods don't taste good.  She does have slight numbness and tingling to the tips  of her fingers and this is very mild and she is not having any difficulty with fine motor skills.  She does complain of loose stools occurring over the past week after she eats and she took one Imodium yesterday which resolved the loose stools and she has not had a stool since.  She denies any blood or mucous in her stools.  She does admit today that she feels the chemotherapy wiped her out greatly as she gets very very fatigued with very little activity.  She is down most of the day.  She does feel that her anxiety is increased also and does feel some emotional lability were some days she'll be fine and another day she'll cry and questions increasing her Zoloft.  She is currently on 50 mg a day.  She does also have some redness and moisture to her groin area and has been using the nystatin cream that was given previously which does help.    Oncologic History:   Patient underwent mammogram in January 2016 which revealed abnormalities of the right breast.  On 02/09/2016, a stereotactic biopsy of the right breast lesion at the 8 o'clock position revealed grade 3 DCIS of a solid micropapillary subtype.  Estrogen receptors were positive.  Also a biopsy of the right breast lesion at the 9 o'clock position revealed a grade 2 DCIS of the solid micropapillary subtype.  Estrogen receptors were positive.  She underwent bilateral mastectomies on 3/30/16 by Dr. 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  There was also angiosarcomatous, liposarcomatous, malignant spindle cell and chondroid differentiation.  The tumor in the lower quadrant was distinct in the 2 DCIS lesions noted in the right breast.  Estrogen receptor positive at 2% with progesterone receptor negative; tumor was HER-2/renetta negative.  In terms of her DCIS, there was a 1.5 cm DCIS in the right breast as well as a smaller focus noted.   There was a sentinel lymph node that was negative. Chemotherapy was recommended.  She was evaluated by Dr. Lam with [facility] on 4/18/2016 he felt patient had a metaplastic carcinoma as well as DCIS of the right breast.  He did note that there was no consensus best treatment options for this patient, but he felt given that she likely had triple negative disease and if she had a pure ductal adenocarcinoma, he would favor treating her a triple-negative breast cancer that is greater than 0.5 cm.  He felt that dose-dense AC x 4 cycles plus Taxol weekly x 12 weeks would be ideal.  She was evaluated here by Dr. Celis with Medical Oncology on 5/3/2016 as she wanted to receive treatment closer to home.     She underwent treatment was found to have metastasis to the right lung in November 2017.  Patient was evaluated by Dr. 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  He recommended docetaxel with gemcitabine and this was initiated on 1/3/2018.        Current Chemo Regime/TX:  Gemcitabine 900mg/m2 with docetaxel 75mg/m2 day 1 and day 8 every 21 days with nuelasta support day 9.  Current Cycle: 2 day 1  # of completed cycles: 1    Weight 96kg on 1/3/18      Previous treatment:  Adriamycin 60mg/m2 and Cytoxan 600mg/m2 every 2 weeks with Neulasta support 6mg SQ injection day 2 x 4 cycles; Taxol 80mg/m2 weekly x 12 weeks; arimidex 1mg daily initiated 11/2016    Past Medical History:   Diagnosis Date     Breast cancer (H)      DMII (diabetes mellitus, type 2) (H)      HLD (hyperlipidemia)      Nonhealing surgical wound        Past Surgical History:   Procedure Laterality Date     APPENDECTOMY OPEN       C VAGINAL HYSTERECTOMY       INSERT PORT VASCULAR ACCESS Left 6/2/2016    Procedure: INSERT PORT VASCULAR ACCESS;  Surgeon: Micheline Davis MD;  Location: [facility] OR     MASTECTOMY Bilateral         Family History   Problem Relation Age of Onset     Lung Cancer Father      Myocardial Infarction Father      Coronary Artery Disease Father      DIABETES Father      Hyperlipidemia Father      Hypertension Father      Prostate Cancer Paternal Grandfather      DIABETES Mother      Hyperlipidemia Mother      Thyroid Disease Mother      Thyroid Disease Maternal Grandmother      Thyroid Disease Daughter      Breast Cancer No family hx of      Colon Cancer No family hx of      Depression No family hx of      Asthma No family hx of        Social History     Social History     Marital status:      Spouse name: Braden     Number of children: 2     Years of education: N/A     Occupational History      Retired     Social History Main Topics     Smoking status: Former Smoker     Smokeless tobacco: Never Used     Alcohol use No      Comment: 1 kamille/ night, with chemo is not drinking     Drug use: No     Sexual activity: Not on file     Other Topics Concern     Parent/Sibling W/ Cabg, Mi Or Angioplasty Before 65f 55m? Yes     father     Social History Narrative       Current Outpatient Prescriptions   Medication     loperamide (IMODIUM A-D) 2 MG tablet     sertraline (ZOLOFT) 50 MG tablet     dexamethasone (DECADRON) 4 MG tablet     guaiFENesin-codeine (CHERATUSSIN AC) 100-10 MG/5ML SOLN solution     sertraline (ZOLOFT) 50 MG tablet     LORazepam (ATIVAN) 0.5 MG tablet     anastrozole (ARIMIDEX) 1 MG tablet     Pyridoxine HCl (VITAMIN B6 PO)     calcium-vitamin D (CALTRATE) 600-400 MG-UNIT per tablet     cyanocobalamin (VITAMIN  B-12) 1000 MCG tablet     METFORMIN HCL PO     SIMVASTATIN PO     prochlorperazine (COMPAZINE) 10 MG tablet     lidocaine-prilocaine (EMLA) cream     No current facility-administered medications for this visit.      Facility-Administered Medications Ordered in Other Visits   Medication     dexamethasone (DECADRON) 12 mg intermittent infusion     gemcitabine (GEMZAR) 1,500 mg  "in NaCl 0.9 % 315 mL CHEMOTHERAPY        Allergies   Allergen Reactions     Cats        Review Of Systems:  Constitutional: denies fever, weight changes, chills, and night sweats.  Eyes: denies blurred or double vision  Ears/Nose/Throat: denies ear pain, nose problems, difficulty swallowing  Respiratory: see hPI  Skin: see hPI  Cardiovascular: denies chest pain, palpitations, edema  Gastrointestinal: denies abdominal pain, bloating, nausea, vomiting  Genitourinary: denies difficulty with urination, blood in urine  Musculoskeletal:denies new muscle pain, bone pain  Neurologic: see hPI  Psychiatric: seeHPI  Hematologic/Lymphatic/Immunologic: denies easy bruising, easy bleeding, lumps or bumps noted  Endocrine: Denies increased thirst      ECOG Performance Status: 3    Physical Exam:  /62  Pulse 58  Temp 98.3  F (36.8  C) (Tympanic)  Resp 24  Ht 1.651 m (5' 5\")  Wt 92.2 kg (203 lb 4.8 oz)  SpO2 94%  BMI 33.83 kg/m2    GENERAL APPEARANCE: Healthy, alert and in no acute distress.  HEENT: Normocephalic, Sclerae anicteric. Oropharynx without ulcers, lesions, or thrush.  NECK:  No asymmetry or masses, no thyromegaly.  LYMPHATICS: No palpable cervical, supraclavicular, axillary, or inguinal nodes   RESP: Lungs with rales to lower right lobe, respirations regular and easy and somewhat labored with activiyt  CARDIOVASCULAR: Regular rate and rhythm. Normal S1, S2; no murmur, gallop, or rub.  ABDOMEN: Soft, nontender. Bowel sounds auscultated all 4 quadrants. No palpable organomegaly or masses.  MUSCULOSKELETAL: Extremities without gross deformities noted. No edema of bilateral lower extremities.  SKIN: No suspicious lesions or rashes.  NEURO: Alert and oriented x 3.  Gait steady.  PSYCHIATRIC: Mentation and affect appear normal.  Mood appropriate.    Laboratory:      Imaging Studies:  None for today    Exam:US THORACENTESIS.     History:67 years Female ; Malignant neoplasm metastatic to right " lung  (H)     Comparison:  1/15/2018     Technique: After informed consent was obtained, a timeout was  performed, satisfactorily identifying the patient and the site of the  procedure.     Ultrasound evaluation was performed, demonstrating minimal volume of  pleural fluid.             Impression:  1.  Minimal volume of pleural fluid. Thoracentesis was not performed.     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  The patient is essentially triple-negative breast cancer.  She underwent treatment then was found to have metastasis to the right lung in November 2017.  She was seen at the [facility] with recommendation for gemcitabine and docetaxel and this was initiated 1/3/18.  Will decrease her doses by 20% related to decreased performance status and loose stools.  She will follow up in 3 weeks prior to cycle 3 day1 with labs per treatment plan.    #2  SOB:  Much improved.  We will cancel further scheduled thoracentesis and she will seek medical evaluation with any worsening of her SOB.  No need for permanent Pluerix catheter at this time.    #3  Depression/anxiety:  Will increase her zoloft to 75mg daily then to 100mg daily after 1 week.  Will reassess in 3 weeks.    #4  Skin rash:  Will send a prescription for Nystatin powder to apply twice daily.    #5  Decreased performance status:  Will decrease her doses by 20% and re-evaluate prior to cycle 3.    I encouraged patient to call with any questions or concerns.      Ceci Amaral  [facility]

## 2018-01-25 NOTE — MR AVS SNAPSHOT
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   Increase your zoloft to 75mg daily and then to 100mg daily after 1 week. Your prescription has been sent to:   [facility] - [place], [place] - [address]  [address]  [place] [address]  Phone: [phone] Fax: [phone]    When you are in need of a refill, please call your pharmacy and they will send us a request. If you have any questions please call [phone]    Other instructions: start nystatin powder to groin twice a day  Seek medical evaluation for SOB-we will cancel your appointments for the throacentesis          Follow-ups after your visit        Your next 10 appointments already scheduled     Feb 01, 2018  8:30 AM CST   Level 4 with HC INF RM 3306   [facility] [place] ([facility] [place] )    [address]  [place] [address]   [phone]            Feb 15, 2018  9:00 AM CST   Level O with HC INF RM 3308   [facility] [place] ([facility] [place] )    [address]  [place] [address]   [phone]            Feb 15, 2018  9:30 AM CST   (Arrive by 9:15 AM)   Return Visit with Ceci Amaral NP   [facility] ([facility] [place] )    [address]  [place] [address]   [phone]            Feb 15, 2018  10:00 AM CST   Level 3 with HC INF RM 3308   [facility] [place] ([facility] [place] )    [address]  [place] [address]   [phone]            Feb 22, 2018  9:00 AM CST   Level 4 with HC INF RM 3308   [facility] [place] ([facility] [place] )    [address]  [place] [address]   [phone]            Mar 08, 2018  9:30 AM CST   Level O with HC INF RM 3306   [facility] [place] ([facility] [place] )    [address]  [place] [address]   [phone]            Mar 08, 2018 10:00 AM CST   (Arrive by 9:45 AM)   Return Visit with Ceci Amaral NP   [facility] [place] ([facility] [place] )    [address]  [place] [address]   [phone]            Mar 08, 2018 10:30 AM CST   Level 3 with HC INF RM 3306   [facility] [place] ([facility] [place] )    [address]  [place] [address]   [phone]            Mar 15, 2018  8:30 AM CDT   Level 4 with HC INF RM 3306   [facility] [place] ([facility] [place] )    [address]  [place] [address]   [phone]              Who to contact     If you have questions or need follow up information about today's clinic visit or your schedule please contact [facility] directly at [phone].  Normal or non-critical lab and imaging results will be communicated to you by [employer], letter or phone within 4 business days after the clinic has received the results. If you do not hear from us within 7 days, please contact the clinic through [employer] or phone. If you have a critical or abnormal lab result, we will notify you by phone as soon as possible.  Submit refill requests through [employer] or call your pharmacy and they will forward the refill request to us. Please allow 3 business days for your refill to be completed.          Additional Information About Your Visit        [employer] Information     [employer] gives you secure  "access to your electronic health record. If you see a primary care provider, you can also send messages to your care team and make appointments. If you have questions, please call your primary care clinic.  If you do not have a primary care provider, please call [phone] and they will assist you.        Care EveryWhere ID     This is your Care EveryWhere ID. This could be used by other organizations to access your [place] [facility] medical records  [id number]        Your Vitals Were     Pulse Temperature Respirations Height Pulse Oximetry BMI (Body Mass Index)    58 98.3  F (36.8  C) (Tympanic) 24 1.651 m (5' 5\") 94% 33.83 kg/m2       Blood Pressure from Last 3 Encounters:   01/25/18 125/69   01/25/18 126/62   01/25/18 126/62    Weight from Last 3 Encounters:   01/25/18 92.2 kg (203 lb 4.8 oz)   01/25/18 92.2 kg (203 lb 4.8 oz)   01/25/18 92.2 kg (203 lb 4.8 oz)              We Performed the Following     Comprehensive metabolic panel          Today's Medication Changes          These changes are accurate as of 1/25/18 11:30 AM.  If you have any questions, ask your nurse or doctor.               Start taking these medicines.        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        Dose/Directions    * ZOLOFT 50 MG tablet   This may have changed:  Another medication with the same name was added. Make sure you understand how and when to take each.   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 Make sure you understand how and when to take each.   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 Read the directions carefully, and ask your doctor or other care provider to review them with you.         Where to get your medicines      These medications were sent to [facility] [address]  [address] [place], [facility] [address]     Phone:  [phone]     nystatin Powd    sertraline 50 MG tablet                Primary Care Provider Office Phone # Fax #    Mya SMITH [facility] [phone]       [facility] [address]   [facility] [address]        Equal Access to Services     JOSE MARKS AH: Hadii janette garcía hadasho Somauriceali, waaxda luqadaha, qaybta kaalmada adecuongyada, candy gil. So [facility] [phone].    ATENCIÓN: Si habla español, tiene a stiles disposición servicios gratuitos de asistencia lingüística. Clint al [phone].    We comply with applicable federal civil rights laws and [place] laws. We do not discriminate on the basis of race, color, national origin, age, disability, sex, sexual orientation, or gender identity.            Thank you!     Thank you for choosing [facility]  for your care. Our goal is always to provide you with excellent care. Hearing back from our patients is one way we can continue to improve our services. Please take a  few minutes to complete the written survey that you may receive in the mail after your visit with us. Thank you!             Your Updated Medication List - Protect others around you: Learn how to safely use, store and throw away your medicines at www.disposemymeds.org.          This list is accurate as of 1/25/18 11:30 AM.  Always use your most recent med list.                   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       dexamethasone 4 MG tablet    DECADRON    2 tablet    Take 2 tablets (8 mg) by mouth See Admin Instructions    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, Cough, Malignant phyllodes tumor of breast, unspecified laterality (H), Malignant neoplasm metastatic to right lung (H), SOB (shortness of breath)       guaiFENesin-codeine 100-10 MG/5ML Soln solution    CHERATUSSIN AC    420 mL    Take 5-10 mLs by mouth every 4 hours as needed for cough    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, Cough       IMODIUM A-D 2 MG tablet   Generic drug:  loperamide      Take 2 mg by mouth 4 times daily as needed for diarrhea    Phyllodes neoplasm of breast, Malignant neoplasm metastatic to right lung (H), Adjustment disorder with mixed anxiety and depressed mood, 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       lidocaine-prilocaine cream    EMLA    30 g    Apply 30 g topically as needed for moderate pain    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       LORazepam 0.5 MG tablet     ATIVAN    30 tablet    Take 1 tablet (0.5 mg) by mouth every 4 hours as needed (Anxiety, Nausea/Vomiting or Sleep)    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       METFORMIN HCL PO      Take 500 mg by mouth daily (with breakfast)        nystatin Powd     1 each    1 Application 2 times daily    Phyllodes neoplasm of breast, Malignant neoplasm metastatic to right lung (H), Adjustment disorder with mixed anxiety and depressed mood, 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       prochlorperazine 10 MG tablet    COMPAZINE    30 tablet    Take 1 tablet (10 mg) by mouth every 6 hours as needed (Nausea/Vomiting)    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       * ZOLOFT 50 MG tablet   Generic drug:  sertraline      Take 50 mg by mouth daily    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 Read the directions carefully, and ask your doctor or other care provider to review them with you.

## 2018-01-25 NOTE — PROGRESS NOTES
Patients left sided power port accessed using non-coring, 19 gauge, 3/4 inch needle.Mask donned by caregiver: yes Site prepped with CHG: yes Labs drawn: yes Dressing applied using aseptic technique: yes. Port accessed per facility protocol. Port flushed easily, blood return noted.  No signs and symptoms of infection or infiltration.  Port flushed 2 mL's normal saline, blood return noted, flushed with 8  mLs Normal Saline, 10 mLs blood discarded.  1 tube taken for ordered labs, port flushed with 20 mLs normal saline.  Port left accessed as patient has appointment with Nathalia Amaral, and is then due for chemo if parameters are met.  Patient discharged with no complaints. Erlinda Tejeda RN

## 2018-01-25 NOTE — MR AVS SNAPSHOT
After Visit Summary   1/25/2018    Ana Simmons    MRN: [id number]           Patient Information     Date Of Birth          1950        Visit Information        Provider Department      1/25/2018 9:30 AM [facility] [address] [facility]        Today's Diagnoses     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    -  1    Malignant neoplasm of right female breast, unspecified estrogen receptor status, unspecified site of breast (H)          Care Instructions      Gemcitabine Hydrochloride Solution for injection  What is this medicine?  GEMCITABINE (chani SIT a been) is a chemotherapy drug. This medicine is used to treat many types of cancer like breast cancer, lung cancer, pancreatic cancer, and ovarian cancer.  This medicine may be used for other purposes; ask your health care provider or pharmacist if you have questions.  What should I tell my health care provider before I take this medicine?  They need to know if you have any of these conditions:    blood disorders    infection    kidney disease    liver disease    recent or ongoing radiation therapy    an unusual or allergic reaction to gemcitabine, other chemotherapy, other medicines, foods, dyes, or preservatives    pregnant or trying to get pregnant    breast-feeding  How should I use this medicine?  This drug is given as an infusion into a vein. It is administered in a hospital or clinic by a specially trained health care professional.  Talk to your pediatrician regarding the use of this medicine in children. Special care may be needed.  Overdosage: If you think you have taken too much of this medicine contact a poison control center or emergency room at once.  NOTE: This medicine is only for you. Do not share this medicine with others.  What if I miss a dose?  It is important not to miss your dose. Call your doctor or health care professional if you are unable to keep an appointment.  What may interact with this  medicine?    medicines to increase blood counts like filgrastim, pegfilgrastim, sargramostim    some other chemotherapy drugs like cisplatin    vaccines  Talk to your doctor or health care professional before taking any of these medicines:    acetaminophen    aspirin    ibuprofen    ketoprofen    naproxen  This list may not describe all possible interactions. Give your health care provider a list of all the medicines, herbs, non-prescription drugs, or dietary supplements you use. Also tell them if you smoke, drink alcohol, or use illegal drugs. Some items may interact with your medicine.  What should I watch for while using this medicine?  Visit your doctor for checks on your progress. This drug may make you feel generally unwell. This is not uncommon, as chemotherapy can affect healthy cells as well as cancer cells. Report any side effects. Continue your course of treatment even though you feel ill unless your doctor tells you to stop.  In some cases, you may be given additional medicines to help with side effects. Follow all directions for their use.  Call your doctor or health care professional for advice if you get a fever, chills or sore throat, or other symptoms of a cold or flu. Do not treat yourself. This drug decreases your body's ability to fight infections. Try to avoid being around people who are sick.  This medicine may increase your risk to bruise or bleed. Call your doctor or health care professional if you notice any unusual bleeding.  Be careful brushing and flossing your teeth or using a toothpick because you may get an infection or bleed more easily. If you have any dental work done, tell your dentist you are receiving this medicine.  Avoid taking products that contain aspirin, acetaminophen, ibuprofen, naproxen, or ketoprofen unless instructed by your doctor. These medicines may hide a fever.  Women should inform their doctor if they wish to become pregnant or think they might be pregnant. There  is a potential for serious side effects to an unborn child. Talk to your health care professional or pharmacist for more information. Do not breast-feed an infant while taking this medicine.  What side effects may I notice from receiving this medicine?  Side effects that you should report to your doctor or health care professional as soon as possible:    allergic reactions like skin rash, itching or hives, swelling of the face, lips, or tongue    low blood counts - this medicine may decrease the number of white blood cells, red blood cells and platelets. You may be at increased risk for infections and bleeding.    signs of infection - fever or chills, cough, sore throat, pain or difficulty passing urine    signs of decreased platelets or bleeding - bruising, pinpoint red spots on the skin, black, tarry stools, blood in the urine    signs of decreased red blood cells - unusually weak or tired, fainting spells, lightheadedness    breathing problems    chest pain    mouth sores    nausea and vomiting    pain, swelling, redness at site where injected    pain, tingling, numbness in the hands or feet    stomach pain    swelling of ankles, feet, hands    unusual bleeding  Side effects that usually do not require medical attention (report to your doctor or health care professional if they continue or are bothersome):    constipation    diarrhea    hair loss    loss of appetite    stomach upset  This list may not describe all possible side effects. Call your doctor for medical advice about side effects. You may report side effects to FDA at 0-278-FDA-4708.  NOTE:This sheet is a summary. It may not cover all possible information. If you have questions about this medicine, talk to your doctor, pharmacist, or health care provider. Copyright  2016 Gold Standard                Follow-ups after your visit        Your next 10 appointments already scheduled     Feb 01, 2018  8:30 AM CST   Level 4 with [facility] [address]   [place]  [facility] [place] ([facility] [place] )    [address]  [place] [address]   [phone]            Feb 15, 2018  9:00 AM CST   Level O with HC INF RM 3308   [facility] [place] ([facility] [place] )    [address]  [place] [address]   [phone]            Feb 15, 2018  9:30 AM CST   (Arrive by 9:15 AM)   Return Visit with Ceci Amaral NP   [facility] [place] ([facility] [place] )    [address]  [place] [address]   [phone]            Feb 15, 2018 10:00 AM CST   Level 3 with HC INF RM 3308   [facility] [place] ([facility] [place] )    [address]  [place] [address]   [phone]            Feb 22, 2018  9:00 AM CST   Level 4 with HC INF RM 3308   [facility] [place] ([facility] [place] )    [address]  [place] [address]   [phone]            Mar 08, 2018  9:30 AM CST   Level O with HC INF RM 3306   [facility] [place] ([facility] [place] )    [address]  [place] [address]   [phone]            Mar 08, 2018 10:00 AM CST   (Arrive by 9:45 AM)   Return Visit with Ceci Amaral NP   [facility] [place] ([facility] [place] )    [address]  [place] [address]   [phone]            Mar 08, 2018 10:30 AM CST   Level 3 with HC INF RM 3306   [facility] [place] ([facility] [place] )    [address]  [place] [address]   [phone]            Mar 15, 2018  8:30 AM CDT   Level 4 with HC INF RM 3306   [facility] [place] ([facility] [place] )    [address]  [place] [address]   [phone]              Who to contact     If you have questions or need follow up information about today's clinic visit or your schedule please contact [facility] directly at [phone].  Normal or non-critical lab and imaging results will be communicated to you by MyChart, letter or  "phone within 4 business days after the clinic has received the results. If you do not hear from us within 7 days, please contact the clinic through [employer] or phone. If you have a critical or abnormal lab result, we will notify you by phone as soon as possible.  Submit refill requests through [employer] or call your pharmacy and they will forward the refill request to us. Please allow 3 business days for your refill to be completed.          Additional Information About Your Visit        [employer] Information     [employer] gives you secure access to your electronic health record. If you see a primary care provider, you can also send messages to your care team and make appointments. If you have questions, please call your primary care clinic.  If you do not have a primary care provider, please call [phone] and they will assist you.        Care EveryWhere ID     This is your Care EveryWhere ID. This could be used by other organizations to access your [facility] medical records  [id number]        Your Vitals Were     Pulse Temperature Respirations Height Pulse Oximetry BMI (Body Mass Index)    110 98.3  F (36.8  C) (Rectal) 24 1.651 m (5' 5\") 94% 33.83 kg/m2       Blood Pressure from Last 3 Encounters:   01/25/18 117/65   01/25/18 126/62   01/25/18 126/62    Weight from Last 3 Encounters:   01/25/18 92.2 kg (203 lb 4.8 oz)   01/25/18 92.2 kg (203 lb 4.8 oz)   01/25/18 92.2 kg (203 lb 4.8 oz)              Today, you had the following     No orders found for display         Today's Medication Changes          These changes are accurate as of 1/25/18 12:19 PM.  If you have any questions, ask your nurse or doctor.               Start taking these medicines.        Dose/Directions    nystatin Powd   Used for:  Phyllodes neoplasm of breast, Malignant neoplasm metastatic to right lung (H), Adjustment disorder with mixed anxiety and depressed mood, 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   Started by:  Munir" Ceci FALL NP        Dose:  1 Application   1 Application 2 times daily   Quantity:  1 each   Refills:  1         These medicines have changed or have updated prescriptions.        Dose/Directions    * ZOLOFT 50 MG tablet   This may have changed:  Another medication with the same name was added. Make sure you understand how and when to take each.   Used for:  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   Generic drug:  sertraline   Changed by:  Ceci Amaral NP        Dose:  50 mg   Take 50 mg by mouth daily   Refills:  0       * sertraline 50 MG tablet   Commonly known as:  ZOLOFT   This may have changed:  You were already taking a medication with the same name, and this prescription was added. Make sure you understand how and when to take each.   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 Read the directions carefully, and ask your doctor or other care provider to review them with you.         Where to get your medicines      These medications were sent to [facility] [place] [address]  [address], [facility] [address]     Phone:  [phone]     nystatin Powd    sertraline 50 MG tablet                Primary Care Provider Office Phone # Fax #    Mya J [facility] [phone]       [facility] [address]   [facility] [address]        Equal Access to Services     [facility] AH: Hadcharles mcmahon Soavril, waaxda luqtoro, qaybta kaalalexey zavala, candy gil. So [facility] [phone].    ATENCIÓN: Terrei horn,  tiene a stiles disposición servicios gratuitos de asistencia lingüística. Clint khan [phone].    We comply with applicable federal civil rights laws and [place] laws. We do not discriminate on the basis of race, color, national origin, age, disability, sex, sexual orientation, or gender identity.            Thank you!     [facility] you for [facility]  [facility] your [facility]. Our goal is always to provide you with excellent care. Hearing back from our patients is one way we can continue to improve our services. Please take a few minutes to complete the written survey that you may receive in the mail after your visit with us. Thank you!             Your Updated Medication List - Protect others around you: Learn how to safely use, store and throw away your medicines at www.disposemymeds.org.          This list is accurate as of 1/25/18 12:19 PM.  Always use your most recent med list.                   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       dexamethasone 4 MG tablet    DECADRON    2 tablet    Take 2 tablets (8 mg) by mouth See Admin Instructions    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, Cough, Malignant phyllodes tumor of breast, unspecified laterality (H), Malignant neoplasm metastatic to right lung (H), SOB (shortness of breath)       guaiFENesin-codeine 100-10 MG/5ML Soln solution    CHERATUSSIN AC    420 mL    Take 5-10 mLs by mouth every 4 hours as needed for cough    Triple  negative malignant neoplasm of breast (H), Cough       IMODIUM A-D 2 MG tablet   Generic drug:  loperamide      Take 2 mg by mouth 4 times daily as needed for diarrhea    Phyllodes neoplasm of breast, Malignant neoplasm metastatic to right lung (H), Adjustment disorder with mixed anxiety and depressed mood, 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       lidocaine-prilocaine cream    EMLA    30 g    Apply 30 g topically as needed for moderate pain    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       LORazepam 0.5 MG tablet    ATIVAN    30 tablet    Take 1 tablet (0.5 mg) by mouth every 4 hours as needed (Anxiety, Nausea/Vomiting or Sleep)    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       METFORMIN HCL PO      Take 500 mg by mouth daily (with breakfast)        nystatin Powd     1 each    1 Application 2 times daily    Phyllodes neoplasm of breast, Malignant neoplasm metastatic to right lung (H), Adjustment disorder with mixed anxiety and depressed mood, 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       prochlorperazine 10 MG tablet    COMPAZINE    30 tablet    Take 1 tablet (10 mg) by mouth every 6 hours as needed (Nausea/Vomiting)    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       * ZOLOFT 50 MG tablet   Generic drug:  sertraline      Take 50 mg by mouth daily    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       * sertraline 50 MG tablet    ZOLOFT    180 tablet    Take 2 tablets (100 mg) by mouth daily    Phyllodes neoplasm of breast, Malignant neoplasm metastatic to right lung (H), Adjustment disorder with mixed anxiety and depressed  mood, 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 Read the directions carefully, and ask your doctor or other care provider to review them with you.

## 2018-01-25 NOTE — NURSING NOTE
"Chief Complaint   Patient presents with     RECHECK     Follow up right breast cancer       Initial /62  Pulse 58  Temp 98.3  F (36.8  C) (Tympanic)  Resp 24  Ht 1.651 m (5' 5\")  Wt 92.2 kg (203 lb 4.8 oz)  SpO2 94%  BMI 33.83 kg/m2 Estimated body mass index is 33.83 kg/(m^2) as calculated from the following:    Height as of this encounter: 1.651 m (5' 5\").    Weight as of this encounter: 92.2 kg (203 lb 4.8 oz).  Medication Reconciliation: complete     Immunizations reviewed and up to date, advanced directives on file, pain 0/10.  Patient was assessed using the NCCN psychosocial distress thermometer. Patient rated the score as a 0. Patient rated current stressors as none today. Stressors will be brought to the attention of provider or Oncology RN Care Coordinator for a score of 6 or greater or per nurses discretion.   Cristina Dye LPN                "

## 2018-01-25 NOTE — PATIENT INSTRUCTIONS
Gemcitabine Hydrochloride Solution for injection  What is this medicine?  GEMCITABINE (chani SIT a been) is a chemotherapy drug. This medicine is used to treat many types of cancer like breast cancer, lung cancer, pancreatic cancer, and ovarian cancer.  This medicine may be used for other purposes; ask your health care provider or pharmacist if you have questions.  What should I tell my health care provider before I take this medicine?  They need to know if you have any of these conditions:    blood disorders    infection    kidney disease    liver disease    recent or ongoing radiation therapy    an unusual or allergic reaction to gemcitabine, other chemotherapy, other medicines, foods, dyes, or preservatives    pregnant or trying to get pregnant    breast-feeding  How should I use this medicine?  This drug is given as an infusion into a vein. It is administered in a hospital or clinic by a specially trained health care professional.  Talk to your pediatrician regarding the use of this medicine in children. Special care may be needed.  Overdosage: If you think you have taken too much of this medicine contact a poison control center or emergency room at once.  NOTE: This medicine is only for you. Do not share this medicine with others.  What if I miss a dose?  It is important not to miss your dose. Call your doctor or health care professional if you are unable to keep an appointment.  What may interact with this medicine?    medicines to increase blood counts like filgrastim, pegfilgrastim, sargramostim    some other chemotherapy drugs like cisplatin    vaccines  Talk to your doctor or health care professional before taking any of these medicines:    acetaminophen    aspirin    ibuprofen    ketoprofen    naproxen  This list may not describe all possible interactions. Give your health care provider a list of all the medicines, herbs, non-prescription drugs, or dietary supplements you use. Also tell them if you smoke,  drink alcohol, or use illegal drugs. Some items may interact with your medicine.  What should I watch for while using this medicine?  Visit your doctor for checks on your progress. This drug may make you feel generally unwell. This is not uncommon, as chemotherapy can affect healthy cells as well as cancer cells. Report any side effects. Continue your course of treatment even though you feel ill unless your doctor tells you to stop.  In some cases, you may be given additional medicines to help with side effects. Follow all directions for their use.  Call your doctor or health care professional for advice if you get a fever, chills or sore throat, or other symptoms of a cold or flu. Do not treat yourself. This drug decreases your body's ability to fight infections. Try to avoid being around people who are sick.  This medicine may increase your risk to bruise or bleed. Call your doctor or health care professional if you notice any unusual bleeding.  Be careful brushing and flossing your teeth or using a toothpick because you may get an infection or bleed more easily. If you have any dental work done, tell your dentist you are receiving this medicine.  Avoid taking products that contain aspirin, acetaminophen, ibuprofen, naproxen, or ketoprofen unless instructed by your doctor. These medicines may hide a fever.  Women should inform their doctor if they wish to become pregnant or think they might be pregnant. There is a potential for serious side effects to an unborn child. Talk to your health care professional or pharmacist for more information. Do not breast-feed an infant while taking this medicine.  What side effects may I notice from receiving this medicine?  Side effects that you should report to your doctor or health care professional as soon as possible:    allergic reactions like skin rash, itching or hives, swelling of the face, lips, or tongue    low blood counts - this medicine may decrease the number of  white blood cells, red blood cells and platelets. You may be at increased risk for infections and bleeding.    signs of infection - fever or chills, cough, sore throat, pain or difficulty passing urine    signs of decreased platelets or bleeding - bruising, pinpoint red spots on the skin, black, tarry stools, blood in the urine    signs of decreased red blood cells - unusually weak or tired, fainting spells, lightheadedness    breathing problems    chest pain    mouth sores    nausea and vomiting    pain, swelling, redness at site where injected    pain, tingling, numbness in the hands or feet    stomach pain    swelling of ankles, feet, hands    unusual bleeding  Side effects that usually do not require medical attention (report to your doctor or health care professional if they continue or are bothersome):    constipation    diarrhea    hair loss    loss of appetite    stomach upset  This list may not describe all possible side effects. Call your doctor for medical advice about side effects. You may report side effects to FDA at 8-222-FDA-2862.  NOTE:This sheet is a summary. It may not cover all possible information. If you have questions about this medicine, talk to your doctor, pharmacist, or health care provider. Copyright  2016 Gold Standard

## 2018-01-25 NOTE — PROGRESS NOTES
Patient is a 67 year old  here accompanied by  today for infusion of gemcitabine under the orders of Dr. Celis.  Left sided power port remains accessed with 19 gauge 3/4 inch 90 degree bent non coring needle.  Denies pain and discomfort at this time.  Port flushes easily without resistance.      1-25-18 lab values: WBC 10.2, ANC 8.4, , HGB 10.5, AST: 25, ALT: 66, Alkaline Phosphate: 182, Creatinine: 0.71. Labs reviewed by Nathalia Amaral and OK to treat today.    Patient meets parameters for today's infusion.  Denies questions or concerns regarding today's infusion and/or medications being administered.      Patient identified with two identifiers, order verified, and verbal consent for today's infusion obtained from patient. Written consent for treatment is on file and valid.    1017: IV pump verified with gemcitabine 1,520mg dose, drug, and rate of administration by second RN, Lauren Pipkin. Infusion administered per protocol. Patient tolerated infusion well, no signs or symptoms of adverse reaction noted. Patient denies pain nor discomfort.     Needle removed, tip intact. Site clean, dry and intact. Covered with a sterile bandage, slight pressure applied for 30 seconds. Pt instructed to leave bandage intact for a minimum of one hour, and to call with questions or concerns. Copy of appointments, discharge instructions, and after visit summary (AVS) provided to patient. Patient states understanding, discharged ambulatory.

## 2018-01-25 NOTE — PATIENT INSTRUCTIONS
We would like to see you back per your calender.   Increase your zoloft to 75mg daily and then to 100mg daily after 1 week. Your prescription has been sent to:   [facility] - [place], [place] - [address]  [address]  [place] [address]  Phone: [phone] Fax: [phone]    When you are in need of a refill, please call your pharmacy and they will send us a request. If you have any questions please call [phone]    Other instructions: start nystatin powder to groin twice a day  Seek medical evaluation for SOB-we will cancel your appointments for the throacentesis

## 2018-01-25 NOTE — MR AVS SNAPSHOT
After Visit Summary   1/25/2018    Ana Simmons    MRN: [id number]           Patient Information     Date Of Birth          1950        Visit Information        Provider Department      1/25/2018 8:30 AM HC INF  [address] [facility]        Today's Diagnoses     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    -  1      Care Instructions    We will see you back for your scheduled infusion, pending your appointment with Nathalia Amaral.          Follow-ups after your visit        Your next 10 appointments already scheduled     Jan 25, 2018  9:00 AM CST   (Arrive by 8:45 AM)   Return Visit with Ceci Amaral NP   [facility] ([facility] )    [address]  [facility] [address]   [phone]            Jan 25, 2018  9:30 AM CST   Level 3 with HC INF RM 3306   [facility] ([facility] )    [address]  [facility] [address]   [phone]            Jan 29, 2018 12:00 PM CST   (Arrive by 11:45 AM)   US THORACENTESIS with HIUS1, HIXRRN, HIIRRAD   HI ULTRASOUND ([facility] )    [address] [place] [address]   [phone]           Tell us in advance if there s any chance you may be pregnant.  Bring a list of your medicines to the exam. Include vitamins, minerals and over-the-counter drugs.  If you take blood thinners, you may need to stop taking them a few days before treatment. Talk to your doctor before stopping these medicines. You will need a blood test the morning of your exam.   Stop taking Coumadin (warfarin) 3 days before your exam. Restart the day after your exam.   If you take aspirin, you may need to stop taking it 3 days before your scan.   If you take Plavix, Ticlid, Pletal or Persantine, you may need to stop taking them 5 days before your scan. Please talk to your doctor before stopping these medicines.  IF YOU WILL RECEIVE SEDATION (medicine to help you relax):   See your  family doctor for an exam within 30 days of treatment.   Plan for an adult to drive you home and stay with you for at least 6 hours.   Follow the eating and drinking guidelines checked below:   No eating or drinking for 4 hours before your test. You may take medicine with small sips of water.   If you have diabetes:If you take insulin, call your diabetes care team. Do not take diabetes pills on the morning of your test. If you take metformin (Avandamet, Glucophage, Glucovance, Metaglip) and received contrast, wait 48 hours before re-starting this medicine.  Please call the Imaging Department at your exam site with any questions.            Feb 01, 2018  8:30 AM CST   Level 4 with HC INF RM 3306   [facility] ([facility] )    [address] [facility] [address]   [phone]            Feb 02, 2018 12:00 PM CST   (Arrive by 11:45 AM)   US THORACENTESIS with HIUS1, HIXRRN, HIIRRAD   HI ULTRASOUND ([facility] )    [address] [place] [address]   [phone]           Tell us in advance if there s any chance you may be pregnant.  Bring a list of your medicines to the exam. Include vitamins, minerals and over-the-counter drugs.  If you take blood thinners, you may need to stop taking them a few days before treatment. Talk to your doctor before stopping these medicines. You will need a blood test the morning of your exam.   Stop taking Coumadin (warfarin) 3 days before your exam. Restart the day after your exam.   If you take aspirin, you may need to stop taking it 3 days before your scan.   If you take Plavix, Ticlid, Pletal or Persantine, you may need to stop taking them 5 days before your scan. Please talk to your doctor before stopping these medicines.  IF YOU WILL RECEIVE SEDATION (medicine to help you relax):   See your family doctor for an exam within 30 days of treatment.   Plan for an adult to drive you home and stay with you for at least 6 hours.    Follow the eating and drinking guidelines checked below:   No eating or drinking for 4 hours before your test. You may take medicine with small sips of water.   If you have diabetes:If you take insulin, call your diabetes care team. Do not take diabetes pills on the morning of your test. If you take metformin (Avandamet, Glucophage, Glucovance, Metaglip) and received contrast, wait 48 hours before re-starting this medicine.  Please call the Imaging Department at your exam site with any questions.            Feb 05, 2018 12:00 PM CST   (Arrive by 11:45 AM)   US THORACENTESIS with HIUS1   HI ULTRASOUND ([facility] )    [address] [place] [address]   [phone]           Tell us in advance if there s any chance you may be pregnant.  Bring a list of your medicines to the exam. Include vitamins, minerals and over-the-counter drugs.  If you take blood thinners, you may need to stop taking them a few days before treatment. Talk to your doctor before stopping these medicines. You will need a blood test the morning of your exam.   Stop taking Coumadin (warfarin) 3 days before your exam. Restart the day after your exam.   If you take aspirin, you may need to stop taking it 3 days before your scan.   If you take Plavix, Ticlid, Pletal or Persantine, you may need to stop taking them 5 days before your scan. Please talk to your doctor before stopping these medicines.  IF YOU WILL RECEIVE SEDATION (medicine to help you relax):   See your family doctor for an exam within 30 days of treatment.   Plan for an adult to drive you home and stay with you for at least 6 hours.   Follow the eating and drinking guidelines checked below:   No eating or drinking for 4 hours before your test. You may take medicine with small sips of water.   If you have diabetes:If you take insulin, call your diabetes care team. Do not take diabetes pills on the morning of your test. If you take metformin (Avandamet, Glucophage,  "Glucovance, Metaglip) and received contrast, wait 48 hours before re-starting this medicine.  Please call the Imaging Department at your exam site with any questions.              Who to contact     If you have questions or need follow up information about today's clinic visit or your schedule please contact [facility] directly at [phone].  Normal or non-critical lab and imaging results will be communicated to you by MyChart, letter or phone within 4 business days after the clinic has received the results. If you do not hear from us within 7 days, please contact the clinic through [employer] or phone. If you have a critical or abnormal lab result, we will notify you by phone as soon as possible.  Submit refill requests through [employer] or call your pharmacy and they will forward the refill request to us. Please allow 3 business days for your refill to be completed.          Additional Information About Your Visit        [employer] Information     [employer] gives you secure access to your electronic health record. If you see a primary care provider, you can also send messages to your care team and make appointments. If you have questions, please call your primary care clinic.  If you do not have a primary care provider, please call [phone] and they will assist you.        Care EveryWhere ID     This is your Care EveryWhere ID. This could be used by other organizations to access your [place] medical records  [id number]        Your Vitals Were     Pulse Temperature Respirations Height Pulse Oximetry BMI (Body Mass Index)    58 98.3  F (36.8  C) (Tympanic) 24 1.651 m (5' 5\") 94% 33.83 kg/m2       Blood Pressure from Last 3 Encounters:   01/25/18 126/62   01/15/18 108/60   01/13/18 133/71    Weight from Last 3 Encounters:   01/25/18 92.2 kg (203 lb 4.8 oz)   01/11/18 96.7 kg (213 lb 3.2 oz)   01/10/18 96.8 kg (213 lb 8 oz)              We Performed the Following     CBC with platelets differential     "    Primary Care Provider Office Phone # Fax #    Mya Park [phone]       [facility] [address]   [facility] [id number]        Equal Access to Services     JOSE MARKS : Hadii aad ku hadlauren Somauriceali, waaxda luqadaha, qaybta kaalmada adeegyada, candy willardalireza campbellcuong godoyjacqueline gil. So [facility] [phone].    ATENCIÓN: Si habla español, tiene a stiles disposición servicios gratuitos de asistencia lingüística. Llame al [phone].    We comply with applicable federal civil rights laws and [place] laws. We do not discriminate on the basis of race, color, national origin, age, disability, sex, sexual orientation, or gender identity.            Thank you!     Thank you for choosing [facility]  for your care. Our goal is always to provide you with excellent care. Hearing back from our patients is one way we can continue to improve our services. Please take a few minutes to complete the written survey that you may receive in the mail after your visit with us. Thank you!             Your Updated Medication List - Protect others around you: Learn how to safely use, store and throw away your medicines at www.disposemymeds.org.          This list is accurate as of 1/25/18  8:43 AM.  Always use your most recent med list.                   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       dexamethasone 4 MG tablet    DECADRON    2 tablet    Take 2 tablets (8 mg)  by mouth See Admin Instructions    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, Cough, Malignant phyllodes tumor of breast, unspecified laterality (H), Malignant neoplasm metastatic to right lung (H), SOB (shortness of breath)       guaiFENesin-codeine 100-10 MG/5ML Soln solution    CHERATUSSIN AC    420 mL    Take 5-10 mLs by mouth every 4 hours as needed for cough    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, Cough       IMODIUM A-D 2 MG tablet   Generic drug:  loperamide      Take 2 mg by mouth 4 times daily as needed for diarrhea        lidocaine-prilocaine cream    EMLA    30 g    Apply 30 g topically as needed for moderate pain    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       LORazepam 0.5 MG tablet    ATIVAN    30 tablet    Take 1 tablet (0.5 mg) by mouth every 4 hours as needed (Anxiety, Nausea/Vomiting or Sleep)    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       METFORMIN HCL PO      Take 500 mg by mouth daily (with breakfast)        prochlorperazine 10 MG tablet    COMPAZINE    30 tablet    Take 1 tablet (10 mg) by mouth every 6 hours as needed (Nausea/Vomiting)    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

## 2018-02-01 NOTE — PROGRESS NOTES
1238: IV pump verified with docetaxel 120mg dose, drug, and rate of administration by second RN, Breana Farnsworth. Infusion administered per protocol. Patient tolerated infusion well, no signs or symptoms of adverse reaction noted. Patient denies pain nor discomfort.

## 2018-02-01 NOTE — PATIENT INSTRUCTIONS
Gemcitabine Hydrochloride Solution for injection  What is this medicine?  GEMCITABINE (chani SIT a been) is a chemotherapy drug. This medicine is used to treat many types of cancer like breast cancer, lung cancer, pancreatic cancer, and ovarian cancer.  This medicine may be used for other purposes; ask your health care provider or pharmacist if you have questions.  What should I tell my health care provider before I take this medicine?  They need to know if you have any of these conditions:    blood disorders    infection    kidney disease    liver disease    recent or ongoing radiation therapy    an unusual or allergic reaction to gemcitabine, other chemotherapy, other medicines, foods, dyes, or preservatives    pregnant or trying to get pregnant    breast-feeding  How should I use this medicine?  This drug is given as an infusion into a vein. It is administered in a hospital or clinic by a specially trained health care professional.  Talk to your pediatrician regarding the use of this medicine in children. Special care may be needed.  Overdosage: If you think you have taken too much of this medicine contact a poison control center or emergency room at once.  NOTE: This medicine is only for you. Do not share this medicine with others.  What if I miss a dose?  It is important not to miss your dose. Call your doctor or health care professional if you are unable to keep an appointment.  What may interact with this medicine?    medicines to increase blood counts like filgrastim, pegfilgrastim, sargramostim    some other chemotherapy drugs like cisplatin    vaccines  Talk to your doctor or health care professional before taking any of these medicines:    acetaminophen    aspirin    ibuprofen    ketoprofen    naproxen  This list may not describe all possible interactions. Give your health care provider a list of all the medicines, herbs, non-prescription drugs, or dietary supplements you use. Also tell them if you smoke,  drink alcohol, or use illegal drugs. Some items may interact with your medicine.  What should I watch for while using this medicine?  Visit your doctor for checks on your progress. This drug may make you feel generally unwell. This is not uncommon, as chemotherapy can affect healthy cells as well as cancer cells. Report any side effects. Continue your course of treatment even though you feel ill unless your doctor tells you to stop.  In some cases, you may be given additional medicines to help with side effects. Follow all directions for their use.  Call your doctor or health care professional for advice if you get a fever, chills or sore throat, or other symptoms of a cold or flu. Do not treat yourself. This drug decreases your body's ability to fight infections. Try to avoid being around people who are sick.  This medicine may increase your risk to bruise or bleed. Call your doctor or health care professional if you notice any unusual bleeding.  Be careful brushing and flossing your teeth or using a toothpick because you may get an infection or bleed more easily. If you have any dental work done, tell your dentist you are receiving this medicine.  Avoid taking products that contain aspirin, acetaminophen, ibuprofen, naproxen, or ketoprofen unless instructed by your doctor. These medicines may hide a fever.  Women should inform their doctor if they wish to become pregnant or think they might be pregnant. There is a potential for serious side effects to an unborn child. Talk to your health care professional or pharmacist for more information. Do not breast-feed an infant while taking this medicine.  What side effects may I notice from receiving this medicine?  Side effects that you should report to your doctor or health care professional as soon as possible:    allergic reactions like skin rash, itching or hives, swelling of the face, lips, or tongue    low blood counts - this medicine may decrease the number of  white blood cells, red blood cells and platelets. You may be at increased risk for infections and bleeding.    signs of infection - fever or chills, cough, sore throat, pain or difficulty passing urine    signs of decreased platelets or bleeding - bruising, pinpoint red spots on the skin, black, tarry stools, blood in the urine    signs of decreased red blood cells - unusually weak or tired, fainting spells, lightheadedness    breathing problems    chest pain    mouth sores    nausea and vomiting    pain, swelling, redness at site where injected    pain, tingling, numbness in the hands or feet    stomach pain    swelling of ankles, feet, hands    unusual bleeding  Side effects that usually do not require medical attention (report to your doctor or health care professional if they continue or are bothersome):    constipation    diarrhea    hair loss    loss of appetite    stomach upset  This list may not describe all possible side effects. Call your doctor for medical advice about side effects. You may report side effects to FDA at 4-855-FDA-5103.  Where should I keep my medicine?  This drug is given in a hospital or clinic and will not be stored at home.  NOTE:This sheet is a summary. It may not cover all possible information. If you have questions about this medicine, talk to your doctor, pharmacist, or health care provider. Copyright  2016 Gold Standard        Docetaxel Solution for injection  What is this medicine?  DOCETAXEL (trejo se TAX el) is a chemotherapy drug. It targets fast dividing cells, like cancer cells, and causes these cells to die. This medicine is used to treat many types of cancers like breast cancer, certain stomach cancers, head and neck cancer, lung cancer, and prostate cancer.  This medicine may be used for other purposes; ask your health care provider or pharmacist if you have questions.  What should I tell my health care provider before I take this medicine?  They need to know if you have  any of these conditions:    infection (especially a virus infection such as chickenpox, cold sores, or herpes)    liver disease    low blood counts, like low white cell, platelet, or red cell counts    an unusual or allergic reaction to docetaxel, polysorbate 80, other chemotherapy agents, other medicines, foods, dyes, or preservatives    pregnant or trying to get pregnant    breast-feeding  How should I use this medicine?  This drug is given as an infusion into a vein. It is administered in a hospital or clinic by a specially trained health care professional.  Talk to your pediatrician regarding the use of this medicine in children. Special care may be needed.  Overdosage: If you think you have taken too much of this medicine contact a poison control center or emergency room at once.  NOTE: This medicine is only for you. Do not share this medicine with others.  What if I miss a dose?  It is important not to miss your dose. Call your doctor or health care professional if you are unable to keep an appointment.  What may interact with this medicine?    cyclosporine    erythromycin    ketoconazole    medicines to increase blood counts like filgrastim, pegfilgrastim, sargramostim    vaccines  Talk to your doctor or health care professional before taking any of these medicines:    acetaminophen    aspirin    ibuprofen    ketoprofen    naproxen  This list may not describe all possible interactions. Give your health care provider a list of all the medicines, herbs, non-prescription drugs, or dietary supplements you use. Also tell them if you smoke, drink alcohol, or use illegal drugs. Some items may interact with your medicine.  What should I watch for while using this medicine?  Your condition will be monitored carefully while you are receiving this medicine. You will need important blood work done while you are taking this medicine.  This drug may make you feel generally unwell. This is not uncommon, as chemotherapy can  affect healthy cells as well as cancer cells. Report any side effects. Continue your course of treatment even though you feel ill unless your doctor tells you to stop.  In some cases, you may be given additional medicines to help with side effects. Follow all directions for their use.  Call your doctor or health care professional for advice if you get a fever, chills or sore throat, or other symptoms of a cold or flu. Do not treat yourself. This drug decreases your body's ability to fight infections. Try to avoid being around people who are sick.  This medicine may increase your risk to bruise or bleed. Call your doctor or health care professional if you notice any unusual bleeding.  This medicine may contain alcohol in the product. You may get drowsy or dizzy. Do not drive, use machinery, or do anything that needs mental alertness until you know how this medicine affects you. Do not stand or sit up quickly, especially if you are an older patient. This reduces the risk of dizzy or fainting spells. Avoid alcoholic drinks.  Do not become pregnant while taking this medicine. Women should inform their doctor if they wish to become pregnant or think they might be pregnant. There is a potential for serious side effects to an unborn child. Talk to your health care professional or pharmacist for more information. Do not breast-feed an infant while taking this medicine.  What side effects may I notice from receiving this medicine?  Side effects that you should report to your doctor or health care professional as soon as possible:    allergic reactions like skin rash, itching or hives, swelling of the face, lips, or tongue    low blood counts - This drug may decrease the number of white blood cells, red blood cells and platelets. You may be at increased risk for infections and bleeding.    signs of infection - fever or chills, cough, sore throat, pain or difficulty passing urine    signs of decreased platelets or bleeding -  bruising, pinpoint red spots on the skin, black, tarry stools, nosebleeds    signs of decreased red blood cells - unusually weak or tired, fainting spells, lightheadedness    breathing problems    fast or irregular heartbeat    low blood pressure    mouth sores    nausea and vomiting    pain, swelling, redness or irritation at the injection site    pain, tingling, numbness in the hands or feet    swelling of the ankle, feet, hands    weight gain  Side effects that usually do not require medical attention (report to your prescriber or health care professional if they continue or are bothersome):    bone pain    complete hair loss including hair on your head, underarms, pubic hair, eyebrows, and eyelashes    diarrhea    excessive tearing    changes in the color of fingernails    loosening of the fingernails    nausea    muscle pain    red flush to skin    sweating    weak or tired  This list may not describe all possible side effects. Call your doctor for medical advice about side effects. You may report side effects to FDA at 2-996-FDA-6395.  Where should I keep my medicine?  This drug is given in a hospital or clinic and will not be stored at home.  NOTE:This sheet is a summary. It may not cover all possible information. If you have questions about this medicine, talk to your doctor, pharmacist, or health care provider. Copyright  2016 Gold Standard

## 2018-02-01 NOTE — MR AVS SNAPSHOT
After Visit Summary   2/1/2018    Ana Simmons    MRN: [id number]           Patient Information     Date Of Birth          1950        Visit Information        Provider Department      2/1/2018 8:30 AM  INF  [facility]        Today's Diagnoses     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    -  1    Cough        Malignant neoplasm of right female breast, unspecified estrogen receptor status, unspecified site of breast (H)          Care Instructions      Gemcitabine Hydrochloride Solution for injection  What is this medicine?  GEMCITABINE (chani SIT a been) is a chemotherapy drug. This medicine is used to treat many types of cancer like breast cancer, lung cancer, pancreatic cancer, and ovarian cancer.  This medicine may be used for other purposes; ask your health care provider or pharmacist if you have questions.  What should I tell my health care provider before I take this medicine?  They need to know if you have any of these conditions:    blood disorders    infection    kidney disease    liver disease    recent or ongoing radiation therapy    an unusual or allergic reaction to gemcitabine, other chemotherapy, other medicines, foods, dyes, or preservatives    pregnant or trying to get pregnant    breast-feeding  How should I use this medicine?  This drug is given as an infusion into a vein. It is administered in a hospital or clinic by a specially trained health care professional.  Talk to your pediatrician regarding the use of this medicine in children. Special care may be needed.  Overdosage: If you think you have taken too much of this medicine contact a poison control center or emergency room at once.  NOTE: This medicine is only for you. Do not share this medicine with others.  What if I miss a dose?  It is important not to miss your dose. Call your doctor or health care professional if you are unable to keep an appointment.  What may interact with this  medicine?    medicines to increase blood counts like filgrastim, pegfilgrastim, sargramostim    some other chemotherapy drugs like cisplatin    vaccines  Talk to your doctor or health care professional before taking any of these medicines:    acetaminophen    aspirin    ibuprofen    ketoprofen    naproxen  This list may not describe all possible interactions. Give your health care provider a list of all the medicines, herbs, non-prescription drugs, or dietary supplements you use. Also tell them if you smoke, drink alcohol, or use illegal drugs. Some items may interact with your medicine.  What should I watch for while using this medicine?  Visit your doctor for checks on your progress. This drug may make you feel generally unwell. This is not uncommon, as chemotherapy can affect healthy cells as well as cancer cells. Report any side effects. Continue your course of treatment even though you feel ill unless your doctor tells you to stop.  In some cases, you may be given additional medicines to help with side effects. Follow all directions for their use.  Call your doctor or health care professional for advice if you get a fever, chills or sore throat, or other symptoms of a cold or flu. Do not treat yourself. This drug decreases your body's ability to fight infections. Try to avoid being around people who are sick.  This medicine may increase your risk to bruise or bleed. Call your doctor or health care professional if you notice any unusual bleeding.  Be careful brushing and flossing your teeth or using a toothpick because you may get an infection or bleed more easily. If you have any dental work done, tell your dentist you are receiving this medicine.  Avoid taking products that contain aspirin, acetaminophen, ibuprofen, naproxen, or ketoprofen unless instructed by your doctor. These medicines may hide a fever.  Women should inform their doctor if they wish to become pregnant or think they might be pregnant. There  is a potential for serious side effects to an unborn child. Talk to your health care professional or pharmacist for more information. Do not breast-feed an infant while taking this medicine.  What side effects may I notice from receiving this medicine?  Side effects that you should report to your doctor or health care professional as soon as possible:    allergic reactions like skin rash, itching or hives, swelling of the face, lips, or tongue    low blood counts - this medicine may decrease the number of white blood cells, red blood cells and platelets. You may be at increased risk for infections and bleeding.    signs of infection - fever or chills, cough, sore throat, pain or difficulty passing urine    signs of decreased platelets or bleeding - bruising, pinpoint red spots on the skin, black, tarry stools, blood in the urine    signs of decreased red blood cells - unusually weak or tired, fainting spells, lightheadedness    breathing problems    chest pain    mouth sores    nausea and vomiting    pain, swelling, redness at site where injected    pain, tingling, numbness in the hands or feet    stomach pain    swelling of ankles, feet, hands    unusual bleeding  Side effects that usually do not require medical attention (report to your doctor or health care professional if they continue or are bothersome):    constipation    diarrhea    hair loss    loss of appetite    stomach upset  This list may not describe all possible side effects. Call your doctor for medical advice about side effects. You may report side effects to FDA at 4-314-FDA-0737.  Where should I keep my medicine?  This drug is given in a hospital or clinic and will not be stored at home.  NOTE:This sheet is a summary. It may not cover all possible information. If you have questions about this medicine, talk to your doctor, pharmacist, or health care provider. Copyright  2016 Gold Standard        Docetaxel Solution for injection  What is this  medicine?  DOCETAXEL (trejo se TAX el) is a chemotherapy drug. It targets fast dividing cells, like cancer cells, and causes these cells to die. This medicine is used to treat many types of cancers like breast cancer, certain stomach cancers, head and neck cancer, lung cancer, and prostate cancer.  This medicine may be used for other purposes; ask your health care provider or pharmacist if you have questions.  What should I tell my health care provider before I take this medicine?  They need to know if you have any of these conditions:    infection (especially a virus infection such as chickenpox, cold sores, or herpes)    liver disease    low blood counts, like low white cell, platelet, or red cell counts    an unusual or allergic reaction to docetaxel, polysorbate 80, other chemotherapy agents, other medicines, foods, dyes, or preservatives    pregnant or trying to get pregnant    breast-feeding  How should I use this medicine?  This drug is given as an infusion into a vein. It is administered in a hospital or clinic by a specially trained health care professional.  Talk to your pediatrician regarding the use of this medicine in children. Special care may be needed.  Overdosage: If you think you have taken too much of this medicine contact a poison control center or emergency room at once.  NOTE: This medicine is only for you. Do not share this medicine with others.  What if I miss a dose?  It is important not to miss your dose. Call your doctor or health care professional if you are unable to keep an appointment.  What may interact with this medicine?    cyclosporine    erythromycin    ketoconazole    medicines to increase blood counts like filgrastim, pegfilgrastim, sargramostim    vaccines  Talk to your doctor or health care professional before taking any of these medicines:    acetaminophen    aspirin    ibuprofen    ketoprofen    naproxen  This list may not describe all possible interactions. Give your health  care provider a list of all the medicines, herbs, non-prescription drugs, or dietary supplements you use. Also tell them if you smoke, drink alcohol, or use illegal drugs. Some items may interact with your medicine.  What should I watch for while using this medicine?  Your condition will be monitored carefully while you are receiving this medicine. You will need important blood work done while you are taking this medicine.  This drug may make you feel generally unwell. This is not uncommon, as chemotherapy can affect healthy cells as well as cancer cells. Report any side effects. Continue your course of treatment even though you feel ill unless your doctor tells you to stop.  In some cases, you may be given additional medicines to help with side effects. Follow all directions for their use.  Call your doctor or health care professional for advice if you get a fever, chills or sore throat, or other symptoms of a cold or flu. Do not treat yourself. This drug decreases your body's ability to fight infections. Try to avoid being around people who are sick.  This medicine may increase your risk to bruise or bleed. Call your doctor or health care professional if you notice any unusual bleeding.  This medicine may contain alcohol in the product. You may get drowsy or dizzy. Do not drive, use machinery, or do anything that needs mental alertness until you know how this medicine affects you. Do not stand or sit up quickly, especially if you are an older patient. This reduces the risk of dizzy or fainting spells. Avoid alcoholic drinks.  Do not become pregnant while taking this medicine. Women should inform their doctor if they wish to become pregnant or think they might be pregnant. There is a potential for serious side effects to an unborn child. Talk to your health care professional or pharmacist for more information. Do not breast-feed an infant while taking this medicine.  What side effects may I notice from receiving  this medicine?  Side effects that you should report to your doctor or health care professional as soon as possible:    allergic reactions like skin rash, itching or hives, swelling of the face, lips, or tongue    low blood counts - This drug may decrease the number of white blood cells, red blood cells and platelets. You may be at increased risk for infections and bleeding.    signs of infection - fever or chills, cough, sore throat, pain or difficulty passing urine    signs of decreased platelets or bleeding - bruising, pinpoint red spots on the skin, black, tarry stools, nosebleeds    signs of decreased red blood cells - unusually weak or tired, fainting spells, lightheadedness    breathing problems    fast or irregular heartbeat    low blood pressure    mouth sores    nausea and vomiting    pain, swelling, redness or irritation at the injection site    pain, tingling, numbness in the hands or feet    swelling of the ankle, feet, hands    weight gain  Side effects that usually do not require medical attention (report to your prescriber or health care professional if they continue or are bothersome):    bone pain    complete hair loss including hair on your head, underarms, pubic hair, eyebrows, and eyelashes    diarrhea    excessive tearing    changes in the color of fingernails    loosening of the fingernails    nausea    muscle pain    red flush to skin    sweating    weak or tired  This list may not describe all possible side effects. Call your doctor for medical advice about side effects. You may report side effects to FDA at 4-509-FDA-2019.  Where should I keep my medicine?  This drug is given in a hospital or clinic and will not be stored at home.  NOTE:This sheet is a summary. It may not cover all possible information. If you have questions about this medicine, talk to your doctor, pharmacist, or health care provider. Copyright  2016 Gold Standard                Follow-ups after your visit        Your next  10 appointments already scheduled     Feb 15, 2018  9:00 AM CST   Level O with HC INF RM 3308   [facility] [place] ([facility] [place] [facility]    [address]  [place] [address]   [phone]            Feb 15, 2018  9:30 AM CST   (Arrive by 9:15 AM)   Return Visit with Ceci Amaral NP   [facility] [place] ([facility] [place] )    [address]  [place] [address]   [phone]            Feb 15, 2018 10:00 AM CST   Level 3 with HC INF RM 3308   [facility] [place] ([facility] [place] )    [address]  [place] [address]   [phone]            Feb 22, 2018  9:00 AM CST   Level 4 with HC INF RM 3308   [facility] [place] ([facility] [place] )    [address]  [place] [address]   [phone]            Mar 08, 2018  9:30 AM CST   Level O with HC INF RM 3306   [facility] [place] ([facility] [place] )    [address]  [place] [address]   [phone]            Mar 08, 2018 10:00 AM CST   (Arrive by 9:45 AM)   Return Visit with Ceci Amaral NP   [facility] [place] ([facility] [place] )    [address]  [place] [address]   [phone]            Mar 08, 2018 10:30 AM CST   Level 3 with HC INF RM 3306   [facility] [place] ([facility] [place] )    [address]  [place] [address]   [phone]            Mar 15, 2018  8:30 AM CDT   Level 4 with HC INF RM 3306   [facility] [place] ([facility] [place] )    [address]  [place] [address]   [phone]              Who to contact     If you have questions or need follow up information about today's clinic visit or your schedule please contact [facility] directly at [phone].  Normal or non-critical lab and imaging results will be communicated to you by MyChart, letter or phone within 4 business days after the clinic has received the results. If you do  "not hear from us within 7 days, please contact the clinic through "[employer]" or phone. If you have a critical or abnormal lab result, we will notify you by phone as soon as possible.  Submit refill requests through "[employer]" or call your pharmacy and they will forward the refill request to us. Please allow 3 business days for your refill to be completed.          Additional Information About Your Visit        [employer] Information     "[employer]" gives you secure access to your electronic health record. If you see a primary care provider, you can also send messages to your care team and make appointments. If you have questions, please call your primary care clinic.  If you do not have a primary care provider, please call [phone] and they will assist you.        Care EveryWhere ID     This is your Care EveryWhere ID. This could be used by other organizations to access your [place] medical records  [id number]        Your Vitals Were     Pulse Temperature Respirations Height Pulse Oximetry BMI (Body Mass Index)    93 96.7  F (35.9  C) (Oral) 16 1.651 m (5' 5\") 92% 33.5 kg/m2       Blood Pressure from Last 3 Encounters:   02/01/18 138/81   01/25/18 117/65   01/25/18 126/62    Weight from Last 3 Encounters:   02/01/18 91.3 kg (201 lb 4.8 oz)   01/25/18 92.2 kg (203 lb 4.8 oz)   01/25/18 92.2 kg (203 lb 4.8 oz)              We Performed the Following     CBC with platelets differential     Hepatic panel          Where to get your medicines      Some of these will need a paper prescription and others can be bought over the counter.  Ask your nurse if you have questions.     Bring a paper prescription for each of these medications     guaiFENesin-codeine 100-10 MG/5ML Soln solution          Primary Care Provider Office Phone # Fax #    Mya Park [phone]       [facility] [address]   [facility] [address]        Equal Access to Services     JOSE MARKS AH: Rocio Meredith, " wavanda alex, qaybta kalew zavala, candy pineda wiliamwilly sotoaaalireza ah. So [facility] [phone].    ATENCIÓN: Si warren horn, tiene a stiles disposición servicios gratuitos de asistencia lingüística. Clint al [phone].    We comply with applicable federal civil rights laws and [place] laws. We do not discriminate on the basis of race, color, national origin, age, disability, sex, sexual orientation, or gender identity.            Thank you!     Thank you for choosing [facility]  for your care. Our goal is always to provide you with excellent care. Hearing back from our patients is one way we can continue to improve our services. Please take a few minutes to complete the written survey that you may receive in the mail after your visit with us. Thank you!             Your Updated Medication List - Protect others around you: Learn how to safely use, store and throw away your medicines at www.disposemymeds.org.          This list is accurate as of 2/1/18  2:16 PM.  Always use your most recent med list.                   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       * dexamethasone 4 MG tablet    DECADRON    2 tablet    Take 2 tablets (8 mg) by mouth See Admin Instructions    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, Cough, Malignant phyllodes tumor of breast, unspecified laterality (H), Malignant neoplasm metastatic to right lung (H), SOB (shortness of breath)       *  dexamethasone 4 MG tablet    DECADRON    12 tablet    Take 2 tablets (8 mg) by mouth 2 times daily (with meals) for 6 doses starting the evening prior to docetaxel    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, Cough, Malignant phyllodes tumor of breast, unspecified laterality (H), Malignant neoplasm metastatic to right lung (H), SOB (shortness of breath)       guaiFENesin-codeine 100-10 MG/5ML Soln solution    CHERATUSSIN AC    420 mL    Take 5-10 mLs by mouth every 4 hours as needed for cough    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, Cough       IMODIUM A-D 2 MG tablet   Generic drug:  loperamide      Take 2 mg by mouth 4 times daily as needed for diarrhea    Phyllodes neoplasm of breast, Malignant neoplasm metastatic to right lung (H), Adjustment disorder with mixed anxiety and depressed mood, 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       lidocaine-prilocaine cream    EMLA    30 g    Apply 30 g topically as needed for moderate pain    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       LORazepam 0.5 MG tablet    ATIVAN    30 tablet    Take 1 tablet (0.5 mg) by mouth every 4 hours as needed (Anxiety, Nausea/Vomiting or Sleep)    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       METFORMIN HCL PO      Take 500 mg by mouth daily (with breakfast)        nystatin Powd     1 each    1 Application 2 times daily    Phyllodes neoplasm of breast, Malignant neoplasm metastatic to right lung (H), Adjustment disorder with mixed anxiety and depressed mood, 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       prochlorperazine 10 MG tablet    COMPAZINE    30 tablet    Take 1 tablet (10 mg) by mouth every 6 hours as needed (Nausea/Vomiting)    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       SIMVASTATIN PO      Take 20 mg by mouth At Bedtime        VITAMIN B6 PO      Take 50 mg by mouth daily    Anemia, unspecified type, Malignant neoplasm of lower-outer quadrant of right female breast  (H), DCIS (ductal carcinoma in situ), right, Postmenopausal status, Aromatase inhibitor use, Encounter for monitoring cardiotoxic drug therapy       * ZOLOFT 50 MG tablet   Generic drug:  sertraline      Take 50 mg by mouth daily    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       * sertraline 50 MG tablet    ZOLOFT    180 tablet    Take 2 tablets (100 mg) by mouth daily    Phyllodes neoplasm of breast, Malignant neoplasm metastatic to right lung (H), Adjustment disorder with mixed anxiety and depressed mood, 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       * Notice:  This list has 4 medication(s) that are the same as other medications prescribed for you. Read the directions carefully, and ask your doctor or other care provider to review them with you.

## 2018-02-01 NOTE — PROGRESS NOTES
Patient is a 67 year old female here accompanied by sister, Ash,  today for infusion of gemcitabine and docetaxel under the orders of Dr. Celis.  Left sided power port accessed with 19 gauge 3/4 inch 90 degree bent non coring needle.  Line flushed with 10 cc's normal saline.  Needle secured with sterile transparent dressing.  10 cc's blood discarded, and blood taken for 2 tubes of ordered labs.  Patient tolerated well.  Denies pain and discomfort at this time.  Port flushes easily without resistance.    Hand hygiene performed: yes   Mask donned by caregiver: yes Site prepped with CHG: yes Labs drawn: yes Dressing applied using aseptic technique: yes   Todays lab values: WBC 5.4, ANC 4.8, , HGB 9.9, AST 36, , Alkaline Phosphatase 170..     Patient meets parameters for today's infusion.  Denies questions or concerns regarding today's infusion and/or medications being administered.      Patient identified with two identifiers, order verified, and verbal consent for today's infusion obtained from patient. Written consent for treatment is on file and valid.    1036: IV pump verified with Gemcitabine dose, drug, and rate of administration by second RN, Abeba Cox. Infusion administered per protocol. Patient tolerated infusion well, no signs or symptoms of adverse reaction noted. Patient denies pain nor discomfort.   1400:  Neualsta on pro injector placed on left abdomen    Needle  removed, tip intact. Site clean, dry and intact. Covered with a sterile bandage, slight pressure applied for 30 seconds. Pt instructed to leave bandage intact for a minimum of one hour, and to call with questions or concerns. Copy of appointments, discharge instructions, and after visit summary (AVS) provided to patient. Patient states understanding, discharged ambulatory.

## 2018-02-02 NOTE — PROGRESS NOTES
Clinic Care Coordination Contact  Care Team Conversations    OCC RN received a call from the pt on 1/31/18.  Pt stated she had not received any type of notice R/T her Profex application.  She stated the traveling for care recently is starting to mount up.  Pt was informed this nurse would inquire and inform the pt asap.  Call ended.    OCC RN telephoned the pt in follow up to her Profex inquiry.  Pt was informed that the request was denied as she had previously received funds.  She was notified the application is once/lifetime.  The pt stated she has been approved for funds via [facility].  She stated she has not used any funds to date; she was advised to contact them to determine if some of the dollars can be used for gas reimbursement and the process to follow.  Pt also informed that this nurse would investigate other options and get back to her on her next Chemo visit.  Pt stated understanding and had questions answered.  The pt informed to call if there were any other issues.  Call ended.    Aimee Saunders RN  [facility] Care Coordinator

## 2018-02-13 NOTE — TELEPHONE ENCOUNTER
Patient Information     Patient Name MRN Ana Kendrick [id number] Female 1950      Telephone Encounter by Mackenzie Howell at 2018  1:07 PM     Author:  Mackenzie Howell Service:  (none) Author Type:  NURS- Registered Nurse     Filed:  2018  1:07 PM Encounter Date:  2018 Status:  Signed     :  Mackenzie Howell (NURS- Registered Nurse)            Dr. Celis notified to call Dr. Lobato. Mackenzie Howell RN..............2018 1:07 PM

## 2018-02-15 PROBLEM — F41.9 ANXIETY AND DEPRESSION: Status: ACTIVE | Noted: 2018-01-01

## 2018-02-15 PROBLEM — F32.A ANXIETY AND DEPRESSION: Status: ACTIVE | Noted: 2018-01-01

## 2018-02-15 NOTE — TELEPHONE ENCOUNTER
Please call and let patient know that I discussed wit Dr. Celis and he recommends discontinuing the arimidex at this point.

## 2018-02-15 NOTE — MR AVS SNAPSHOT
After Visit Summary   2/15/2018    Ana Simmons    MRN: [id number]           Patient Information     Date Of Birth          1950        Visit Information        Provider Department      2/15/2018 9:30 AM Ceci Amaral NP 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 We will change your calender to reflect a week delay in chemotherapy. Your prescriptions for prednisone, antibiotic have been sent to:   [facility] - [place], [place] - [address]  [address]  [place] [address]  Phone: [phone] Fax: [phone]     When you are in need of a refill, please call your pharmacy and they will send us a request.     If you have any questions please call [phone]    Other instructions:  Prescription given for cough med            Follow-ups after your visit        Your next 10 appointments already scheduled     Feb 15, 2018  2:15 PM CST   (Arrive by 2:00 PM)   IR PORT CHECK LEFT with HIXR5   [facility] ([facility] )    [address] [place] [id number] [phone]           1. Your doctor will need to do a history and physical within 7 days before this procedure. 2. Your doctor decide will which medications should not be taken the morning of the exam. 3. Laboratory tests are to be obtained by your doctor prior to the exam (Basic Metabolic Panel, CBCP, PTT and INR) (No labs needed if you are having a tunneled catheter exchange or removal) 4. If you have allergies to x-ray contrast or iodine, contact your doctor or a Radiology nurse prior to the  exam day for instructions. 5. Someone will need to drive you to and from the hospital. 6. If you are or may be pregnant, contact your doctor or a Radiology nurse prior to the day of the exam. 7. If you have diabetes, check with your doctor or a Radiology nurse to see if your insulin needs to be adjusted for the exam. 8. If you are taking a medication called Glucophage or Glucovance; these medications need to be held the day of the exam and for approximately 48 hours following. A blood sample must be drawn so your creatinine level can be checked before resuming this medication. 9. If you are taking Coumadin (to thin you blood) please contact your doctor or a Radiology nurse at least 3 days before the exam for special instructions. 10. You should not have received contrast within 48 hours of this exam. 11. The day before your exam you may eat your regular diet and are encouraged to drink at least 2 quarts of clear liquids. Drink no alcoholic beverages for 24 hours prior to the exam. 12. If you have a colostomy you will need to irrigate it with tap water at 8PM the evening before and again at 6AM the morning of the exam. 13. Do not smoke for 24 hours prior to the procedure. 14. Birth to 4 years: - Breast feeding must be stopped 4 hours prior to exam - Solid food or formula must be stopped 6 hours prior to exam - Tube feedings must be stopped 6 hours prior to exam 15. 4-10 years old: - Nothing to eat or drink 6 hours prior to exam 16. 10+ years old: - Nothing to eat or drink 8 hours prior to exam 17. The morning of the exam you may brush your teeth and take medications as directed with a sip of water. 18. When discharged, you cannot drive until morning, and an adult must be with you until then. You should stay in the [facility] overnight. 19. Bring a list of all drugs you are taking; include supplements and over-the-counter medications. Wear comfortable clothes and leave your valuables at home.            Feb 22,  2018  8:00 AM CST   Level O with HC INF RM 3308   [facility] [place] ([facility] [place] )    [address]  [place] [address]   [phone]            Feb 22, 2018  8:30 AM CST   (Arrive by 8:15 AM)   Return Visit with Ceci Amaral NP   [facility] [place] ([facility] [place] )    [address]  [place] [address]   [phone]            Feb 22, 2018  9:00 AM CST   Level 3 with HC INF RM 3308   [facility] [place] ([facility] [place] )    [address]  [place] [address]   [phone]            Mar 01, 2018 11:30 AM CST   Level 4 with HC INF RM 3306   [facility] [place] ([facility] [place] )    [address]  [place] [address]   [phone]            Mar 15, 2018  8:30 AM CDT   Level O with HC INF RM 3306   [facility] [place] ([facility] [place] )    [address]  [place] [address]   [phone]            Mar 15, 2018  9:00 AM CDT   (Arrive by 8:45 AM)   Return Visit with Ceci Amaral NP   [facility] [place] ([facility] [place] )    [address]  [place] [address]   [phone]            Mar 15, 2018  9:30 AM CDT   Level 3 with HC INF RM 3306   [facility] [place] ([facility] [place] )    [address]  [place] [address]   [phone]            Mar 22, 2018  9:30 AM CDT   Level 4 with HC INF RM 3308   [facility] [place] ([facility] [place] )    [address]  [place] [address]   [phone]              Who to contact     If you have questions or need follow up information about today's clinic visit or your schedule please contact [facility] directly at [phone].  Normal or non-critical lab and imaging results will be communicated to you by MyChart, letter or phone within 4 business days after the clinic has received the results. If you do not hear from us within 7 days, please contact  "the clinic through [employer] or phone. If you have a critical or abnormal lab result, we will notify you by phone as soon as possible.  Submit refill requests through [employer] or call your pharmacy and they will forward the refill request to us. Please allow 3 business days for your refill to be completed.          Additional Information About Your Visit        [employer] Information     [employer] gives you secure access to your electronic health record. If you see a primary care provider, you can also send messages to your care team and make appointments. If you have questions, please call your primary care clinic.  If you do not have a primary care provider, please call [phone] and they will assist you.        Care EveryWhere ID     This is your Care EveryWhere ID. This could be used by other organizations to access your [place] medical records  [id number]        Your Vitals Were     Pulse Temperature Respirations Height Pulse Oximetry BMI (Body Mass Index)    100 98.8  F (37.1  C) (Tympanic) 18 1.651 m (5' 5\") 93% 34.43 kg/m2       Blood Pressure from Last 3 Encounters:   02/15/18 137/59   02/15/18 137/59   02/01/18 138/81    Weight from Last 3 Encounters:   02/15/18 93.8 kg (206 lb 14.4 oz)   02/15/18 93.8 kg (206 lb 14.4 oz)   02/01/18 91.3 kg (201 lb 4.8 oz)              Today, you had the following     No orders found for display         Today's Medication Changes          These changes are accurate as of 2/15/18 11:10 AM.  If you have any questions, ask your nurse or doctor.               Start taking these medicines.        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, Cough   Started by:  Ceci Amaral " D, NP        Dose:  100 mg   Take 1 capsule (100 mg) by mouth 2 times daily   Quantity:  20 capsule   Refills:  0       predniSONE 20 MG tablet   Commonly known as:  DELTASONE   Used for:  Cough, Malignant neoplasm of lower-outer quadrant of right breast of female, estrogen receptor negative (H), Ductal carcinoma in situ (DCIS) of right breast, Phyllodes neoplasm of breast, Anxiety and depression, 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 [address]     Phone:  [phone]     doxycycline 100 MG capsule    predniSONE 20 MG tablet         Some of these will need a paper prescription and others can be bought over the counter.  Ask your nurse if you have questions.     Bring a paper prescription for each of these medications     guaiFENesin-codeine 100-10 MG/5ML Soln solution                Primary Care Provider Office Phone # Fax #    Mya Park [phone]       [facility] [address]   [facility] [address]        Equal Access to Services     JOSE MARKS AH: Rocio garciao Somauriceali, waaxda luqadaha, qaybta kaalmada adeegyada, candy gil. So [facility] [phone].    ATENCIÓN: Si habla español, tiene a stiles disposición servicios gratuitos de asistencia lingüística. Llame al [phone].    We comply with applicable federal civil rights laws and [place] laws. We do not discriminate on the basis of race, color, national origin, age, disability, sex, sexual orientation, or gender identity.            Thank you!     Thank you for choosing [facility]  for your care. Our goal is always to provide you with excellent care. Hearing back from our  patients is one way we can continue to improve our services. Please take a few minutes to complete the written survey that you may receive in the mail after your visit with us. Thank you!             Your Updated Medication List - Protect others around you: Learn how to safely use, store and throw away your medicines at www.disposemymeds.org.          This list is accurate as of 2/15/18 11:10 AM.  Always use your most recent med list.                   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       dexamethasone 4 MG tablet    DECADRON    12 tablet    Take 2 tablets (8 mg) by mouth 2 times daily (with meals) for 6 doses starting the evening prior to docetaxel    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, Cough, Malignant phyllodes tumor of breast, unspecified laterality (H), Malignant neoplasm metastatic to right lung (H), SOB (shortness of breath)       doxycycline 100 MG capsule    VIBRAMYCIN    20 capsule    Take 1 capsule (100 mg) by mouth 2 times daily    Malignant neoplasm of lower-outer quadrant of right breast of female, estrogen receptor negative (H), Ductal carcinoma in situ (DCIS) of right breast, Phyllodes neoplasm of breast, Anxiety and depression, 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, Cough       guaiFENesin-codeine 100-10 MG/5ML Soln solution    CHERATUSSIN AC    420 mL    Take 5-10 mLs by mouth every 4 hours as needed for cough    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  breast (H), Cough, Malignant neoplasm of lower-outer quadrant of right breast of female, estrogen receptor negative (H), Ductal carcinoma in situ (DCIS) of right breast, Phyllodes neoplasm of breast, Anxiety and depression       IMODIUM A-D 2 MG tablet   Generic drug:  loperamide      Take 2 mg by mouth 4 times daily as needed for diarrhea    Phyllodes neoplasm of breast, Malignant neoplasm metastatic to right lung (H), Adjustment disorder with mixed anxiety and depressed mood, 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       lidocaine-prilocaine cream    EMLA    30 g    Apply 30 g topically as needed for moderate pain    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       LORazepam 0.5 MG tablet    ATIVAN    30 tablet    Take 1 tablet (0.5 mg) by mouth every 4 hours as needed (Anxiety, Nausea/Vomiting or Sleep)    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       METFORMIN HCL PO      Take 500 mg by mouth daily (with breakfast)        nystatin Powd     1 each    1 Application 2 times daily    Phyllodes neoplasm of breast, Malignant neoplasm metastatic to right lung (H), Adjustment disorder with mixed anxiety and depressed mood, 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       predniSONE 20 MG tablet    DELTASONE    4 tablet    Take 1 tablet (20 mg) by mouth daily    Cough, Malignant neoplasm of lower-outer quadrant of right breast of female, estrogen receptor negative (H), Ductal carcinoma in situ (DCIS) of right breast, Phyllodes neoplasm of breast, Anxiety and depression, 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       prochlorperazine 10 MG tablet    COMPAZINE    30 tablet    Take 1 tablet (10 mg) by mouth every 6 hours as needed (Nausea/Vomiting)    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       SIMVASTATIN PO      Take 20 mg by mouth At Bedtime        VITAMIN B6 PO      Take 50 mg by mouth daily    Anemia, unspecified type, Malignant neoplasm of lower-outer quadrant of right  female breast (H), DCIS (ductal carcinoma in situ), right, Postmenopausal status, Aromatase inhibitor use, Encounter for monitoring cardiotoxic drug therapy       * ZOLOFT 50 MG tablet   Generic drug:  sertraline      Take 50 mg by mouth daily    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 Read the directions carefully, and ask your doctor or other care provider to review them with you.

## 2018-02-15 NOTE — PROGRESS NOTES
Patients left sided power port accessed using non-coring, 19 gauge, 3/4 inch needle.Mask donned by caregiver: yes Site prepped with CHG: yes Labs drawn: no Dressing applied using aseptic technique: yes. Port accessed per facility protocol. Port flushed easily, but no blood return noted. Patient repositioned, without success. Updated NABOR Venegas, CAROLYN, ANÍBAL, who ordered fluoroscopy.  Unable to draw labs from port, lab called to draw peripherally. No signs and symptoms of infection or infiltration.     After provider visit/assessment, NABOR Venegas FNP-BC, ANÍBAL noted treatment would be delayed due to URI. Prednisone ordered for symptom management, and given per orders.     1125: patient down for fluoroscopy    1153: patient back to department. Results of fluoroscopy shows fibrous sheath. NABOR Venegas FNP-BC, ANÍBAL updated, cathflo ordered.    1226: cathflo administered    1256: checked for blood return, none noted.    1426: blood return noted. 10cc blood return discarded. Flushed port per protocols.    Needle removed, tip intact. Site clean, dry and intact. Covered with a sterile bandage, slight pressure applied for 30 seconds. Pt instructed to leave bandage intact for a minimum of one hour, and to call with questions or concerns. Copy of appointments, discharge instructions, and after visit summary (AVS) provided to patient. Patient states understanding, discharged ambulatory.Patient discharged with no complaints.

## 2018-02-15 NOTE — MR AVS SNAPSHOT
After Visit Summary   2/15/2018    Ana Simmons    MRN: [id number]           Patient Information     Date Of Birth          1950        Visit Information        Provider Department      2/15/2018 9:00 AM HC INF RM 3308 [facility] [place]        Today's Diagnoses     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    -  1    Malignant neoplasm of right female breast, unspecified estrogen receptor status, unspecified site of breast (H)          Care Instructions    We will see you back as planned. Your treatment was defered, please refer to new appointment schedule provided by Oncology team today.           Follow-ups after your visit        Your next 10 appointments already scheduled     Feb 22, 2018  8:00 AM CST   Level O with HC INF RM 3308   [facility] [place] ([facility] [place] )    [address]  [place] [address]   [phone]            Feb 22, 2018  8:30 AM CST   (Arrive by 8:15 AM)   Return Visit with Ceci Amaral NP   [facility] [place] ([facility] [place] )    [address]  [place] [address]   [phone]            Feb 22, 2018  9:00 AM CST   Level 3 with HC INF RM 3308   [facility] [place] ([facility] [place] )    [address]  [place] [address]   [phone]            Mar 01, 2018 11:30 AM CST   Level 4 with HC INF RM 3306   [facility] [place] ([facility] [place] )    [address]  [place] [address]   [phone]            Mar 15, 2018  8:30 AM CDT   Level O with HC INF RM 3306   [facility] [place] ([facility] [place] )    [address]  [place] [address]   [phone]            Mar 15, 2018  9:00 AM CDT   (Arrive by 8:45 AM)   Return Visit with Ceci Amaral NP   [facility] [place] ([facility] [place] )    [address]  [place] [address]   [phone]            Mar 15, 2018  9:30 AM CDT   Level 3 with HC  "INF RM 3306   [facility] [place] ([facility] [place] )    [address]  [place] [address]   [phone]            Mar 22, 2018  9:30 AM CDT   Level 4 with HC INF RM 3308   [facility] [place] ([facility] [place] )    [address]  [place] [address]   [phone]              Who to contact     If you have questions or need follow up information about today's clinic visit or your schedule please contact [facility] directly at [phone].  Normal or non-critical lab and imaging results will be communicated to you by [employer], letter or phone within 4 business days after the clinic has received the results. If you do not hear from us within 7 days, please contact the clinic through [employer] or phone. If you have a critical or abnormal lab result, we will notify you by phone as soon as possible.  Submit refill requests through [employer] or call your pharmacy and they will forward the refill request to us. Please allow 3 business days for your refill to be completed.          Additional Information About Your Visit        MyChart Information     [employer] gives you secure access to your electronic health record. If you see a primary care provider, you can also send messages to your care team and make appointments. If you have questions, please call your primary care clinic.  If you do not have a primary care provider, please call [phone] and they will assist you.        Care EveryWhere ID     This is your Care EveryWhere ID. This could be used by other organizations to access your [facility] medical records  [id number]        Your Vitals Were     Pulse Temperature Respirations Height Pulse Oximetry BMI (Body Mass Index)    100 98.8  F (37.1  C) (Tympanic) 18 1.651 m (5' 5\") 93% 34.43 kg/m2       Blood Pressure from Last 3 Encounters:   02/15/18 137/59   02/15/18 137/59   02/01/18 138/81    Weight from Last 3 Encounters:   02/15/18 93.8 kg (206 lb 14.4 oz) "   02/15/18 93.8 kg (206 lb 14.4 oz)   02/01/18 91.3 kg (201 lb 4.8 oz)              We Performed the Following     Ca27.29  breast tumor marker     CBC with platelets differential     CEA     Comprehensive metabolic panel     Ferritin     IR Port Check Left     Iron and iron binding capacity          Today's Medication Changes          These changes are accurate as of 2/15/18  3:48 PM.  If you have any questions, ask your nurse or doctor.               Start taking these medicines.        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, Cough   Started by:  Ceci Amaral NP        Dose:  100 mg   Take 1 capsule (100 mg) by mouth 2 times daily   Quantity:  20 capsule   Refills:  0       predniSONE 20 MG tablet   Commonly known as:  DELTASONE   Used for:  Cough, Malignant neoplasm of lower-outer quadrant of right breast of female, estrogen receptor negative (H), Ductal carcinoma in situ (DCIS) of right breast, Phyllodes neoplasm of breast, Anxiety and depression, 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   Started by:  Ceci Amaral NP        Dose:  20 mg   Take 1 tablet (20 mg) by mouth daily   Quantity:  4 tablet   Refills:  0            Where to get your medicines      These medications were sent to [facility] [address]  [address], [facility] [address]     Phone:  [phone]     doxycycline 100 MG capsule    predniSONE 20 MG tablet         Some of these will need a paper prescription and others can be bought over the counter.  Ask your nurse if you have questions.     Bring a paper prescription for each of these medications     guaiFENesin-codeine 100-10  MG/5ML Soln solution                Primary Care Provider Office Phone # Fax #    Mya Park [phone]       [facility] [address]   [facility] [address]        Equal Access to Services     JOSE MARKS : Rocio garcía joseo Soomaali, waaxda luqadaha, qaybta kaalmada adeegyada, candy mariaa montsealireza campbellcuong godoyjacqueline gil. So [facility] [phone].    ATENCIÓN: Si habla español, tiene a stiles disposición servicios gratuitos de asistencia lingüística. Llame al [phone].    We comply with applicable federal civil rights laws and [place] laws. We do not discriminate on the basis of race, color, national origin, age, disability, sex, sexual orientation, or gender identity.            Thank you!     Thank you for choosing [facility]  for your care. Our goal is always to provide you with excellent care. Hearing back from our patients is one way we can continue to improve our services. Please take a few minutes to complete the written survey that you may receive in the mail after your visit with us. Thank you!             Your Updated Medication List - Protect others around you: Learn how to safely use, store and throw away your medicines at www.disposemymeds.org.          This list is accurate as of 2/15/18  3:48 PM.  Always use your most recent med list.                   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       dexamethasone 4 MG tablet    DECADRON    12  tablet    Take 2 tablets (8 mg) by mouth 2 times daily (with meals) for 6 doses starting the evening prior to docetaxel    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, Cough, Malignant phyllodes tumor of breast, unspecified laterality (H), Malignant neoplasm metastatic to right lung (H), SOB (shortness of breath)       doxycycline 100 MG capsule    VIBRAMYCIN    20 capsule    Take 1 capsule (100 mg) by mouth 2 times daily    Malignant neoplasm of lower-outer quadrant of right breast of female, estrogen receptor negative (H), Ductal carcinoma in situ (DCIS) of right breast, Phyllodes neoplasm of breast, Anxiety and depression, 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, Cough       guaiFENesin-codeine 100-10 MG/5ML Soln solution    CHERATUSSIN AC    420 mL    Take 5-10 mLs by mouth every 4 hours as needed for cough    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, Cough, Malignant neoplasm of lower-outer quadrant of right breast of female, estrogen receptor negative (H), Ductal carcinoma in situ (DCIS) of right breast, Phyllodes neoplasm of breast, Anxiety and depression       IMODIUM A-D 2 MG tablet   Generic drug:  loperamide      Take 2 mg by mouth 4 times daily as needed for diarrhea    Phyllodes neoplasm of breast, Malignant neoplasm metastatic to right lung (H), Adjustment disorder with mixed anxiety and depressed mood, 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       lidocaine-prilocaine cream    EMLA    30 g    Apply 30 g topically as needed for moderate pain    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       LORazepam 0.5 MG tablet    ATIVAN    30 tablet    Take 1 tablet (0.5 mg) by mouth every 4 hours as needed (Anxiety, Nausea/Vomiting or Sleep)    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       METFORMIN HCL PO      Take 500 mg by mouth daily (with breakfast)        nystatin Powd     1 each    1 Application 2 times daily    Phyllodes neoplasm of breast, Malignant neoplasm  metastatic to right lung (H), Adjustment disorder with mixed anxiety and depressed mood, 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       predniSONE 20 MG tablet    DELTASONE    4 tablet    Take 1 tablet (20 mg) by mouth daily    Cough, Malignant neoplasm of lower-outer quadrant of right breast of female, estrogen receptor negative (H), Ductal carcinoma in situ (DCIS) of right breast, Phyllodes neoplasm of breast, Anxiety and depression, 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       prochlorperazine 10 MG tablet    COMPAZINE    30 tablet    Take 1 tablet (10 mg) by mouth every 6 hours as needed (Nausea/Vomiting)    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       * ZOLOFT 50 MG tablet   Generic drug:  sertraline      Take 50 mg by mouth daily    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 Read the directions carefully, and ask your doctor or other care provider to review them with you.

## 2018-02-15 NOTE — PATIENT INSTRUCTIONS
We will see you back as planned. Your treatment was defered, please refer to new appointment schedule provided by Oncology team today.

## 2018-02-15 NOTE — NURSING NOTE
"No chief complaint on file.      Initial /59 (BP Location: Left arm, Patient Position: Sitting, Cuff Size: Adult Regular)  Pulse 100  Temp 98.8  F (37.1  C) (Tympanic)  Resp 18  Ht 1.651 m (5' 5\")  Wt 93.8 kg (206 lb 14.4 oz)  SpO2 93%  BMI 34.43 kg/m2 Estimated body mass index is 34.43 kg/(m^2) as calculated from the following:    Height as of this encounter: 1.651 m (5' 5\").    Weight as of this encounter: 93.8 kg (206 lb 14.4 oz).  Medication Reconciliation: complete     Patient was assessed using the NCCN psychosocial distress thermometer. Patient rated the score as a 3-4/10. Patient rated current stressors as recent illness, pt was on antibiotics-finished on Monday. Stressors will be brought to the attention of provider or Oncology RN Care Coordinator for a score of 6 or greater or per nurses discretion.     Abeba Cox, RN          "

## 2018-02-15 NOTE — PATIENT INSTRUCTIONS
We would like to see you back next week prior to chemo for assessment. We will change your calender to reflect a week delay in chemotherapy. Your prescriptions for prednisone, antibiotic have been sent to:   [facility] [place], [place] - [address]  [address]  [place] [address]  Phone: [phone] Fax: [phone]     When you are in need of a refill, please call your pharmacy and they will send us a request.     If you have any questions please call [phone]    Other instructions:  Prescription given for cough med

## 2018-02-15 NOTE — PROGRESS NOTES
Clinic Care Coordination Contact  Care Team Conversations    OCC RN visited pt face to face today.  Follow up was done R/T financial issues; Juan Wong application was recently denied as the pt had already received the once/lifetime allocation.  She was informed this writer is investigating other potential options and would involve her as information is known.  Pt stated understanding this information.  Pt stated she feels pretty good today, despite not being able to receive treatment.  States she has a cold and knows this can affect the blood counts as well as the cancer.  She states she has not needed the thoracentesis for awhile now and was explained that this is likely due to her treatments working.  She stated this is good news and she does not miss making the frequent trips to [place] for these.  Pt expressed and had no further questions or concerns for this writer today.  The visit ended with pt informed to contact the writer anytime she has a question/concern or new issue to discuss.    Aimee Saunders RN  G Oncology Care Coordinator

## 2018-02-15 NOTE — MR AVS SNAPSHOT
After Visit Summary   2/15/2018    Ana Simmons    MRN: [id number]           Patient Information     Date Of Birth          1950        Visit Information        Provider Department      2/15/2018 10:00 AM  INF RM 3302 [facility] [place]        Today's Diagnoses     ERRONEOUS ENCOUNTER--DISREGARD    -  1       Follow-ups after your visit        Who to contact     If you have questions or need follow up information about today's clinic visit or your schedule please contact [facility] [place] directly at [phone].  Normal or non-critical lab and imaging results will be communicated to you by [employer], letter or phone within 4 business days after the clinic has received the results. If you do not hear from us within 7 days, please contact the clinic through [employer] or phone. If you have a critical or abnormal lab result, we will notify you by phone as soon as possible.  Submit refill requests through [employer] or call your pharmacy and they will forward the refill request to us. Please allow 3 business days for your refill to be completed.          Additional Information About Your Visit        MyChart Information     [employer] gives you secure access to your electronic health record. If you see a primary care provider, you can also send messages to your care team and make appointments. If you have questions, please call your primary care clinic.  If you do not have a primary care provider, please call [phone] and they will assist you.        Care EveryWhere ID     This is your Care EveryWhere ID. This could be used by other organizations to access your [facility] medical records  [id number]         Blood Pressure from Last 3 Encounters:   02/15/18 137/59   02/15/18 137/59   02/01/18 138/81    Weight from Last 3 Encounters:   02/15/18 93.8 kg (206 lb 14.4 oz)   02/15/18 93.8 kg (206 lb 14.4 oz)   02/01/18 91.3 kg (201 lb 4.8 oz)              Today, you had the following     No  orders found for display         Where to get your medicines      Some of these will need a paper prescription and others can be bought over the counter.  Ask your nurse if you have questions.     Bring a paper prescription for each of these medications     guaiFENesin-codeine 100-10 MG/5ML Soln solution          Primary Care Provider Office Phone # Fax #    Mya Park [phone]       [facility] [address]   [facility] [address]        Equal Access to Services     JOSE MARKS : Hadii aad ku hadasho Soomaali, waaxda luqadaha, qaybta kaalmada adeegyada, waxay idiin hayaan adeeg yoly gil. So [facility] [phone].    ATENCIÓN: Si habla español, tiene a stiles disposición servicios gratuitos de asistencia lingüística. [facility] [phone].    We comply with applicable federal civil rights laws and [place] laws. We do not discriminate on the basis of race, color, national origin, age, disability, sex, sexual orientation, or gender identity.            Thank you!     Thank you for choosing [facility]  for your [facility]. Our goal is always to provide you with excellent care. Hearing back from our patients is one way we can continue to improve our services. Please take a few minutes to complete the written survey that you may receive in the mail after your visit with us. Thank you!             Your Updated Medication List - Protect others around you: Learn how to safely use, store and throw away your medicines at www.disposemymeds.org.          This list is accurate as of 2/15/18 10:19 AM.  Always use your most recent med list.                   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, Ductal carcinoma in situ (DCIS) of right breast, Lung mass       calcium-vitamin D 600-400 MG-UNIT per tablet    CALTRATE    90 tablet     Take 1 tablet by mouth 2 times daily    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       cyanocobalamin 1000 MCG tablet    vitamin  B-12     Take 2,500 mcg by mouth daily    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       dexamethasone 4 MG tablet    DECADRON    12 tablet    Take 2 tablets (8 mg) by mouth 2 times daily (with meals) for 6 doses starting the evening prior to docetaxel    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, Cough, Malignant phyllodes tumor of breast, unspecified laterality (H), Malignant neoplasm metastatic to right lung (H), SOB (shortness of breath)       guaiFENesin-codeine 100-10 MG/5ML Soln solution    CHERATUSSIN AC    420 mL    Take 5-10 mLs by mouth every 4 hours as needed for cough    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, Cough       IMODIUM A-D 2 MG tablet   Generic drug:  loperamide      Take 2 mg by mouth 4 times daily as needed for diarrhea    Phyllodes neoplasm of breast, Malignant neoplasm metastatic to right lung (H), Adjustment disorder with mixed anxiety and depressed mood, 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       lidocaine-prilocaine cream    EMLA    30 g    Apply 30 g topically as needed for moderate pain    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       LORazepam 0.5 MG tablet    ATIVAN    30 tablet    Take 1 tablet (0.5 mg) by mouth every 4 hours as needed (Anxiety, Nausea/Vomiting or Sleep)    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       METFORMIN HCL PO      Take 500 mg by mouth daily (with breakfast)        nystatin Powd     1 each    1 Application 2 times daily    Phyllodes neoplasm of breast, Malignant neoplasm metastatic to right lung (H), Adjustment disorder with mixed anxiety and depressed mood, 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       prochlorperazine 10 MG tablet    COMPAZINE    30 tablet    Take 1 tablet (10 mg) by mouth every 6 hours as needed (Nausea/Vomiting)    Triple negative  malignant neoplasm of breast (H)       SIMVASTATIN PO      Take 20 mg by mouth At Bedtime        VITAMIN B6 PO      Take 50 mg by mouth daily    Anemia, unspecified type, Malignant neoplasm of lower-outer quadrant of right female breast (H), DCIS (ductal carcinoma in situ), right, Postmenopausal status, Aromatase inhibitor use, Encounter for monitoring cardiotoxic drug therapy       * ZOLOFT 50 MG tablet   Generic drug:  sertraline      Take 50 mg by mouth daily    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 Read the directions carefully, and ask your doctor or other care provider to review them with you.

## 2018-02-15 NOTE — PROGRESS NOTES
Oncology Follow-up Visit:  February 15, 2018    Reason for Visit:  Patient presents with:  RECHECK: cycle 3 chemo     Nursing Note and documentation reviewed: yes    HPI:   This is a 67-year-old female patient who presents to the oncology/hematology clinic today for evaluation prior to receiving cycle 3 day 1 chemotherapy.  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  She underwent treatment completing this 11/2016 and was found to have metastasis to the right lung in November 2017.  Patient was evaluated by Dr. 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  He recommended docetaxel with gemcitabine and this was initiated on 1/3/2018.      She presents to the clinic today accompanied by her  stating she is having upper respiratory symptoms which developed 2-3 weeks ago after her last chemotherapy.  She was evaluated by Dr. Lobato in [facility] and she was placed on antibiotic last Thursday and this was azithromycin.  She completed the antibiotic on Monday.  Chest x-ray completed there showed no pneumonia and minimal pleural effusion.  She does continue with a cough stating it is somewhat different than her previous cough she's been experiencing since her recurrence.  She does also have nasal drainage and states both her nasal drainage and sputum will be brown at times.  She denies any fevers and does have some shortness of breath that continues with activity.  She has a heavy feeling in her chest ever since she's been ill.  She is using a cough medicine which she states does seem to help somewhat and needs a refill on this today.  She also does have some minor swelling to her ankles and feet.  She states  the increase in Zoloft seems to be helping her anxiety and depression but admits to taking 2-3 Ativan per day and is doing this maybe more related to her recently being ill and her increased cough.  She also experiences headaches at times with coughing.  She denies any issues with numbness and tingling to her fingers or toes.  Again we discussed genetics today and she is in favor of a referral.    Oncologic History: Patient underwent mammogram in January 2016 which revealed abnormalities of the right breast.  On 02/09/2016, a stereotactic biopsy of the right breast lesion at the 8 o'clock position revealed grade 3 DCIS of a solid micropapillary subtype.  Estrogen receptors were positive.  Also a biopsy of the right breast lesion at the 9 o'clock position revealed a grade 2 DCIS of the solid micropapillary subtype.  Estrogen receptors were positive.  She underwent bilateral mastectomies on 3/30/16 by Dr. 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  There was also angiosarcomatous, liposarcomatous, malignant spindle cell and chondroid differentiation.  The tumor in the lower quadrant was distinct in the 2 DCIS lesions noted in the right breast.  Estrogen receptor positive at 2% with progesterone receptor negative; tumor was HER-2/renetta negative.  In terms of her DCIS, there was a 1.5 cm DCIS in the right breast as well as a smaller focus noted.  There was a sentinel lymph node that was negative. Chemotherapy was recommended.  She was evaluated by Dr. Lam with [facility] on 4/18/2016 he felt patient had a metaplastic carcinoma as well as DCIS of the right breast.  He did note that there was no consensus best treatment options for this patient, but he felt given that she likely had triple negative disease and if she had a pure ductal adenocarcinoma, he would favor treating  her a triple-negative breast cancer that is greater than 0.5 cm.  He felt that dose-dense AC x 4 cycles plus Taxol weekly x 12 weeks would be ideal.  She was evaluated here by Dr. Celis with Medical Oncology on 5/3/2016 as she wanted to receive treatment closer to home.      She underwent treatment was found to have metastasis to the right lung in November 2017.  Patient was evaluated by Dr. 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  He recommended docetaxel with gemcitabine and this was initiated on 1/3/2018.        Current Chemo Regime/TX:  Gemcitabine 900mg/m2 with docetaxel 75mg/m2 day 1 and day 8 every 21 days with nuelasta support day 9.  Current Cycle: 3 day 1  # of completed cycles: 2     Weight 96kg on 1/3/18      Previous treatment:  Adriamycin 60mg/m2 and Cytoxan 600mg/m2 every 2 weeks with Neulasta support 6mg SQ injection day 2 x 4 cycles; Taxol 80mg/m2 weekly x 12 weeks; arimidex 1mg daily initiated 11/2016    Past Medical History:   Diagnosis Date     Breast cancer ([facility])      DMII (diabetes mellitus, type 2) ([facility])      HLD (hyperlipidemia)      Nonhealing surgical wound        Past Surgical History:   Procedure Laterality Date     APPENDECTOMY OPEN       C VAGINAL HYSTERECTOMY       INSERT PORT VASCULAR ACCESS Left 6/2/2016    Procedure: INSERT PORT VASCULAR ACCESS;  Surgeon: Micheline Davis MD;  Location: [facility] OR     MASTECTOMY Bilateral        Family History   Problem Relation Age of Onset     Lung Cancer Father      Myocardial Infarction Father      Coronary Artery Disease Father      DIABETES Father      Hyperlipidemia Father      Hypertension Father      Prostate Cancer Paternal Grandfather      DIABETES Mother      Hyperlipidemia Mother      Thyroid Disease Mother      Thyroid Disease Maternal Grandmother      Thyroid Disease Daughter      Breast Cancer No family hx of      Colon Cancer No  family hx of      Depression No family hx of      Asthma No family hx of        Social History     Social History     Marital status:      Spouse name: Braden     Number of children: 2     Years of education: N/A     Occupational History      Retired     Social History Main Topics     Smoking status: Former Smoker     Smokeless tobacco: Never Used     Alcohol use No      Comment: 1 kamille/ night, with chemo is not drinking     Drug use: No     Sexual activity: Not on file     Other Topics Concern     Parent/Sibling W/ Cabg, Mi Or Angioplasty Before 65f 55m? Yes     father     Social History Narrative       Current Outpatient Prescriptions   Medication     guaiFENesin-codeine (CHERATUSSIN AC) 100-10 MG/5ML SOLN solution     predniSONE (DELTASONE) 20 MG tablet     doxycycline (VIBRAMYCIN) 100 MG capsule     dexamethasone (DECADRON) 4 MG tablet     loperamide (IMODIUM A-D) 2 MG tablet     sertraline (ZOLOFT) 50 MG tablet     nystatin POWD     prochlorperazine (COMPAZINE) 10 MG tablet     LORazepam (ATIVAN) 0.5 MG tablet     anastrozole (ARIMIDEX) 1 MG tablet     Pyridoxine HCl (VITAMIN B6 PO)     calcium-vitamin D (CALTRATE) 600-400 MG-UNIT per tablet     cyanocobalamin (VITAMIN  B-12) 1000 MCG tablet     METFORMIN HCL PO     SIMVASTATIN PO     [DISCONTINUED] dexamethasone (DECADRON) 4 MG tablet     sertraline (ZOLOFT) 50 MG tablet     lidocaine-prilocaine (EMLA) cream     No current facility-administered medications for this visit.         Allergies   Allergen Reactions     Cats        Review Of Systems:  Constitutional: denies fever, weight changes, chills, and night sweats.  Eyes: denies blurred or double vision  Ears/Nose/Throat: denies ear pain, nose problems, difficulty swallowing  Respiratory: see hPI  Skin: denies new rash, lesions; nystatin powder helps the rash in her groin  Breast/Chest wall: denies new pain, lumps   Cardiovascular: denies chest pain, palpitations  Gastrointestinal:  "denies abdominal pain, bloating, nausea, vomiting, early satiety  Genitourinary: denies difficulty with urination, blood in urine  Musculoskeletal:denies new muscle pain, bone pain  Neurologic: denies lightheadedness, headaches, numbness or tingling  Psychiatric: see hPI  Hematologic/Lymphatic/Immunologic: denies easy bruising, easy bleeding, lumps or bumps noted  Endocrine: Denies increased thirst    ECOG Performance Status: 1-2    Physical Exam:  /59 (BP Location: Left arm, Patient Position: Sitting, Cuff Size: Adult Regular)  Pulse 100  Temp 98.8  F (37.1  C) (Tympanic)  Resp 18  Ht 1.651 m (5' 5\")  Wt 93.8 kg (206 lb 14.4 oz)  SpO2 93%  BMI 34.43 kg/m2    GENERAL APPEARANCE: Healthy, alert and in no acute distress.  HEENT: Normocephalic, Sclerae anicteric. Oropharynx without ulcers, lesions, or thrush.  NECK:  No asymmetry or masses, no thyromegaly.  LYMPHATICS: No palpable cervical, supraclavicular, axillary, or inguinal nodes   RESP: Lungs with faint rales scattered and faint wheeze to upper left lobe, respirations regular and easy  CARDIOVASCULAR: Regular rate and rhythm. Normal S1, S2  ABDOMEN: Soft, nontender. Bowel sounds auscultated all 4 quadrants. No palpable organomegaly or masses.  BREAST/Chest wall: not examined  MUSCULOSKELETAL: Extremities without gross deformities noted. Mild edema to ankles and feet.  NEURO: Alert and oriented x 3.  Gait steady.  PSYCHIATRIC: Mentation and affect appear normal.  Mood appropriate.    Laboratory:  Results for orders placed or performed in visit on 02/15/18   IR Port Check Left    Narrative    PROCEDURE: IR PORT CHECK LEFT 2/15/2018 12:28 PM    HISTORY: no blood return;     COMPARISONS: None.    TECHNIQUE: Infusion port check was performed following injection of IV  contrast.    FINDINGS: There is a fibrous sheath around the tip of the infusion  port catheter. Free flow of contrast however from the catheter into  the superior vena cava was " demonstrated.         Impression    IMPRESSION: Fibrous sheath around the tip of the infusion port    ANDREW BACA MD   Iron and iron binding capacity   Result Value Ref Range    Iron 41 35 - 180 ug/dL    Iron Binding Cap 250 240 - 430 ug/dL    Iron Saturation Index 16 15 - 46 %   Ferritin   Result Value Ref Range    Ferritin 630 (H) 8 - 252 ng/mL   Comprehensive metabolic panel   Result Value Ref Range    Sodium 140 133 - 144 mmol/L    Potassium 3.9 3.4 - 5.3 mmol/L    Chloride 106 94 - 109 mmol/L    Carbon Dioxide 25 20 - 32 mmol/L    Anion Gap 9 3 - 14 mmol/L    Glucose 119 (H) 70 - 99 mg/dL    Urea Nitrogen 13 7 - 30 mg/dL    Creatinine 0.69 0.52 - 1.04 mg/dL    GFR Estimate 84 >60 mL/min/1.7m2    GFR Estimate If Black >90 >60 mL/min/1.7m2    Calcium 8.4 (L) 8.5 - 10.1 mg/dL    Bilirubin Total 0.4 0.2 - 1.3 mg/dL    Albumin 2.6 (L) 3.4 - 5.0 g/dL    Protein Total 6.4 (L) 6.8 - 8.8 g/dL    Alkaline Phosphatase 145 40 - 150 U/L    ALT 47 0 - 50 U/L    AST 25 0 - 45 U/L   CBC with platelets differential   Result Value Ref Range    WBC 10.3 4.0 - 11.0 10e9/L    RBC Count 3.02 (L) 3.8 - 5.2 10e12/L    Hemoglobin 9.0 (L) 11.7 - 15.7 g/dL    Hematocrit 28.2 (L) 35.0 - 47.0 %    MCV 93 78 - 100 fl    MCH 29.8 26.5 - 33.0 pg    MCHC 31.9 31.5 - 36.5 g/dL    RDW 16.4 (H) 10.0 - 15.0 %    Platelet Count 256 150 - 450 10e9/L    Diff Method Automated Method     % Neutrophils 85.2 %    % Lymphocytes 7.9 %    % Monocytes 4.7 %    % Eosinophils 0.9 %    % Basophils 0.4 %    % Immature Granulocytes 0.9 %    Nucleated RBCs 0 0 /100    Absolute Neutrophil 8.8 (H) 1.6 - 8.3 10e9/L    Absolute Lymphocytes 0.8 0.8 - 5.3 10e9/L    Absolute Monocytes 0.5 0.0 - 1.3 10e9/L    Absolute Eosinophils 0.1 0.0 - 0.7 10e9/L    Absolute Basophils 0.0 0.0 - 0.2 10e9/L    Abs Immature Granulocytes 0.1 0 - 0.4 10e9/L    Absolute Nucleated RBC 0.0        Imaging Studies:      PROCEDURE: IR PORT CHECK LEFT 2/15/2018 12:28 PM     HISTORY: no blood  return;      COMPARISONS: None.     TECHNIQUE: Infusion port check was performed following injection of IV  contrast.     FINDINGS: There is a fibrous sheath around the tip of the infusion  port catheter. Free flow of contrast however from the catheter into  the superior vena cava was demonstrated.          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  She underwent treatment then was found to have metastasis to the right lung in November 2017.  She was seen at the [facility] with recommendation for gemcitabine and docetaxel and this was initiated 1/3/18.  Dose was decreased by 20% with Cycle 2 related to decreased performance status and loose stools. Tolerated treatment better at this dose.  Will delay today's treatment to next week related to upper respiratory infection.  She will see me next week prior to treatment.    #2  Depression/anxiety: Discussed the use of the ativan routinely and that if needed 2-3 times a day, we may need to consider increasing the Zoloft again.  We will wait and discuss further at her visit next week.    #3  Fibrin Sheath:  Alteplase protocol today.    #4  Sinusitis/cough:  Will treat with additional 10 days of antibiotic and doxycycline sent to her pharmacy.  Will also treat with prednisone 20mg daily for 5 days with first dose her today.  Will see her next week for reassessment.  Cough med refilled.    #5  Edema:  Mild.  Discussed in relation to her 2 chemo meds and that these can cause edema.  Discussed adding a diuretic but will hold off at this point.    #6  Anemia:  Iron studies are actually WNL with elevated ferritin related to her disease.  She will start 1 iron tablet  daily.    Genetics referral sent.    I encouraged patient to call with any questions or concerns.       Ceci TOMLIN, FNP-BC, [place]

## 2018-02-16 NOTE — TELEPHONE ENCOUNTER
Patient notified via phone to stop arimidex per DR Celis recommendations and also discussed iron studies and per MEGHAN Amaral to iron once daily as instructed at appointment yesterday

## 2018-02-22 NOTE — MR AVS SNAPSHOT
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   Acetaminophen chewable tablets  Brand Names: Genapap, Mapap, Pain & Fever, Pain and Fever, PediaCare Children's Smooth Metls Fever Reducer/Pain Reliever, Tylenol Children's  What is this medicine?  ACETAMINOPHEN (a set a JUSTIN clotilde fen) is a pain reliever. It is used to treat mild pain and fever.  How should I use this medicine?  Take this medicine by mouth. Chew it completely before swallowing. Follow the directions on the package or prescription label. Take your medicine at regular intervals. Do not take your medicine more often than directed.  Talk to your pediatrician regarding the use of this medicine in children. While this drug may be prescribed for children as young as 2 years of age for selected conditions, precautions do apply.  What side effects may I notice from receiving this medicine?  Side effects that you should report to your doctor or health care professional as soon as possible:    allergic reactions like skin rash, itching or hives, swelling of the face, lips, or tongue    breathing problems    redness, blistering, peeling or loosening of the skin, including inside the mouth    sore throat with fever, headache, rash, nausea, or vomiting    trouble passing urine or change in the amount of urine    unusual bleeding or bruising    unusually weak or tired    yellowing of the eyes or skin  Side effects that usually do not require medical attention (report to your doctor or health  care professional if they continue or are bothersome):    headache    nausea, stomach upset  What may interact with this medicine?    alcohol    imatinib    isoniazid    other medicines with acetaminophen  What if I miss a dose?  If you miss a dose, take it as soon as you can. If it is almost time for your next dose, take only that dose. Do not take double or extra doses.  Where should I keep my medicine?  Keep out of reach of children.  Store at room temperature between 20 and 25 degrees C (68 and 77 degrees F). Protect from moisture and heat. Throw away any unused medicine after the expiration date.  What should I tell my health care provider before I take this medicine?  They need to know if you have any of these conditions:    if you often drink alcohol    liver disease    phenylketonuria    an unusual or allergic reaction to acetaminophen, other medicines, foods, dyes, or preservatives    pregnant or trying to get pregnant    breast-feeding  What should I watch for while using this medicine?  Tell your doctor or health care professional if the pain lasts more than 10 days (5 days for children), if it gets worse, or if there is a new or different kind of pain. Also, check with your doctor if a fever lasts for more than 3 days.  Do not take other medicines that contain acetaminophen with this medicine. Always read labels carefully. If you have questions, ask your doctor or pharmacist.  If you take too much acetaminophen get medical help right away. Too much acetaminophen can be very dangerous and cause liver damage. Even if you do not have symptoms, it is important to get help right away.  NOTE:This sheet is a summary. It may not cover all possible information. If you have questions about this medicine, talk to your doctor, pharmacist, or health care provider. Copyright  2017 [employer]                Follow-ups after your visit        Your next 10 appointments already scheduled     Mar 01, 2018 11:30 AM CST    Level 4 with HC INF RM 3306   [facility] ([facility] )    [address]  [place] [address]   [phone]            Mar 12, 2018 10:00 AM CDT   Level O with HC INF RM 3308   [facility] ([facility] )    [address]  [place] [address]   [phone]            Mar 12, 2018 10:30 AM CDT   (Arrive by 10:15 AM)   CT CHEST W CONTRAST with HICT1, HIXRRN   HI CT SCAN ([facility] )    [address] [place] [id number] [phone]           Please bring any scans or X-rays taken at other hospitals, if similar tests were done. Also bring a list of your medicines, including vitamins, minerals and over-the-counter drugs. It is safest to leave personal items at home.  Be sure to tell your doctor:   If you have any allergies.   If there s any chance you are pregnant.   If you are breastfeeding.    If you have diabetes as your medication may need to be adjusted for this exam.  You will have contrast for this exam. To prepare:   Do not eat or drink for 2 hours before your exam. If you need to take medicine, you may take it with small sips of water. (We may ask you to take liquid medicine as well.)   The day before your exam, drink extra fluids at least six 8-ounce glasses (unless your doctor tells you to restrict your fluids).  Patients over 70 or patients with diabetes or kidney problems:   If you haven t had a blood test (creatinine test) within the last 30 days, the Cardiologist/Radiologist may require you to get this test prior to your exam.  Please wear loose clothing, such as a sweat suit or jogging clothes. Avoid snaps, zippers and other metal. We may ask you to undress and put on a hospital gown.  If you have any questions, please call the Imaging Department where you will have your exam.            Mar 15, 2018  9:00 AM CDT   (Arrive by 8:45 AM)   Return Visit with Ceci Amaral NP   [facility]  ([facility] [place] )    [address]  [place] [address]   [phone]            Mar 15, 2018  9:30 AM CDT   Level 3 with HC INF RM 3306   [facility] [place] ([facility] [place] )    [address]  [place] [address]   [phone]            Mar 22, 2018  9:30 AM CDT   Level 4 with HC INF RM 3308   [facility] [place] ([facility] [place] )    [address]  [place] [address]   [phone]            Jul 25, 2018  9:00 AM CDT   (Arrive by 8:45 AM)   New Visit with Jerri Cueto GC   [facility] [place] ([facility] [place] )    [address]  [place] [address]   [phone]              Future tests that were ordered for you today     Open Future Orders        Priority Expected Expires Ordered    CT Chest w Contrast Routine 3/5/2018 10/22/2018 2/22/2018            Who to contact     If you have questions or need follow up information about today's clinic visit or your schedule please contact [facility] directly at [phone].  Normal or non-critical lab and imaging results will be communicated to you by MyChart, letter or phone within 4 business days after the clinic has received the results. If you do not hear from us within 7 days, please contact the clinic through [employer] or phone. If you have a critical or abnormal lab result, we will notify you by phone as soon as possible.  Submit refill requests through [employer] or call your pharmacy and they will forward the refill request to us. Please allow 3 business days for your refill to be completed.          Additional Information About Your Visit        [employer] Information     [employer] gives you secure access to your electronic health record. If you see a primary care provider, you can also send messages to your care team and make appointments. If you have questions, please call your primary care clinic.  If you do not have a primary care provider, please  "call [phone] and they will assist you.        Care EveryWhere ID     This is your Care EveryWhere ID. This could be used by other organizations to access your [facility] medical records  [id number]        Your Vitals Were     Pulse Temperature Respirations Height Pulse Oximetry BMI (Body Mass Index)    78 97.4  F (36.3  C) (Tympanic) 20 1.651 m (5' 5\") 95% 33.63 kg/m2       Blood Pressure from Last 3 Encounters:   02/22/18 138/72   02/22/18 145/66   02/22/18 137/59    Weight from Last 3 Encounters:   02/22/18 91.7 kg (202 lb 1.6 oz)   02/22/18 91.7 kg (202 lb 1.6 oz)   02/22/18 93.8 kg (206 lb 14.4 oz)              Today, you had the following     No orders found for display         Today's Medication Changes          These changes are accurate as of 2/22/18 11:31 AM.  If you have any questions, ask your nurse or doctor.               These medicines have changed or have updated prescriptions.        Dose/Directions    sertraline 50 MG tablet   Commonly known as:  ZOLOFT   This may have changed:  Another medication with the same name was removed. Continue taking this medication, and follow the directions you see here.   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    ATENCIÓN: Si habla español, tiene a stiles disposición servicios gratuitos de asistencia lingüística. " Clint khan [phone].    We comply with applicable federal civil rights laws and [place] laws. We do not discriminate on the basis of race, color, national origin, age, disability, sex, sexual orientation, or gender identity.            Thank you!     Thank you for choosing [facility]  for your care. Our goal is always to provide you with excellent care. Hearing back from our patients is one way we can continue to improve our services. Please take a few minutes to complete the written survey that you may receive in the mail after your visit with us. Thank you!             Your Updated Medication List - Protect others around you: Learn how to safely use, store and throw away your medicines at www.disposemymeds.org.          This list is accurate as of 2/22/18 11:31 AM.  Always use your most recent med list.                   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       dexamethasone 4 MG tablet    DECADRON    12 tablet    Take 2 tablets (8 mg) by mouth 2 times daily (with meals) for 6 doses starting the evening prior to docetaxel    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, Cough, Malignant phyllodes tumor of breast, unspecified laterality (H), Malignant neoplasm metastatic to right lung (H), SOB (shortness of breath)       doxycycline 100 MG capsule    VIBRAMYCIN    20 capsule    Take 1 capsule (100 mg) by mouth 2 times daily    Malignant neoplasm of lower-outer quadrant of right breast of  female, estrogen receptor negative (H), Ductal carcinoma in situ (DCIS) of right breast, Phyllodes neoplasm of breast, Anxiety and depression, 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, Cough       guaiFENesin-codeine 100-10 MG/5ML Soln solution    CHERATUSSIN AC    420 mL    Take 5-10 mLs by mouth every 4 hours as needed for cough    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, Cough, Malignant neoplasm of lower-outer quadrant of right breast of female, estrogen receptor negative (H), Ductal carcinoma in situ (DCIS) of right breast, Phyllodes neoplasm of breast, Anxiety and depression       IMODIUM A-D 2 MG tablet   Generic drug:  loperamide      Take 2 mg by mouth 4 times daily as needed for diarrhea    Phyllodes neoplasm of breast, Malignant neoplasm metastatic to right lung (H), Adjustment disorder with mixed anxiety and depressed mood, 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       lidocaine-prilocaine cream    EMLA    30 g    Apply 30 g topically as needed for moderate pain    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       LORazepam 0.5 MG tablet    ATIVAN    30 tablet    Take 1 tablet (0.5 mg) by mouth every 4 hours as needed (Anxiety, Nausea/Vomiting or Sleep)    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       METFORMIN HCL PO      Take 500 mg by mouth daily (with breakfast)        nystatin Powd     1 each    1 Application 2 times daily    Phyllodes neoplasm of breast, Malignant neoplasm metastatic to right lung (H), Adjustment disorder with mixed anxiety and depressed mood, 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       prochlorperazine 10 MG tablet    COMPAZINE    30 tablet    Take 1 tablet (10 mg) by mouth every 6 hours as needed (Nausea/Vomiting)    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)       sertraline 50 MG tablet    ZOLOFT    180 tablet    Take 2 tablets (100 mg) by mouth daily    Phyllodes neoplasm of breast, Malignant neoplasm metastatic to right  lung (H), Adjustment disorder with mixed anxiety and depressed mood, 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

## 2018-02-22 NOTE — MR AVS SNAPSHOT
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           Follow-ups after your visit        Your next 10 appointments already scheduled     Feb 22, 2018  9:00 AM CST   Level 3 with HC INF RM 3308   [facility] [place] ([facility] [place] )    [address]  [place] [address]   [phone]            Mar 01, 2018 11:30 AM CST   Level 4 with HC INF RM 3306   [facility] [place] ([facility] [place] )    [address]  [place] [address]   [phone]            Mar 15, 2018  8:30 AM CDT   Level O with HC INF RM 3306   [facility] [place] ([facility] [place] )    [address]  [place] [address]   [phone]            Mar 15, 2018  9:00 AM CDT   (Arrive by 8:45 AM)   Return Visit with Ceci Amaral NP   [facility] [place] ([facility] [place] )    [address]  [place] [address]   [phone]            Mar 15, 2018  9:30 AM CDT   Level 3 with HC INF RM 3306   [facility] [place] ([facility] [place] )    [address]  [place] [address]   [phone]            Mar 22, 2018  9:30 AM CDT   Level 4 with HC INF RM 3308   [facility] [place] ([facility] [place] )    [address]  [place] [address]   [phone]            Jul 25, 2018  9:00 AM CDT   (Arrive by 8:45 AM)   New Visit with Jerri Cueto GC   [facility] [place] ([facility] [place] )    [address]  [place] [address]   [phone]              Who to contact     If you have questions or need follow up information about  "today's clinic visit or your schedule please contact [facility] directly at [phone].  Normal or non-critical lab and imaging results will be communicated to you by MyChart, letter or phone within 4 business days after the clinic has received the results. If you do not hear from us within 7 days, please contact the clinic through [employer] or phone. If you have a critical or abnormal lab result, we will notify you by phone as soon as possible.  Submit refill requests through [employer] or call your pharmacy and they will forward the refill request to us. Please allow 3 business days for your refill to be completed.          Additional Information About Your Visit        [employer] Information     [employer] gives you secure access to your electronic health record. If you see a primary care provider, you can also send messages to your care team and make appointments. If you have questions, please call your primary care clinic.  If you do not have a primary care provider, please call [phone] and they will assist you.        Care EveryWhere ID     This is your Care EveryWhere ID. This could be used by other organizations to access your [facility] medical records  [id number]        Your Vitals Were     Pulse Temperature Respirations Height Pulse Oximetry BMI (Body Mass Index)    100 98.8  F (37.1  C) (Tympanic) 18 1.651 m (5' 5\") 93% 34.43 kg/m2       Blood Pressure from Last 3 Encounters:   02/22/18 145/66   02/22/18 137/59   02/15/18 137/59    Weight from Last 3 Encounters:   02/22/18 91.7 kg (202 lb 1.6 oz)   02/22/18 93.8 kg (206 lb 14.4 oz)   02/15/18 93.8 kg (206 lb 14.4 oz)              We Performed the Following     CBC with platelets differential     Comprehensive metabolic panel          Today's Medication Changes          These changes are accurate as of 2/22/18  8:38 AM.  If you have any questions, ask your nurse or doctor.               These medicines have changed or have updated prescriptions. "        Dose/Directions    sertraline 50 MG tablet   Commonly known as:  ZOLOFT   This may have changed:  Another medication with the same name was removed. Continue taking this medication, and follow the directions you see here.   Used for:  Phyllodes neoplasm of breast, Malignant neoplasm metastatic to right lung (H), Adjustment disorder with mixed anxiety and depressed mood, Triple negative malignant neoplasm of breast (H)   Changed by:  Ceci Amaral NP        Dose:  100 mg   Take 2 tablets (100 mg) by mouth daily   Quantity:  180 tablet   Refills:  1                Primary Care Provider Office Phone # Fax #    Mya Smallwoodbartolome [phone]       [facility] [address]   [facility] [address]        Equal Access to Services     JOSE MARKS : Rocio garciao Soavril, waaxda luqadaha, qaybta kaalmada adeegyada, candy nieves . So [facility] [phone].    ATENCIÓN: Si habla español, tiene a stiles disposición servicios gratuitos de asistencia lingüística. [facility] [phone].    We comply with applicable federal civil rights laws and [place] laws. We do not discriminate on the basis of race, color, national origin, age, disability, sex, sexual orientation, or gender identity.            Thank you!     Thank you for choosing [facility]  for your care. Our goal is always to provide you with excellent care. Hearing back from our patients is one way we can continue to improve our services. Please take a few minutes to complete the written survey that you may receive in the mail after your visit with us. Thank you!             Your Updated Medication List - Protect others around you: Learn how to safely use, store and throw away your medicines at www.disposemymeds.org.          This list is accurate as of 2/22/18  8:38 AM.  Always use your most recent med list.                   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)       dexamethasone 4 MG tablet    DECADRON    12 tablet    Take 2 tablets (8 mg) by mouth 2 times daily (with meals) for 6 doses starting the evening prior to docetaxel    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), Cough, Malignant phyllodes tumor of breast, unspecified laterality (H), Malignant neoplasm metastatic to right lung (H), SOB (shortness of breath)       doxycycline 100 MG capsule    VIBRAMYCIN    20 capsule    Take 1 capsule (100 mg) by mouth 2 times daily    Malignant neoplasm of lower-outer quadrant of right breast of female, estrogen receptor negative (H), Ductal carcinoma in situ (DCIS) of right breast, Phyllodes neoplasm of breast, Anxiety and depression, Triple negative malignant neoplasm of breast (H), Cough       guaiFENesin-codeine 100-10 MG/5ML Soln solution    CHERATUSSIN AC    420 mL    Take 5-10 mLs by mouth every 4 hours as needed for cough    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), Cough, Malignant neoplasm of lower-outer quadrant of right breast of female, estrogen receptor negative (H), Ductal carcinoma in situ (DCIS) of right breast, Phyllodes neoplasm of breast, Anxiety and depression       IMODIUM A-D 2 MG tablet   Generic drug:  loperamide      Take 2 mg by mouth 4 times daily as needed for diarrhea    Phyllodes neoplasm of breast, Malignant neoplasm metastatic to right lung (H), Adjustment disorder with mixed anxiety and depressed mood, Triple negative malignant neoplasm of breast  (H)       lidocaine-prilocaine cream    EMLA    30 g    Apply 30 g topically as needed for moderate pain    Triple negative malignant neoplasm of breast (H)       LORazepam 0.5 MG tablet    ATIVAN    30 tablet    Take 1 tablet (0.5 mg) by mouth every 4 hours as needed (Anxiety, Nausea/Vomiting or Sleep)    Triple negative malignant neoplasm of breast (H)       METFORMIN HCL PO      Take 500 mg by mouth daily (with breakfast)        nystatin Powd     1 each    1 Application 2 times daily    Phyllodes neoplasm of breast, Malignant neoplasm metastatic to right lung (H), Adjustment disorder with mixed anxiety and depressed mood, Triple negative malignant neoplasm of breast (H)       prochlorperazine 10 MG tablet    COMPAZINE    30 tablet    Take 1 tablet (10 mg) by mouth every 6 hours as needed (Nausea/Vomiting)    Triple negative malignant neoplasm of breast (H)       sertraline 50 MG tablet    ZOLOFT    180 tablet    Take 2 tablets (100 mg) by mouth daily    Phyllodes neoplasm of breast, Malignant neoplasm metastatic to right lung (H), Adjustment disorder with mixed anxiety and depressed mood, 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

## 2018-02-22 NOTE — PROGRESS NOTES
Oncology Follow-up Visit:  February 22, 2018    Reason for Visit:  Patient presents with:  RECHECK: Follow up triple negative malignant neoplasm right breast     Nursing Note and documentation reviewed: yes    HPI:  This is a 67-year-old female patient who presents to the oncology/hematology clinic today for evaluation prior to receiving cycle 3 day 1 chemotherapy.  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  She underwent treatment completing this 11/2016 and was found to have metastasis to the right lung in November 2017.  Patient was evaluated by Dr. 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  He recommended docetaxel with gemcitabine and this was initiated on 1/3/2018.       She had presented last week to initiate cycle 3; however, she was evaluated the week prior for upper respiratory infection and placed on antibiotics.   Last week she had symptoms of ongoing sinusitis and bronchitis and was placed on a second course of antibiotics.  She states she is feeling better but still has a cough.  No fevers and she doesn't have the nasal congestion as she had last week.  Her fatigue is improved.  Her SOB is also much improved.  She is using the cough medicine at least once daily which helps calm the cough.  She feels her depression and anxiety are much improved on the increased dose of zoloft.    Oncologic History: Patient underwent mammogram in January 2016 which revealed abnormalities of the right breast.  On 02/09/2016, a stereotactic biopsy of the right breast lesion at the 8 o'clock position revealed grade 3 DCIS of a solid micropapillary subtype.  Estrogen receptors were positive.  Also a biopsy of the  right breast lesion at the 9 o'clock position revealed a grade 2 DCIS of the solid micropapillary subtype.  Estrogen receptors were positive.  She underwent bilateral mastectomies on 3/30/16 by Dr. Sara Cooley at the [facility] with pathology revealing a 2 cm metaplastic carcinoma with epithelial mesenchymal components including adenocarcinoma.  There was also angiosarcomatous, liposarcomatous, malignant spindle cell and chondroid differentiation.  The tumor in the lower quadrant was distinct in the 2 DCIS lesions noted in the right breast.  Estrogen receptor positive at 2% with progesterone receptor negative; tumor was HER-2/renetta negative.  In terms of her DCIS, there was a 1.5 cm DCIS in the right breast as well as a smaller focus noted.  There was a sentinel lymph node that was negative. Chemotherapy was recommended.  She was evaluated by Dr. Lam with [facility] on 4/18/2016 he felt patient had a metaplastic carcinoma as well as DCIS of the right breast.  He did note that there was no consensus best treatment options for this patient, but he felt given that she likely had triple negative disease and if she had a pure ductal adenocarcinoma, he would favor treating her a triple-negative breast cancer that is greater than 0.5 cm.  He felt that dose-dense AC x 4 cycles plus Taxol weekly x 12 weeks would be ideal.  She was evaluated here by Dr. Celis with Medical Oncology on 5/3/2016 as she wanted to receive treatment closer to home.      She underwent treatment was found to have metastasis to the right lung in November 2017.  Patient was evaluated by Dr. 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  He recommended docetaxel with gemcitabine and this was initiated on 1/3/2018.        Current Chemo Regime/TX:  Gemcitabine 900mg/m2 with docetaxel 75mg/m2 day 1 and day 8  every 21 days with nuelasta support day 9.  Current Cycle: 3 day 1  # of completed cycles: 2      Weight 96kg on 1/3/18      Previous treatment:  Adriamycin 60mg/m2 and Cytoxan 600mg/m2 every 2 weeks with Neulasta support 6mg SQ injection day 2 x 4 cycles; Taxol 80mg/m2 weekly x 12 weeks; arimidex 1mg daily initiated 11/2016    Past Medical History:   Diagnosis Date     Breast cancer (H)      DMII (diabetes mellitus, type 2) (H)      HLD (hyperlipidemia)      Nonhealing surgical wound        Past Surgical History:   Procedure Laterality Date     APPENDECTOMY OPEN       C VAGINAL HYSTERECTOMY       INSERT PORT VASCULAR ACCESS Left 6/2/2016    Procedure: INSERT PORT VASCULAR ACCESS;  Surgeon: Micheline Davis MD;  Location: [facility] OR     MASTECTOMY Bilateral        Family History   Problem Relation Age of Onset     Lung Cancer Father      Myocardial Infarction Father      Coronary Artery Disease Father      DIABETES Father      Hyperlipidemia Father      Hypertension Father      Prostate Cancer Paternal Grandfather      DIABETES Mother      Hyperlipidemia Mother      Thyroid Disease Mother      Thyroid Disease Maternal Grandmother      Thyroid Disease Daughter      Breast Cancer No family hx of      Colon Cancer No family hx of      Depression No family hx of      Asthma No family hx of      CEREBROVASCULAR DISEASE No family hx of      Genetic Disorder No family hx of        Social History     Social History     Marital status:      Spouse name: Braden     Number of children: 2     Years of education: N/A     Occupational History      Retired     Social History Main Topics     Smoking status: Former Smoker     Smokeless tobacco: Never Used     Alcohol use No      Comment: 1 kamille/ night, with chemo is not drinking     Drug use: No     Sexual activity: Not on file     Other Topics Concern     Parent/Sibling W/ Cabg, Mi Or Angioplasty Before 65f 55m? Yes     father     Social History Narrative  "      Current Outpatient Prescriptions   Medication     albuterol (PROAIR HFA/PROVENTIL HFA/VENTOLIN HFA) 108 (90 BASE) MCG/ACT Inhaler     guaiFENesin-codeine (CHERATUSSIN AC) 100-10 MG/5ML SOLN solution     doxycycline (VIBRAMYCIN) 100 MG capsule     dexamethasone (DECADRON) 4 MG tablet     loperamide (IMODIUM A-D) 2 MG tablet     sertraline (ZOLOFT) 50 MG tablet     nystatin POWD     Pyridoxine HCl (VITAMIN B6 PO)     calcium-vitamin D (CALTRATE) 600-400 MG-UNIT per tablet     cyanocobalamin (VITAMIN  B-12) 1000 MCG tablet     METFORMIN HCL PO     SIMVASTATIN PO     prochlorperazine (COMPAZINE) 10 MG tablet     LORazepam (ATIVAN) 0.5 MG tablet     [DISCONTINUED] sertraline (ZOLOFT) 50 MG tablet     lidocaine-prilocaine (EMLA) cream     No current facility-administered medications for this visit.      Facility-Administered Medications Ordered in Other Visits   Medication     gemcitabine (GEMZAR) 1,500 mg in sodium chloride 0.9 % 315 mL CHEMOTHERAPY        Allergies   Allergen Reactions     Cats        Review Of Systems:  Constitutional: denies fever, weight changes, chills, and night sweats.  Ears/Nose/Throat: denies ear pain, nose problems, difficulty swallowing  Respiratory: see hPI  Cardiovascular: denies chest pain, palpitations, mild edema to ankles  Gastrointestinal: denies abdominal pain, bloating, nausea, vomiting, early satiety  Psychiatric: see hPI    ECOG Performance Status: 1    Physical Exam:  /66  Pulse 100  Temp 97.4  F (36.3  C) (Tympanic)  Resp 20  Ht 1.651 m (5' 5\")  Wt 91.7 kg (202 lb 1.6 oz)  SpO2 95%  BMI 33.63 kg/m2    GENERAL APPEARANCE: Healthy, alert and in no acute distress.  HEENT: Normocephalic, Sclerae anicteric.   NECK:  No asymmetry or masses, no thyromegaly.  LYMPHATICS: No palpable cervical, supraclavicular nodes   RESP: Lungs clear to auscultation bilaterally, respirations regular and easy  CARDIOVASCULAR: Regular rate and rhythm. Normal S1, S2; no murmur, gallop, or " rub.  MUSCULOSKELETAL: Extremities without gross deformities noted.  MIld edema of bilateral ankles.  NEURO: Alert and oriented x 3.  Gait steady.  PSYCHIATRIC: Mentation and affect appear normal.  Mood appropriate.    Laboratory:  Results for orders placed or performed in visit on 02/22/18   Comprehensive metabolic panel   Result Value Ref Range    Sodium 140 133 - 144 mmol/L    Potassium 3.9 3.4 - 5.3 mmol/L    Chloride 104 94 - 109 mmol/L    Carbon Dioxide 31 20 - 32 mmol/L    Anion Gap 5 3 - 14 mmol/L    Glucose 139 (H) 70 - 99 mg/dL    Urea Nitrogen 19 7 - 30 mg/dL    Creatinine 0.72 0.52 - 1.04 mg/dL    GFR Estimate 80 >60 mL/min/1.7m2    GFR Estimate If Black >90 >60 mL/min/1.7m2    Calcium 8.4 (L) 8.5 - 10.1 mg/dL    Bilirubin Total 0.5 0.2 - 1.3 mg/dL    Albumin 2.8 (L) 3.4 - 5.0 g/dL    Protein Total 6.7 (L) 6.8 - 8.8 g/dL    Alkaline Phosphatase 99 40 - 150 U/L    ALT 40 0 - 50 U/L    AST 22 0 - 45 U/L   CBC with platelets differential   Result Value Ref Range    WBC 8.3 4.0 - 11.0 10e9/L    RBC Count 3.44 (L) 3.8 - 5.2 10e12/L    Hemoglobin 10.0 (L) 11.7 - 15.7 g/dL    Hematocrit 32.4 (L) 35.0 - 47.0 %    MCV 94 78 - 100 fl    MCH 29.1 26.5 - 33.0 pg    MCHC 30.9 (L) 31.5 - 36.5 g/dL    RDW 17.2 (H) 10.0 - 15.0 %    Platelet Count 277 150 - 450 10e9/L    Diff Method Automated Method     % Neutrophils 76.3 %    % Lymphocytes 13.6 %    % Monocytes 7.2 %    % Eosinophils 1.7 %    % Basophils 0.2 %    % Immature Granulocytes 1.0 %    Nucleated RBCs 0 0 /100    Absolute Neutrophil 6.4 1.6 - 8.3 10e9/L    Absolute Lymphocytes 1.1 0.8 - 5.3 10e9/L    Absolute Monocytes 0.6 0.0 - 1.3 10e9/L    Absolute Eosinophils 0.1 0.0 - 0.7 10e9/L    Absolute Basophils 0.0 0.0 - 0.2 10e9/L    Abs Immature Granulocytes 0.1 0 - 0.4 10e9/L    Absolute Nucleated RBC 0.0        Imaging Studies:  None for today      ASSESSMENT/PLAN:    #1  Breast cancer: DCIS of the right breast as well as a second invasive breast cancer of the  right breast-Stage I, [id number] 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  She underwent treatment then was found to have metastasis to the right lung in November 2017.  She was seen at the [facility] with recommendation for gemcitabine and docetaxel and this was initiated 1/3/18.  Dose was decreased by 20% with Cycle 2 related to decreased performance status and loose stools. Tolerated treatment better at this dose.  She will receive cycle 3 day 1 today in follow up prior to cycle 4 with labs per treatment plan along with a CT scan of the chest.     #2  Depression/anxiety:  Much improved with PHQ9=0.      I encouraged patient to call with any questions or concerns.    Ceci TOMLIN, FNP-BC, AOCNP

## 2018-02-22 NOTE — PROGRESS NOTES
Patient is a 67 year old female here accompanied by spouse and brother today for infusion of gemzar under the orders of Dr. Celis.      Today's lab values: WBC 8.3, ANC 6.4, , HGB 10, AST 22, ALT 40, Alkaline Phosphatase 99, Creatinine 0.72.     Patient meets parameters for today's infusion.  Denies questions or concerns regarding today's infusion and/or medications being administered.      Patient identified with two identifiers, order verified, and verbal consent for today's infusion obtained from patient. Written consent for treatment is on file and valid.    0935: IV pump verified with gemzar 1500mg dose, drug, and rate of administration by second RN, Erlinda Tejeda. Infusion administered per protocol. Patient tolerated infusion well, no signs or symptoms of adverse reaction noted. Patient denies pain nor discomfort.     Needle removed, tip intact. Site clean, dry and intact. Covered with a sterile bandage, slight pressure applied for 30 seconds. Pt instructed to leave bandage intact for a minimum of one hour, and to call with questions or concerns. Copy of appointments, discharge instructions, and after visit summary (AVS) provided to patient. Patient states understanding, discharged ambulatory.

## 2018-02-22 NOTE — MR AVS SNAPSHOT
After Visit Summary   2/22/2018    Ana Simmons    MRN: [id number]           Patient Information     Date Of Birth          1950        Visit Information        Provider Department      2/22/2018 8:30 AM Ceci Amaral NP [facility]        Today's Diagnoses     Malignant neoplasm metastatic to right lung (H)    -  1    Phyllodes neoplasm of breast        Ductal carcinoma in situ (DCIS) of right breast        Malignant neoplasm of lower-outer quadrant of right breast of female, estrogen receptor negative (H)        Anxiety and depression          Care Instructions    We would like to see you back per your calender.  We will set you up for a CT chest also to be done along with your labs 2 days prior to your next cycle.  When you are in need of a refill, please call your pharmacy and they will send us a request. If you have any questions please call [phone]  Other instructions:  none          Follow-ups after your visit        Your next 10 appointments already scheduled     Mar 01, 2018 11:30 AM CST   Level 4 with HC INF RM 3306   [facility] )    [address] [address]   [phone]            Mar 12, 2018 10:00 AM CDT   Level O with HC INF RM 3308   [facility] )    [address] [address]   [phone]            Mar 12, 2018 10:30 AM CDT   (Arrive by 10:15 AM)   CT CHEST W CONTRAST with HICT1, HIXRRN   [facility] SCAN ([facility] )    [address] [id number] [phone]           Please bring any scans or X-rays taken at other hospitals, if similar tests were done. Also bring a list of your medicines, including vitamins, minerals and over-the-counter drugs. It is safest to leave personal items at home.  Be sure to tell your doctor:   If you have any allergies.   If there s any chance you are pregnant.   If you are  breastfeeding.    If you have diabetes as your medication may need to be adjusted for this exam.  You will have contrast for this exam. To prepare:   Do not eat or drink for 2 hours before your exam. If you need to take medicine, you may take it with small sips of water. (We may ask you to take liquid medicine as well.)   The day before your exam, drink extra fluids at least six 8-ounce glasses (unless your doctor tells you to restrict your fluids).  Patients over 70 or patients with diabetes or kidney problems:   If you haven t had a blood test (creatinine test) within the last 30 days, the Cardiologist/Radiologist may require you to get this test prior to your exam.  Please wear loose clothing, such as a sweat suit or jogging clothes. Avoid snaps, zippers and other metal. We may ask you to undress and put on a hospital gown.  If you have any questions, please call the Imaging Department where you will have your exam.            Mar 15, 2018  9:00 AM CDT   (Arrive by 8:45 AM)   Return Visit with Ceci Amaral NP   [facility] [place] [facility] [place] [facility]    [address]  [place] [address]   [phone]            Mar 15, 2018  9:30 AM CDT   Level 3 with HC INF RM 3306   [facility] [place] [facility] [place] [facility]    [address]  [place] [address]   [phone]            Mar 22, 2018  9:30 AM CDT   Level 4 with HC INF RM 3308   [facility] [place] [facility] [place] [facility]    [address]  [place] [address]   [phone]            Jul 25, 2018  9:00 AM CDT   (Arrive by 8:45 AM)   New Visit with Jerri Cueto GC   [facility] [place] [facility] [place] [facility]    [address]  [place] [address]   [phone]              Future tests that were ordered for you today     Open Future Orders        Priority Expected Expires Ordered    CT Chest w Contrast Routine 3/5/2018 10/22/2018 2/22/2018            Who to  "contact     If you have questions or need follow up information about today's clinic visit or your schedule please contact [facility] directly at [phone].  Normal or non-critical lab and imaging results will be communicated to you by MyChart, letter or phone within 4 business days after the clinic has received the results. If you do not hear from us within 7 days, please contact the clinic through [employer] or phone. If you have a critical or abnormal lab result, we will notify you by phone as soon as possible.  Submit refill requests through [facility] or call your pharmacy and they will forward the refill request to us. Please allow 3 business days for your refill to be completed.          Additional Information About Your Visit        [email or web address] Information     [facility] gives you secure access to your electronic health record. If you see a primary care provider, you can also send messages to your care team and make appointments. If you have questions, please call your primary care clinic.  If you do not have a primary care provider, please call [phone] and they will assist you.        Care EveryWhere ID     This is your Care EveryWhere ID. This could be used by other organizations to access your [place] medical records  [id number]        Your Vitals Were     Pulse Temperature Respirations Height Pulse Oximetry BMI (Body Mass Index)    100 97.4  F (36.3  C) (Tympanic) 20 1.651 m (5' 5\") 95% 33.63 kg/m2       Blood Pressure from Last 3 Encounters:   02/22/18 145/66   02/22/18 137/59   02/15/18 137/59    Weight from Last 3 Encounters:   02/22/18 91.7 kg (202 lb 1.6 oz)   02/22/18 93.8 kg (206 lb 14.4 oz)   02/15/18 93.8 kg (206 lb 14.4 oz)                 Today's Medication Changes          These changes are accurate as of 2/22/18  9:07 AM.  If you have any questions, ask your nurse or doctor.               These medicines have changed or have updated prescriptions.        Dose/Directions    " sertraline 50 MG tablet   Commonly known as:  ZOLOFT   This may have changed:  Another medication with the same name was removed. Continue taking this medication, and follow the directions you see here.   Used for:  Phyllodes neoplasm of breast, Malignant neoplasm metastatic to right lung (H), Adjustment disorder with mixed anxiety and depressed mood, Triple negative malignant neoplasm of breast (H)   Changed by:  Ceci Amaral NP        Dose:  100 mg   Take 2 tablets (100 mg) by mouth daily   Quantity:  180 tablet   Refills:  1                Primary Care Provider Office Phone # Fax #    Mya Park [phone]       [facility] [address]   [facility] [id number]        Equal Access to Services     JOSE MARKS : Rocio Meredith, wavanda luqadaha, qaybta kaalmada lucas, candy nieves . [facility] [phone].    ATENCIÓN: Si habla español, tiene a stiles disposición servicios gratuitos de asistencia lingüística. [facility] [phone].    We comply with applicable federal civil rights laws and [place] laws. We do not discriminate on the basis of race, color, national origin, age, disability, sex, sexual orientation, or gender identity.            Thank you!     [facility] you [facility]  [facility] your [facility]. Our goal is always to provide you with excellent care. Hearing back from our patients is one way we can continue to improve our services. Please take a few minutes to complete the written survey that you may receive in the mail after your visit with us. Thank you!             Your Updated Medication List - Protect others around you: Learn how to safely use, store and throw away your medicines at www.disposemymeds.org.          This list is accurate as of 2/22/18  9:07 AM.  Always use your most recent med list.                   Brand Name Dispense Instructions for use Diagnosis    albuterol 108 (90 BASE) MCG/ACT  Inhaler    PROAIR HFA/PROVENTIL HFA/VENTOLIN HFA     Inhale 2 puffs into the lungs every 4 hours as needed for shortness of breath / dyspnea or wheezing        calcium-vitamin D 600-400 MG-UNIT per tablet    CALTRATE    90 tablet    Take 1 tablet by mouth 2 times daily    Triple negative malignant neoplasm of breast (H)       cyanocobalamin 1000 MCG tablet    vitamin  B-12     Take 2,500 mcg by mouth daily    Triple negative malignant neoplasm of breast (H)       dexamethasone 4 MG tablet    DECADRON    12 tablet    Take 2 tablets (8 mg) by mouth 2 times daily (with meals) for 6 doses starting the evening prior to docetaxel    Triple negative malignant neoplasm of breast (H), Cough, Malignant phyllodes tumor of breast, unspecified laterality (H), Malignant neoplasm metastatic to right lung (H), SOB (shortness of breath)       doxycycline 100 MG capsule    VIBRAMYCIN    20 capsule    Take 1 capsule (100 mg) by mouth 2 times daily    Malignant neoplasm of lower-outer quadrant of right breast of female, estrogen receptor negative (H), Ductal carcinoma in situ (DCIS) of right breast, Phyllodes neoplasm of breast, Anxiety and depression, Triple negative malignant neoplasm of breast (H), Cough       guaiFENesin-codeine 100-10 MG/5ML Soln solution    CHERATUSSIN AC    420 mL    Take 5-10 mLs by mouth every 4 hours as needed for cough    Triple negative malignant neoplasm of breast (H), Cough, Malignant neoplasm of lower-outer quadrant of right breast of female, estrogen receptor negative (H), Ductal carcinoma in situ (DCIS) of right breast, Phyllodes neoplasm of breast, Anxiety and depression       IMODIUM A-D 2 MG tablet   Generic drug:  loperamide      Take 2 mg by mouth 4 times daily as needed for diarrhea    Phyllodes neoplasm of breast, Malignant neoplasm metastatic to right lung (H), Adjustment disorder with mixed anxiety and depressed mood, Triple negative malignant neoplasm of breast (H)       lidocaine-prilocaine  cream    EMLA    30 g    Apply 30 g topically as needed for moderate pain    Triple negative malignant neoplasm of breast (H)       LORazepam 0.5 MG tablet    ATIVAN    30 tablet    Take 1 tablet (0.5 mg) by mouth every 4 hours as needed (Anxiety, Nausea/Vomiting or Sleep)    Triple negative malignant neoplasm of breast (H)       METFORMIN HCL PO      Take 500 mg by mouth daily (with breakfast)        nystatin Powd     1 each    1 Application 2 times daily    Phyllodes neoplasm of breast, Malignant neoplasm metastatic to right lung (H), Adjustment disorder with mixed anxiety and depressed mood, Triple negative malignant neoplasm of breast (H)       prochlorperazine 10 MG tablet    COMPAZINE    30 tablet    Take 1 tablet (10 mg) by mouth every 6 hours as needed (Nausea/Vomiting)    Triple negative malignant neoplasm of breast (H)       sertraline 50 MG tablet    ZOLOFT    180 tablet    Take 2 tablets (100 mg) by mouth daily    Phyllodes neoplasm of breast, Malignant neoplasm metastatic to right lung (H), Adjustment disorder with mixed anxiety and depressed mood, 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

## 2018-02-22 NOTE — PATIENT INSTRUCTIONS
We will see you back as planned.   Acetaminophen chewable tablets  Brand Names: Genapap, Mapap, Pain & Fever, Pain and Fever, PediaCare Children's Smooth Metls Fever Reducer/Pain Reliever, Tylenol Children's  What is this medicine?  ACETAMINOPHEN (a set a JUSTIN clotilde fen) is a pain reliever. It is used to treat mild pain and fever.  How should I use this medicine?  Take this medicine by mouth. Chew it completely before swallowing. Follow the directions on the package or prescription label. Take your medicine at regular intervals. Do not take your medicine more often than directed.  Talk to your pediatrician regarding the use of this medicine in children. While this drug may be prescribed for children as young as 2 years of age for selected conditions, precautions do apply.  What side effects may I notice from receiving this medicine?  Side effects that you should report to your doctor or health care professional as soon as possible:    allergic reactions like skin rash, itching or hives, swelling of the face, lips, or tongue    breathing problems    redness, blistering, peeling or loosening of the skin, including inside the mouth    sore throat with fever, headache, rash, nausea, or vomiting    trouble passing urine or change in the amount of urine    unusual bleeding or bruising    unusually weak or tired    yellowing of the eyes or skin  Side effects that usually do not require medical attention (report to your doctor or health care professional if they continue or are bothersome):    headache    nausea, stomach upset  What may interact with this medicine?    alcohol    imatinib    isoniazid    other medicines with acetaminophen  What if I miss a dose?  If you miss a dose, take it as soon as you can. If it is almost time for your next dose, take only that dose. Do not take double or extra doses.  Where should I keep my medicine?  Keep out of reach of children.  Store at room temperature between 20 and 25 degrees C (71  and 77 degrees F). Protect from moisture and heat. Throw away any unused medicine after the expiration date.  What should I tell my health care provider before I take this medicine?  They need to know if you have any of these conditions:    if you often drink alcohol    liver disease    phenylketonuria    an unusual or allergic reaction to acetaminophen, other medicines, foods, dyes, or preservatives    pregnant or trying to get pregnant    breast-feeding  What should I watch for while using this medicine?  Tell your doctor or health care professional if the pain lasts more than 10 days (5 days for children), if it gets worse, or if there is a new or different kind of pain. Also, check with your doctor if a fever lasts for more than 3 days.  Do not take other medicines that contain acetaminophen with this medicine. Always read labels carefully. If you have questions, ask your doctor or pharmacist.  If you take too much acetaminophen get medical help right away. Too much acetaminophen can be very dangerous and cause liver damage. Even if you do not have symptoms, it is important to get help right away.  NOTE:This sheet is a summary. It may not cover all possible information. If you have questions about this medicine, talk to your doctor, pharmacist, or health care provider. Copyright  2017 [employer]

## 2018-02-22 NOTE — PATIENT INSTRUCTIONS
We would like to see you back per your calender.  We will set you up for a CT chest also to be done along with your labs 2 days prior to your next cycle.  When you are in need of a refill, please call your pharmacy and they will send us a request. If you have any questions please call [phone]  Other instructions:  none

## 2018-02-22 NOTE — NURSING NOTE
"Chief Complaint   Patient presents with     RECHECK     Follow up triple negative malignant neoplasm right breast       Initial /66  Pulse 100  Temp 97.4  F (36.3  C) (Tympanic)  Resp 20  Ht 1.651 m (5' 5\")  Wt 91.7 kg (202 lb 1.6 oz)  SpO2 95%  BMI 33.63 kg/m2 Estimated body mass index is 33.63 kg/(m^2) as calculated from the following:    Height as of this encounter: 1.651 m (5' 5\").    Weight as of this encounter: 91.7 kg (202 lb 1.6 oz).  Medication Reconciliation: complete   Immunizations up to date, advanced directives on file, pain = 0, PHQ9 = 0.  Patient was assessed using the NCCN psychosocial distress thermometer. Patient rated the score as a 0. Patient rated current stressors as none. Stressors will be brought to the attention of provider or Oncology RN Care Coordinator for a score of 6 or greater or per nurses discretion.   Cristina Dye LPN                "

## 2018-02-22 NOTE — PROGRESS NOTES
Patients left sided power port accessed using non-coring, 19 gauge, 3/4 inch needle.Mask donned by caregiver: yes Site prepped with CHG: yes Labs drawn: yes Dressing applied using aseptic technique: yes. Port accessed per facility protocol. Port flushed easily, blood return noted.  No signs and symptoms of infection or infiltration.  Port flushed 2 mL's normal saline, blood return noted, flushed with 8  mLs Normal Saline, 10 mLs blood discarded.  2 tubes taken for ordered labs, port flushed with 20 mLs normal saline.  Port left accessed as patient has appointment with Nathalia Amaral, and is then due for chemo if parameters are met.  Patient discharged with no complaints.

## 2018-03-01 NOTE — MR AVS SNAPSHOT
After Visit Summary   3/1/2018    Ana Simmons    MRN: [id number]           Patient Information     Date Of Birth          1950        Visit Information        Provider Department      3/1/2018 11:30 AM  INF  [address] [facility] [place]        Today's Diagnoses     Triple negative malignant neoplasm of breast (H)    -  1    Malignant neoplasm of right female breast, unspecified estrogen receptor status, unspecified site of breast (H)          Care Instructions    Discharge Instructions for Infusion Therapy/Chemotherapy Department:    Your doctor has prescribed the following medication(s) gemzar/docetaxel/neulasta to treat your cancer.    All treatments have potential side effects, but they don't all happen to everyone.  If you have any questions, problems, or concerns, we want you to call us.  You can reach the Infusion Nurses at [phone] Monday - Friday 8:00am to 4:30pm or the Health Unit Coordinator at [phone] Monday - Friday 8:00am to 5:00pm.      After hours, evenings, weekends, and holidays please call Urgent Care [phone] or toll free [phone] or go to your nearest Emergency Department.    IV, PICC line or Port access site care or injection site care:   Please report signs of infection or infiltration;  *Redness     *Swelling/puffiness  *Pain/tenderness   *Fever 100.4 F or higher  *Drainage         Possible side effects include:  *Diarrhea     *Allergic Reaction  *Constipation    *Drop in blood counts  *Depression/Anxiey   *Nausea/Vomiting  *Weakness/Fatigue   *Cold intolerance  *Skin changes/Rash   *Stomatitis (mouth sores)  *Loss of appetite/Taste changes *Weight gain/Swelling (edema)  *Hand-Foot syndrome   *Hypertension (high blood pressure)  *Abdominal pain    *Peripheral Neuropathy (numbness/tingling in hands/feet)    YOU NEED TO CALL US OR GO TO YOUR NEAREST URGENT CARE/EMERGENCY DEPARTMENT IF ANY OF THE FOLLOWING OCCUR:     *You have a fever of 100.4 F  or higher.      *You are experiencing uncontrolled vomiting while taking your  anti-nausea medications.      *You are having watery diarrhea (4-6 loose stools in a 24 hour  period).      *You are experiencing a severe rash or skin changes.      *You have mouth sores or a sore throat.      *You have severe constipation (no BM for 3 days).      ANY questions or problems, PLEASE do not hesitate to call us!!            Follow-ups after your visit        Your next 10 appointments already scheduled     Mar 12, 2018 10:00 AM CDT   Level O with HC INF RM 3308   [facility] ([facility] )    [address] [facility] [address]   [phone]            Mar 12, 2018 10:30 AM CDT   (Arrive by 10:15 AM)   CT CHEST W CONTRAST with HICT1, HIXRRN   HI CT SCAN ([facility] )    [address] [id number] [phone]           Please bring any scans or X-rays taken at other hospitals, if similar tests were done. Also bring a list of your medicines, including vitamins, minerals and over-the-counter drugs. It is safest to leave personal items at home.  Be sure to tell your doctor:   If you have any allergies.   If there s any chance you are pregnant.   If you are breastfeeding.    If you have diabetes as your medication may need to be adjusted for this exam.  You will have contrast for this exam. To prepare:   Do not eat or drink for 2 hours before your exam. If you need to take medicine, you may take it with small sips of water. (We may ask you to take liquid medicine as well.)   The day before your exam, drink extra fluids at least six 8-ounce glasses (unless your doctor tells you to restrict your fluids).  Patients over 70 or patients with diabetes or kidney problems:   If you haven t had a blood test (creatinine test) within the last 30 days, the Cardiologist/Radiologist may require you to get this test prior to your exam.  Please wear loose clothing, such as a sweat  suit or jogging clothes. Avoid snaps, zippers and other metal. We may ask you to undress and put on a hospital gown.  If you have any questions, please call the Imaging Department where you will have your exam.            Mar 15, 2018  9:00 AM CDT   (Arrive by 8:45 AM)   Return Visit with Ceci Amaral NP   [facility] [place] ([facility] [place] [facility]    [address]  [place] [address]   [phone]            Mar 15, 2018  9:30 AM CDT   Level 3 with HC INF RM 3306   [facility] [place] ([facility] [place] )    [address]  [place] [address]   [phone]            Mar 22, 2018  9:30 AM CDT   Level 4 with HC INF RM 3308   [facility] [place] ([facility] [place] )    [address]  [place] [address]   [phone]            Jul 25, 2018  9:00 AM CDT   (Arrive by 8:45 AM)   New Visit with Jerri Cueto GC   [facility] [place] ([facility] [place] )    [address]  [place] [address]   [phone]              Who to contact     If you have questions or need follow up information about today's clinic visit or your schedule please contact [facility] directly at [phone].  Normal or non-critical lab and imaging results will be communicated to you by MyChart, letter or phone within 4 business days after the clinic has received the results. If you do not hear from us within 7 days, please contact the clinic through [email or web address] or phone. If you have a critical or abnormal lab result, we will notify you by phone as soon as possible.  Submit refill requests through [email or web address] or call your pharmacy and they will forward the refill request to us. Please allow 3 business days for your refill to be completed.          Additional Information About Your Visit        MyChart Information     [email or web address] gives you secure access to your electronic health record. If you see a primary care provider, you can also send  "messages to your care team and make appointments. If you have questions, please call your primary care clinic.  If you do not have a primary care provider, please call [phone] and they will assist you.        Care EveryWhere ID     This is your Care EveryWhere ID. This could be used by other organizations to access your [facility] medical records  [id number]        Your Vitals Were     Pulse Temperature Respirations Height Pulse Oximetry BMI (Body Mass Index)    94 98.4  F (36.9  C) (Tympanic) 20 1.651 m (5' 5\") 97% 33.43 kg/m2       Blood Pressure from Last 3 Encounters:   03/01/18 134/67   02/22/18 138/72   02/22/18 145/66    Weight from Last 3 Encounters:   03/01/18 91.1 kg (200 lb 14.4 oz)   02/22/18 91.7 kg (202 lb 1.6 oz)   02/22/18 91.7 kg (202 lb 1.6 oz)              We Performed the Following     CBC with platelets differential     Hepatic panel        Primary Care Provider Office Phone # Fax #    Mya SMITH Selinbartolome [phone]       [facility] [address]   [facility] [id number]        Equal Access to Services     JOSE MARKS : Hadii aad ku hadasho Soomaali, waaxda luqadaha, qaybta kaalmada adeegyada, candy zambranoin montsen miriam gil. So [facility] [phone].    ATENCIÓN: Si habla español, tiene a stiles disposición servicios gratuitos de asistencia lingüística. Llame al [phone].    We comply with applicable federal civil rights laws and [place] laws. We do not discriminate on the basis of race, color, national origin, age, disability, sex, sexual orientation, or gender identity.            Thank you!     Thank you for choosing [facility]  for your care. Our goal is always to provide you with excellent care. Hearing back from our patients is one way we can continue to improve our services. Please take a few minutes to complete the written survey that you may receive in the mail after your visit with us. Thank you!             Your Updated Medication " List - Protect others around you: Learn how to safely use, store and throw away your medicines at www.disposemymeds.org.          This list is accurate as of 3/1/18  3:40 PM.  Always use your most recent med list.                   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)       dexamethasone 4 MG tablet    DECADRON    12 tablet    Take 2 tablets (8 mg) by mouth 2 times daily (with meals) for 6 doses starting the evening prior to docetaxel    Triple negative malignant neoplasm of breast (H), Cough, Malignant phyllodes tumor of breast, unspecified laterality (H), Malignant neoplasm metastatic to right lung (H), SOB (shortness of breath)       doxycycline 100 MG capsule    VIBRAMYCIN    20 capsule    Take 1 capsule (100 mg) by mouth 2 times daily    Malignant neoplasm of lower-outer quadrant of right breast of female, estrogen receptor negative (H), Ductal carcinoma in situ (DCIS) of right breast, Phyllodes neoplasm of breast, Anxiety and depression, Triple negative malignant neoplasm of breast (H), Cough       guaiFENesin-codeine 100-10 MG/5ML Soln solution    CHERATUSSIN AC    420 mL    Take 5-10 mLs by mouth every 4 hours as needed for cough    Triple negative malignant neoplasm of breast (H), Cough, Malignant neoplasm of lower-outer quadrant of right breast of female, estrogen receptor negative (H), Ductal carcinoma in situ (DCIS) of right breast, Phyllodes neoplasm of breast, Anxiety and depression       IMODIUM A-D  2 MG tablet   Generic drug:  loperamide      Take 2 mg by mouth 4 times daily as needed for diarrhea    Phyllodes neoplasm of breast, Malignant neoplasm metastatic to right lung (H), Adjustment disorder with mixed anxiety and depressed mood, Triple negative malignant neoplasm of breast (H)       lidocaine-prilocaine cream    EMLA    30 g    Apply 30 g topically as needed for moderate pain    Triple negative malignant neoplasm of breast (H)       LORazepam 0.5 MG tablet    ATIVAN    30 tablet    Take 1 tablet (0.5 mg) by mouth every 4 hours as needed (Anxiety, Nausea/Vomiting or Sleep)    Triple negative malignant neoplasm of breast (H)       METFORMIN HCL PO      Take 500 mg by mouth daily (with breakfast)        nystatin Powd     1 each    1 Application 2 times daily    Phyllodes neoplasm of breast, Malignant neoplasm metastatic to right lung (H), Adjustment disorder with mixed anxiety and depressed mood, Triple negative malignant neoplasm of breast (H)       prochlorperazine 10 MG tablet    COMPAZINE    30 tablet    Take 1 tablet (10 mg) by mouth every 6 hours as needed (Nausea/Vomiting)    Triple negative malignant neoplasm of breast (H)       sertraline 50 MG tablet    ZOLOFT    180 tablet    Take 2 tablets (100 mg) by mouth daily    Phyllodes neoplasm of breast, Malignant neoplasm metastatic to right lung (H), Adjustment disorder with mixed anxiety and depressed mood, 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

## 2018-03-01 NOTE — PROGRESS NOTES
1521: Neulasta Onpro 6mg placed on left upper abdomen. Lot number:[id number], [id number]. Written and verbal insturctions given to patient. Verbalized understanding.    Patient tolerated infusion well, no signs or symptoms of adverse reaction noted. Patient denies pain nor discomfort.     Needle removed, tip intact. Site clean, dry and intact. Covered with a sterile bandage, slight pressure applied for 30 seconds. Pt instructed to leave bandage intact for a minimum of one hour, and to call with questions or concerns. Copy of appointments, discharge instructions, and after visit summary (AVS) provided to patient. Patient states understanding, discharged ambulatory. Erlinda Tejeda RN

## 2018-03-01 NOTE — PROGRESS NOTES
Patient is a 67 year old female here accompanied by spouse today for infusion of gemcitabine and docetaxel under the orders of Dr. Celis.  Left sided power port accessed with 19  gauge 3/4  inch 90 degree bent non coring needle.  Line flushed with 10 cc's normal saline.  Needle secured with sterile transparent dressing.  10 cc's blood discarded, and blood taken for 2 tubes of ordered labs.  Patient tolerated well.  Denies pain and discomfort at this time.  Port flushes easily without resistance.    Hand hygiene performed: yes   Mask donned by caregiver: yes Site prepped with CHG: yes Labs drawn: yes Dressing applied using aseptic technique: yes   Todays lab values: WBC 2.8, ANC 1.7, , HGB 8.9, AST 35, ALT 49, Alkaline Phosphatase 87.     Patient meets parameters for today's infusion.  Denies questions or concerns regarding today's infusion and/or medications being administered.      Patient identified with two identifiers, order verified, and verbal consent for today's infusion obtained from patient. Written consent for treatment is on file and valid.    1301: IV pump verified with gemcitabine dose, drug, and rate of administration by second RN, Erlinda Tejeda. Infusion administered per protocol. Patient tolerated infusion well, no signs or symptoms of adverse reaction noted. Patient denies pain nor discomfort.   1443: IV pump verified with docetaxel dose, drug, and rate of administration by second RN, Abeba Cox. Infusion administered per protocol.

## 2018-03-12 NOTE — PROGRESS NOTES
Patients left sided port accessed using non-coring power needle, 19 gauge, 3/4in needle. Port accessed per facility protocol.  Hand hygiene performed: yes   Mask donned by caregiver: yes Site prepped with CHG: yes Labs drawn: yes Dressing applied using aseptic technique: yes Port flushed easily, without resistance. Flushed with 10 cc's normal saline.   Immediate blood return noted, however difficulty in obtaining blood for labs. Repositioned patient without success, but then had her walk about unit and blood return sufficient. 10 cc blood discarded.  2 vials blood draw and sent to lab for results.  Port flushed with 20 cc 0.9% normal saline and left accessed for scans. Patient tolerated port flush well, denies pain nor discomfort at this time.   Copy of appointments, and after visit summary (AVS) given to patient.  Patient discharged ambulatory. Dianne Elder

## 2018-03-12 NOTE — MR AVS SNAPSHOT
After Visit Summary   3/12/2018    Ana Simmons    MRN: [id number]           Patient Information     Date Of Birth          1950        Visit Information        Provider Department      3/12/2018 10:00 AM HC INF RM 3308 [facility]        Today's Diagnoses     Triple negative malignant neoplasm of breast (H)    -  1      Care Instructions    We will see you back as planned.           Follow-ups after your visit        Your next 10 appointments already scheduled     Mar 15, 2018  9:00 AM CDT   (Arrive by 8:45 AM)   Return Visit with Ceci Amaral NP   [facility] [place] ([facility] [place] [facility]    [address]  [place] [address]   [phone]            Mar 15, 2018  9:30 AM CDT   Level 3 with HC INF RM 3306   [facility] [place] ([facility] [place] [facility]    [address]  [place] [address]   [phone]            Mar 22, 2018  9:30 AM CDT   Level 4 with HC INF RM 3308   [facility] [place] ([facility] [place] [facility]    [address]  [place] [address]   [phone]            Apr 25, 2018  8:00 AM CDT   (Arrive by 7:45 AM)   New Visit with Jerri Cueto GC   [facility] [place] ([facility] [place] )    [address]  [place] [address]   [phone]              Who to contact     If you have questions or need follow up information about today's clinic visit or your schedule please contact [facility] directly at [phone].  Normal or non-critical lab and imaging results will be communicated to you by MyChart, letter or phone within 4 business days after the clinic has received the results. If you do not hear from us within 7 days, please contact the clinic through MyChart or phone. If you have a critical or abnormal lab result, we will notify you by phone as soon as possible.  Submit refill requests through [employer] or call your pharmacy and they will forward the  "refill request to us. Please allow 3 business days for your refill to be completed.          Additional Information About Your Visit        Pure[employer]hart Information     [employer] gives you secure access to your electronic health record. If you see a primary care provider, you can also send messages to your care team and make appointments. If you have questions, please call your primary care clinic.  If you do not have a primary care provider, please call [phone] and they will assist you.        Care EveryWhere ID     This is your Care EveryWhere ID. This could be used by other organizations to access your [facility] medical records  [id number]        Your Vitals Were     Height BMI (Body Mass Index)                1.651 m (5' 5\") 32.68 kg/m2           Blood Pressure from Last 3 Encounters:   03/01/18 140/83   02/22/18 138/72   02/22/18 145/66    Weight from Last 3 Encounters:   03/12/18 89.1 kg (196 lb 6.4 oz)   03/01/18 91.1 kg (200 lb 14.4 oz)   02/22/18 91.7 kg (202 lb 1.6 oz)              We Performed the Following     CBC with platelets differential     Comprehensive metabolic panel        Primary Care Provider Office Phone # Fax #    [facility] [phone]       [facility] [address]   [facility] [place], operated [facility] [id number]        Equal Access to Services     JOSE MARKS : Hadii aad ku hadasho Soomaali, waaxda luqadaha, qaybta kaalmada adeegyada, candy gil. So [facility] [phone].    ATENCIÓN: Si habla español, tiene a stiles disposición servicios gratuitos de asistencia lingüística. Llame al [phone].    We comply with applicable federal civil rights laws and [place] laws. We do not discriminate on the basis of race, color, national origin, age, disability, sex, sexual orientation, or gender identity.            Thank you!     Thank you for choosing [facility]  for your care. Our goal is always to provide you with excellent care. Hearing back " from our patients is one way we can continue to improve our services. Please take a few minutes to complete the written survey that you may receive in the mail after your visit with us. Thank you!             Your Updated Medication List - Protect others around you: Learn how to safely use, store and throw away your medicines at www.disposemymeds.org.          This list is accurate as of 3/12/18  1:55 PM.  Always use your most recent med list.                   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)       dexamethasone 4 MG tablet    DECADRON    12 tablet    Take 2 tablets (8 mg) by mouth 2 times daily (with meals) for 6 doses starting the evening prior to docetaxel    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), Cough, Malignant phyllodes tumor of breast, unspecified laterality (H), Malignant neoplasm metastatic to right lung (H), SOB (shortness of breath)       doxycycline 100 MG capsule    VIBRAMYCIN    20 capsule    Take 1 capsule (100 mg) by mouth 2 times daily    Malignant neoplasm of lower-outer quadrant of right breast of female, estrogen receptor negative (H), Ductal carcinoma in situ (DCIS) of right breast, Phyllodes neoplasm of breast, Anxiety and depression, Triple negative malignant neoplasm of breast (H), Cough       guaiFENesin-codeine 100-10 MG/5ML Soln solution    CHERATUSSIN AC    420 mL    Take 5-10 mLs by mouth every 4 hours as needed for cough    Triple negative malignant neoplasm of breast  (H), Cough, Malignant neoplasm of lower-outer quadrant of right breast of female, estrogen receptor negative (H), Ductal carcinoma in situ (DCIS) of right breast, Phyllodes neoplasm of breast, Anxiety and depression       IMODIUM A-D 2 MG tablet   Generic drug:  loperamide      Take 2 mg by mouth 4 times daily as needed for diarrhea    Phyllodes neoplasm of breast, Malignant neoplasm metastatic to right lung (H), Adjustment disorder with mixed anxiety and depressed mood, Triple negative malignant neoplasm of breast (H)       lidocaine-prilocaine cream    EMLA    30 g    Apply 30 g topically as needed for moderate pain    Triple negative malignant neoplasm of breast (H)       LORazepam 0.5 MG tablet    ATIVAN    30 tablet    Take 1 tablet (0.5 mg) by mouth every 4 hours as needed (Anxiety, Nausea/Vomiting or Sleep)    Triple negative malignant neoplasm of breast (H)       METFORMIN HCL PO      Take 500 mg by mouth daily (with breakfast)        nystatin Powd     1 each    1 Application 2 times daily    Phyllodes neoplasm of breast, Malignant neoplasm metastatic to right lung (H), Adjustment disorder with mixed anxiety and depressed mood, Triple negative malignant neoplasm of breast (H)       prochlorperazine 10 MG tablet    COMPAZINE    30 tablet    Take 1 tablet (10 mg) by mouth every 6 hours as needed (Nausea/Vomiting)    Triple negative malignant neoplasm of breast (H)       sertraline 50 MG tablet    ZOLOFT    180 tablet    Take 2 tablets (100 mg) by mouth daily    Phyllodes neoplasm of breast, Malignant neoplasm metastatic to right lung (H), Adjustment disorder with mixed anxiety and depressed mood, Triple negative malignant neoplasm of breast (H)       SIMVASTATIN PO      Take 20 mg by mouth At Bedtime        VITAMIN B6 PO      Take 50 mg by mouth daily    Anemia, unspecified type, Malignant neoplasm of lower-outer quadrant of right female breast (H), DCIS (ductal carcinoma in situ), right, Postmenopausal  status, Aromatase inhibitor use, Encounter for monitoring cardiotoxic drug therapy

## 2018-03-13 NOTE — PROGRESS NOTES
Visit Date:   03/13/2018      HISTORY OF PRESENT ILLNESS:  Ms. 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  We had seen her originally in consultation at the request of Dr. Mya Park on 05/03/2016.  At that time we were asked to evaluate her DCIS of the right breast as well as metaplastic carcinoma of the right breast.  The patient had presented with an abnormal mammogram through her primary care provider on 06/11/2016.  Findings were that there was residual calcification in the upper outer quadrant of the right breast.  On 02/09/2016, she underwent stereotactic biopsy of the right breast lesion at the 8 o'clock position, which revealed grade 3 DCIS with solid micropapillary subtype.  Estrogen receptor was positive biopsy of the right breast lesion at the 9 o'clock position revealed a grade 2 DCIS with solid micropapillary subtype, estrogen receptor positive.  The patient went on to have bilateral mastectomies performed at [facility].  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  There was also evidence of angiosarcoma, liposarcoma spindle cells.  Estrogen receptor was positive at 2%, progesterone receptor was negative, HER-2/renetta was negative.  The tumor was triple negative.  There was also 1.5 cm DCIS component of the right breast as 1 sentinel lymph node was obtained that was negative.  She was seen at the [facility] and was told that she would likely need chemotherapy.  The patient subsequently was seen by [facility] Hematology/Oncology by Dr. Misty Lam who saw the patient on 04/18/2016.  His assessment was that the patient had metaplastic carcinoma as well as DCIS of the right breast and had noted a metaplastic tumor which did contain an adenocarcinoma angiosarcoma spindle cell component and he did note there were concerns  for treatment.  He felt given the fact she likely had triple negative disease and appeared to have a ductal adenocarcinoma, was favored treating for triple-negative breast cancer given the fact her tumor size was greater than 0.5 cm he felt the dose-dense AC followed by weekly paclitaxel would be ideal.  The patient subsequently was seen by us on 05/03/2016 and we did note that she had a CT chest which revealed 2 small lung nodules, one was a tiny 2 mm lung nodes in the left lower lobe as well as a small 4 mm nodule in the right upper lobe.  There was no lymphadenopathy.  CT chest, abdomen and pelvis revealed no findings of metastatic disease in the abdomen and pelvis.  Bone scan was negative.  The patient went on to be treated with triple-negative breast cancer with dose-dense AC followed by 12 weeks of Taxol.  Echocardiogram on 04/21/2016 revealed an ejection fraction of 55%, normal ventricular size and systolic function.  She also had a PET scan done on 05/05/2016 and the findings were there was no evidence of metastatic disease.  She also had an MRI of the brain at [facility] which was negative for metastatic disease, this was performed on 05/09/2016.  [facility].  She completed all chemotherapy on 10/31/2016.  PET scan 11/17/2016 was negative for metastatic disease.  MRI of the brain was negative.  Given the fact she was ER positive, LA positive DCIS we felt she would be a candidate for Arimidex.  We placed her on Arimidex 1 mg p.o. daily.  She did have a CT chest done at [facility] on 07/14/2017 and the findings were there was a new nodule in the right lung.  The patient subsequently had a repeat CT chest done on 11/26/2017 and the findings were there was significant interval enlargement of a pleural-based mass anteriorly of the right hemithorax measuring 7.5 x 3.7 x 5.3 cm consistent with previous diagnosis of breast cancer.  I elected to send the  patient for a second opinion and given the complex nature of the diagnosis the patient saw Dr. Sean Tineo breast oncologist on 12/27/2017.  His impression was the patient had received a dose of between 220 and 240 mg/m2 doxorubicin.  He had the slides reviewed at the [facility] and upon review of the slides they felt the patient had a malignant spindle cell neoplasm most compatible with metastatic phyllodes tumor.  He also reviewed the pathology of the original mastectomy specimen and revealed DCIS as well as a 2 cm phyllodes tumor.  This is typically treated with anthracyclines and as such the use of doxorubicin was very reasonable.  Tumor was acting very aggressively with substantial growth in the last 3 months.  She did have a CT chest at the [facility] as well and the feeling was that she had likely pleural involvement as well.  CT of the abdomen and pelvis was negative.  As recommended chemotherapy I felt that given her previous doxorubicin exposure, the best treatment would be docetaxel and gemcitabine.  We did discuss the case with Dr. Multani who was covering for Dr. Tineo and confirmed the dosing would be treated as a soft tissue sarcoma with a dose of docetaxel 75 mg per meter squared and gemcitabine given on day 1 and day 8 at a dose of 900 mg/m2 and the docetaxel will be given at 75 mg/m2 on day 8, given every 21 days.  The patient did receive day 1 of chemotherapy on January 3 and was due to receive day 8 but then developed a pleural effusion and required multiple taps.  Cytology was initially negative.  She had multiple thoracenteses and completed 3 cycles of gemcitabine and docetaxel.  The last dose of chemotherapy being given on March 1.  The patient subsequently had repeat staging studies including CT chest that was obtained on March 12 with the findings were there was progression of disease with the majority of the right chest filled by a large 7cm necrotic likely pleural-based mass.   There are numerous additional pleural based metastases in the right lung that were worsened.  The mass had increased from January 10 when the mass measured 8.7 x 9.9 x 10.2 cm and now had enlarged to a measurement of 14 cm x 10.9 x 11.7 cm consistent with progression.  We discussed the case with Dr. Multani who consulted with the soft tissue sarcoma experts at the [facility] as well as the breast oncologist experts and he was covering for Dr. Tineo.  He recommended initiating soft tissue sarcoma regimen for malignant phyllodes tumor which is highly active in this disease which would be Doxil given 30 mg/m2 on day 1 of a 21-day cycle with a drug called olaratumab.  In a phase II trial it had shown a significant improvement in median overall survival.  In this study, patients had improved survival with doxorubicin and olaratumab with 80% improvement in overall survival as well as 61% in disease-free survival compared to the study of doxorubicin alone.  The patient otherwise has not required any thoracentesis since 01/15/2018.  She says her breathing is much better.  She denies any other symptoms of right-sided pleuritic chest pain, shortness of breath.  She may have had some mild weight loss, denies any fevers, night sweats, abdominal pain, chest pain.      PHYSICAL EXAMINATION:   GENERAL:  She is a 67-year-old white female in no acute distress.   VITAL SIGNS:  Blood pressure 137/70, pulse 107, respirations 18, temperature 98.5.   HEENT:  Atraumatic, normocephalic.  Oropharynx nonerythematous.   NECK:  Supple, no thyromegaly.   LUNGS:  Reveal bilateral rhonchi, right greater than left.   HEART:  Regular rhythm, S1, S2 normal.   ABDOMEN:  Soft, normoactive bowel sounds, nontender.   LYMPHATICS:  No cervical, supraclavicular, axillary or inguinal nodes.   EXTREMITIES:  No edema.   NEUROLOGIC:  Nonfocal.      LABORATORY DATA:  Reveal CBC:  White count 5.4, H and H 9.9 and 30.0, platelet count is 253,000.  BUN is 12,  creatinine 0.93.  LFTs are normal.      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  Course complicated by right pleural effusion, now with evidence of progression of disease with enlargement right pleural based mass of the lung.  We discussed the case with Dr. Multani at [place] and the recommendation is to discontinue gemcitabine and docetaxel and start the patient on Doxil which is better tolerated and has less cardiotoxicity than Adriamycin at a dose of 30 mg per meter squared on day 1 as well as olaratumab which is a PDGFR blocking monoclonal antibody which has been shown to be very effective in soft tissue sarcoma-like phyllodes tumor.  We would treat this patient with Doxil 30 mg per meter squared on day 1 and olaratumab 15 mg/kg on day 1 and day 8.  We will start this week.  We will obtain an echocardiogram to assess cardiac function.  Otherwise, we will see the patient with cycle 2.  Obtain CBC, CMP, LDH, CA 27-29.        Forty minutes was spent greater than half of the time was spent discussing the role of chemotherapy in this patient and the fact that this new combination of Doxil plus olaratumab has shown improvement in disease-free survival as well as overall survival in these patients.  Time was spent ordering chemotherapy and followup labs.         LEO LEE MD             D: 2018   T: 2018   MT: RODRIGO      Name:     LESLIE BENAVIDEZ   MRN:      [id number]        Account:      [id number]   :      1950           Visit Date:   2018      Document: [id number]       cc: Mya Multani MD

## 2018-03-13 NOTE — NURSING NOTE
"Chief Complaint   Patient presents with     RECHECK     scan follow up       Initial /77  Pulse 107  Temp 98.5  F (36.9  C) (Tympanic)  Resp 18  Ht 1.651 m (5' 5\")  Wt 89.2 kg (196 lb 11.2 oz)  SpO2 96%  BMI 32.73 kg/m2 Estimated body mass index is 32.73 kg/(m^2) as calculated from the following:    Height as of this encounter: 1.651 m (5' 5\").    Weight as of this encounter: 89.2 kg (196 lb 11.2 oz).  Medication Reconciliation: complete     Patient was assessed using the NCCN psychosocial distress thermometer. Patient rated the score as a 7-8/10. Patient rated current stressors as this appointment. Stressors will be brought to the attention of provider or Oncology RN Care Coordinator for a score of 6 or greater or per nurses discretion.     Abeba Cox, RN            "

## 2018-03-13 NOTE — NURSING NOTE
Clinic Care Coordination Contact  Care Team Conversations    OCC RN met pt, spouse and 2 dtrs today during the appt w/Dr Celis today.  Discussed results from the pt's most recent scans.  Pt and family became tearful during the visit today.  Pt stated she had expected to hear better news as she stated she has been feeling so much better.  Pt was informed her current cancer treatments were not working at this time.  Pt was informed by Dr Celis of a different treatment option available providing her cardiac status would allow for the treatment.  Pt decision was to move forward with the new treatment; pt agreed to the ECHO appt.  Pt was informed of the success of the new treatment, based on statistics provided and discussed with them by Dr Celis.  This was met with some smiles on the faces of the pt and family.  This nurse met privately with the pt and family also.  Small discussion of how they felt was held.  The pt was informed by Dr Celis nurse, Abeba that treatment would start on 3/13/18.  The pt stated pleasure with this information.  The pt then requested to change the subject; then discussed the curling event during the recent [place].  This nurse's visit ended prior to the pt AVS discussion.  OCC RN will continue to follow up with this pt.    Aimee Saunders, RN  [facility] Oncology Care Coordinator

## 2018-03-13 NOTE — PATIENT INSTRUCTIONS
We would like to see you back per calendar. Please come 30minute(s) prior for lab work. You are also scheduled for Chemo Education. You have been scheduled for an echocardiogram.    Your prescription has been sent to:   [facility] - [place], [place] - [address]  [address]  [place] [address]  Phone: [phone] Fax: [phone]    When you are in need of a refill of your medications, please call your pharmacy and they will send us the request. If you have any questions please call [phone]

## 2018-03-13 NOTE — MR AVS SNAPSHOT
After Visit Summary   3/13/2018    Ana Simmons    MRN: [id number]           Patient Information     Date Of Birth          1950        Visit Information        Provider Department      3/13/2018 9:00 AM Mickie Celis MD [facility]        Today's Diagnoses     Phyllodes neoplasm of breast    -  1    Encounter for monitoring cardiotoxic drug therapy          Care Instructions    We would like to see you back per calendar. Please come 30minute(s) prior for lab work. You are also scheduled for Chemo Education. You have been scheduled for an echocardiogram.    Your prescription has been sent to:   [facility] - [place] - [address]  [address] [place] [address]  Phone: [phone] Fax: [phone]    When you are in need of a refill of your medications, please call your pharmacy and they will send us the request. If you have any questions please call [phone]            Follow-ups after your visit        Your next 10 appointments already scheduled     Mar 14, 2018  8:00 AM CDT   Ech Complete with HIECH1   [facility] ([facility] )    [address] [facility] [id number] [phone]           1. Please bring or wear a comfortable two-piece outfit. 2. You may eat, drink and take your normal medicines. 3. For any questions that cannot be answered, please contact the ordering physician            Mar 15, 2018  9:30 AM CDT   Level 5 with HC INF RM 3306   [facility] ([facility] [place] )    [address]  [place] [address]   [phone]            Mar 22, 2018  9:30 AM CDT   Level 4 with HC INF RM 3308   [facility] ([facility] [place] )    [address]  [place] [address]   [phone]            Apr 25, 2018  8:00 AM CDT   (Arrive by 7:45 AM)   New Visit with [facility] ([facility] )    [address]  [place]  "[place] [address]   [phone]              Future tests that were ordered for you today     Open Future Orders        Priority Expected Expires Ordered    Echocardiogram Complete Routine  3/13/2019 3/13/2018            Who to contact     If you have questions or need follow up information about today's clinic visit or your schedule please contact [facility] directly at [phone].  Normal or non-critical lab and imaging results will be communicated to you by MyChart, letter or phone within 4 business days after the clinic has received the results. If you do not hear from us within 7 days, please contact the clinic through [employer] or phone. If you have a critical or abnormal lab result, we will notify you by phone as soon as possible.  Submit refill requests through [email or web address] or call your pharmacy and they will forward the refill request to us. Please allow 3 business days for your refill to be completed.          Additional Information About Your Visit        [employer] Information     [email or web address] gives you secure access to your electronic health record. If you see a primary care provider, you can also send messages to your care team and make appointments. If you have questions, please call your primary care clinic.  If you do not have a primary care provider, please call [phone] and they will assist you.        Care EveryWhere ID     This is your Care EveryWhere ID. This could be used by other organizations to access your [facility] medical records  [id number]        Your Vitals Were     Pulse Temperature Respirations Height Pulse Oximetry BMI (Body Mass Index)    107 98.5  F (36.9  C) (Tympanic) 18 1.651 m (5' 5\") 96% 32.73 kg/m2       Blood Pressure from Last 3 Encounters:   03/13/18 137/77   03/01/18 140/83   02/22/18 138/72    Weight from Last 3 Encounters:   03/13/18 89.2 kg (196 lb 11.2 oz)   03/12/18 89.1 kg (196 lb 6.4 oz)   03/01/18 91.1 kg (200 lb 14.4 oz)                 Today's Medication Changes "          These changes are accurate as of 3/13/18 10:02 AM.  If you have any questions, ask your nurse or doctor.               Stop taking these medicines if you haven't already. Please contact your care team if you have questions.     LORazepam 0.5 MG tablet   Commonly known as:  ATIVAN   Stopped by:  Micike Celis MD           prochlorperazine 10 MG tablet   Commonly known as:  COMPAZINE   Stopped by:  Mickie Celis MD                    Primary Care Provider Office Phone # Fax #    Mya Park [phone]       [facility] [address]   [address] [facility] [address]        Equal Access to Services     [facility]: Hadii janette garcía hadasho Somauriceali, waaxda luqadaha, qaybta kaalmada adeegyada, candy nieves . So [facility] [phone].    ATENCIÓN: Si habla español, tiene a stiles disposición servicios gratuitos de asistencia lingüística. Llame al [phone].    We comply with applicable federal civil rights laws and [place] laws. We do not discriminate on the basis of race, color, national origin, age, disability, sex, sexual orientation, or gender identity.            Thank you!     Thank you for choosing [facility]  for your [facility]. Our goal is always to provide you with excellent care. Hearing back from our patients is one way we can continue to improve our services. Please take a few minutes to complete the written survey that you may receive in the mail after your visit with us. Thank you!             Your Updated Medication List - Protect others around you: Learn how to safely use, store and throw away your medicines at www.disposemymeds.org.          This list is accurate as of 3/13/18 10:02 AM.  Always use your most recent med list.                   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)       dexamethasone 4 MG tablet    DECADRON    12 tablet    Take 2 tablets (8 mg) by mouth 2 times daily (with meals) for 6 doses starting the evening prior to docetaxel    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), Cough, Malignant phyllodes tumor of breast, unspecified laterality (H), Malignant neoplasm metastatic to right lung (H), SOB (shortness of breath)       doxycycline 100 MG capsule    VIBRAMYCIN    20 capsule    Take 1 capsule (100 mg) by mouth 2 times daily    Malignant neoplasm of lower-outer quadrant of right breast of female, estrogen receptor negative (H), Ductal carcinoma in situ (DCIS) of right breast, Phyllodes neoplasm of breast, Anxiety and depression, 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), Cough       guaiFENesin-codeine 100-10 MG/5ML Soln solution    CHERATUSSIN AC    420 mL    Take 5-10 mLs by mouth every 4 hours as needed for cough    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), Cough, Malignant neoplasm of lower-outer quadrant of right breast of female, estrogen receptor negative (H), Ductal carcinoma in situ (DCIS) of right breast, Phyllodes neoplasm of breast, Anxiety and depression       IMODIUM A-D 2 MG tablet   Generic drug:  loperamide      Take 2 mg by mouth 4 times daily as needed for diarrhea    Phyllodes neoplasm of breast, Malignant neoplasm metastatic to right lung (H), Adjustment disorder with mixed anxiety and depressed mood, 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)       lidocaine-prilocaine cream    EMLA    30 g    Apply 30 g topically as needed for moderate pain    Triple negative  malignant neoplasm of breast (H)       METFORMIN HCL PO      Take 500 mg by mouth daily (with breakfast)        nystatin Powd     1 each    1 Application 2 times daily    Phyllodes neoplasm of breast, Malignant neoplasm metastatic to right lung (H), Adjustment disorder with mixed anxiety and depressed mood, 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)       sertraline 50 MG tablet    ZOLOFT    180 tablet    Take 2 tablets (100 mg) by mouth daily    Phyllodes neoplasm of breast, Malignant neoplasm metastatic to right lung (H), Adjustment disorder with mixed anxiety and depressed mood, 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

## 2018-03-15 NOTE — PHARMACY-CONSULT NOTE
Pharmacy consult note    Pharmacy was consulted by Abeba LAY to discuss the new chemotherapy agents olaratumab and Doxil. The patient was accompanied by her  and daughter. Discussed mechanism of action of each medication as well as common side effects such as hand foot syndrome, mouth sores, fatigue, myelosuppression, diarrhea, and hair loss. Provided patient handouts from [facility] as well as some information about clinical trials and efficacy of this regimen. The patient and her family were very involved in the education and had no further questions or concerns at this time.    Trang Davila, MeghanD

## 2018-03-15 NOTE — PROGRESS NOTES
Patient is a 67 year old female here accompanied by  and daughter today for infusion of olartumab/Doxil under the orders of Dr. Celis.  Left sided sided power port accessed with 19 gauge 3/4 inch 90 degree bent non coring needle.  Line flushed with 10 cc's normal saline.  Needle secured with sterile transparent dressing.  10 cc's blood discarded, and blood taken for 2 tubes of ordered labs.  Patient tolerated well.  Denies pain and discomfort at this time.  Port flushes easily without resistance.    Hand hygiene performed: yes   Mask donned by caregiver: yes Site prepped with CHG: yes Labs drawn: yes Dressing applied using aseptic technique: yes   3-15-18 lab values: WBC 6.8, ANC 5.1, , HGB 9.5, AST 26, ALT 32, Alkaline Phosphatase 93.     Patient meets parameters for today's infusion.  Denies questions or concerns regarding today's infusion and/or medications being administered.      Patient identified with two identifiers, order verified, and verbal consent for today's infusion obtained from patient. Written consent for treatment is on file and valid.    Patient was assessed using the NCCN psychosocial distress thermometer. Patient rated the score as a 3. Patient rated current stressors as starting new chemotherapy. Stressors will be brought to the attention of provider or Oncology RN Care Coordinator for a score of 6 or greater or per nurses discretion.     1244: IV pump verified with Olaratumab 1330mg dose, drug, and rate of administration by second RNBreana. Infusion administered per protocol. Patient tolerated infusion well, no signs or symptoms of adverse reaction noted. Patient denies pain nor discomfort.     1359: IV pump verified with doxorubicin liposome dose, drug, and rate of administration by second RNBreana. Infusion administered per protocol. Patient tolerated infusion well, no signs or symptoms of adverse reaction noted. Patient denies pain nor discomfort.      Needle removed, tip intact. Site clean, dry and intact. Covered with a sterile bandage, slight pressure applied for 30 seconds. Pt instructed to leave bandage intact for a minimum of one hour, and to call with questions or concerns. Copy of appointments, discharge instructions, and after visit summary (AVS) provided to patient. Patient states understanding, discharged ambulatory. Erlinda Tejeda RN

## 2018-03-15 NOTE — PROGRESS NOTES
"Chemotherapy Education    Patient is a 67 year old female here today for chemotherapy education, accompanied by  and daughter.  Pt has a cancer diagnosis of breast cancer and their main concern is effectiveness of new chemotherapy.  Their Oncologist is Dr. Celis, and PCP is Mya Park.  Reviewed the following with the patient and their support person:  General chemotherapy information, including ways it is excreted from the body and cleaning and containment of vomitus or other bodily fluid, use of the bathroom, sexual health and intimacy, what to do if needing to miss a treatment, when to call a provider and the need for staff to wear protective equipment.  Importance of Central line care (port) or IV site care.  Proper use of the take home infusion pump, troubleshooting, and who to call if there is a malfunction.  Treatment regimen; olaratumab and Doxil and rationale for strict adherence, specific medication names including pre-treatment medications and at home scheduled or as needed medications, delivery methods, and side effects and management; including skin changes/hand-foot syndrome, anemia, neutropenia, thrombocytopenia, diarrhea/constipation, hair loss syndrome, memory changes/ \"chemobrain\", mouth sores, taste changes, neuropathy, fatigue, myelosuppression, and risk of extravasation or infiltration.  Infection prevention, and monitoring of lab values, what lab tests and what changes of these values meant, along with the possibility of hydration or blood product transfusion, or the need to defer or hold treatment.    Patient received written information including specific drug information guides, approved Internet sources. Business card with contact information given as well as Patient Navigator contact information.  No barriers to learning identified. Patient and family verbalized understanding of all written and verbal information. All questions answered to patients satisfaction.  Informed " consent signed, copy given to patient.  Pt instructed to call with further questions or concerns.  Patient states understanding and is in agreement with this plan.  Copy of appointments, and after visit summary (AVS) given to patient. Erlinda Tejeda RN

## 2018-03-15 NOTE — MR AVS SNAPSHOT
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    All treatments have potential side effects, but they don't all happen to everyone.  If you have any questions, problems, or concerns, we want you to call us.  You can reach the Infusion Nurses at [phone] Monday - Friday 8:00am to 4:30pm or the Health Unit Coordinator at [phone] Monday - Friday 8:00am to 5:00pm.      After hours, evenings, weekends, and holidays please call Urgent Care [phone] or toll free [phone] or go to your nearest Emergency Department.    IV, PICC line or Port access site care or injection site care:   Please report signs of infection or infiltration;  *Redness     *Swelling/puffiness  *Pain/tenderness   *Fever 100.4 F or higher  *Drainage         Possible side effects include:  *Diarrhea     *Allergic Reaction  *Constipation    *Drop in blood counts  *Depression/Anxiey   *Nausea/Vomiting  *Weakness/Fatigue   *Cold intolerance  *Skin changes/Rash   *Stomatitis (mouth sores)  *Loss of appetite/Taste changes *Weight gain/Swelling (edema)  *Hand-Foot syndrome   *Hypertension (high blood pressure)  *Abdominal pain    *Peripheral Neuropathy (numbness/tingling in hands/feet)    YOU NEED TO CALL US OR GO TO YOUR NEAREST URGENT CARE/EMERGENCY DEPARTMENT IF ANY OF THE FOLLOWING  OCCUR:     *You have a fever of 100.4 F or higher.      *You are experiencing uncontrolled vomiting while taking your  anti-nausea medications.      *You are having watery diarrhea (4-6 loose stools in a 24 hour  period).      *You are experiencing a severe rash or skin changes.      *You have mouth sores or a sore throat.      *You have severe constipation (no BM for 3 days).      ANY questions or problems, PLEASE do not hesitate to call us!!            Follow-ups after your visit        Your next 10 appointments already scheduled     Mar 22, 2018  9:30 AM CDT   Level 4 with HC INF RM 3308   [facility] [place] ([facility] [place] )    [address]  [place] [address]   [phone]            Apr 05, 2018  9:00 AM CDT   Level O with HC INF RM 3306   [facility] [place] ([facility] [place] )    [address]  [place] [address]   [phone]            Apr 05, 2018  9:30 AM CDT   (Arrive by 9:15 AM)   Return Visit with Ceci Amaral NP   [facility] [place] ([facility] [place] )    [address]  [place] [address]   [phone]            Apr 06, 2018  9:30 AM CDT   Level 4 with HC INF RM 3302   [facility] [place] ([facility] [place] )    [address]  [place] [address]   [phone]            Apr 13, 2018  9:00 AM CDT   Level 4 with HC INF RM 3302   [facility] [place] ([facility] [place] )    [address]  [place] [address]   [phone]            Apr 25, 2018  8:00 AM CDT   (Arrive by 7:45 AM)   New Visit with Jerri Cueto GC   [facility] [place] ([facility] [place] )    [address]  [place] [address]   [phone]            Apr 27, 2018  8:30 AM CDT   Level O with HC INF RM 3308   [facility] [place] ([facility] [place] )    [address]  [place] [address]   [phone]            Apr 27, 2018  9:00 AM CDT   (Arrive by 8:45  AM)   Return Visit with Ceci Amaral NP   [facility] [place] ([facility] [place] )    [address]  [place] [address]   [phone]            Apr 27, 2018  9:30 AM CDT   Level 4 with HC INF RM 3308   [facility] [place] ([facility] [place] )    [address]  [place] [address]   [phone]            May 04, 2018  9:30 AM CDT   Level 4 with HC INF RM 3308   [facility] [place] ([facility] [place] )    [address]  [place] [address]   [phone]              Who to contact     If you have questions or need follow up information about today's clinic visit or your schedule please contact [facility] directly at [phone].  Normal or non-critical lab and imaging results will be communicated to you by MyChart, letter or phone within 4 business days after the clinic has received the results. If you do not hear from us within 7 days, please contact the clinic through [employer] or phone. If you have a critical or abnormal lab result, we will notify you by phone as soon as possible.  Submit refill requests through [employer] or call your pharmacy and they will forward the refill request to us. Please allow 3 business days for your refill to be completed.          Additional Information About Your Visit        MyChart Information     [employer] gives you secure access to your electronic health record. If you see a primary care provider, you can also send messages to your care team and make appointments. If you have questions, please call your primary care clinic.  If you do not have a primary care provider, please call [phone] and they will assist you.        Care EveryWhere ID     This is your Care EveryWhere ID. This could be used by other organizations to access your [facility] medical records  [id number]        Your Vitals Were     Pulse Temperature Respirations Height Pulse Oximetry BMI (Body Mass Index)    101 98.1  F  "(36.7  C) (Oral) 18 1.651 m (5' 5\") 95% 32.87 kg/m2       Blood Pressure from Last 3 Encounters:   03/15/18 115/59   03/13/18 137/77   03/01/18 140/83    Weight from Last 3 Encounters:   03/15/18 89.6 kg (197 lb 8 oz)   03/13/18 89.2 kg (196 lb 11.2 oz)   03/12/18 89.1 kg (196 lb 6.4 oz)              We Performed the Following     CBC with platelets differential     Hepatic panel        Primary Care Provider Office Phone # Fax #    Mya Park [phone]       [facility] [address]   [address] [facility] [address]        Equal Access to Services     JOSE MARKS : Rocio mcmahon Soavril, waaxda luqadaha, qaybta kaalmada adeegyada, candy nieves . So [place] [phone].    ATENCIÓN: Si habla español, tiene a stiles disposición servicios gratuitos de asistencia lingüística. Llame al [phone].    We comply with applicable federal civil rights laws and [place] laws. We do not discriminate on the basis of race, color, national origin, age, disability, sex, sexual orientation, or gender identity.            Thank you!     Thank you for choosing [facility]  for your care. Our goal is always to provide you with excellent care. Hearing back from our patients is one way we can continue to improve our services. Please take a few minutes to complete the written survey that you may receive in the mail after your visit with us. Thank you!             Your Updated Medication List - Protect others around you: Learn how to safely use, store and throw away your medicines at www.disposemymeds.org.          This list is accurate as of 3/15/18  3:13 PM.  Always use your most recent med list.                   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), Cough, Malignant neoplasm of lower-outer quadrant of right breast of female, estrogen receptor negative (H), Ductal carcinoma in situ (DCIS) of right breast, Phyllodes neoplasm of breast, Anxiety and depression       IMODIUM A-D 2 MG tablet   Generic drug:  loperamide      Take 2 mg by mouth 4 times daily as needed for diarrhea    Phyllodes neoplasm of breast, Malignant neoplasm metastatic to right lung (H), Adjustment disorder with mixed anxiety and depressed mood, Triple negative malignant neoplasm of breast (H)       lidocaine-prilocaine cream    EMLA    30 g    Apply 30 g topically as needed for moderate pain    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)       LORazepam 0.5 MG tablet    ATIVAN    30 tablet    Take 1 tablet (0.5 mg) by mouth every 4 hours as needed (Anxiety, Nausea/Vomiting or Sleep)    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)       METFORMIN HCL PO      Take 500 mg by mouth daily (with breakfast)        nystatin Powd     1 each    1 Application 2 times daily    Phyllodes neoplasm of breast, Malignant neoplasm metastatic to right lung (H), Adjustment disorder with mixed anxiety and depressed mood, Triple negative malignant neoplasm of breast (H)        prochlorperazine 10 MG tablet    COMPAZINE    30 tablet    Take 1 tablet (10 mg) by mouth every 6 hours as needed (Nausea/Vomiting)    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)       sertraline 50 MG tablet    ZOLOFT    180 tablet    Take 2 tablets (100 mg) by mouth daily    Phyllodes neoplasm of breast, Malignant neoplasm metastatic to right lung (H), Adjustment disorder with mixed anxiety and depressed mood, 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

## 2018-03-15 NOTE — PATIENT INSTRUCTIONS
Discharge Instructions for Infusion Therapy/Chemotherapy Department:    Your doctor has prescribed the following medication(s) olaratumab / Doxil to treat your breast cancer.    All treatments have potential side effects, but they don't all happen to everyone.  If you have any questions, problems, or concerns, we want you to call us.  You can reach the Infusion Nurses at [phone] Monday - Friday 8:00am to 4:30pm or the Health Unit Coordinator at [phone] Monday - Friday 8:00am to 5:00pm.      After hours, evenings, weekends, and holidays please call Urgent Care [phone] or toll free [phone] or go to your nearest Emergency Department.    IV, PICC line or Port access site care or injection site care:   Please report signs of infection or infiltration;  *Redness     *Swelling/puffiness  *Pain/tenderness   *Fever 100.4 F or higher  *Drainage         Possible side effects include:  *Diarrhea     *Allergic Reaction  *Constipation    *Drop in blood counts  *Depression/Anxiey   *Nausea/Vomiting  *Weakness/Fatigue   *Cold intolerance  *Skin changes/Rash   *Stomatitis (mouth sores)  *Loss of appetite/Taste changes *Weight gain/Swelling (edema)  *Hand-Foot syndrome   *Hypertension (high blood pressure)  *Abdominal pain    *Peripheral Neuropathy (numbness/tingling in hands/feet)    YOU NEED TO CALL US OR GO TO YOUR NEAREST URGENT CARE/EMERGENCY DEPARTMENT IF ANY OF THE FOLLOWING OCCUR:     *You have a fever of 100.4 F or higher.      *You are experiencing uncontrolled vomiting while taking your  anti-nausea medications.      *You are having watery diarrhea (4-6 loose stools in a 24 hour  period).      *You are experiencing a severe rash or skin changes.      *You have mouth sores or a sore throat.      *You have severe constipation (no BM for 3 days).      ANY questions or problems, PLEASE do not hesitate to call us!!

## 2018-03-16 NOTE — TELEPHONE ENCOUNTER
Chemotherapy Infusion Status Check    Pt is a 67 year old female, 1 day post first chemotherapy infusion of olaratumab / Doxil.    Oncology provider is:  Dr. Celis      Any side effects from treatment?  Nausea, Vomiting: no  Mucositis: no  Diarrhea/Constipation: no  Urination: WNL  Skin: no concerns  Bruising: no  Bleeding: no  Excessive dryness: no  Cracking, Peeling: no  Neuropathy: no  Cognitive Disturbance: no  Pain: 0  on pain scale with 0 being no pain and 10 being intolerable.  Sleep: no concerns  Fatigue: no  Sexuality: did not ask  Any other issues with treatment?: no concerns voiced    Patient instructed to call with any questions or concerns.  Patient states understanding and is in agreement with this plan.    Approximately 5 minutes spent on telephone with patient reviewing assessment and symptom(s) and providing symptom management.

## 2018-03-19 NOTE — TELEPHONE ENCOUNTER
Chemotherapy Infusion Status Check    Pt is a 67 year old female, 4 day(s) post first chemotherapy infusion of olaratumab / Doxil.    Oncology provider is:  Dr. Celis      Any side effects from treatment?  Nausea, Vomiting: no  Mucositis: no  Diarrhea/Constipation: no  Urination: normal  Skin: no concerns  Bruising: no  Bleeding: no  Excessive dryness: no  Cracking, Peeling: no  Neuropathy: no  Cognitive Disturbance: no  Pain: 0  on pain scale with 0 being no pain and 10 being intolerable.  Sleep: no concerns  Fatigue: no  Sexuality: did not ask  Any other issues with treatment?: no concerns stated    Patient instructed to call with any questions or concerns.  Patient states understanding and is in agreement with this plan.    Approximately 5 minutes spent on telephone with patient reviewing assessment and symptom(s) and providing symptom management.

## 2018-03-22 NOTE — PATIENT INSTRUCTIONS
Please go to admitting for your following Ultrasound Guided Thoracentesis. Please return tomorrow as scheduled for you treatment we deferred from today. See appointments for time.     Alteplase Solution for injection  What is this medicine?  ALTEPLASE (AL te plase) can dissolve blood clots that form in the heart, blood vessels, or lungs after a heart attack. This medicine is also given to improve recovery and decrease the chance of disability in patients having symptoms of a stroke.  This medicine may be used for other purposes; ask your health care provider or pharmacist if you have questions.  What should I tell my health care provider before I take this medicine?  They need to know if you have any of these conditions:    aneurysm    bleeding problems or problems with blood clotting    blood vessel disease or damaged blood vessels    diabetic retinopathy    head injury or tumor    high blood pressure    infection    irregular heartbeats    previous stroke    recent biopsy or surgery    an unusual or allergic reaction to alteplase, other medicines, foods, dyes, or preservatives    pregnant or trying to get pregnant    breast-feeding  How should I use this medicine?  This medicine is for injection into a vein. It is given by a health care professional in a hospital or clinic setting.  Talk to your pediatrician regarding the use of this medicine in children. Special care may be needed.  Overdosage: If you think you have taken too much of this medicine contact a poison control center or emergency room at once.  NOTE: This medicine is only for you. Do not share this medicine with others.  What if I miss a dose?  This does not apply.  What may interact with this medicine?  Do not take this medicine with any of the following medications:    aminocaproic acid    aprotinin    tranexamic acid  This medicine may also interact with the following medications:    antiinflammatory drugs, NSAIDs like ibuprofen    aspirin and  aspirin-like medicines    blood thinners, like warfarin, heparin or enoxaparin    dipyridamole  This list may not describe all possible interactions. Give your health care provider a list of all the medicines, herbs, non-prescription drugs, or dietary supplements you use. Also tell them if you smoke, drink alcohol, or use illegal drugs. Some items may interact with your medicine.  What should I watch for while using this medicine?  Your condition will be monitored carefully while you are receiving this medicine. Follow the advice of your doctor or health care professional exactly. You may need bed rest to minimize the risk of bleeding.  This medicine can make you bleed more easily. This effect can last for several days. Take special care brushing or flossing your teeth.  Do not take aspirin, ibuprofen, or other nonprescription pain relievers during or for several days after alteplase treatment unless otherwise instructed by your doctor or health care professional.  You may feel dizzy or lightheaded. To avoid the risk of dizzy or fainting spells, sit or stand up slowly, especially if you are an older patient.  What side effects may I notice from receiving this medicine?  Side effects that you should report to your doctor or health care professional as soon as possible:    allergic reactions like skin rash, itching or hives, swelling of the face, lips, or tongue    signs and symptoms of bleeding such as bloody or black, tarry stools; red or dark-brown urine; spitting up blood or brown material that looks like coffee grounds; red spots on the skin; unusual bruising or bleeding from the eye, gums, or nose    signs and symptoms of a stroke such as changes in vision; confusion; trouble speaking or understanding; severe headaches; sudden numbness or weakness of the face, arm, or leg; trouble walking; dizziness; loss of balance or coordination    slow or fast heart rate  Side effects that usually do not require medical  attention (report to your doctor or health care professional if they continue or are bothersome):    dizziness    fever    nausea, vomiting  This list may not describe all possible side effects. Call your doctor for medical advice about side effects. You may report side effects to FDA at 8-453-GEG-8493.  Where should I keep my medicine?  This does not apply. You will not be given this medicine to store at home.  NOTE:This sheet is a summary. It may not cover all possible information. If you have questions about this medicine, talk to your doctor, pharmacist, or health care provider. Copyright  2016 [employer]

## 2018-03-22 NOTE — MR AVS SNAPSHOT
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 Please return tomorrow as scheduled for you treatment we deferred from today. See appointments for time.     Alteplase Solution for injection  What is this medicine?  ALTEPLASE (AL te plase) can dissolve blood clots that form in the heart, blood vessels, or lungs after a heart attack. This medicine is also given to improve recovery and decrease the chance of disability in patients having symptoms of a stroke.  This medicine may be used for other purposes; ask your health care provider or pharmacist if you have questions.  What should I tell my health care provider before I take this medicine?  They need to know if you have any of these conditions:    aneurysm    bleeding problems or problems with blood clotting    blood vessel disease or damaged blood vessels    diabetic retinopathy    head injury or tumor    high blood pressure    infection    irregular heartbeats    previous stroke    recent biopsy or surgery    an unusual or allergic reaction to alteplase, other medicines, foods, dyes, or preservatives    pregnant or trying to get pregnant    breast-feeding  How should I use this medicine?  This medicine is for injection into a vein. It is given by a health care professional in a hospital or clinic setting.  Talk to your pediatrician regarding the use of this medicine in children. Special care  may be needed.  Overdosage: If you think you have taken too much of this medicine contact a poison control center or emergency room at once.  NOTE: This medicine is only for you. Do not share this medicine with others.  What if I miss a dose?  This does not apply.  What may interact with this medicine?  Do not take this medicine with any of the following medications:    aminocaproic acid    aprotinin    tranexamic acid  This medicine may also interact with the following medications:    antiinflammatory drugs, NSAIDs like ibuprofen    aspirin and aspirin-like medicines    blood thinners, like warfarin, heparin or enoxaparin    dipyridamole  This list may not describe all possible interactions. Give your health care provider a list of all the medicines, herbs, non-prescription drugs, or dietary supplements you use. Also tell them if you smoke, drink alcohol, or use illegal drugs. Some items may interact with your medicine.  What should I watch for while using this medicine?  Your condition will be monitored carefully while you are receiving this medicine. Follow the advice of your doctor or health care professional exactly. You may need bed rest to minimize the risk of bleeding.  This medicine can make you bleed more easily. This effect can last for several days. Take special care brushing or flossing your teeth.  Do not take aspirin, ibuprofen, or other nonprescription pain relievers during or for several days after alteplase treatment unless otherwise instructed by your doctor or health care professional.  You may feel dizzy or lightheaded. To avoid the risk of dizzy or fainting spells, sit or stand up slowly, especially if you are an older patient.  What side effects may I notice from receiving this medicine?  Side effects that you should report to your doctor or health care professional as soon as possible:    allergic reactions like skin rash, itching or hives, swelling of the face, lips, or tongue    signs and  symptoms of bleeding such as bloody or black, tarry stools; red or dark-brown urine; spitting up blood or brown material that looks like coffee grounds; red spots on the skin; unusual bruising or bleeding from the eye, gums, or nose    signs and symptoms of a stroke such as changes in vision; confusion; trouble speaking or understanding; severe headaches; sudden numbness or weakness of the face, arm, or leg; trouble walking; dizziness; loss of balance or coordination    slow or fast heart rate  Side effects that usually do not require medical attention (report to your doctor or health care professional if they continue or are bothersome):    dizziness    fever    nausea, vomiting  This list may not describe all possible side effects. Call your doctor for medical advice about side effects. You may report side effects to FDA at 8-788-MGB-5200.  Where should I keep my medicine?  This does not apply. You will not be given this medicine to store at home.  NOTE:This sheet is a summary. It may not cover all possible information. If you have questions about this medicine, talk to your doctor, pharmacist, or health care provider. Copyright  2016 Gold Standard                Follow-ups after your visit        Your next 10 appointments already scheduled     Mar 22, 2018  2:30 PM CDT   US THORACENTESIS with HIUS1, HIXRRN, HIIRRAD   HI ULTRASOUND ([facility] )    [address] [place] [id number] [phone]           Bring a list of your medicines to the exam. Include vitamins, minerals and over-the-counter drugs.  Tell your doctor in advance:   If you are or may be pregnant.   If you are taking Coumadin (or any other blood thinners) 5 days prior to the exam for any special instructions.  IF YOUR DOCTOR HAS TOLD YOU THAT YOU WILL BE RECEIVING SEDATION (medicine to help you relax): (Typically sedation is only for liver exams at [facility] and Renal Biopsy exams in Pediatrics)   See your family  doctor for an exam within 30 days of treatment.   Plan for an adult to drive you home and stay with you for at least 24 hours.   No eating or drinking for 4 hours before your test. You may take medicine with small sips of water.   If you have diabetes:If you take insulin, call your diabetes care team for any special instructions for this exam.  Please call the Imaging Department at your exam site with any questions.             Mar 23, 2018 12:30 PM CDT   Level 2 with HC INF RM 3310   [facility] [place] ([facility] [place] )    [address]  [place] [address]   [phone]            Apr 05, 2018  8:30 AM CDT   Level 1 with HC INF RM 3306   [facility] [place] ([facility] [place] )    [address]  [place] [address]   [phone]            Apr 05, 2018  9:30 AM CDT   (Arrive by 9:15 AM)   Return Visit with Ceci Amaral NP   [facility] [place] ([facility] [place] )    [address]  [place] [address]   [phone]            Apr 06, 2018  9:30 AM CDT   Level 4 with HC INF RM 3302   [facility] [place] ([facility] [place] )    [address]  [place] [address]   [phone]            Apr 13, 2018  9:00 AM CDT   Level 4 with HC INF RM 3302   [facility] [place] ([facility] [place] )    [address]  [place] [address]   [phone]            Apr 25, 2018  8:00 AM CDT   (Arrive by 7:45 AM)   New Visit with Jerri Cueto GC   [facility] [place] ([facility] [place] )    [address]  [place] [address]   [phone]            Apr 27, 2018  8:30 AM CDT   Level O with HC INF RM 3308   [facility] [place] ([facility] [place] )    [address]  [place] [address]   [phone]              Future tests that were ordered for you today     Open Future Orders        Priority Expected Expires Ordered    US Thoracentesis Routine  3/22/2019  "3/22/2018            Who to contact     If you have questions or need follow up information about today's clinic visit or your schedule please contact [facility] directly at [phone].  Normal or non-critical lab and imaging results will be communicated to you by MyChart, letter or phone within 4 business days after the clinic has received the results. If you do not hear from us within 7 days, please contact the clinic through MyChart or phone. If you have a critical or abnormal lab result, we will notify you by phone as soon as possible.  Submit refill requests through [employer] or call your pharmacy and they will forward the refill request to us. Please allow 3 business days for your refill to be completed.          Additional Information About Your Visit        Ebixhart Information     [employer] gives you secure access to your electronic health record. If you see a primary care provider, you can also send messages to your care team and make appointments. If you have questions, please call your primary care clinic.  If you do not have a primary care provider, please call [phone] and they will assist you.        Care EveryWhere ID     This is your Care EveryWhere ID. This could be used by other organizations to access your [place] medical records  [id number]        Your Vitals Were     Pulse Temperature Height Pulse Oximetry BMI (Body Mass Index)       106 98.6  F (37  C) (Tympanic) 1.651 m (5' 5\") 95% 32.72 kg/m2        Blood Pressure from Last 3 Encounters:   03/22/18 124/66   03/15/18 115/59   03/13/18 137/77    Weight from Last 3 Encounters:   03/22/18 89.2 kg (196 lb 9.6 oz)   03/15/18 89.6 kg (197 lb 8 oz)   03/13/18 89.2 kg (196 lb 11.2 oz)              We Performed the Following     CBC with platelets differential     XR Chest 2 Views        Primary Care Provider Office Phone # Fax #    Myajuan manuel Park [phone]       [facility] [address]   [place] " [id number]        Equal Access to Services     [facility] Hospital[facility]ORDAL[facility]A : Hadii aad ku hadnataliedale Kiaali, wavanda yrnkelsieha, qabetzyta adriannala nenacandy zamora. So [facility] [phone].    ATENCIÓN: Si habla español, tiene a stiles disposición servicios gratuitos de asistencia lingüística. Lexaame al [phone].    We comply with applicable federal civil rights laws and [place] laws. We do not discriminate on the basis of race, color, national origin, age, disability, sex, sexual orientation, or gender identity.            Thank you!     Thank you for choosing [facility]  for your care. Our goal is always to provide you with excellent care. Hearing back from our patients is one way we can continue to improve our services. Please take a few minutes to complete the written survey that you may receive in the mail after your visit with us. Thank you!             Your Updated Medication List - Protect others around you: Learn how to safely use, store and throw away your medicines at www.disposemymeds.org.          This list is accurate as of 3/22/18  2:04 PM.  Always use your most recent med list.                   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), Cough, Malignant neoplasm of lower-outer quadrant of right breast of female, estrogen receptor negative (H), Ductal carcinoma in situ (DCIS) of right breast, Phyllodes neoplasm of breast, Anxiety and depression       IMODIUM A-D 2 MG tablet   Generic drug:  loperamide      Take 2 mg by mouth 4 times daily as needed for diarrhea    Phyllodes neoplasm of breast, Malignant neoplasm metastatic to right lung (H), Adjustment disorder with mixed anxiety and depressed mood, Triple negative malignant neoplasm of breast (H)       lidocaine-prilocaine cream    EMLA    30 g    Apply 30 g topically as needed for moderate pain    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)       LORazepam 0.5 MG tablet    ATIVAN    30 tablet    Take 1 tablet (0.5 mg) by mouth every 4 hours as needed (Anxiety, Nausea/Vomiting or Sleep)    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)       METFORMIN HCL PO      Take 500 mg by mouth daily (with breakfast)        nystatin Powd     1 each    1 Application 2 times daily    Phyllodes neoplasm of breast, Malignant neoplasm metastatic to right lung (H), Adjustment disorder with mixed anxiety and depressed mood, Triple negative malignant neoplasm of breast (H)       prochlorperazine 10 MG tablet    COMPAZINE    30 tablet    Take 1 tablet (10 mg) by mouth every 6 hours as needed (Nausea/Vomiting)    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)       sertraline 50 MG tablet    ZOLOFT    180 tablet    Take 2 tablets (100 mg) by mouth daily    Phyllodes neoplasm of breast, Malignant neoplasm metastatic to right lung (H), Adjustment disorder with mixed anxiety and depressed mood, Triple negative malignant neoplasm of breast (H)       SIMVASTATIN PO       Take 20 mg by mouth At Bedtime        VITAMIN B6 PO      Take 50 mg by mouth daily    Anemia, unspecified type, Malignant neoplasm of lower-outer quadrant of right female breast (H), DCIS (ductal carcinoma in situ), right, Postmenopausal status, Aromatase inhibitor use, Encounter for monitoring cardiotoxic drug therapy

## 2018-03-22 NOTE — PROGRESS NOTES
"Patient is a 67 year old female here accompanied by spouse and brother Dallin today for infusion of Orlatumab under the orders of Dr. Celis.  Left sided power port accessed with 19 gauge, 3/4 inch 90 degree bent non coring power needle.  Line flushed with 10 cc's normal saline.  Needle secured with sterile transparent dressing. Difficulty obtaining blood return. Unable to obtain enough for labs, but able to determine needle is placed correctly. Lab called to draw peripherally. Patient tolerated well.  Denies pain and discomfort at this time.  Port flushes easily without resistance.  Nathalia Amaral NP updated and VORB received to administer Alteplase today d/t recent history of difficulty obtaining good blood return and history of need for Alteplase in recent past. Initiated at 1200. Assessed at 1230 and no blood return noted. Assessed at 1400 and good blood return noted.    Hand hygiene performed: yes   Mask donned by caregiver: yes Site prepped with CHG: yes Labs drawn: No Dressing applied using aseptic technique: yes     Today's lab values: WBC 7.2, ANC 5.4, , HGB 10.9.     Patient reports heaviness in left chest that came \"once steroids worse off\" and was accompanied by return of cough, that is seldom productive and when it is, is clear. Lung sounds clear to all but DUTCH, where sound is decreased. No adventitious sounds heard. VS WDL, afebrile. Updated Nathalia Amaral NP and orders obtained for STAT Chest XRAY.  XRAY shows increase in fluid to right side. Nathalia Amaral NP updated and ordered that patient treatment of Orlatumab be deferred to tomorrow, and ultrasound guided Thoracentesis to be set up today. Nathalia Amaral's nurse Abeba LAY RN, placing order. Procedure to be done at 1430, patient able to have clear liquids per Diagnostic Imaging and ordered accordingly.     Denies questions or concerns regarding today's infusion and/or medications being administered.     Patient identified with two " identifiers, order verified, and verbal consent for today's infusion obtained from patient. Written consent for treatment is on file and valid.    Per Diagnostic Imaging, does not need to remain accessed for procedure. Needle removed after flushing with NS and Heparin per orders, tip intact. Site clean, dry and intact. Covered with a sterile bandage, slight pressure applied for 30 seconds. Pt instructed to leave bandage intact for a minimum of one hour, and to call with questions or concerns. Copy of appointments, discharge instructions, and after visit summary (AVS) provided to patient. Patient states understanding, discharged ambulatory. Instructed to go to admitting for following procedure.

## 2018-03-22 NOTE — PLAN OF CARE
Patient here to have thoracentesis.  Vital signs as charted.  Denies pain at this time.  Denies having had a fall.   Procedure explained to procedure. Patient signed in understanding.  Dr Chavira in to check via ultrasound on fluid.  Procedure aborted at this time due to not having adequate fluid.  Dr. Chavira explained this to patient.  Denies any questions at this time.  Escorted to main entrance.

## 2018-03-23 NOTE — PATIENT INSTRUCTIONS
We will see you back as planned.   Olaratumab injection  Brand Name: LARTRUVO  What is this medicine?  OLARATUMAB (oh lar a tue mab) is a monoclonal antibody. It is used to treat soft-tissue sarcoma.  How should I use this medicine?  This medicine is for infusion into a vein. It is given by a health care professional in a hospital or clinic setting.  Talk to your pediatrician regarding the use of this medicine in children. Special care may be needed.  What side effects may I notice from receiving this medicine?  Side effects that you should report to your doctor or health care professional as soon as possible:    allergic reactions like skin rash, itching or hives, swelling of the face, lips, or tongue    breathing problems    facial flushing    low blood counts - this medicine may decrease the number of white blood cells, red blood cells and platelets. You may be at increased risk for infections and bleeding.    pain, tingling, numbness in the hands or feet    signs and symptoms of low blood pressure like dizziness; feeling faint or lightheaded, falls; unusually weak or tired    signs of infection - fever or chills, cough, sore throat, pain or difficulty passing urine    signs of decreased platelets or bleeding - bruising, pinpoint red spots on the skin, black, tarry stools, blood in the urine  Side effects that usually do not require medical attention (report to your doctor or health care professional if they continue or are bothersome):    diarrhea    hair loss    loss of appetite    nausea    mouth sores    muscle pain    stomach pain    vomiting  What may interact with this medicine?  Interactions have not been studied.  Give your health care provider a list of all the medicines, herbs, non-prescription drugs, or dietary supplements you use. Also tell them if you smoke, drink alcohol, or use illegal drugs. Some items may interact with your medicine.  What if I miss a dose?  It is important not to miss your  dose. Call your doctor or health care professional if you are unable to keep an appointment.  Where should I keep my medicine?  This drug is given in a hospital or clinic and will not be stored at home.  What should I tell my health care provider before I take this medicine?  They need to know if you have any of these conditions:    an unusual or allergic reaction to olaratumab, other medicines, foods, dyes, or preservatives    pregnant or trying to get pregnant    breast-feeding  What should I watch for while using this medicine?  Your condition will be monitored carefully while you are receiving this medicine.  You may need blood work done while you are taking this medicine.  This medicine can cause serious allergic reactions. To reduce your risk you may need to take medicine before treatment with this medicine. Take your medicine as directed.  Do not become pregnant while taking this medicine or for 3 months after stopping it. Women should inform their doctor if they wish to become pregnant or think they might be pregnant. There is a potential for serious side effects to an unborn child. Talk to your health care professional or pharmacist for more information. Do not breast-feed an infant while taking this medicine or for 3 months after stopping it.  This may interfere with the ability to father a child. You should talk to your doctor or health care professional if you are concerned about your fertility.  NOTE:This sheet is a summary. It may not cover all possible information. If you have questions about this medicine, talk to your doctor, pharmacist, or health care provider. Copyright  2017 [employer]vier

## 2018-03-23 NOTE — PROGRESS NOTES
Patient is a 67 year old female here accompanied by spouse today for infusion of Olaratumab under the orders of Dr. Celis.  Left sided power port accessed with 22 gauge 3/4 inch 90 degree bent non coring needle.  Line flushed with 10 cc's normal saline.  Needle secured with sterile transparent dressing. Patient tolerated well.  Denies pain and discomfort at this time.  Port flushes easily without resistance.    Hand hygiene performed: yes   Mask donned by caregiver: yes Site prepped with CHG: yes Labs drawn: no Dressing applied using aseptic technique: yes   Yesterday's lab values: WBC 7.2, ANC 5.4, , HGB 10.9.     Patient meets parameters for today's infusion.  Denies questions or concerns regarding today's infusion and/or medications being administered.      Independent dose check done.    Patient identified with two identifiers, order verified, and verbal consent for today's infusion obtained from patient. Written consent for treatment is on file and valid.    1330: IV pump verified with Olaratumab 1330mg dose, drug, and rate of administration by second RN, well. Infusion administered per protocol. Patient tolerated infusion well, no signs or symptoms of adverse reaction noted. Patient denies pain nor discomfort.     Needle removed, tip intact. Site clean, dry and intact. Covered with a sterile bandage, slight pressure applied for 30 seconds. Pt instructed to leave bandage intact for a minimum of one hour, and to call with questions or concerns. Copy of appointments, discharge instructions, and after visit summary (AVS) provided to patient. Patient states understanding, discharged ambulatory.

## 2018-03-23 NOTE — MR AVS SNAPSHOT
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   Olaratumab injection  Brand Name: LARTRUVO  What is this medicine?  OLARATUMAB (oh lar a tue mab) is a monoclonal antibody. It is used to treat soft-tissue sarcoma.  How should I use this medicine?  This medicine is for infusion into a vein. It is given by a health care professional in a hospital or clinic setting.  Talk to your pediatrician regarding the use of this medicine in children. Special care may be needed.  What side effects may I notice from receiving this medicine?  Side effects that you should report to your doctor or health care professional as soon as possible:    allergic reactions like skin rash, itching or hives, swelling of the face, lips, or tongue    breathing problems    facial flushing    low blood counts - this medicine may decrease the number of white blood cells, red blood cells and platelets. You may be at increased risk for infections and bleeding.    pain, tingling, numbness in the hands or feet    signs and symptoms of low blood pressure like dizziness; feeling faint or lightheaded, falls; unusually weak or tired    signs of infection - fever or chills, cough, sore throat, pain or difficulty passing urine    signs of decreased platelets or bleeding - bruising, pinpoint red spots on the skin, black, tarry stools, blood in the urine  Side effects that usually do not require medical attention (report to your doctor or health care  professional if they continue or are bothersome):    diarrhea    hair loss    loss of appetite    nausea    mouth sores    muscle pain    stomach pain    vomiting  What may interact with this medicine?  Interactions have not been studied.  Give your health care provider a list of all the medicines, herbs, non-prescription drugs, or dietary supplements you use. Also tell them if you smoke, drink alcohol, or use illegal drugs. Some items may interact with your medicine.  What if I miss a dose?  It is important not to miss your dose. Call your doctor or health care professional if you are unable to keep an appointment.  Where should I keep my medicine?  This drug is given in a hospital or clinic and will not be stored at home.  What should I tell my health care provider before I take this medicine?  They need to know if you have any of these conditions:    an unusual or allergic reaction to olaratumab, other medicines, foods, dyes, or preservatives    pregnant or trying to get pregnant    breast-feeding  What should I watch for while using this medicine?  Your condition will be monitored carefully while you are receiving this medicine.  You may need blood work done while you are taking this medicine.  This medicine can cause serious allergic reactions. To reduce your risk you may need to take medicine before treatment with this medicine. Take your medicine as directed.  Do not become pregnant while taking this medicine or for 3 months after stopping it. Women should inform their doctor if they wish to become pregnant or think they might be pregnant. There is a potential for serious side effects to an unborn child. Talk to your health care professional or pharmacist for more information. Do not breast-feed an infant while taking this medicine or for 3 months after stopping it.  This may interfere with the ability to father a child. You should talk to your doctor or health care professional if you are concerned about  your fertility.  NOTE:This sheet is a summary. It may not cover all possible information. If you have questions about this medicine, talk to your doctor, pharmacist, or health care provider. Copyright  2017 [employer]                Follow-ups after your visit        Your next 10 appointments already scheduled     Apr 05, 2018  9:00 AM CDT   Level O with HC INF RM 3306   [facility] [place] ([facility] [place] [facility] [place] )    [address]  [place] [address]   [phone]            Apr 05, 2018  9:30 AM CDT   (Arrive by 9:15 AM)   Return Visit with Ceci Amaral NP   [facility] [place] ([facility] [place] [facility] [place] )    [address]  [place] [address]   [phone]            Apr 06, 2018  9:30 AM CDT   Level 4 with HC INF RM 3302   [facility] [place] ([facility] [place] )    [address]  [place] [address]   [phone]            Apr 13, 2018  9:00 AM CDT   Level 4 with HC INF RM 3302   [facility] [place] ([facility] [place] )    [address]  [place] [address]   [phone]            Apr 25, 2018  8:00 AM CDT   (Arrive by 7:45 AM)   New Visit with Jerri Cueto GC   [facility] [place] ([facility] [place] )    [address]  [place] [address]   [phone]            Apr 27, 2018  8:30 AM CDT   Level O with HC INF RM 3308   [facility] [place] ([facility] [place] )    [address]  [place] [address]   [phone]            Apr 27, 2018  9:00 AM CDT   (Arrive by 8:45 AM)   Return Visit with Ceci Amaral NP   [facility] [place] ([facility] [place] )    [address]  [place] [address]   [phone]            Apr 27, 2018  9:30 AM CDT   Level 4 with HC INF RM 3308   [facility] [place] ([facility] [place] )    [address]  [place] [address]   [phone]            May 04, 2018  9:30 AM CDT   Level 4 with HC INF RM  "3308   [facility] [place] ([facility] [place] )    [address]  [place] [address]   [phone]              Future tests that were ordered for you today     Open Future Orders        Priority Expected Expires Ordered    US Chest Pleural Effusion Imaging Routine  3/22/2019 3/22/2018            Who to contact     If you have questions or need follow up information about today's clinic visit or your schedule please contact [facility] directly at [phone].  Normal or non-critical lab and imaging results will be communicated to you by eTapestryhart, letter or phone within 4 business days after the clinic has received the results. If you do not hear from us within 7 days, please contact the clinic through Anadyst or phone. If you have a critical or abnormal lab result, we will notify you by phone as soon as possible.  Submit refill requests through [employer] or call your pharmacy and they will forward the refill request to us. Please allow 3 business days for your refill to be completed.          Additional Information About Your Visit        [employer] Information     [employer] gives you secure access to your electronic health record. If you see a primary care provider, you can also send messages to your care team and make appointments. If you have questions, please call your primary care clinic.  If you do not have a primary care provider, please call [phone] and they will assist you.        Care EveryWhere ID     This is your Care EveryWhere ID. This could be used by other organizations to access your [facility] medical records  [id number]        Your Vitals Were     Pulse Temperature Respirations Height Pulse Oximetry BMI (Body Mass Index)    97 97.7  F (36.5  C) (Oral) 18 1.651 m (5' 5\") 94% 32.88 kg/m2       Blood Pressure from Last 3 Encounters:   03/22/18 139/82   03/22/18 124/66   03/15/18 115/59    Weight from Last 3 Encounters:   03/23/18 89.6 kg (197 lb 9.6 oz)   03/22/18 " 89.2 kg (196 lb 9.6 oz)   03/15/18 89.6 kg (197 lb 8 oz)              Today, you had the following     No orders found for display       Primary Care Provider Office Phone # Fax #    Mya Park [phone]       [facility] [address]   [place] [address]        Equal Access to Services     [facility] SELINA : Hadii aad ku hadasho Soomaali, waaxda luqadaha, qaybta kaalmada adeegyada, waxay idiin hayaan adeeg wiliamayanajacqueline laravin . So [facility] [phone].    ATENCIÓN: Si habla espobed, tiene a stiles disposición servicios gratuitos de asistencia lingüística. Lexaame al [phone].    We comply with applicable federal civil rights laws and [place] laws. We do not discriminate on the basis of race, color, national origin, age, disability, sex, sexual orientation, or gender identity.            Thank you!     Thank you for choosing [facility]  for your care. Our goal is always to provide you with excellent care. Hearing back from our patients is one way we can continue to improve our services. Please take a few minutes to complete the written survey that you may receive in the mail after your visit with us. Thank you!             Your Updated Medication List - Protect others around you: Learn how to safely use, store and throw away your medicines at www.disposemymeds.org.          This list is accurate as of 3/23/18  1:21 PM.  Always use your most recent med list.                   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), Cough, Malignant neoplasm of lower-outer quadrant of right breast of female, estrogen receptor negative (H), Ductal carcinoma in situ (DCIS) of right breast, Phyllodes neoplasm of breast, Anxiety and depression       IMODIUM A-D 2 MG tablet   Generic drug:  loperamide      Take 2 mg by mouth 4 times daily as needed for diarrhea    Phyllodes neoplasm of breast, Malignant neoplasm metastatic to right lung (H), Adjustment disorder with mixed anxiety and depressed mood, Triple negative malignant neoplasm of breast (H)       lidocaine-prilocaine cream    EMLA    30 g    Apply 30 g topically as needed for moderate pain    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)       LORazepam 0.5 MG tablet    ATIVAN    30 tablet    Take 1 tablet (0.5 mg) by mouth every 4 hours as needed (Anxiety, Nausea/Vomiting or Sleep)    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)       METFORMIN HCL PO      Take 500 mg by mouth daily (with breakfast)        nystatin Powd     1 each    1 Application 2 times daily    Phyllodes neoplasm of breast, Malignant neoplasm metastatic to right lung (H), Adjustment disorder with mixed anxiety and depressed mood, Triple negative malignant neoplasm of breast (H)       prochlorperazine 10 MG tablet    COMPAZINE    30 tablet    Take 1 tablet (10 mg) by mouth every 6 hours as needed (Nausea/Vomiting)    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)       sertraline 50 MG tablet    ZOLOFT     180 tablet    Take 2 tablets (100 mg) by mouth daily    Phyllodes neoplasm of breast, Malignant neoplasm metastatic to right lung (H), Adjustment disorder with mixed anxiety and depressed mood, 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

## 2018-04-05 NOTE — NURSING NOTE
"Chief Complaint   Patient presents with     RECHECK     Follow up malignant neoplasm        Initial /67  Pulse 112  Temp 97.2  F (36.2  C) (Tympanic)  Resp 18  Ht 1.651 m (5' 5\")  Wt 87.8 kg (193 lb 9.6 oz)  SpO2 91%  BMI 32.22 kg/m2 Estimated body mass index is 32.22 kg/(m^2) as calculated from the following:    Height as of this encounter: 1.651 m (5' 5\").    Weight as of this encounter: 87.8 kg (193 lb 9.6 oz).  Medication Reconciliation: complete   Immunizations reviewed and up to date, advanced directives on file, pain = 0, PHQ9 = 0.  Patient was assessed using the NCCN psychosocial distress thermometer. Patient rated the score as a 0. Patient rated current stressors as none. Stressors will be brought to the attention of provider or Oncology RN Care Coordinator for a score of 6 or greater or per nurses discretion.   Cristina Dye LPN                "

## 2018-04-05 NOTE — MR AVS SNAPSHOT
After Visit Summary   4/5/2018    Ana Simmons    MRN: [id number]           Patient Information     Date Of Birth          1950        Visit Information        Provider Department      4/5/2018 9:30 AM Ceci Amaral NP [facility] [place]        Today's Diagnoses     Ductal carcinoma in situ (DCIS) of right breast    -  1    Phyllodes neoplasm of breast        Malignant neoplasm metastatic to right lung (H)          Care Instructions    We would like to see you back per your calender.   Your prescription has been sent to:   [facility] [place] [address]  Phone: [phone] Fax: [phone]   When you are in need of a refill, please call your pharmacy and they will send us a request. If you have any questions please call [phone]  Other instructions:  1.  Start coumadin 1mg daily  2.  Try dextromethorphan at night for a cough suppressant; try a wedge for sleeping  3.  Can try pepcid OTC if needed for heartburn-if worsens-let us know and we can send in a prescription for you          Follow-ups after your visit        Your next 10 appointments already scheduled     Apr 06, 2018  9:30 AM CDT   Level 4 with HC INF RM 3302   [facility] [place] ([facility] [place] )    [address]  [place] [address]   [phone]            Apr 13, 2018  9:00 AM CDT   Level 4 with HC INF RM 3302   [facility] [place] ([facility] [place] )    [address]  [place] [address]   [phone]            Apr 25, 2018  8:00 AM CDT   (Arrive by 7:45 AM)   New Visit with Jerri Cueto GC   [facility] [place] ([facility] [place] )    [address]  [place] [address]   [phone]            Apr 27, 2018  8:30 AM CDT   Level O with HC INF RM 3308   [facility] [place] ([facility] [place] )    [address]  [place] [address]   [phone]             Apr 27, 2018  9:00 AM CDT   (Arrive by 8:45 AM)   Return Visit with Ceci Amaral NP   [facility] [place] ([facility] [place] )    [address]  [place] [address]   [phone]            Apr 27, 2018  9:30 AM CDT   Level 4 with HC INF RM 3308   [facility] [place] ([facility] [place] )    [address]  [place] [address]   [phone]            May 04, 2018  9:30 AM CDT   Level 4 with HC INF RM 3308   [facility] [place] ([facility] [place] )    [address]  [place] [address]   [phone]              Who to contact     If you have questions or need follow up information about today's clinic visit or your schedule please contact [facility] directly at [phone].  Normal or non-critical lab and imaging results will be communicated to you by [employer], letter or phone within 4 business days after the clinic has received the results. If you do not hear from us within 7 days, please contact the clinic through RoboDynamicst or phone. If you have a critical or abnormal lab result, we will notify you by phone as soon as possible.  Submit refill requests through [employer] or call your pharmacy and they will forward the refill request to us. Please allow 3 business days for your refill to be completed.          Additional Information About Your Visit        [employer] Information     [employer] gives you secure access to your electronic health record. If you see a primary care provider, you can also send messages to your care team and make appointments. If you have questions, please call your primary care clinic.  If you do not have a primary care provider, please call [phone] and they will assist you.        Care EveryWhere ID     This is your Care EveryWhere ID. This could be used by other organizations to access your [facility] medical records  [id number]        Your Vitals Were     Pulse Temperature Respirations Height  "Pulse Oximetry BMI (Body Mass Index)    112 97.2  F (36.2  C) (Tympanic) 18 1.651 m (5' 5\") 91% 32.22 kg/m2       Blood Pressure from Last 3 Encounters:   04/05/18 123/67   03/23/18 132/68   03/22/18 139/82    Weight from Last 3 Encounters:   04/05/18 87.8 kg (193 lb 9.6 oz)   03/23/18 89.6 kg (197 lb 9.6 oz)   03/22/18 89.2 kg (196 lb 9.6 oz)              Today, you had the following     No orders found for display         Today's Medication Changes          These changes are accurate as of 4/5/18 10:16 AM.  If you have any questions, ask your nurse or doctor.               Start taking these medicines.        Dose/Directions    warfarin 1 MG tablet   Commonly known as:  COUMADIN   Used for:  Ductal carcinoma in situ (DCIS) of right breast, Phyllodes neoplasm of breast, Malignant neoplasm metastatic to right lung (H)   Started by:  Ceci Amaral, NP        Dose:  1 mg   Take 1 tablet (1 mg) by mouth daily   Quantity:  90 tablet   Refills:  1            Where to get your medicines      These medications were sent to [facility] [place] [address]  [address], [facility] [address]     Phone:  [phone]     warfarin 1 MG tablet                Primary Care Provider Office Phone # Fax #    Mya Park [phone]       [facility] [address]   [facility] [address]        Equal Access to Services     ASHLEY MARKS AH: Rocio garciao Soavril, waaxda luqadaha, qaybta kaalmada adeegyada, candy idiin hayaan adecuong gil. So [facility] [phone].    ATENCIÓN: Si habla español, tiene a stiles disposición servicios gratuitos de asistencia lingüística. Llame al [phone].    We comply with applicable federal civil rights laws and [place] laws. We do not discriminate on the basis of race, color, national origin, age, disability, sex, sexual orientation, or gender identity.            Thank you!     Thank you for choosing [facility]  for your care. Our goal is always to provide you with excellent care. Hearing back from our patients is one way we can continue to improve our services. Please take a few minutes to complete the written survey that you may receive in the mail after your visit with us. Thank you!             Your Updated Medication List - Protect others around you: Learn how to safely use, store and throw away your medicines at www.disposemymeds.org.          This list is accurate as of 4/5/18 10:16 AM.  Always use your most recent med list.                   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), Cough, Malignant neoplasm of lower-outer quadrant of right breast of female, estrogen receptor negative (H), Ductal carcinoma in situ (DCIS) of right breast, Phyllodes neoplasm of breast, Anxiety and depression       IMODIUM A-D 2 MG tablet   Generic drug:  loperamide      Take 2 mg by mouth 4 times daily as needed for diarrhea    Phyllodes neoplasm of breast, Malignant neoplasm metastatic to right lung (H), Adjustment  disorder with mixed anxiety and depressed mood, Triple negative malignant neoplasm of breast (H)       lidocaine-prilocaine cream    EMLA    30 g    Apply 30 g topically as needed for moderate pain    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 breast (H)       LORazepam 0.5 MG tablet    ATIVAN    30 tablet    Take 1 tablet (0.5 mg) by mouth every 4 hours as needed (Anxiety, Nausea/Vomiting or Sleep)    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)       METFORMIN HCL PO      Take 500 mg by mouth daily (with breakfast)        nystatin Powd     1 each    1 Application 2 times daily    Phyllodes neoplasm of breast, Malignant neoplasm metastatic to right lung (H), Adjustment disorder with mixed anxiety and depressed mood, Triple negative malignant neoplasm of breast (H)       prochlorperazine 10 MG tablet    COMPAZINE    30 tablet    Take 1 tablet (10 mg) by mouth every 6 hours as needed (Nausea/Vomiting)    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)       sertraline 50 MG tablet    ZOLOFT    180 tablet    Take 2 tablets (100 mg) by mouth daily    Phyllodes neoplasm of breast, Malignant neoplasm metastatic to right lung (H), Adjustment disorder with mixed anxiety and depressed mood, 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

## 2018-04-05 NOTE — PATIENT INSTRUCTIONS
We will see you back as planned. If you have any questions or concerns, please do not hesitate to call us! [phone]

## 2018-04-05 NOTE — PATIENT INSTRUCTIONS
We would like to see you back per your calender.   Your prescription has been sent to:   [facility] - [place], [place] - [address]  [address]  [place] [address]  Phone: [phone] Fax: [phone]   When you are in need of a refill, please call your pharmacy and they will send us a request. If you have any questions please call [phone]  Other instructions:  1.  Start coumadin 1mg daily  2.  Try dextromethorphan at night for a cough suppressant; try a wedge for sleeping  3.  Can try pepcid OTC if needed for heartburn-if worsens-let us know and we can send in a prescription for you

## 2018-04-05 NOTE — PROGRESS NOTES
Oncology Follow-up Visit:  April 5, 2018    Reason for Visit:  Patient presents with:  RECHECK: Follow up malignant neoplasm      Nursing Note and documentation reviewed: yes    HPI:  This is a 67-year-old female patient who presents to the oncology/hematology clinic today for evaluation prior to receiving cycle 2 day 1 chemotherapy.  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  She underwent treatment completing this 11/2016 and was found to have metastasis to the right lung in November 2017.  Patient was evaluated by Dr. 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  He recommended docetaxel with gemcitabine and this was initiated on 1/3/2018 and she was found to have further progression after 3 cycles.  She is now being treated with olaratumab and doxil.    She presents today accompanied by her  stating she overall is feeling fairly well.  She does continue to have a cough that she feels is more in the morning.  She states she coughed so hard 1 morning that she vomited.  Her cough is worse when she goes to bed but she does cough occasionally throughout the day also and will use the prescription cough medicine on occasion.  She does not use it routinely.  She has some shortness of breath with activity and denies any fevers.  She also complains of some reflux over the past 2 days after breakfast and states rolaids help.  She does have some mild fatigue but essentially she has no other complaints.    Oncologic History: Patient underwent mammogram in January 2016 which revealed abnormalities of the right breast.  On 02/09/2016, a stereotactic biopsy of the right breast lesion at the 8 o'clock position  revealed grade 3 DCIS of a solid micropapillary subtype.  Estrogen receptors were positive.  Also a biopsy of the right breast lesion at the 9 o'clock position revealed a grade 2 DCIS of the solid micropapillary subtype.  Estrogen receptors were positive.  She underwent bilateral mastectomies on 3/30/16 by Dr. Sara Cooley at the [facility] with pathology revealing a 2 cm metaplastic carcinoma with epithelial mesenchymal components including adenocarcinoma.  There was also angiosarcomatous, liposarcomatous, malignant spindle cell and chondroid differentiation.  The tumor in the lower quadrant was distinct in the 2 DCIS lesions noted in the right breast.  Estrogen receptor positive at 2% with progesterone receptor negative; tumor was HER-2/renetta negative.  In terms of her DCIS, there was a 1.5 cm DCIS in the right breast as well as a smaller focus noted.  There was a sentinel lymph node that was negative. Chemotherapy was recommended.  She was evaluated by Dr. Lam with [facility] on 4/18/2016 he felt patient had a metaplastic carcinoma as well as DCIS of the right breast.  He did note that there was no consensus best treatment options for this patient, but he felt given that she likely had triple negative disease and if she had a pure ductal adenocarcinoma, he would favor treating her a triple-negative breast cancer that is greater than 0.5 cm.  He felt that dose-dense AC x 4 cycles plus Taxol weekly x 12 weeks would be ideal.  She was evaluated here by Dr. Celis with Medical Oncology on 5/3/2016 as she wanted to receive treatment closer to home.      She underwent treatment and was found to have metastasis to the right lung in November 2017.  Patient was evaluated by Dr. 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  He recommended docetaxel with gemcitabine and this  was initiated on 1/3/2018.    Current Chemo Regime/TX:  Olaratumab 15mg/kg day1 and day 8 with doxil 30mg/m2 day1 every 21 days  Current Cycle:  2  # of completed cycles: 1    Previous treatment:  Adriamycin 60mg/m2 and Cytoxan 600mg/m2 every 2 weeks with Neulasta support 6mg SQ injection day 2 x 4 cycles; Taxol 80mg/m2 weekly x 12 weeks; arimidex 1mg daily initiated 11/2016; Gemcitabine 900mg/m2 with docetaxel 75mg/m2 day 1 and day 8 every 21 days with nuelasta support day 9 x 3 cycles    Past Medical History:   Diagnosis Date     Breast cancer (H)      DMII (diabetes mellitus, type 2) (H)      HLD (hyperlipidemia)      Nonhealing surgical wound        Past Surgical History:   Procedure Laterality Date     APPENDECTOMY OPEN       C VAGINAL HYSTERECTOMY       COLONOSCOPY - HIM SCAN  01/01/2006    Colonoscopy 2006;  02-16     INSERT PORT VASCULAR ACCESS Left 6/2/2016    Procedure: INSERT PORT VASCULAR ACCESS;  Surgeon: Micheline Davis MD;  Location: [facility] OR     MASTECTOMY Bilateral        Family History   Problem Relation Age of Onset     Lung Cancer Father      Myocardial Infarction Father      Coronary Artery Disease Father      DIABETES Father      Hyperlipidemia Father      Hypertension Father      Prostate Cancer Paternal Grandfather      DIABETES Mother      Hyperlipidemia Mother      Thyroid Disease Mother      Thyroid Disease Maternal Grandmother      Thyroid Disease Daughter      Breast Cancer No family hx of      Colon Cancer No family hx of      Depression No family hx of      Asthma No family hx of      CEREBROVASCULAR DISEASE No family hx of      Genetic Disorder No family hx of        Social History     Social History     Marital status:      Spouse name: Braden     Number of children: 2     Years of education: N/A     Occupational History      Retired     Social History Main Topics     Smoking status: Former Smoker     Smokeless tobacco: Never Used     Alcohol use No      Comment: 1  "kamille/ night, with chemo is not drinking     Drug use: No     Sexual activity: Not on file     Other Topics Concern     Parent/Sibling W/ Cabg, Mi Or Angioplasty Before 65f 55m? Yes     father     Social History Narrative       Current Outpatient Prescriptions   Medication     warfarin (COUMADIN) 1 MG tablet     LORazepam (ATIVAN) 0.5 MG tablet     dexamethasone (DECADRON) 4 MG tablet     guaiFENesin-codeine (CHERATUSSIN AC) 100-10 MG/5ML SOLN solution     loperamide (IMODIUM A-D) 2 MG tablet     sertraline (ZOLOFT) 50 MG tablet     Pyridoxine HCl (VITAMIN B6 PO)     calcium-vitamin D (CALTRATE) 600-400 MG-UNIT per tablet     cyanocobalamin (VITAMIN  B-12) 1000 MCG tablet     METFORMIN HCL PO     SIMVASTATIN PO     prochlorperazine (COMPAZINE) 10 MG tablet     albuterol (PROAIR HFA/PROVENTIL HFA/VENTOLIN HFA) 108 (90 BASE) MCG/ACT Inhaler     nystatin POWD     lidocaine-prilocaine (EMLA) cream     No current facility-administered medications for this visit.         Allergies   Allergen Reactions     Cats        Review Of Systems:  Constitutional: denies fever, weight changes, chills, and night sweats.  Eyes: denies blurred or double vision  Ears/Nose/Throat: denies ear pain, nose problems, difficulty swallowing  Respiratory: se HPI  Skin: denies rash, lesions  Breast/Chest wall: denies new pain, lumps or discharge  Cardiovascular: denies chest pain, palpitations, edema  Gastrointestinal: denies abdominal pain, bloating, nausea  Genitourinary: denies difficulty with urination, blood in urine  Musculoskeletal:denies new muscle pain, bone pain  Neurologic: denies lightheadedness, headaches, numbness or tingling  Psychiatric: denies anxiety, depression  Hematologic/Lymphatic/Immunologic: denies easy bruising, easy bleeding, lumps or bumps noted  Endocrine: Denies increased thirst      ECOG Performance Status: 1    Physical Exam:  /67  Pulse 112  Temp 97.2  F (36.2  C) (Tympanic)  Resp 18  Ht 1.651 m (5' 5\")  " Wt 87.8 kg (193 lb 9.6 oz)  SpO2 91%  BMI 32.22 kg/m2    GENERAL APPEARANCE: Healthy, alert and in no acute distress.  HEENT: Normocephalic, Sclerae anicteric. Oropharynx without ulcers, lesions, or thrush.  NECK:  No asymmetry or masses, no thyromegaly.  LYMPHATICS: No palpable cervical, supraclavicular, axillary, or inguinal nodes   RESP: Lungs clear to auscultation bilaterally, respirations regular and easy  CARDIOVASCULAR: Regular rate and rhythm. Normal S1, S2  ABDOMEN: Soft, nontender. Bowel sounds auscultated all 4 quadrants. No palpable organomegaly or masses.  BREAST/Chest wall: not examined  MUSCULOSKELETAL: Extremities without gross deformities noted. No edema of bilateral lower extremities.  NEURO: Alert and oriented x 3.  Gait steady.  PSYCHIATRIC: Mentation and affect appear normal.  Mood appropriate.    Laboratory:  Results for orders placed or performed in visit on 04/05/18   Comprehensive metabolic panel   Result Value Ref Range    Sodium 139 133 - 144 mmol/L    Potassium 4.1 3.4 - 5.3 mmol/L    Chloride 104 94 - 109 mmol/L    Carbon Dioxide 29 20 - 32 mmol/L    Anion Gap 6 3 - 14 mmol/L    Glucose 107 (H) 70 - 99 mg/dL    Urea Nitrogen 14 7 - 30 mg/dL    Creatinine 0.74 0.52 - 1.04 mg/dL    GFR Estimate 78 >60 mL/min/1.7m2    GFR Estimate If Black >90 >60 mL/min/1.7m2    Calcium 9.2 8.5 - 10.1 mg/dL    Bilirubin Total 0.3 0.2 - 1.3 mg/dL    Albumin 3.2 (L) 3.4 - 5.0 g/dL    Protein Total 7.1 6.8 - 8.8 g/dL    Alkaline Phosphatase 83 40 - 150 U/L    ALT 25 0 - 50 U/L    AST 22 0 - 45 U/L   Lactate Dehydrogenase (LDH)   Result Value Ref Range    Lactate Dehydrogenase 167 81 - 234 U/L   Ca27.29  breast tumor marker   Result Value Ref Range    CA 27-29 18 0 - 39 U/mL   CBC with platelets differential   Result Value Ref Range    WBC 5.0 4.0 - 11.0 10e9/L    RBC Count 3.76 (L) 3.8 - 5.2 10e12/L    Hemoglobin 10.9 (L) 11.7 - 15.7 g/dL    Hematocrit 34.5 (L) 35.0 - 47.0 %    MCV 92 78 - 100 fl    MCH  29.0 26.5 - 33.0 pg    MCHC 31.6 31.5 - 36.5 g/dL    RDW 17.2 (H) 10.0 - 15.0 %    Platelet Count 215 150 - 450 10e9/L    Diff Method Automated Method     % Neutrophils 69.9 %    % Lymphocytes 19.3 %    % Monocytes 5.4 %    % Eosinophils 5.0 %    % Basophils 0.2 %    % Immature Granulocytes 0.2 %    Nucleated RBCs 0 0 /100    Absolute Neutrophil 3.5 1.6 - 8.3 10e9/L    Absolute Lymphocytes 1.0 0.8 - 5.3 10e9/L    Absolute Monocytes 0.3 0.0 - 1.3 10e9/L    Absolute Eosinophils 0.3 0.0 - 0.7 10e9/L    Absolute Basophils 0.0 0.0 - 0.2 10e9/L    Abs Immature Granulocytes 0.0 0 - 0.4 10e9/L    Absolute Nucleated RBC 0.0        Imaging Studies:  None for today    Results for orders placed or performed during the hospital encounter of 03/22/18   US Chest Pleural Effusion Imaging    Narrative    PROCEDURE: US CHEST/PLEURAL EFFUSION IMAGING 3/22/2018 3:32 PM    HISTORY: ; Chest heaviness    COMPARISONS: None.    TECHNIQUE: Patient was initially scheduled for ultrasound-guided  thoracentesis the right pleural space.    Scanning of the right pleural space demonstrates a large mass but only  a small amount of pleural fluid. Therefore, no thoracentesis was  performed.    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  She underwent treatment then was found to have metastasis to the right lung in November 2017.  She was seen at the [facility] and felt this was a Phyllodes tumor, with recommendation for gemcitabine and docetaxel.  She received 3 cycles with progression of disease.  She is now being treated with olaratumab and doxil and will initiate cycle 2 day 1 tomorrow.  She will return for evaluation prior to cycle 3 with labs per treatment plan.    #2  Cough:  She'll continue on the  Cheratussin as needed.  I recommended Dextromethorphan at HS as a suppressant.    #3  Anemia:  Likely related to her disease.  She will however, continue with 1 iron tablet daily.    #4  Complication of Central Venous Catheter:  She will have alteplase placed tomorrow prior to her treatment to attempt to dissolve known fibrin sheath in order to obtain blood return.  Will try coumadin 1mg daily to see if this prevents the formation of the sheath.  Will check an INR next week.      #5  Reflux:  Recommended OTC pepcid if this continues.  If worsens will likely try Prilosec and add pepcid into her pre-meds.    I encouraged patient to call with any questions or concerns.       Ceci TOMLIN, FNP-BC, AOCNP

## 2018-04-05 NOTE — PROGRESS NOTES
Patients left sided port accessed using non-coring, 19 gauge, 3/4 inch needle. Port accessed per facility protocol.  Hand hygiene performed: yes   Mask donned by caregiver: yes Site prepped with CHG: yes Labs drawn: no Dressing applied using aseptic technique: yes Port flushed easily, without resistance. Flushed with 10 cc's normal saline.   No blood return noted despite several position changes. Update given  To Nathalia Amaral, patient to ave Cathflo administered tomorrow prior to chemotherapy to obtain blood return. Labs drawn peripherally by . Needle removed intact, sterile dressing applied.  Slight pressure applied for 30 seconds.   Patient tolerated port flush well, denies pain nor discomfort at this time. Patient instructed to leave dressing intact for a minimum of one hour, and to call with questions or concerns.  Patient states understanding and is in agreement with this plan. Copy of appointments, and after visit summary (AVS) given to patient.  Patient discharged ambulatory. Erlinda Tejeda

## 2018-04-05 NOTE — MR AVS SNAPSHOT
After Visit Summary   4/5/2018    Ana Simmons    MRN: [id number]           Patient Information     Date Of Birth          1950        Visit Information        Provider Department      4/5/2018 9:00 AM HC INF RM 3306 [facility] [place]        Today's Diagnoses     Phyllodes neoplasm of breast    -  1    Triple negative malignant neoplasm of breast (H)        Malignant neoplasm of right female breast, unspecified estrogen receptor status, unspecified site of breast (H)          Care Instructions    We will see you back as planned. If you have any questions or concerns, please do not hesitate to call us! [phone]          Follow-ups after your visit        Your next 10 appointments already scheduled     Apr 06, 2018  9:30 AM CDT   Level 4 with HC INF RM 3302   [facility] [place] ([facility] [place] )    [address]  [place] [address]   [phone]            Apr 13, 2018  9:00 AM CDT   Level 4 with HC INF RM 3302   [facility] [place] ([facility] [place] )    [address]  [place] [address]   [phone]            Apr 25, 2018  8:00 AM CDT   (Arrive by 7:45 AM)   New Visit with Jerri Cueto GC   [facility] [place] ([facility] [place] )    [address]  [place] [address]   [phone]            Apr 27, 2018  8:30 AM CDT   Level O with HC INF RM 3308   [facility] [place] ([facility] [place] )    [address]  [place] [address]   [phone]            Apr 27, 2018  9:00 AM CDT   (Arrive by 8:45 AM)   Return Visit with Ceci Amaral, NP   [facility] [place] ([facility] [place] )    [address]  [place] [address]   [phone]            Apr 27, 2018  9:30 AM CDT   Level 4 with HC INF RM 3308   [facility] [place] ([facility] [place] )    [address]  [place] [address]   [phone]            May 04, 2018  9:30 AM CDT  "  Level 4 with  INF RM 3308   [facility] [place] ([facility] [place] )    [address] [place] [address]   [phone]              Who to contact     If you have questions or need follow up information about today's clinic visit or your schedule please contact [facility] directly at [phone].  Normal or non-critical lab and imaging results will be communicated to you by MyChart, letter or phone within 4 business days after the clinic has received the results. If you do not hear from us within 7 days, please contact the clinic through [facility] or phone. If you have a critical or abnormal lab result, we will notify you by phone as soon as possible.  Submit refill requests through [facility] or call your pharmacy and they will forward the refill request to us. Please allow 3 business days for your refill to be completed.          Additional Information About Your Visit        [id number] Information     [facility] gives you secure access to your electronic health record. If you see a primary care provider, you can also send messages to your care team and make appointments. If you have questions, please call your primary care clinic.  If you do not have a primary care provider, please call [phone] and they will assist you.        Care EveryWhere ID     This is your Care EveryWhere ID. This could be used by other organizations to access your [place] medical records  [id number]        Your Vitals Were     Pulse Temperature Respirations Height Pulse Oximetry BMI (Body Mass Index)    112 97.2  F (36.2  C) (Tympanic) 18 1.651 m (5' 5\") 91% 32.22 kg/m2       Blood Pressure from Last 3 Encounters:   04/05/18 123/67   04/05/18 123/67   03/23/18 132/68    Weight from Last 3 Encounters:   04/05/18 87.8 kg (193 lb 9.6 oz)   04/05/18 87.8 kg (193 lb 9.6 oz)   03/23/18 89.6 kg (197 lb 9.6 oz)              We Performed the Following     Ca27.29  breast tumor marker     CBC with platelets " differential     Comprehensive metabolic panel     Lactate Dehydrogenase (LDH)          Today's Medication Changes          These changes are accurate as of 4/5/18 10:54 AM.  If you have any questions, ask your nurse or doctor.               Start taking these medicines.        Dose/Directions    warfarin 1 MG tablet   Commonly known as:  COUMADIN   Used for:  Ductal carcinoma in situ (DCIS) of right breast, Phyllodes neoplasm of breast, Malignant neoplasm metastatic to right lung (H)   Started by:  Ceci Amaral, NP        Dose:  1 mg   Take 1 tablet (1 mg) by mouth daily   Quantity:  90 tablet   Refills:  1            Where to get your medicines      These medications were sent to [facility] - [place], [place] - [address]  [address], [facility] [address]     Phone:  [phone]     warfarin 1 MG tablet                Primary Care Provider Office Phone # Fax #    Mya Park [phone]       [facility] [address]   [facility] [place] [address]        Equal Access to Services     JOSE MARKS : Hadii aad ku hadasho Soomaali, waaxda luqadaha, qaybta kaalmada adeegyada, waxay idiin hayaan miriam nieves . So [facility] [phone].    ATENCIÓN: Si habla español, tiene a stiles disposición servicios gratuitos de asistencia lingüística. Clint al [phone].    We comply with applicable federal civil rights laws and [place] laws. We do not discriminate on the basis of race, color, national origin, age, disability, sex, sexual orientation, or gender identity.            Thank you!     Thank you for choosing [facility]  for your care. Our goal is always to provide you with excellent care. Hearing back from our patients is one way we can continue to improve our services. Please take a few minutes to complete the written survey that you may receive in the mail after your visit with us. Thank you!             Your Updated Medication List - Protect others  around you: Learn how to safely use, store and throw away your medicines at www.disposemymeds.org.          This list is accurate as of 4/5/18 10:54 AM.  Always use your most recent med list.                   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), Cough, Malignant neoplasm of lower-outer quadrant of right breast of female, estrogen receptor negative (H), Ductal carcinoma in situ (DCIS) of right breast, Phyllodes neoplasm of breast, Anxiety and depression       IMODIUM A-D 2 MG tablet   Generic drug:  loperamide      Take 2 mg by mouth 4 times daily as needed for diarrhea    Phyllodes neoplasm of breast, Malignant neoplasm metastatic to right lung (H), Adjustment disorder with mixed anxiety and depressed mood, Triple negative malignant neoplasm of breast (H)       lidocaine-prilocaine cream    EMLA    30 g    Apply 30 g topically as needed for moderate pain    Triple negative malignant neoplasm of breast (H)       LORazepam 0.5 MG tablet    ATIVAN    30 tablet    Take 1 tablet (0.5 mg) by mouth every 4  hours as needed (Anxiety, Nausea/Vomiting or Sleep)    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)       METFORMIN HCL PO      Take 500 mg by mouth daily (with breakfast)        nystatin Powd     1 each    1 Application 2 times daily    Phyllodes neoplasm of breast, Malignant neoplasm metastatic to right lung (H), Adjustment disorder with mixed anxiety and depressed mood, Triple negative malignant neoplasm of breast (H)       prochlorperazine 10 MG tablet    COMPAZINE    30 tablet    Take 1 tablet (10 mg) by mouth every 6 hours as needed (Nausea/Vomiting)    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)       sertraline 50 MG tablet    ZOLOFT    180 tablet    Take 2 tablets (100 mg) by mouth daily    Phyllodes neoplasm of breast, Malignant neoplasm metastatic to right lung (H), Adjustment disorder with mixed anxiety and depressed mood, 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

## 2018-04-06 NOTE — PATIENT INSTRUCTIONS
We will see you back as planned.    Doxorubicin, Liposomal Solution for injection  What is this medicine?  LIPOSOMAL DOXORUBICIN (LIP oh edyta al dox oh ALBA bi sin) is a chemotherapy drug. This medicine is used to treat many kinds of cancer like Kaposi's sarcoma, multiple myeloma, and ovarian cancer.  This medicine may be used for other purposes; ask your health care provider or pharmacist if you have questions.  What should I tell my health care provider before I take this medicine?  They need to know if you have any of these conditions:    blood disorders    heart disease    infection (especially a virus infection such as chickenpox, cold sores, or herpes)    liver disease    recent or ongoing radiation therapy    an unusual or allergic reaction to doxorubicin, other chemotherapy agents, soybeans, other medicines, foods, dyes, or preservatives    pregnant or trying to get pregnant    breast-feeding  How should I use this medicine?  This drug is given as an infusion into a vein. It is administered in a hospital or clinic by a specially trained health care professional. If you have pain, swelling, burning or any unusual feeling around the site of your injection, tell your health care professional right away.  Talk to your pediatrician regarding the use of this medicine in children. Special care may be needed.  Overdosage: If you think you have taken too much of this medicine contact a poison control center or emergency room at once.  NOTE: This medicine is only for you. Do not share this medicine with others.  What if I miss a dose?  It is important not to miss your dose. Call your doctor or health care professional if you are unable to keep an appointment.  What may interact with this medicine?  Do not take this medicine with any of the following medications:    zidovudine  This medicine may also interact with the following medications:    medicines to increase blood counts like filgrastim, pegfilgrastim,  sargramostim    vaccines  Talk to your doctor or health care professional before taking any of these medicines:    acetaminophen    aspirin    ibuprofen    ketoprofen    naproxen  This list may not describe all possible interactions. Give your health care provider a list of all the medicines, herbs, non-prescription drugs, or dietary supplements you use. Also tell them if you smoke, drink alcohol, or use illegal drugs. Some items may interact with your medicine.  What should I watch for while using this medicine?  Your condition will be monitored carefully while you are receiving this medicine. You will need important blood work done while you are taking this medicine.  This drug may make you feel generally unwell. This is not uncommon, as chemotherapy can affect healthy cells as well as cancer cells. Report any side effects. Continue your course of treatment even though you feel ill unless your doctor tells you to stop.  Your urine may turn orange-red for a few days after your dose. This is not blood. If your urine is dark or brown, call your doctor.  In some cases, you may be given additional medicines to help with side effects. Follow all directions for their use.  Call your doctor or health care professional for advice if you get a fever (100.5 degrees F or higher), chills or sore throat, or other symptoms of a cold or flu. Do not treat yourself. This drug decreases your body's ability to fight infections. Try to avoid being around people who are sick.  This medicine may increase your risk to bruise or bleed. Call your doctor or health care professional if you notice any unusual bleeding.  Be careful brushing and flossing your teeth or using a toothpick because you may get an infection or bleed more easily. If you have any dental work done, tell your dentist you are receiving this medicine.  Avoid taking products that contain aspirin, acetaminophen, ibuprofen, naproxen, or ketoprofen unless instructed by your  doctor. These medicines may hide a fever.  Men and women of childbearing age should use effective birth control methods while using taking this medicine. Do not become pregnant while taking this medicine. There is a potential for serious side effects to an unborn child. Talk to your health care professional or pharmacist for more information. Do not breast-feed an infant while taking this medicine.  Talk to your doctor about your risk of cancer. You may be more at risk for certain types of cancers if you take this medicine.  What side effects may I notice from receiving this medicine?  Side effects that you should report to your doctor or health care professional as soon as possible:    allergic reactions like skin rash, itching or hives, swelling of the face, lips, or tongue    low blood counts - this medicine may decrease the number of white blood cells, red blood cells and platelets. You may be at increased risk for infections and bleeding.    signs of hand-foot syndrome - tingling or burning, redness, flaking, swelling, small blisters, or small sores on the palms of your hands or the soles of your feet    signs of infection - fever or chills, cough, sore throat, pain or difficulty passing urine    signs of decreased platelets or bleeding - bruising, pinpoint red spots on the skin, black, tarry stools, blood in the urine    signs of decreased red blood cells - unusually weak or tired, fainting spells, lightheadedness    back pain, chills, facial flushing, fever, headache, tightness in the chest or throat during the infusion    breathing problems    chest pain    fast, irregular heartbeat    mouth pain, redness, sores    pain, swelling, redness at site where injected    pain, tingling, numbness in the hands or feet    swelling of ankles, feet, or hands    vomiting  Side effects that usually do not require medical attention (report to your doctor or health care professional if they continue or are  bothersome):    diarrhea    hair loss    loss of appetite    nail discoloration or damage    nausea    red or watery eyes    red colored urine    stomach upset  This list may not describe all possible side effects. Call your doctor for medical advice about side effects. You may report side effects to FDA at 2-101-FDA-6028.  Where should I keep my medicine?  This drug is given in a hospital or clinic and will not be stored at home.  NOTE:This sheet is a summary. It may not cover all possible information. If you have questions about this medicine, talk to your doctor, pharmacist, or health care provider. Copyright  2016 [employer]        Olaratumab injection  Brand Name: LARTRUVO  What is this medicine?  OLARATUMAB (oh lar a tue mab) is a monoclonal antibody. It is used to treat soft-tissue sarcoma.  How should I use this medicine?  This medicine is for infusion into a vein. It is given by a health care professional in a hospital or clinic setting.  Talk to your pediatrician regarding the use of this medicine in children. Special care may be needed.  What side effects may I notice from receiving this medicine?  Side effects that you should report to your doctor or health care professional as soon as possible:    allergic reactions like skin rash, itching or hives, swelling of the face, lips, or tongue    breathing problems    facial flushing    low blood counts - this medicine may decrease the number of white blood cells, red blood cells and platelets. You may be at increased risk for infections and bleeding.    pain, tingling, numbness in the hands or feet    signs and symptoms of low blood pressure like dizziness; feeling faint or lightheaded, falls; unusually weak or tired    signs of infection - fever or chills, cough, sore throat, pain or difficulty passing urine    signs of decreased platelets or bleeding - bruising, pinpoint red spots on the skin, black, tarry stools, blood in the urine  Side effects that  usually do not require medical attention (report to your doctor or health care professional if they continue or are bothersome):    diarrhea    hair loss    loss of appetite    nausea    mouth sores    muscle pain    stomach pain    vomiting  What may interact with this medicine?  Interactions have not been studied.  Give your health care provider a list of all the medicines, herbs, non-prescription drugs, or dietary supplements you use. Also tell them if you smoke, drink alcohol, or use illegal drugs. Some items may interact with your medicine.  What if I miss a dose?  It is important not to miss your dose. Call your doctor or health care professional if you are unable to keep an appointment.  Where should I keep my medicine?  This drug is given in a hospital or clinic and will not be stored at home.  What should I tell my health care provider before I take this medicine?  They need to know if you have any of these conditions:    an unusual or allergic reaction to olaratumab, other medicines, foods, dyes, or preservatives    pregnant or trying to get pregnant    breast-feeding  What should I watch for while using this medicine?  Your condition will be monitored carefully while you are receiving this medicine.  You may need blood work done while you are taking this medicine.  This medicine can cause serious allergic reactions. To reduce your risk you may need to take medicine before treatment with this medicine. Take your medicine as directed.  Do not become pregnant while taking this medicine or for 3 months after stopping it. Women should inform their doctor if they wish to become pregnant or think they might be pregnant. There is a potential for serious side effects to an unborn child. Talk to your health care professional or pharmacist for more information. Do not breast-feed an infant while taking this medicine or for 3 months after stopping it.  This may interfere with the ability to father a child. You should  talk to your doctor or health care professional if you are concerned about your fertility.  NOTE:This sheet is a summary. It may not cover all possible information. If you have questions about this medicine, talk to your doctor, pharmacist, or health care provider. Copyright  2017 [employer]

## 2018-04-06 NOTE — MR AVS SNAPSHOT
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    Doxorubicin, Liposomal Solution for injection  What is this medicine?  LIPOSOMAL DOXORUBICIN (LIP oh edyta al dox oh ALBA bi sin) is a chemotherapy drug. This medicine is used to treat many kinds of cancer like Kaposi's sarcoma, multiple myeloma, and ovarian cancer.  This medicine may be used for other purposes; ask your health care provider or pharmacist if you have questions.  What should I tell my health care provider before I take this medicine?  They need to know if you have any of these conditions:    blood disorders    heart disease    infection (especially a virus infection such as chickenpox, cold sores, or herpes)    liver disease    recent or ongoing radiation therapy    an unusual or allergic reaction to doxorubicin, other chemotherapy agents, soybeans, other medicines, foods, dyes, or preservatives    pregnant or trying to get pregnant    breast-feeding  How should I use this medicine?  This drug is given as an infusion into a vein. It is administered in a hospital or clinic by a specially trained health care professional. If you have pain, swelling, burning or any unusual feeling around the site of your injection, tell your health care professional right away.  Talk to your pediatrician regarding the use of this medicine in children. Special care may be needed.  Overdosage: If you think you have taken too much of this medicine contact a poison  control center or emergency room at once.  NOTE: This medicine is only for you. Do not share this medicine with others.  What if I miss a dose?  It is important not to miss your dose. Call your doctor or health care professional if you are unable to keep an appointment.  What may interact with this medicine?  Do not take this medicine with any of the following medications:    zidovudine  This medicine may also interact with the following medications:    medicines to increase blood counts like filgrastim, pegfilgrastim, sargramostim    vaccines  Talk to your doctor or health care professional before taking any of these medicines:    acetaminophen    aspirin    ibuprofen    ketoprofen    naproxen  This list may not describe all possible interactions. Give your health care provider a list of all the medicines, herbs, non-prescription drugs, or dietary supplements you use. Also tell them if you smoke, drink alcohol, or use illegal drugs. Some items may interact with your medicine.  What should I watch for while using this medicine?  Your condition will be monitored carefully while you are receiving this medicine. You will need important blood work done while you are taking this medicine.  This drug may make you feel generally unwell. This is not uncommon, as chemotherapy can affect healthy cells as well as cancer cells. Report any side effects. Continue your course of treatment even though you feel ill unless your doctor tells you to stop.  Your urine may turn orange-red for a few days after your dose. This is not blood. If your urine is dark or brown, call your doctor.  In some cases, you may be given additional medicines to help with side effects. Follow all directions for their use.  Call your doctor or health care professional for advice if you get a fever (100.5 degrees F or higher), chills or sore throat, or other symptoms of a cold or flu. Do not treat yourself. This drug decreases your body's ability to fight  infections. Try to avoid being around people who are sick.  This medicine may increase your risk to bruise or bleed. Call your doctor or health care professional if you notice any unusual bleeding.  Be careful brushing and flossing your teeth or using a toothpick because you may get an infection or bleed more easily. If you have any dental work done, tell your dentist you are receiving this medicine.  Avoid taking products that contain aspirin, acetaminophen, ibuprofen, naproxen, or ketoprofen unless instructed by your doctor. These medicines may hide a fever.  Men and women of childbearing age should use effective birth control methods while using taking this medicine. Do not become pregnant while taking this medicine. There is a potential for serious side effects to an unborn child. Talk to your health care professional or pharmacist for more information. Do not breast-feed an infant while taking this medicine.  Talk to your doctor about your risk of cancer. You may be more at risk for certain types of cancers if you take this medicine.  What side effects may I notice from receiving this medicine?  Side effects that you should report to your doctor or health care professional as soon as possible:    allergic reactions like skin rash, itching or hives, swelling of the face, lips, or tongue    low blood counts - this medicine may decrease the number of white blood cells, red blood cells and platelets. You may be at increased risk for infections and bleeding.    signs of hand-foot syndrome - tingling or burning, redness, flaking, swelling, small blisters, or small sores on the palms of your hands or the soles of your feet    signs of infection - fever or chills, cough, sore throat, pain or difficulty passing urine    signs of decreased platelets or bleeding - bruising, pinpoint red spots on the skin, black, tarry stools, blood in the urine    signs of decreased red blood cells - unusually weak or tired, fainting  spells, lightheadedness    back pain, chills, facial flushing, fever, headache, tightness in the chest or throat during the infusion    breathing problems    chest pain    fast, irregular heartbeat    mouth pain, redness, sores    pain, swelling, redness at site where injected    pain, tingling, numbness in the hands or feet    swelling of ankles, feet, or hands    vomiting  Side effects that usually do not require medical attention (report to your doctor or health care professional if they continue or are bothersome):    diarrhea    hair loss    loss of appetite    nail discoloration or damage    nausea    red or watery eyes    red colored urine    stomach upset  This list may not describe all possible side effects. Call your doctor for medical advice about side effects. You may report side effects to FDA at 9-915-UMN-0285.  Where should I keep my medicine?  This drug is given in a hospital or clinic and will not be stored at home.  NOTE:This sheet is a summary. It may not cover all possible information. If you have questions about this medicine, talk to your doctor, pharmacist, or health care provider. Copyright  2016 [employer]        Olaratumab injection  Brand Name: LARTRUVO  What is this medicine?  OLARATUMAB (oh lar a tue mab) is a monoclonal antibody. It is used to treat soft-tissue sarcoma.  How should I use this medicine?  This medicine is for infusion into a vein. It is given by a health care professional in a hospital or clinic setting.  Talk to your pediatrician regarding the use of this medicine in children. Special care may be needed.  What side effects may I notice from receiving this medicine?  Side effects that you should report to your doctor or health care professional as soon as possible:    allergic reactions like skin rash, itching or hives, swelling of the face, lips, or tongue    breathing problems    facial flushing    low blood counts - this medicine may decrease the number of white  blood cells, red blood cells and platelets. You may be at increased risk for infections and bleeding.    pain, tingling, numbness in the hands or feet    signs and symptoms of low blood pressure like dizziness; feeling faint or lightheaded, falls; unusually weak or tired    signs of infection - fever or chills, cough, sore throat, pain or difficulty passing urine    signs of decreased platelets or bleeding - bruising, pinpoint red spots on the skin, black, tarry stools, blood in the urine  Side effects that usually do not require medical attention (report to your doctor or health care professional if they continue or are bothersome):    diarrhea    hair loss    loss of appetite    nausea    mouth sores    muscle pain    stomach pain    vomiting  What may interact with this medicine?  Interactions have not been studied.  Give your health care provider a list of all the medicines, herbs, non-prescription drugs, or dietary supplements you use. Also tell them if you smoke, drink alcohol, or use illegal drugs. Some items may interact with your medicine.  What if I miss a dose?  It is important not to miss your dose. Call your doctor or health care professional if you are unable to keep an appointment.  Where should I keep my medicine?  This drug is given in a hospital or clinic and will not be stored at home.  What should I tell my health care provider before I take this medicine?  They need to know if you have any of these conditions:    an unusual or allergic reaction to olaratumab, other medicines, foods, dyes, or preservatives    pregnant or trying to get pregnant    breast-feeding  What should I watch for while using this medicine?  Your condition will be monitored carefully while you are receiving this medicine.  You may need blood work done while you are taking this medicine.  This medicine can cause serious allergic reactions. To reduce your risk you may need to take medicine before treatment with this medicine.  Take your medicine as directed.  Do not become pregnant while taking this medicine or for 3 months after stopping it. Women should inform their doctor if they wish to become pregnant or think they might be pregnant. There is a potential for serious side effects to an unborn child. Talk to your health care professional or pharmacist for more information. Do not breast-feed an infant while taking this medicine or for 3 months after stopping it.  This may interfere with the ability to father a child. You should talk to your doctor or health care professional if you are concerned about your fertility.  NOTE:This sheet is a summary. It may not cover all possible information. If you have questions about this medicine, talk to your doctor, pharmacist, or health care provider. Copyright  2017 [employer]                Follow-ups after your visit        Your next 10 appointments already scheduled     Apr 13, 2018  9:00 AM CDT   Level 4 with HC INF RM 3302   [facility] [place] ([facility] [place] [facility]    [address]  [place] [address]   [phone]            Apr 25, 2018  8:00 AM CDT   (Arrive by 7:45 AM)   New Visit with Jerri Cueto GC   [facility] [place] ([facility] [place] )    [address]  [place] [address]   [phone]            Apr 27, 2018  8:30 AM CDT   Level O with HC INF RM 3308   [facility] [place] ([facility] [place] )    [address]  [place] [address]   [phone]            Apr 27, 2018  9:00 AM CDT   (Arrive by 8:45 AM)   Return Visit with Ceci Amaral NP   [facility] [place] ([facility] [place] )    [address]  [place] [address]   [phone]            Apr 27, 2018  9:30 AM CDT   Level 4 with HC INF RM 3308   [facility] [place] ([facility] [place] [facility]    [address]  [place] [address]   [phone]            May 04, 2018  9:30 AM CDT   Level 4 with HC INF RM  3308   [facility] [place] ([facility] [place] [facility]    [address]  [place] [address]   [phone]            May 18, 2018  8:30 AM CDT   Level O with HC INF RM 3306   [facility] [place] ([facility] [place] )    [address]  [place] [address]   [phone]            May 18, 2018  9:00 AM CDT   (Arrive by 8:45 AM)   Return Visit with Ceci Amaral NP   [facility] [place] ([facility] [place] )    [address]  [place] [address]   [phone]            May 18, 2018  9:30 AM CDT   Level 4 with HC INF RM 3306   [facility] [place] ([facility] [place] )    [address]  [place] [address]   [phone]            May 25, 2018  9:30 AM CDT   Level 4 with HC INF RM 3310   [facility] [place] ([facility] [place] )    [address]  [place] [address]   [phone]              Who to contact     If you have questions or need follow up information about today's clinic visit or your schedule please contact [facility] directly at [phone].  Normal or non-critical lab and imaging results will be communicated to you by [employer], letter or phone within 4 business days after the clinic has received the results. If you do not hear from us within 7 days, please contact the clinic through [employer] or phone. If you have a critical or abnormal lab result, we will notify you by phone as soon as possible.  Submit refill requests through [employer] or call your pharmacy and they will forward the refill request to us. Please allow 3 business days for your refill to be completed.          Additional Information About Your Visit        [employer] Information     [employer] gives you secure access to your electronic health record. If you see a primary care provider, you can also send messages to your care team and make appointments. If you have questions, please call your primary care clinic.  If you do not  "have a primary care provider, please call [phone] and they will assist you.        Care EveryWhere ID     This is your Care EveryWhere ID. This could be used by other organizations to access your [facility] medical records  [id number]        Your Vitals Were     Pulse Temperature Respirations Height Pulse Oximetry BMI (Body Mass Index)    105 98.9  F (37.2  C) (Tympanic) 18 1.651 m (5' 5\") 94% 32.32 kg/m2       Blood Pressure from Last 3 Encounters:   04/06/18 124/61   04/05/18 123/67   04/05/18 123/67    Weight from Last 3 Encounters:   04/06/18 88.1 kg (194 lb 3.2 oz)   04/05/18 87.8 kg (193 lb 9.6 oz)   04/05/18 87.8 kg (193 lb 9.6 oz)              Today, you had the following     No orders found for display       Primary Care Provider Office Phone # Fax #    [facility] [phone]       [facility] [address]   [facility] [id number]        Equal Access to Services     [facility] CenterFIORDALIZA : Hadii janette garcía hadasho Soavril, waaxda luqadaha, qaybta kaalmada lucas, candy nieves . So [facility] [phone].    ATENCIÓN: Si habla español, tiene a stiles disposición servicios gratuitos de asistencia lingüística. [facility] [phone].    We comply with applicable federal civil rights laws and [place] laws. We do not discriminate on the basis of race, color, national origin, age, disability, sex, sexual orientation, or gender identity.            Thank you!     Thank you for choosing [facility]  for your care. Our goal is always to provide you with excellent care. Hearing back from our patients is one way we can continue to improve our services. Please take a few minutes to complete the written survey that you may receive in the mail after your visit with us. Thank you!             Your Updated Medication List - Protect others around you: Learn how to safely use, store and throw away your medicines at www.[employer].org.          This list is " accurate as of 4/6/18  2:31 PM.  Always use your most recent med list.                   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), Cough, Malignant neoplasm of lower-outer quadrant of right breast of female, estrogen receptor negative (H), Ductal carcinoma in situ (DCIS) of right breast, Phyllodes neoplasm of breast, Anxiety and depression       IMODIUM A-D 2 MG tablet   Generic drug:  loperamide      Take 2 mg by mouth 4 times daily as needed for diarrhea    Phyllodes neoplasm of breast, Malignant neoplasm metastatic to right lung (H), Adjustment disorder with mixed anxiety and depressed mood, Triple negative malignant neoplasm of breast (H)       lidocaine-prilocaine cream    EMLA    30 g    Apply 30 g topically as needed for moderate pain    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)       LORazepam 0.5 MG tablet    ATIVAN    30 tablet    Take 1 tablet (0.5 mg) by mouth every 4 hours as needed (Anxiety, Nausea/Vomiting or Sleep)    Phyllodes neoplasm of breast, Triple negative malignant neoplasm of  breast (H)       METFORMIN HCL PO      Take 500 mg by mouth daily (with breakfast)        nystatin Powd     1 each    1 Application 2 times daily    Phyllodes neoplasm of breast, Malignant neoplasm metastatic to right lung (H), Adjustment disorder with mixed anxiety and depressed mood, Triple negative malignant neoplasm of breast (H)       prochlorperazine 10 MG tablet    COMPAZINE    30 tablet    Take 1 tablet (10 mg) by mouth every 6 hours as needed (Nausea/Vomiting)    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)       sertraline 50 MG tablet    ZOLOFT    180 tablet    Take 2 tablets (100 mg) by mouth daily    Phyllodes neoplasm of breast, Malignant neoplasm metastatic to right lung (H), Adjustment disorder with mixed anxiety and depressed mood, 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

## 2018-04-06 NOTE — PROGRESS NOTES
Patient is a 67 year old female here accompanied by spouse today for infusion of olaratumab/doxil under the orders of Dr. Celis.  Left sided power port accessed with 19 gauge 3/4 inch power 90 degree bent non coring needle.  Line flushed with 10 cc's normal saline.  Needle secured with sterile transparent dressing.   Patient tolerated well.  Denies pain and discomfort at this time.  Port flushes easily without resistance but no blood return noted. Multiple attempts at repo with no return noted. Unable to get return yesterday when patient was here, and plan was to use Alteplase this date if no return. VORB obtained from Dr Celis for Alteplase 2mg per protocol.    Hand hygiene performed: yes   Mask donned by caregiver: yes Site prepped with CHG: yes Labs drawn: no Dressing applied using aseptic technique: yes     Yesterday lab values: WBC 5.0, ANC 3.5, , HGB 10.9, AST 22, ALT 25, Alkaline Phosphatase 83, Creatinine 0.74.     Patient meets parameters for today's infusion.  Denies questions or concerns regarding today's infusion and/or medications being administered.      Independent dose check done    Patient identified with two identifiers, order verified, and verbal consent for today's infusion obtained from patient. Written consent for treatment is on file and valid.    1152: IV pump verified with Olaratumab 1330mg dose, drug, and rate of administration by second RN, Erlinda Garza. Infusion administered per protocol. Patient tolerated infusion well, no signs or symptoms of adverse reaction noted. Patient denies pain nor discomfort.     1420: IV pump verified with Doxorubicin 60mg dose, drug, and rate of administration by second RN, Breana Farnsworth. Infusion administered per protocol. Patient tolerated infusion well, no signs or symptoms of adverse reaction noted. Patient denies pain nor discomfort.    Needle removed, tip intact. Site clean, dry and intact. Covered with a sterile bandage, slight pressure  applied for 30 seconds. Pt instructed to leave bandage intact for a minimum of one hour, and to call with questions or concerns. Copy of appointments, discharge instructions, and after visit summary (AVS) provided to patient. Patient states understanding, discharged ambulatory.

## 2018-04-13 NOTE — PATIENT INSTRUCTIONS
Olaratumab injection  Brand Name: LARTRUVO  What is this medicine?  OLARATUMAB (oh lar a tue mab) is a monoclonal antibody. It is used to treat soft-tissue sarcoma.    What side effects may I notice from receiving this medicine?  Side effects that you should report to your doctor or health care professional as soon as possible:    allergic reactions like skin rash, itching or hives, swelling of the face, lips, or tongue    breathing problems    facial flushing    low blood counts - this medicine may decrease the number of white blood cells, red blood cells and platelets. You may be at increased risk for infections and bleeding.    pain, tingling, numbness in the hands or feet    signs and symptoms of low blood pressure like dizziness; feeling faint or lightheaded, falls; unusually weak or tired    signs of infection - fever or chills, cough, sore throat, pain or difficulty passing urine    signs of decreased platelets or bleeding - bruising, pinpoint red spots on the skin, black, tarry stools, blood in the urine  Side effects that usually do not require medical attention (report to your doctor or health care professional if they continue or are bothersome):    diarrhea    hair loss    loss of appetite    nausea    mouth sores    muscle pain    stomach pain    vomiting  What may interact with this medicine?  Interactions have not been studied.  Give your health care provider a list of all the medicines, herbs, non-prescription drugs, or dietary supplements you use. Also tell them if you smoke, drink alcohol, or use illegal drugs. Some items may interact with your medicine.  What if I miss a dose?  It is important not to miss your dose. Call your doctor or health care professional if you are unable to keep an appointment.  Where should I keep my medicine?  This drug is given in a hospital or clinic and will not be stored at home.  What should I tell my health care provider before I take this medicine?  They need to  know if you have any of these conditions:    an unusual or allergic reaction to olaratumab, other medicines, foods, dyes, or preservatives    pregnant or trying to get pregnant    breast-feeding  What should I watch for while using this medicine?  Your condition will be monitored carefully while you are receiving this medicine.  You may need blood work done while you are taking this medicine.  This medicine can cause serious allergic reactions. To reduce your risk you may need to take medicine before treatment with this medicine. Take your medicine as directed.  Do not become pregnant while taking this medicine or for 3 months after stopping it. Women should inform their doctor if they wish to become pregnant or think they might be pregnant. There is a potential for serious side effects to an unborn child. Talk to your health care professional or pharmacist for more information. Do not breast-feed an infant while taking this medicine or for 3 months after stopping it.  This may interfere with the ability to father a child. You should talk to your doctor or health care professional if you are concerned about your fertility.  NOTE:This sheet is a summary. It may not cover all possible information. If you have questions about this medicine, talk to your doctor, pharmacist, or health care provider. Copyright  2017 [employer]

## 2018-04-13 NOTE — PROGRESS NOTES
Patient is a 66 y/o female  here accompanied by spouse today for infusion of olaratumab under the orders of Dr. Celis.  Left  sided power port accessed with 19 gauge 3/4 inch 90 degree bent non coring needle.  Line flushed with 10 cc's normal saline.  Needle secured with sterile transparent dressing.  10 cc's blood discarded, after repositioning patient and blood taken for 2 tubes of ordered labs.  Patient tolerated well.  Denies pain and discomfort at this time.  Port flushes easily without resistance.    Hand hygiene performed: yes   Mask donned by caregiver: yes Site prepped with CHG: yes Labs drawn: yes Dressing applied using aseptic technique: yes   Todays lab values: WBC 5.6, ANC 4.3, , HGB 10.8, INR 1.37  Patient meets parameters for today's infusion.  Denies questions or concerns regarding today's infusion and/or medications being administered.      Patient identified with two identifiers, order verified, and verbal consent for today's infusion obtained from patient. Written consent for treatment is on file and valid.    1003 IV pump verified with olaratumab dose, drug, and rate of administration by second RN, Dianne Elder. Infusion administered per protocol. Patient tolerated infusion well, no signs or symptoms of adverse reaction noted. Patient denies pain nor discomfort.     Needle removed, tip intact. Site clean, dry and intact. Covered with a sterile bandage, slight pressure applied for 30 seconds. Pt instructed to leave bandage intact for a minimum of one hour, and to call with questions or concerns. Copy of appointments, discharge instructions, and after visit summary (AVS) provided to patient. Patient states understanding, discharged ambulatory.

## 2018-04-13 NOTE — MR AVS SNAPSHOT
After Visit Summary   4/13/2018    Ana Simmons    MRN: [id number]           Patient Information     Date Of Birth          1950        Visit Information        Provider Department      4/13/2018 9:00 AM 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  OLARATUMAB (oh lar a tue mab) is a monoclonal antibody. It is used to treat soft-tissue sarcoma.    What side effects may I notice from receiving this medicine?  Side effects that you should report to your doctor or health care professional as soon as possible:    allergic reactions like skin rash, itching or hives, swelling of the face, lips, or tongue    breathing problems    facial flushing    low blood counts - this medicine may decrease the number of white blood cells, red blood cells and platelets. You may be at increased risk for infections and bleeding.    pain, tingling, numbness in the hands or feet    signs and symptoms of low blood pressure like dizziness; feeling faint or lightheaded, falls; unusually weak or tired    signs of infection - fever or chills, cough, sore throat, pain or difficulty passing urine    signs of decreased platelets or bleeding - bruising, pinpoint red spots on the skin, black, tarry stools, blood in the urine  Side effects that usually do not require medical attention (report to your doctor or health care professional if they continue or are bothersome):    diarrhea    hair loss    loss of appetite    nausea    mouth sores    muscle pain    stomach pain    vomiting  What may interact with this medicine?  Interactions have not been studied.  Give your health care provider a list of all the  medicines, herbs, non-prescription drugs, or dietary supplements you use. Also tell them if you smoke, drink alcohol, or use illegal drugs. Some items may interact with your medicine.  What if I miss a dose?  It is important not to miss your dose. Call your doctor or health care professional if you are unable to keep an appointment.  Where should I keep my medicine?  This drug is given in a hospital or clinic and will not be stored at home.  What should I tell my health care provider before I take this medicine?  They need to know if you have any of these conditions:    an unusual or allergic reaction to olaratumab, other medicines, foods, dyes, or preservatives    pregnant or trying to get pregnant    breast-feeding  What should I watch for while using this medicine?  Your condition will be monitored carefully while you are receiving this medicine.  You may need blood work done while you are taking this medicine.  This medicine can cause serious allergic reactions. To reduce your risk you may need to take medicine before treatment with this medicine. Take your medicine as directed.  Do not become pregnant while taking this medicine or for 3 months after stopping it. Women should inform their doctor if they wish to become pregnant or think they might be pregnant. There is a potential for serious side effects to an unborn child. Talk to your health care professional or pharmacist for more information. Do not breast-feed an infant while taking this medicine or for 3 months after stopping it.  This may interfere with the ability to father a child. You should talk to your doctor or health care professional if you are concerned about your fertility.  NOTE:This sheet is a summary. It may not cover all possible information. If you have questions about this medicine, talk to your doctor, pharmacist, or health care provider. Copyright  2017 [employer]                Follow-ups after your visit        Your next 10 appointments  already scheduled     Apr 25, 2018  8:00 AM CDT   (Arrive by 7:45 AM)   New Visit with Jerri Cueto GC   [facility] [place] ([facility] [place] )    [address]  [place] [address]   [phone]            Apr 27, 2018  8:30 AM CDT   Level O with HC INF RM 3308   [facility] [place] ([facility] [place] )    [address]  [place] [address]   [phone]            Apr 27, 2018  9:00 AM CDT   (Arrive by 8:45 AM)   Return Visit with Ceci Amaral NP   [facility] [place] ([facility] [place] )    [address]  [place] [address]   [phone]            Apr 27, 2018  9:30 AM CDT   Level 4 with HC INF RM 3308   [facility] [place] ([facility] [place] )    [address]  [place] [address]   [phone]            May 04, 2018  9:30 AM CDT   Level 4 with HC INF RM 3308   [facility] [place] ([facility] [place] )    [address]  [place] [address]   [phone]            May 18, 2018  8:30 AM CDT   Level O with HC INF RM 3306   [facility] [place] ([facility] [place] )    [address]  [place] [address]   [phone]            May 18, 2018  9:00 AM CDT   (Arrive by 8:45 AM)   Return Visit with Ceci Amaral NP   [facility] [place] ([facility] [place] )    [address]  [place] [address]   [phone]            May 18, 2018  9:30 AM CDT   Level 4 with HC INF RM 3306   [facility] [place] ([facility] [place] )    [address]  [place] [address]   [phone]            May 25, 2018  9:30 AM CDT   Level 4 with HC INF RM 3310   [facility] [place] ([facility] [place] )    [address]  [place] [address]   [phone]            Jun 08, 2018  9:30 AM CDT   Level 4 with HC INF RM 3095   [facility] [place] ([facility] [place] )    [address]  [place] [address]  "  [phone]              Who to contact     If you have questions or need follow up information about today's clinic visit or your schedule please contact [facility] directly at [phone].  Normal or non-critical lab and imaging results will be communicated to you by MyChart, letter or phone within 4 business days after the clinic has received the results. If you do not hear from us within 7 days, please contact the clinic through MyChart or phone. If you have a critical or abnormal lab result, we will notify you by phone as soon as possible.  Submit refill requests through [employer] or call your pharmacy and they will forward the refill request to us. Please allow 3 business days for your refill to be completed.          Additional Information About Your Visit        The [employer] Information     [employer] gives you secure access to your electronic health record. If you see a primary care provider, you can also send messages to your care team and make appointments. If you have questions, please call your primary care clinic.  If you do not have a primary care provider, please call [phone] and they will assist you.        Care EveryWhere ID     This is your Care EveryWhere ID. This could be used by other organizations to access your [facility] medical records  [id number]        Your Vitals Were     Pulse Temperature Respirations Height Pulse Oximetry BMI (Body Mass Index)    87 97.8  F (36.6  C) 20 1.651 m (5' 5\") 94% 32 kg/m2       Blood Pressure from Last 3 Encounters:   04/13/18 128/67   04/06/18 124/61   04/05/18 123/67    Weight from Last 3 Encounters:   04/13/18 87.2 kg (192 lb 4.8 oz)   04/06/18 88.1 kg (194 lb 3.2 oz)   04/05/18 87.8 kg (193 lb 9.6 oz)              We Performed the Following     CBC with platelets differential     INR        Primary Care Provider Office Phone # Fax #    Mya Park [phone]       [facility] [address]   [place] " [id number]        Equal Access to Services     [facility] Hospital[facility]ORD[facility] : Hadii aad ku hadnataliedale Kiaali, wavanda yrnkelsieha, qarandy adriannala nenacandy zamora. So [place] -[phone].    ATENCIÓN: Si habla español, tiene a stiles disposición servicios gratuitos de asistencia lingüística. Lexaame al [phone].    We comply with applicable federal civil rights laws and [place] laws. We do not discriminate on the basis of race, color, national origin, age, disability, sex, sexual orientation, or gender identity.            Thank you!     Thank you for choosing [facility]  for your care. Our goal is always to provide you with excellent care. Hearing back from our patients is one way we can continue to improve our services. Please take a few minutes to complete the written survey that you may receive in the mail after your visit with us. Thank you!             Your Updated Medication List - Protect others around you: Learn how to safely use, store and throw away your medicines at www.disposemymeds.org.          This list is accurate as of 4/13/18 11:23 AM.  Always use your most recent med list.                   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), Cough, Malignant neoplasm of lower-outer quadrant of right breast of female, estrogen receptor negative (H), Ductal carcinoma in situ (DCIS) of right breast, Phyllodes neoplasm of breast, Anxiety and depression       IMODIUM A-D 2 MG tablet   Generic drug:  loperamide      Take 2 mg by mouth 4 times daily as needed for diarrhea    Phyllodes neoplasm of breast, Malignant neoplasm metastatic to right lung (H), Adjustment disorder with mixed anxiety and depressed mood, Triple negative malignant neoplasm of breast (H)       lidocaine-prilocaine cream    EMLA    30 g    Apply 30 g topically as needed for moderate pain    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)       LORazepam 0.5 MG tablet    ATIVAN    30 tablet    Take 1 tablet (0.5 mg) by mouth every 4 hours as needed (Anxiety, Nausea/Vomiting or Sleep)    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)       METFORMIN HCL PO      Take 500 mg by mouth daily (with breakfast)        nystatin Powd     1 each    1 Application 2 times daily    Phyllodes neoplasm of breast, Malignant neoplasm metastatic to right lung (H), Adjustment disorder with mixed anxiety and depressed mood, Triple negative malignant neoplasm of breast (H)       prochlorperazine 10 MG tablet    COMPAZINE    30 tablet    Take 1 tablet (10 mg) by mouth every 6 hours as needed (Nausea/Vomiting)    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)       sertraline 50 MG tablet    ZOLOFT    180 tablet    Take 2 tablets (100 mg) by mouth daily    Phyllodes neoplasm of breast, Malignant neoplasm metastatic to right lung (H), Adjustment disorder with mixed anxiety and depressed mood, Triple negative malignant neoplasm of breast (H)       SIMVASTATIN PO       Take 20 mg by mouth At Bedtime        VITAMIN B6 PO      Take 50 mg by mouth daily    Anemia, unspecified type, Malignant neoplasm of lower-outer quadrant of right female breast (H), DCIS (ductal carcinoma in situ), right, Postmenopausal status, Aromatase inhibitor use, Encounter for monitoring cardiotoxic drug therapy       warfarin 1 MG tablet    COUMADIN    90 tablet    Take 1 tablet (1 mg) by mouth daily    Ductal carcinoma in situ (DCIS) of right breast, Phyllodes neoplasm of breast, Malignant neoplasm metastatic to right lung (H)

## 2018-04-25 NOTE — PATIENT INSTRUCTIONS
Assessing Cancer Risk  Only about 5-10% of cancers are thought to be due to an inherited cancer susceptibility gene.    These families often have:    Several people with the same or related types of cancer    Cancers diagnosed at a young age (before age 50)    Individuals with more than one primary cancer    Multiple generations of the family affected with cancer    Some people may be candidates for genetic testing of more than one gene.  For these families, genetic testing using a cancer panel may be offered.  These panels will test different genes known to increase the risk for breast, ovarian, uterine, and/or other cancers. All of the genes discussed below have published clinical management guidelines for individuals who are found to carry a mutation. The purpose of this handout is to serve as a brief summary of the genes analyzed by the panels used to inquire about hereditary breast and gynecologic cancer:  STEPHENIE, BRCA1, BRCA2, BRIP1, CDH1, CHEK2, MLH1, MSH2, MSH6, PMS2, EPCAM, PTEN, PALB2, RAD51C, RAD51D, and TP53.  ______________________________________________________________________________  Hereditary Breast and Ovarian Cancer Syndrome   (BRCA1 and BRCA2)  A single mutation in one of the copies of BRCA1 or BRCA2 increases the risk for breast and ovarian cancer, among others.  The risk for pancreatic cancer and melanoma may also be slightly increased in some families.  The chart below shows the chance that someone with a BRCA mutation would develop cancer in his or her lifetime1,2,3,4.        A person s ethnic background is also important to consider, as individuals of Ashkenazi Moravian ancestry have a higher chance of having a BRCA gene mutation.  There are three BRCA mutations that occur more frequently in this population.    Jean Syndrome   (MLH1, MSH2, MSH6, PMS2, and EPCAM)  Currently five genes are known to cause Jean Syndrome: MLH1, MSH2, MSH6, PMS2, and EPCAM.  A single mutation in one of the  Jean Syndrome genes increases the risk for colon, endometrial, ovarian, and stomach cancers.  Other cancers that occur less commonly in Jean Syndrome include urinary tract, skin, and brain cancers.  The chart below shows the chance that a person with Jean syndrome would develop cancer in his or her lifetime5.      *Cancer risk varies depending on Jean syndrome gene found    Cowden Syndrome   (PTEN)  Cowden syndrome is a hereditary condition that increases the risk for breast, thyroid, endometrial, colon, and kidney cancer.  Cowden syndrome is caused by a mutation in the PTEN gene.  A single mutation in one of the copies of PTEN causes Cowden syndrome and increases cancer risk.  The chart below shows the chance that someone with a PTEN mutation would develop cancer in their lifetime6,7.  Other benign features seen in some individuals with Cowden syndrome include benign skin lesions (facial papules, keratoses, lipomas), learning disability, autism, thyroid nodules, colon polyps, and larger head size.      *One recent study found breast cancer risk to be increased to 85%    Li-Fraumeni Syndrome   (TP53)  Li-Fraumeni Syndrome (LFS) is a cancer predisposition syndrome caused by a mutation in the TP53 gene. A single mutation in one of the copies of TP53 increases the risk for multiple cancers. Individuals with LFS are at an increased risk for developing cancer at a young age. The lifetime risk for development of a LFS-associated cancer is 50% by age 30 and 90% by age 60.     Core Cancers: Sarcomas, Breast, Brain, Lung, Leukemias/Lymphomas, Adrenocortical carcinomas  Other Cancers: Gastrointestinal, Thyroid, Skin, Genitourinary    Hereditary Diffuse Gastric Cancer   (CDH1)  Currently, one gene is known to cause hereditary diffuse gastric cancer (HDGC): CDH1.  Individuals with HDGC are at increased risk for diffuse gastric cancer and lobular breast cancer. Of people diagnosed with HDGC, 30-50% have a mutation in the  CDH1 gene.  This suggests there are likely other genes that may cause HDGC that have not been identified yet.      Lifetime Cancer Risks    General Population HDGC    Diffuse Gastric  <1% ~80%   Breast 12% 39-52%         Additional Genes  STEPHENIE  STEPHENIE is a moderate-risk breast cancer gene. Women who have a mutation in STEPHENIE can have between a 2-4 fold increased risk for breast cancer compared to the general population8. STEPHENIE mutations have also been associated with increased risk for pancreatic cancer, however an estimate of this cancer risk is not well understood9. Individuals who inherit two STEPHENIE mutations have a condition called ataxia-telangiectasia (AT).  This rare autosomal recessive condition affects the nervous system and immune system, and is associated with progressive cerebellar ataxia beginning in childhood.  Individuals with ataxia-telangiectasia often have a weakened immune system and have an increased risk for childhood cancers.    PALB2  Mutations in PALB2 have been shown to increase the risk of breast cancer up to 33-58% in some families; where individuals fall within this risk range is dependent upon family [id number]. PALB2 mutations have also been associated with increased risk for pancreatic cancer, although this risk has not been quantified yet.  Individuals who inherit two PALB2 mutations--one from their mother and one from their father--have a condition called Fanconi Anemia.  This rare autosomal recessive condition is associated with short stature, developmental delay, bone marrow failure, and increased risk for childhood cancers.    CHEK2   CHEK2 is a moderate-risk breast cancer gene.  Women who have a mutation in CHEK2 have around a 2-fold increased risk for breast cancer compared to the general population, and this risk may be higher depending upon family history.11,12,13 Mutations in CHEK2 have also been shown to increase the risk of a number of other cancers, including colon and prostate,  however these cancer risks are currently not well understood.    BRIP1, RAD51C and RAD51D  Mutations in BRIP1, RAD51C, and RAD51D have been shown to increase the risk of ovarian cancer and possibly female breast cancer as well14,15 .       Lifetime Cancer Risk    General Population BRIP1 RAD51C RAD51D   Ovarian 1-2% ~5-8% ~5-9% ~7-15%               Inheritance  All of the cancer syndromes reviewed above are inherited in an autosomal dominant pattern.  This means that if a parent has a mutation, each of his or her children will have a 50% chance of inheriting that same mutation.  Therefore, each child--male or female--would have a 50% chance of being at increased risk for developing cancer.      Image obtained from [employer] [facility], 2013     Mutations in some genes can occur de thee, which means that a person s mutation occurred for the first time in them and was not inherited from a parent.  Now that they have the mutation, however, it can be passed on to future generations.    Genetic Testing  Genetic testing involves a blood test and will look at the genetic information in the STEPHENIE, BRCA1, BRCA2, BRIP1, CDH1, CHEK2, MLH1, MSH2, MSH6, PMS2, EPCAM, PTEN, PALB2, RAD51C, RAD51D, and TP53 genes for any harmful mutations that are associated with increased cancer risk.  If possible, it is recommended that the person(s) who has had cancer be tested before other family members.  That person will give us the most useful information about whether or not a specific gene is associated with the cancer in the family.    Results  There are three possible results of genetic testing:    Positive--a harmful mutation was identified in one or more of the genes    Negative--no mutation was identified in any of the genes on this panel    Variant of unknown significance--a variation in one of the genes was identified, but it is unclear how this impacts cancer risk in the family    Advantages and Disadvantages   There are advantages  and disadvantages to genetic testing.    Advantages    May clarify your cancer risk    Can help you make medical decisions    May explain the cancers in your family    May give useful information to your family members (if you share your results)    Disadvantages    Possible negative emotional impact of learning about inherited cancer risk    Uncertainty in interpreting a negative test result in some situations    Possible genetic discrimination concerns (see below)    Genetic Information Nondiscrimination Act (LAURA)  LAURA is a federal law that protects individuals from health insurance or employment discrimination based on a genetic test result alone.  Although rare, there are currently no legal discrimination protections in terms of life insurance, long term care, or disability insurances.  Visit the [employer] website to learn more.    Reducing Cancer Risk  All of the genes described above have nationally recognized cancer screening guidelines that would be recommended for individuals who test positive.  In addition to increased cancer screening, surgeries may be offered or recommended to reduce cancer risk.  Recommendations are based upon an individual s genetic test result as well as their personal and family history of cancer.    Questions to Think About Regarding Genetic Testing:    What effect will the test result have on me and my relationship with my family members if I have an inherited gene mutation?  If I don t have a gene mutation?    Should I share my test results, and how will my family react to this news, which may also affect them?    Are my children ready to learn new information that may one day affect their own health?    Hereditary Cancer Resources    FORCE: Facing Our Risk of Cancer Empowered facingourrisk.org   Bright Pink bebrightpink.org   Li-Fraumeni Syndrome Association lfsassociation.org   PTEN World PTENworld.com   No stomach for cancer, Inc.  nostomachforcancer.org   Stomach cancer relief network Scrnet.org   Collaborative Group of the Americas on Inherited Colorectal Cancer (CGA) cgaicc.com    Cancer Care cancercare.org   American Cancer Society (ACS) cancer.org   National Cancer [place] (NCI) cancer.gov     Cancer Risk Management Program [phone]-CANCER ([phone])  ? Aimee Bass, MS, [facility]  [phone]  ? Princess Wu, MS, [facility]  [phone]  ? Jerri Cueto, MS, [facility]  [phone]  ? Britni Santiago, MS, [facility]  [phone]  ? Raymundo Johnson, MS, [facility]  [phone]    References  1. Carmita BROWN, Ebony PDP, Esequiel S, Mark SANTAMARIA, Paulette JE, Gisela JL, Alice N, Jackie H, Cari O, Vesta A, Erwin B, Karen P, Mannacho S, Meghan DM, Reyes N, Frances E, Kerline H, Rob E, Tung J, Gronaustin J, Anshul B, Tulinius H, Thorlacius S, Eerola H, Nevaamirlinna H, Merlin K, Dori OP. Average risks of breast and ovarian cancer associated with BRCA1 or BRCA2 mutations detected in case series unselected for family history: a combined analysis of 222 studies. Am J Hum Anna. 2003;72:1117-30.  2. Shantel N, Adeline M, Burr G.  BRCA1 and BRCA2 Hereditary Breast and Ovarian Cancer. Gene Reviews online. 2013.  3. Avelino YC, Itz S, Dionicio G, Patricia S. Breast cancer risk among male BRCA1 and BRCA2 mutation carriers. J Natl Cancer Inst. 2007;99:1811-4.  4. Donovan GRANGER, Get I, Allen J, Afshan E, Maureen ER, [place] F. Risk of breast cancer in male BRCA2 carriers. J Med Anna. 2010;47:710-1.  5. National Comprehensive Cancer Network. Clinical practice guidelines in oncology, colorectal cancer screening. Available online (registration required). 2015.  6. Emigdio MOLINA, Ronen SMITH, Violette SMITH, Sada LA, Suzi MS, Eng C. Lifetime cancer risks in individuals with germline PTEN mutations. Clin Cancer Res. 2012;18:400-7.  7. Aundrea MANCERA. Cowden Syndrome: A Critical Review of the Clinical Literature. J Anna . 2009:18:13-27.  8. Ellie BROWN, Paul FALL, Marci S, Erlinda P,  Herlinda T, Suzie M, Dayron B, Jere H, Harvinder R, Leandro K, Lolly L, Donovan DG, Meghan D, Ilya DF, Charlette MR, The Breast Cancer Susceptibility Collaboration () & Randell SUTHERLAND. STEPHENIE mutations that cause ataxia-telangiectasia are breast cancer susceptibility alleles. Nature Genetics. 2006;38:873-875  9. Estiven N , Kriss Y, Cely J, Jaspal L, Any GM , Joseluis ML, Gallinger S, Flores AG, Syngal S, Willi ML, Honorio J , Juan Antonio R, Lata SZ, Esalaina JR, Lisa VE, Opal M, Vogelstein B, Joaquin N, Chiqui RH, Davide KW, and Gloria AP. STEPHENIE mutations in patients with hereditary pancreatic cancer. Cancer Discover. 2012;2:41-46  10. Carmita SHANNON, et al. Breast-Cancer Risk in Families with Mutations in PALB2. NEJM. 2014; 371(6):497-506.  11. CHEK2 Breast Cancer Case-Control Consortium. CHEK2*1100delC and susceptibility to breast cancer: A collaborative analysis involving 10,860 breast cancer cases and 9,065 controls from 10 studies. Am J Hum Anna, 74 (2004), pp. 5506-3674  12. Dat T, Pranay S, Yomi K, et al. Spectrum of Mutations in BRCA1, BRCA2, CHEK2, and TP53 in Families at High Risk of Breast Cancer. MILA. 2006;295(12):6309-2439.   13. Karol C, Elena D, Brian A, et al. Risk of breast cancer in women with a CHEK2 mutation with and without a family history of breast cancer. J Clin Oncol. 2011;29:8424-1082.  14. Surendra H, Kyle E, Wilbert SJ, et al. Contribution of germline mutations in the RAD51B, RAD51C, and RAD51D genes to ovarian cancer in the population. J Clin Oncol. 2015;33(26):9895-7324. Doi:10.1200/JCO.2015.61.2408.  15. Garth AYON, Yusuf MEADOWS, Carline P, et al. Mutations in BRIP1 confer high risk of ovarian cancer. Tonia Anna. 2011;43(11):7274-6308. doi:10.1038/ng.955.

## 2018-04-25 NOTE — LETTER
"    Cancer Risk Management  Program Locations    [facility]  [facility]  Mailing Address  Cancer Risk Management Program  [facility]  [address]  [place] [address]    New patient appointments  [phone]  April 28, 2018    Ana Simmons  [address] [facility] [address]      Dear Ana,    It was a pleasure meeting with you at the [facility] on April 25, 2018 for a telemedicine visit. Here is a copy of the progress note from your recent genetic counseling visit to the Cancer Risk Management Program. If you have any additional questions, please feel free to call.    4/25/2018    Referring Provider: MARCO ANTONIO YoungBC    Presenting Information:   I met with Ana Simmons today for a telemedicine genetic counseling visit at the Cancer Risk Management Program at the [facility] to discuss her personal history of breast cancer and family history of bone, pancreatic, uterine, and prostate cancer. She is here today to review this history, cancer screening recommendations, and available genetic testing options.    Personal History:  Ana is a 67 year old female. 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 She is now undergoing chemotherapy, as the tumor is being considered a \"triple negative\" cancer.      She had her first menstrual period at age 10, her first child at age 19, and is postmenopausal. Ana had surgery to remove her uterus at age 48 to address fibroids; she is unsure if her ovaries are intact. She reports using hormone replacement therapy for " "approximately two years.      Her most recent mammogram in January 2016 identified this cancer. She reports that her most recent colonoscopy in February 2016 identified a \"few polyps\" and follow-up was recommended in seven years. She does not regularly do any other cancer screening at this time. Ana reported no tobacco use and no current alcohol use.    Family History: (Please see scanned pedigree for detailed family history information)    One niece is in her 30's and was diagnosed with a bone cancer in her arm in her 20's; treatment included surgery and chemotherapy.    One maternal uncle was diagnosed with Hodgkin's lymphoma and passed away in his 50's.    One maternal first cousin was diagnosed with uterine cancer at age 49 and passed away at age 51.    Ana's father was diagnosed with lung cancer and passed away at age 84; he had a history of smoking.    One paternal aunt was diagnosed with and passed away from pancreatic cancer in her 60's; she did not have a history of smoking or alcohol use.    Ana's paternal grandfather was diagnosed with prostate cancer in his 80's and passed away in his 80's.    Her maternal ethnicity is Haitian and [place]. Her paternal ethnicity is Haitian. There is no known Ashkenazi Religious ancestry on either side of her family. There is no reported consanguinity.    Discussion:    Ana's personal history of breast cancer and family history of bone, uterine, prostate, and pancreatic cancer is possibly suggestive of a hereditary cancer syndrome.    We reviewed the features of sporadic, familial, and hereditary cancers. In looking at Ana's family history, it is possible that a cancer susceptibility gene is present as she was diagnosed with a rare type of cancer and has a few close relatives diagnosed with potentially related cancers (i.e. bone, pancreatic, prostate); several of these relatives were also diagnosed under age 50. That being said, Ana does have multiple " relatives that have never been diagnosed with a related cancer, including her siblings, parents, and several other extended relatives.    We discussed the natural history and genetics of several hereditary cancer syndromes, including Hereditary Breast and Ovarian Cancer (HBOC) syndrome and Li-Fraumeni syndrome (LFS). A detailed handout regarding these syndromes and the information we discussed was provided to Ana at the end of our appointment today and can be found in the after visit summary. Topics included: inheritance pattern, cancer risks, cancer screening recommendations, and also risks, benefits and limitations of testing.    We reviewed that the most common cause of hereditary breast cancer is HBOC syndrome, which is caused by mutations in the BRCA1 and BRCA2 genes. Individuals with HBOC syndrome are at increased risk for several different cancers, including breast, ovarian, male breast, prostate, melanoma, and pancreatic cancer. We also reviewed that her specific type of breast cancer is not typically seen with HBOC syndrome, though there have been a few case reports of women with metaplastic cancer carrying a BRCA mutation.    Based on her personal and family history (specifically if her grandfather's prostate cancer was metastatic or had a high Tra score), Ana meets current National Comprehensive Cancer Network (NCCN) criteria for genetic testing of BRCA1 and BRCA2.    We also reviewed that LFS, which is caused by mutations in the TP53 gene, has been associated with malignant phyllodes breast tumors, bone cancers, as well as significantly increased risk for many other types of cancer. That being said, her personal history is not classic for LFS, though, as she was not diagnosed under age 48.      We discussed that there are also other additional genes that could cause increased risk for breast and related cancers. As many of these genes present with overlapping features in a family and accurate  cancer risk cannot always be established based upon the pedigree analysis alone, it would be reasonable for Ana to consider panel genetic testing to analyze multiple genes at once.    We reviewed genetic testing options for hereditary breast cancer: actionable high/moderate risk custom panel (CustomNext-Cancer, 16 genes) and expanded high and moderate risk panel (OvaNext, 25 genes). Ana expressed  interest in learning as much information as possible from the testing. She opted for the OvaNext panel.    Genetic testing is available for 25 genes associated with hereditary breast and related cancers: OvaNext (STEPHENIE, BARD1, BRCA1, BRCA2, BRIP1, CDH1, CHEK2, DICER1, EPCAM, MLH1, MRE11A, MSH2, MSH6, MUTYH, NBN, NF1, PALB2, PMS2, PTEN, RAD50, RAD51C, RAD51D, SMARCA4, STK11, and TP53).    We discussed that some of the genes in the OvaNext panel are associated with specific hereditary cancer syndromes and published management guidelines: HBOC syndrome (BRCA1, BRCA2), Jean syndrome (MLH1, MSH2, MSH6, PMS2, EPCAM), Hereditary Diffuse Gastric Cancer (CDH1), Cowden syndrome (PTEN), LFS (TP53), Peutz-Jeghers syndrome (STK11), MUTYH Associated Polyposis (MUTYH), and Neurofibromatosis type 1 (NF1).      Risk-reducing salpingo-oophorectomy can be considered in women with mutations in BRIP1, RAD51C, or RAD51D. Breast and other cancer risk management guidelines are available for STEPHENIE, CHEK2, PALB2, NF1, and NBN.    The remaining genes (BARD1, DICER1, MRE11A, RAD50, and SMARCA4) are associated with increased cancer risk and may allow us to make medical recommendations when mutations are identified.      Ana was provided with a detailed brochure from [facility] explaining the OvaNext testing.    Consent was obtained and genetic testing for OvaNext was sent to [facility]. Turn around time: approximately 4 weeks.    Medical Management: For Ana, we reviewed that the information from genetic testing may  determine:    additional cancer screening for which Ana may qualify (i.e. colonoscopies every 1-5 years, annual full body MRI, more frequent dermatologic exams, etc.),    options for risk reducing surgeries Ana could consider (i.e. surgery to remove her ovaries),      and targeted chemotherapies for Ana's active cancer, or if she were to develop certain cancers in the future (i.e. immunotherapy for individuals with Jean syndrome, PARP inhibitors, etc.).     These recommendations and possible targeted chemotherapies will be discussed in detail once genetic testing is completed.     Plan:  1) Today Ana elected to proceed with genetic testing using the OvaNext panel offered by [facility].  2) This information should be available in 4 weeks.  3) Ana will first be called with her test results when available. She will then be invited back to clinic to discuss the results again, if she is interested.    Face to face time: 45 minutes    Jerri Cueto MS, [facility]  Licensed Genetic Counselor  Office: [phone]  Pager: [phone]

## 2018-04-25 NOTE — MR AVS SNAPSHOT
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    These families often have:    Several people with the same or related types of cancer    Cancers diagnosed at a young age (before age 50)    Individuals with more than one primary cancer    Multiple generations of the family affected with cancer    Some people may be candidates for genetic testing of more than one gene.  For these families, genetic testing using a cancer panel may be offered.  These panels will test different genes known to increase the risk for breast, ovarian, uterine, and/or other cancers. All of the genes discussed below have published clinical management guidelines for individuals who are found to carry a mutation. The purpose of this handout is to serve as a brief summary of the genes analyzed by the panels used to inquire about hereditary breast and gynecologic cancer:  STEPHENIE, BRCA1, BRCA2, BRIP1, CDH1, CHEK2, MLH1, MSH2, MSH6, PMS2, EPCAM, PTEN, PALB2, RAD51C, RAD51D, and TP53.  ______________________________________________________________________________  Hereditary Breast and Ovarian Cancer Syndrome   (BRCA1 and BRCA2)  A single mutation in one of the copies of BRCA1 or BRCA2 increases the risk for breast and ovarian cancer, among others.  The risk for pancreatic cancer and melanoma may also be slightly increased in some families.  The chart below shows the chance that someone with a BRCA mutation  would develop cancer in his or her lifetime1,2,3,4.        A person s ethnic background is also important to consider, as individuals of Ashkenazi Hindu ancestry have a higher chance of having a BRCA gene mutation.  There are three BRCA mutations that occur more frequently in this population.    Jean Syndrome   (MLH1, MSH2, MSH6, PMS2, and EPCAM)  Currently five genes are known to cause Jean Syndrome: MLH1, MSH2, MSH6, PMS2, and EPCAM.  A single mutation in one of the Jean Syndrome genes increases the risk for colon, endometrial, ovarian, and stomach cancers.  Other cancers that occur less commonly in Jean Syndrome include urinary tract, skin, and brain cancers.  The chart below shows the chance that a person with Jean syndrome would develop cancer in his or her lifetime5.      *Cancer risk varies depending on Jean syndrome gene found    Cowden Syndrome   (PTEN)  Cowden syndrome is a hereditary condition that increases the risk for breast, thyroid, endometrial, colon, and kidney cancer.  Cowden syndrome is caused by a mutation in the PTEN gene.  A single mutation in one of the copies of PTEN causes Cowden syndrome and increases cancer risk.  The chart below shows the chance that someone with a PTEN mutation would develop cancer in their lifetime6,7.  Other benign features seen in some individuals with Cowden syndrome include benign skin lesions (facial papules, keratoses, lipomas), learning disability, autism, thyroid nodules, colon polyps, and larger head size.      *One recent study found breast cancer risk to be increased to 85%    Li-Fraumeni Syndrome   (TP53)  Li-Fraumeni Syndrome (LFS) is a cancer predisposition syndrome caused by a mutation in the TP53 gene. A single mutation in one of the copies of TP53 increases the risk for multiple cancers. Individuals with LFS are at an increased risk for developing cancer at a young age. The lifetime risk for development of a LFS-associated cancer is 50% by age  30 and 90% by age 60.     Core Cancers: Sarcomas, Breast, Brain, Lung, Leukemias/Lymphomas, Adrenocortical carcinomas  Other Cancers: Gastrointestinal, Thyroid, Skin, Genitourinary    Hereditary Diffuse Gastric Cancer   (CDH1)  Currently, one gene is known to cause hereditary diffuse gastric cancer (HDGC): CDH1.  Individuals with HDGC are at increased risk for diffuse gastric cancer and lobular breast cancer. Of people diagnosed with HDGC, 30-50% have a mutation in the CDH1 gene.  This suggests there are likely other genes that may cause HDGC that have not been identified yet.      Lifetime Cancer Risks    General Population HDGC    Diffuse Gastric  <1% ~80%   Breast 12% 39-52%         Additional Genes  STEPHENIE  STEPHENIE is a moderate-risk breast cancer gene. Women who have a mutation in STEPHENIE can have between a 2-4 fold increased risk for breast cancer compared to the general population8. STEPHENIE mutations have also been associated with increased risk for pancreatic cancer, however an estimate of this cancer risk is not well understood9. Individuals who inherit two STEPHENIE mutations have a condition called ataxia-telangiectasia (AT).  This rare autosomal recessive condition affects the nervous system and immune system, and is associated with progressive cerebellar ataxia beginning in childhood.  Individuals with ataxia-telangiectasia often have a weakened immune system and have an increased risk for childhood cancers.    PALB2  Mutations in PALB2 have been shown to increase the risk of breast cancer up to 33-58% in some families; where individuals fall within this risk range is dependent upon family [id number]. PALB2 mutations have also been associated with increased risk for pancreatic cancer, although this risk has not been quantified yet.  Individuals who inherit two PALB2 mutations--one from their mother and one from their father--have a condition called Fanconi Anemia.  This rare autosomal recessive condition is associated with  short stature, developmental delay, bone marrow failure, and increased risk for childhood cancers.    CHEK2   CHEK2 is a moderate-risk breast cancer gene.  Women who have a mutation in CHEK2 have around a 2-fold increased risk for breast cancer compared to the general population, and this risk may be higher depending upon family history.11,12,13 Mutations in CHEK2 have also been shown to increase the risk of a number of other cancers, including colon and prostate, however these cancer risks are currently not well understood.    BRIP1, RAD51C and RAD51D  Mutations in BRIP1, RAD51C, and RAD51D have been shown to increase the risk of ovarian cancer and possibly female breast cancer as well14,15 .       Lifetime Cancer Risk    General Population BRIP1 RAD51C RAD51D   Ovarian 1-2% ~5-8% ~5-9% ~7-15%               Inheritance  All of the cancer syndromes reviewed above are inherited in an autosomal dominant pattern.  This means that if a parent has a mutation, each of his or her children will have a 50% chance of inheriting that same mutation.  Therefore, each child--male or female--would have a 50% chance of being at increased risk for developing cancer.      Image obtained from Genetics Home Reference, 2013     Mutations in some genes can occur de thee, which means that a person s mutation occurred for the first time in them and was not inherited from a parent.  Now that they have the mutation, however, it can be passed on to future generations.    Genetic Testing  Genetic testing involves a blood test and will look at the genetic information in the STEPHENIE, BRCA1, BRCA2, BRIP1, CDH1, CHEK2, MLH1, MSH2, MSH6, PMS2, EPCAM, PTEN, PALB2, RAD51C, RAD51D, and TP53 genes for any harmful mutations that are associated with increased cancer risk.  If possible, it is recommended that the person(s) who has had cancer be tested before other family members.  That person will give us the most useful information about whether or not a  specific gene is associated with the cancer in the family.    Results  There are three possible results of genetic testing:    Positive--a harmful mutation was identified in one or more of the genes    Negative--no mutation was identified in any of the genes on this panel    Variant of unknown significance--a variation in one of the genes was identified, but it is unclear how this impacts cancer risk in the family    Advantages and Disadvantages   There are advantages and disadvantages to genetic testing.    Advantages    May clarify your cancer risk    Can help you make medical decisions    May explain the cancers in your family    May give useful information to your family members (if you share your results)    Disadvantages    Possible negative emotional impact of learning about inherited cancer risk    Uncertainty in interpreting a negative test result in some situations    Possible genetic discrimination concerns (see below)    Genetic Information Nondiscrimination Act (LAURA)  LAURA is a federal law that protects individuals from health insurance or employment discrimination based on a genetic test result alone.  Although rare, there are currently no legal discrimination protections in terms of life insurance, long term care, or disability insurances.  Visit the National Human Genome Research [place] website to learn more.    Reducing Cancer Risk  All of the genes described above have nationally recognized cancer screening guidelines that would be recommended for individuals who test positive.  In addition to increased cancer screening, surgeries may be offered or recommended to reduce cancer risk.  Recommendations are based upon an individual s genetic test result as well as their personal and family history of cancer.    Questions to Think About Regarding Genetic Testing:    What effect will the test result have on me and my relationship with my family members if I have an inherited gene mutation?  If I  don t have a gene mutation?    Should I share my test results, and how will my family react to this news, which may also affect them?    Are my children ready to learn new information that may one day affect their own health?    Hereditary Cancer Resources    FORCE: Facing Our Risk of Cancer Empowered facingourrisk.org   Bright Pink bebrightpink.org   Li-Fraumeni Syndrome Association lfsassociation.org   PTEN World PTENworld.Spacious   No stomach for cancer, Inc. nostomachforcancer.org   Stomach cancer relief network Scrnet.org   Collaborative Group of the Americas on Inherited Colorectal Cancer (CGA) cgaicc.com    Cancer Care cancercare.org   American Cancer Society (ACS) cancer.org   National Cancer Guin (NCI) cancer.gov     Cancer Risk Management Program [phone] ([phone])  ? Aimee Bass, MS, [facility]  [phone]  ? Princess Wu, MS, [facility]  [phone]  ? Jerri Cueto, MS, [facility]  [phone]  ? Britni Santiago, MS, [facility]  [phone]  ? Raymundo Johnson, MS, [facility]  [phone]    References  1. Carmita A, Ebony PDP, Esequiel S, Mark SANTAMAIRA, Paulette JE, Gisela JL, Alice N, Jackie H, Cari O, Vesta A, Erwin B, Karen P, Mannacho S, Meghan DM, Chambers N, Frances E, Kerline H, Rob E, Tung J, Gronwald J, Anshul B, Santa H, Thorlacius S, Eerola H, Darrion H, Merlin K, Dori OP. Average risks of breast and ovarian cancer associated with BRCA1 or BRCA2 mutations detected in case series unselected for family history: a combined analysis of 222 studies. Am J Hum Anna. 2003;72:1117-30.  2. Shantel SUTHERLAND, Adeline ROJAS, Leno MOORE.  BRCA1 and BRCA2 Hereditary Breast and Ovarian Cancer. Gene Reviews online. 2013.  3. Avelino YC, Itz S, Dionicio G, Patricia S. Breast cancer risk among male BRCA1 and BRCA2 mutation carriers. J Natl Cancer Inst. 2007;99:1811-4.  4. Donovan GRANGER, Get I, Allen J, Afshan E, Maureen GARCIA, [place] F. Risk of breast cancer in male BRCA2 carriers. J Med Anna. 2010;47:710-1.  5. National  Comprehensive Cancer Network. Clinical practice guidelines in oncology, colorectal cancer screening. Available online (registration required). 2015.  6. Beal MH, Ronen J, Violette J, Sada LA, Suzi MS, Earnestine C. Lifetime cancer risks in individuals with germline PTEN mutations. Clin Cancer Res. 2012;18:400-7.  7. Aundrea MANCERA. Cowden Syndrome: A Critical Review of the Clinical Literature. J Anna . 2009:18:13-27.  8. Ellie BROWN, Paul D, Marci S, Erlinda P, Herlinda T, Suzie M, Dayron B, Jere H, Harvinder R, Leandro K, Lolly L, Donovan DG, Meghan D, Ilya DF, Charlette MR, The Breast Cancer Susceptibility Collaboration (UK) & Randell N. STEPHENIE mutations that cause ataxia-telangiectasia are breast cancer susceptibility alleles. Nature Genetics. 2006;38:873-875  9. Estiven N , Kriss Y, Cely J, Jaspal L, Any GM , Joseluis ML, Gallinger S, Flores AG, Syngal S, Willi ML, Honorio J , Juan Antonio R, Lata SZ, Tarah JR, Lisa VE, Opal M, Vogelstein B, Joaquin N, Chiqui RH, Davide KW, and Gloria AP. STEPHENIE mutations in patients with hereditary pancreatic cancer. Cancer Discover. 2012;2:41-46  10. Carmita SUÁREZ., et al. Breast-Cancer Risk in Families with Mutations in PALB2. NEJM. 2014; 371(6):497-506.  11. CHEK2 Breast Cancer Case-Control Consortium. CHEK2*1100delC and susceptibility to breast cancer: A collaborative analysis involving 10,860 breast cancer cases and 9,065 controls from 10 studies. Am J Hum Anna, 74 (2004), pp. 1153-7934  12. Dat T, Pranay S, Yomi K, et al. Spectrum of Mutations in BRCA1, BRCA2, CHEK2, and TP53 in Families at High Risk of Breast Cancer. MILA. 2006;295(12):8273-7461.   13. Karol WEEMS, Elena FALL, Brian BROWN, et al. Risk of breast cancer in women with a CHEK2 mutation with and without a family history of breast cancer. J Clin Oncol. 2011;29:9156-4843.  14. Surendra H, Kyle E, Wilbert ARREOLA, et al. Contribution of germline mutations in the RAD51B, RAD51C, and RAD51D genes to  ovarian cancer in the population. J Clin Oncol. 2015;33(26):8238-3685. Doi:10.1200/JCO.2015.61.2408.  15. Garth T, Yusuf DF, Carline P, et al. Mutations in BRIP1 confer high risk of ovarian cancer. Tonia Anna. 2011;43(11):9007-7035. doi:10.1038/ng.955.          Follow-ups after your visit        Your next 10 appointments already scheduled     Apr 25, 2018  9:30 AM CDT   LAB with HC LAB   [facility] [place] ([facility] [place] )    [address]  [place] [address]   [phone]            Apr 27, 2018  8:30 AM CDT   Level O with HC INF RM 3308   [facility] [place] ([facility] [place] )    [address]  [place] [address]   [phone]            Apr 27, 2018  9:00 AM CDT   (Arrive by 8:45 AM)   Return Visit with Ceci Amaral NP   [facility] [place] ([facility] [place] )    [address]  [place] [address]   [phone]            Apr 27, 2018  9:30 AM CDT   Level 4 with HC INF RM 3308   [facility] [place] ([facility] [place] )    [address]  [place] [address]   [phone]            May 04, 2018  9:30 AM CDT   Level 4 with HC INF RM 3308   [facility] [place] ([facility] [place] )    [address]  [place] [address]   [phone]            May 18, 2018  8:30 AM CDT   Level O with HC INF RM 3306   [facility] [place] ([facility] [place] )    [address]  [place] [address]   [phone]            May 18, 2018  9:00 AM CDT   (Arrive by 8:45 AM)   Return Visit with Ceci Amaral NP   [facility] [place] ([facility] [place] )    [address]  [place] [address]   [phone]            May 18, 2018  9:30 AM CDT   Level 4 with HC INF RM 8907   [facility] [place] ([facility] [place] )    [address]  [place] [address]   [phone]            May 25, 2018  9:30 AM CDT   Level 4 with HC INF RM 7725    [facility] [place] )    [address]  [place] [address]   [phone]            Jun 08, 2018  9:30 AM CDT   Level 4 with HC INF RM 3310   [facility] [place] ([facility] [place] )    [address]  [place] [address]   [phone]              Future tests that were ordered for you today     Open Future Orders        Priority Expected Expires Ordered    [facility] genetic testing, [facility] Test: Laboratory Miscellaneous Order Routine  4/25/2019 4/25/2018            Who to contact     If you have questions or need follow up information about today's clinic visit or your schedule please contact [facility] directly at [phone].  Normal or non-critical lab and imaging results will be communicated to you by [employer], letter or phone within 4 business days after the clinic has received the results. If you do not hear from us within 7 days, please contact the clinic through [employer] or phone. If you have a critical or abnormal lab result, we will notify you by phone as soon as possible.  Submit refill requests through [employer] or call your pharmacy and they will forward the refill request to us. Please allow 3 business days for your refill to be completed.          Additional Information About Your Visit        [employer] Information     [employer] gives you secure access to your electronic health record. If you see a primary care provider, you can also send messages to your care team and make appointments. If you have questions, please call your primary care clinic.  If you do not have a primary care provider, please call [phone] and they will assist you.        Care EveryWhere ID     This is your Care EveryWhere ID. This could be used by other organizations to access your [facility] medical records  [id number]         Blood Pressure from Last 3 Encounters:   04/13/18 128/67   04/06/18 124/61   04/05/18 123/67    Weight from Last 3 Encounters:    04/13/18 87.2 kg (192 lb 4.8 oz)   04/06/18 88.1 kg (194 lb 3.2 oz)   04/05/18 87.8 kg (193 lb 9.6 oz)               Primary Care Provider Office Phone # Fax #    [place] [place] [phone]       [facility] [address] [facility] [address]        Equal Access to Services     JOSE MARKS : Hadii aad ku hadasho Soomaali, waaxda luqadaha, qaybta kaalmada adeegyada, waxay idiin hayaan adeeg yoly gil. So [facility] [phone].    ATENCIÓN: Si warren horn, tiene a stiles disposición servicios gratuitos de asistencia lingüística. [facility] [phone].    We comply with applicable federal civil rights laws and [place] laws. We do not discriminate on the basis of race, color, national origin, age, disability, sex, sexual orientation, or gender identity.            Thank you!     Thank you for [facility]  for your [facility]. Our goal is always to provide you with excellent care. Hearing back from our patients is one way we can continue to improve our services. Please take a few minutes to complete the written survey that you may receive in the mail after your visit with us. Thank you!             Your Updated Medication List - Protect others around you: Learn how to safely use, store and throw away your medicines at www.disposemymeds.org.          This list is accurate as of 4/25/18  9:08 AM.  Always use your most recent med list.                   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), Cough, Malignant neoplasm of lower-outer quadrant of right breast of female, estrogen receptor negative (H), Ductal carcinoma in situ (DCIS) of right breast, Phyllodes neoplasm of breast, Anxiety and depression       IMODIUM A-D 2 MG tablet   Generic drug:  loperamide      Take 2 mg by mouth 4 times daily as needed for diarrhea    Phyllodes neoplasm of breast, Malignant neoplasm metastatic to right lung (H), Adjustment disorder with mixed anxiety and depressed mood, 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)       lidocaine-prilocaine cream    EMLA    30 g    Apply 30 g topically as needed for moderate pain    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)       LORazepam 0.5 MG tablet    ATIVAN    30 tablet    Take 1 tablet (0.5 mg) by mouth every 4 hours as needed (Anxiety, Nausea/Vomiting or Sleep)    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)       METFORMIN HCL PO      Take 500 mg by mouth daily (with breakfast)        nystatin Powd     1 each    1 Application 2 times daily    Phyllodes neoplasm of breast, Malignant neoplasm metastatic to right lung (H), Adjustment disorder with mixed anxiety and depressed mood, 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)       prochlorperazine 10 MG tablet    COMPAZINE    30 tablet    Take 1 tablet (10 mg) by mouth every 6 hours as needed (Nausea/Vomiting)    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)       sertraline 50 MG tablet    ZOLOFT    180 tablet     Take 2 tablets (100 mg) by mouth daily    Phyllodes neoplasm of breast, Malignant neoplasm metastatic to right lung (H), Adjustment disorder with mixed anxiety and depressed mood, 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

## 2018-04-25 NOTE — PROGRESS NOTES
"4/25/2018    Referring Provider: Ceci Amaral, FNP-BC    Presenting Information:   I met with Ana Simmons today for a telemedicine genetic counseling visit at the Cancer Risk Management Program at the [facility] to discuss her personal history of breast cancer and family history of bone, pancreatic, uterine, and prostate cancer. She is here today to review this history, cancer screening recommendations, and available genetic testing options.    Personal History:  Ana is a 67 year old female. 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 She is now undergoing chemotherapy, as the tumor is being considered a \"triple negative\" cancer.      She had her first menstrual period at age 10, her first child at age 19, and is postmenopausal. Ana had surgery to remove her uterus at age 48 to address fibroids; she is unsure if her ovaries are intact. She reports using hormone replacement therapy for approximately two years.      Her most recent mammogram in January 2016 identified this cancer. She reports that her most recent colonoscopy in February 2016 identified a \"few polyps\" and follow-up was recommended in seven years. She does not regularly do any other cancer screening at this time. Ana reported no tobacco use and no current alcohol use.    Family History: (Please see scanned pedigree for detailed family history information)    One niece is in her 30's and was diagnosed with a bone cancer in her arm in her 20's; treatment included surgery and chemotherapy.    One maternal uncle was diagnosed with Hodgkin's lymphoma and passed away in his 50's.    One maternal first cousin was diagnosed with uterine cancer at age 49 and passed away at age 51.    Ana's father was diagnosed with " lung cancer and passed away at age 84; he had a history of smoking.    One paternal aunt was diagnosed with and passed away from pancreatic cancer in her 60's; she did not have a history of smoking or alcohol use.    Ana's paternal grandfather was diagnosed with prostate cancer in his 80's and passed away in his 80's.    Her maternal ethnicity is [place] and [place]. Her paternal ethnicity is [place]. There is no known Ashkenazi Baptism ancestry on either side of her family. There is no reported consanguinity.    Discussion:    Ana's personal history of breast cancer and family history of bone, uterine, prostate, and pancreatic cancer is possibly suggestive of a hereditary cancer syndrome.    We reviewed the features of sporadic, familial, and hereditary cancers. In looking at Ana's family history, it is possible that a cancer susceptibility gene is present as she was diagnosed with a rare type of cancer and has a few close relatives diagnosed with potentially related cancers (i.e. bone, pancreatic, prostate); several of these relatives were also diagnosed under age 50. That being said, Ana does have multiple relatives that have never been diagnosed with a related cancer, including her siblings, parents, and several other extended relatives.    We discussed the natural history and genetics of several hereditary cancer syndromes, including Hereditary Breast and Ovarian Cancer (HBOC) syndrome and Li-Fraumeni syndrome (LFS). A detailed handout regarding these syndromes and the information we discussed was provided to Ana at the end of our appointment today and can be found in the after visit summary. Topics included: inheritance pattern, cancer risks, cancer screening recommendations, and also risks, benefits and limitations of testing.    We reviewed that the most common cause of hereditary breast cancer is HBOC syndrome, which is caused by mutations in the BRCA1 and BRCA2 genes. Individuals with HBOC  syndrome are at increased risk for several different cancers, including breast, ovarian, male breast, prostate, melanoma, and pancreatic cancer. We also reviewed that her specific type of breast cancer is not typically seen with HBOC syndrome, though there have been a few case reports of women with metaplastic cancer carrying a BRCA mutation.    Based on her personal and family history (specifically if her grandfather's prostate cancer was metastatic or had a high Tra score), Ana meets current National Comprehensive Cancer Network (NCCN) criteria for genetic testing of BRCA1 and BRCA2.    We also reviewed that LFS, which is caused by mutations in the TP53 gene, has been associated with malignant phyllodes breast tumors, bone cancers, as well as significantly increased risk for many other types of cancer. That being said, her personal history is not classic for LFS, though, as she was not diagnosed under age 48.      We discussed that there are also other additional genes that could cause increased risk for breast and related cancers. As many of these genes present with overlapping features in a family and accurate cancer risk cannot always be established based upon the pedigree analysis alone, it would be reasonable for Ana to consider panel genetic testing to analyze multiple genes at once.    We reviewed genetic testing options for hereditary breast cancer: actionable high/moderate risk custom panel (CustomNext-Cancer, 16 genes) and expanded high and moderate risk panel (OvaNext, 25 genes). Ana expressed  interest in learning as much information as possible from the testing. She opted for the OvaNext panel.    Genetic testing is available for 25 genes associated with hereditary breast and related cancers: OvaNext (STEPHENIE, BARD1, BRCA1, BRCA2, BRIP1, CDH1, CHEK2, DICER1, EPCAM, MLH1, MRE11A, MSH2, MSH6, MUTYH, NBN, NF1, PALB2, PMS2, PTEN, RAD50, RAD51C, RAD51D, SMARCA4, STK11, and TP53).    We discussed  that some of the genes in the OvaNext panel are associated with specific hereditary cancer syndromes and published management guidelines: HBOC syndrome (BRCA1, BRCA2), Jean syndrome (MLH1, MSH2, MSH6, PMS2, EPCAM), Hereditary Diffuse Gastric Cancer (CDH1), Cowden syndrome (PTEN), LFS (TP53), Peutz-Jeghers syndrome (STK11), MUTYH Associated Polyposis (MUTYH), and Neurofibromatosis type 1 (NF1).      Risk-reducing salpingo-oophorectomy can be considered in women with mutations in BRIP1, RAD51C, or RAD51D. Breast and other cancer risk management guidelines are available for STEPHENIE, CHEK2, PALB2, NF1, and NBN.    The remaining genes (BARD1, DICER1, MRE11A, RAD50, and SMARCA4) are associated with increased cancer risk and may allow us to make medical recommendations when mutations are identified.      Ana was provided with a detailed brochure from [facility] explaining the OvaNext testing.    Consent was obtained and genetic testing for OvaNext was sent to [facility]. Turn around time: approximately 4 weeks.    Medical Management: For Ana, we reviewed that the information from genetic testing may determine:    additional cancer screening for which Ana may qualify (i.e. colonoscopies every 1-5 years, annual full body MRI, more frequent dermatologic exams, etc.),    options for risk reducing surgeries Ana could consider (i.e. surgery to remove her ovaries),      and targeted chemotherapies for Ana's active cancer, or if she were to develop certain cancers in the future (i.e. immunotherapy for individuals with Jean syndrome, PARP inhibitors, etc.).     These recommendations and possible targeted chemotherapies will be discussed in detail once genetic testing is completed.     Plan:  1) Today Ana elected to proceed with genetic testing using the OvaNext panel offered by [facility].  2) This information should be available in 4 weeks.  3) Ana will first be called with her test  results when available. She will then be invited back to clinic to discuss the results again, if she is interested.    Face to face time: 45 minutes    Jerri Cueto MS, [facility]  Licensed Genetic Counselor  Office: [phone]  Pager: [phone]

## 2018-04-27 NOTE — PATIENT INSTRUCTIONS
We would like to see you back per your calender.  Will plan scans after this cycle and followup with Dr. Celis for the results. Our diagnostic imaging department will call you to schedule your CT scan(s).  Your prescription for prednisone has been sent to:   [facility] - [address] - [place] [id number]  Phone: [phone] Fax: [phone]   When you are in need of a refill, please call your pharmacy and they will send us a request. If you have any questions please call [phone]    Other instructions: Start Prednisone 20mg daily for 4 days  I would like you to have an ultrasound of your abdomen and we will call with results.

## 2018-04-27 NOTE — PROGRESS NOTES
Oncology Follow-up Visit:  April 27, 2018    Reason for Visit:  Patient presents with:  RECHECK: Follow up phyllodes neoplasm of breast     Nursing Note and documentation reviewed: yes    HPI:  This is a 67-year-old female patient who presents to the oncology/hematology clinic today for evaluation prior to receiving cycle 3 day 1 chemotherapy.  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  She underwent treatment completing this 11/2016 and was found to have metastasis to the right lung in November 2017.  Patient was evaluated by Dr. 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  He recommended docetaxel with gemcitabine and this was initiated on 1/3/2018 and she was found to have further progression after 3 cycles.  She is now being treated with olaratumab and doxil.    She presents to the clinic today telling me she is having some discomfort to the right upper abdomen that she states radiates up into her shoulder at times.  The pain is not constant but when it comes its last quite a while and it feels very dull.  She questions if its related to her coughing.  She continues to cough quite a bit using the cough medicine at night, the dextromethorphan, and states she's been sleeping well and this does help.  She does cough often throughout the day and uses the cheritussin and questions if it truly works or not.  She states that at times she'll cough so hard she vomits.  She feels like her shortness of breath may be increasing also and becomes winded easily with activity.  She denies any fevers.  Appetite is fair and she has been eatting smaller meals been lighter meals.  She does have some fatigue.  She states her  depression is somewhat increased related to the constant coughing.  She has some discomfort in the midepigastric region when she eats stating she feels as though her food is not digesting.  She's had no difficulty with her bowel movements.    Oncologic History: Patient underwent mammogram in January 2016 which revealed abnormalities of the right breast.  On 02/09/2016, a stereotactic biopsy of the right breast lesion at the 8 o'clock position revealed grade 3 DCIS of a solid micropapillary subtype.  Estrogen receptors were positive.  Also a biopsy of the right breast lesion at the 9 o'clock position revealed a grade 2 DCIS of the solid micropapillary subtype.  Estrogen receptors were positive.  She underwent bilateral mastectomies on 3/30/16 by Dr. 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  There was also angiosarcomatous, liposarcomatous, malignant spindle cell and chondroid differentiation.  The tumor in the lower quadrant was distinct in the 2 DCIS lesions noted in the right breast.  Estrogen receptor positive at 2% with progesterone receptor negative; tumor was HER-2/renetta negative.  In terms of her DCIS, there was a 1.5 cm DCIS in the right breast as well as a smaller focus noted.  There was a sentinel lymph node that was negative. Chemotherapy was recommended.  She was evaluated by Dr. Lam with [facility] on 4/18/2016 he felt patient had a metaplastic carcinoma as well as DCIS of the right breast.  He did note that there was no consensus best treatment options for this patient, but he felt given that she likely had triple negative disease and if she had a pure ductal adenocarcinoma, he would favor treating her a triple-negative breast cancer that is greater than 0.5 cm.  He felt that dose-dense AC x 4 cycles plus Taxol weekly x 12 weeks would be ideal.  She was  evaluated here by Dr. Celis with Medical Oncology on 5/3/2016 as she wanted to receive treatment closer to home.      She underwent treatment and was found to have metastasis to the right lung in November 2017.  Patient was evaluated by Dr. 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  He recommended docetaxel with gemcitabine and this was initiated on 1/3/2018.      Current Chemo Regime/TX:  Olaratumab 15mg/kg day1 and day 8 with doxil 30mg/m2 day1 every 21 days  Current Cycle:  3  # of completed cycles: 2     Previous treatment:  Adriamycin 60mg/m2 and Cytoxan 600mg/m2 every 2 weeks with Neulasta support 6mg SQ injection day 2 x 4 cycles; Taxol 80mg/m2 weekly x 12 weeks; arimidex 1mg daily initiated 11/2016; Gemcitabine 900mg/m2 with docetaxel 75mg/m2 day 1 and day 8 every 21 days with nuelasta support day 9 x 3 cycles     Past Medical History:   Diagnosis Date     Breast cancer ([facility])      DMII (diabetes mellitus, type 2) ([facility])      HLD (hyperlipidemia)      Nonhealing surgical wound        Past Surgical History:   Procedure Laterality Date     APPENDECTOMY OPEN       C VAGINAL HYSTERECTOMY       COLONOSCOPY - [facility] SCAN  01/01/2006    Colonoscopy 2006; 02-16     INSERT PORT VASCULAR ACCESS Left 6/2/2016    Procedure: INSERT PORT VASCULAR ACCESS;  Surgeon: Micheline Davis MD;  Location: [facility] OR     MASTECTOMY Bilateral        Family History   Problem Relation Age of Onset     Lung Cancer Father      Myocardial Infarction Father      Coronary Artery Disease Father      DIABETES Father      Hyperlipidemia Father      Hypertension Father      Prostate Cancer Paternal Grandfather      DIABETES Mother      Hyperlipidemia Mother      Thyroid Disease Mother      Thyroid Disease Maternal Grandmother      Thyroid Disease Daughter      Breast Cancer No family hx of      Colon Cancer No family hx of      Depression No family  hx of      Asthma No family hx of      CEREBROVASCULAR DISEASE No family hx of      Genetic Disorder No family hx of        Social History     Social History     Marital status:      Spouse name: Braden     Number of children: 2     Years of education: N/A     Occupational History      Retired     Social History Main Topics     Smoking status: Former Smoker     Smokeless tobacco: Never Used     Alcohol use No      Comment: 1 kamille/ night, with chemo is not drinking     Drug use: No     Sexual activity: Not on file     Other Topics Concern     Parent/Sibling W/ Cabg, Mi Or Angioplasty Before 65f 55m? Yes     father     Social History Narrative       Current Outpatient Prescriptions   Medication     albuterol (PROAIR HFA/PROVENTIL HFA/VENTOLIN HFA) 108 (90 BASE) MCG/ACT Inhaler     calcium-vitamin D (CALTRATE) 600-400 MG-UNIT per tablet     cyanocobalamin (VITAMIN  B-12) 1000 MCG tablet     dexamethasone (DECADRON) 4 MG tablet     guaiFENesin-codeine (CHERATUSSIN AC) 100-10 MG/5ML SOLN solution     loperamide (IMODIUM A-D) 2 MG tablet     LORazepam (ATIVAN) 0.5 MG tablet     METFORMIN HCL PO     Pyridoxine HCl (VITAMIN B6 PO)     sertraline (ZOLOFT) 50 MG tablet     SIMVASTATIN PO     warfarin (COUMADIN) 1 MG tablet     lidocaine-prilocaine (EMLA) cream     nystatin POWD     prochlorperazine (COMPAZINE) 10 MG tablet     No current facility-administered medications for this visit.         Allergies   Allergen Reactions     Cats        Review Of Systems:  Constitutional: denies fever, chills, and night sweats.  Eyes: denies blurred or double vision  Ears/Nose/Throat: denies ear pain, nose problems, difficulty swallowing  Respiratory: see HPI  Skin: denies rash, lesions  Cardiovascular: denies chest pain, palpitations  Gastrointestinal: see HPI  Genitourinary: denies difficulty with urination, blood in urine  Musculoskeletal: see HPI  Neurologic: denies lightheadedness, headaches, numbness or  "tingling  Psychiatric: see HPI  Hematologic/Lymphatic/Immunologic: denies easy bruising, easy bleeding, lumps or bumps noted  Endocrine: Denies increased thirst    ECOG Performance Status: 1    Physical Exam:  /62  Pulse 106  Temp 97  F (36.1  C) (Tympanic)  Resp 18  Ht 1.651 m (5' 5\")  Wt 87.4 kg (192 lb 9.6 oz)  SpO2 93%  BMI 32.05 kg/m2    GENERAL APPEARANCE: Healthy, alert and in no acute distress.  HEENT: Normocephalic, Sclerae anicteric. Oropharynx without ulcers, lesions, or thrush.  NECK:  No asymmetry or masses, no thyromegaly.  LYMPHATICS: No palpable cervical, supraclavicular, axillary, or inguinal nodes   RESP: Lungs clear to auscultation bilaterally, respirations regular and easy  CARDIOVASCULAR: Regular rate and rhythm. Normal S1, S2; no murmur, gallop, or rub.  ABDOMEN: Soft, tenderness to the right upper quadrant. Bowel sounds auscultated all 4 quadrants. No palpable organomegaly or masses.  MUSCULOSKELETAL: Extremities without gross deformities noted.   NEURO: Alert and oriented x 3.  Gait steady.  PSYCHIATRIC: Mentation and affect appear normal.  Mood appropriate.    Laboratory:  Results for orders placed or performed in visit on 04/27/18   Comprehensive metabolic panel   Result Value Ref Range    Sodium 140 133 - 144 mmol/L    Potassium 3.4 3.4 - 5.3 mmol/L    Chloride 105 94 - 109 mmol/L    Carbon Dioxide 28 20 - 32 mmol/L    Anion Gap 7 3 - 14 mmol/L    Glucose 136 (H) 70 - 99 mg/dL    Urea Nitrogen 10 7 - 30 mg/dL    Creatinine 0.66 0.52 - 1.04 mg/dL    GFR Estimate 89 >60 mL/min/1.7m2    GFR Estimate If Black >90 >60 mL/min/1.7m2    Calcium 8.7 8.5 - 10.1 mg/dL    Bilirubin Total 0.3 0.2 - 1.3 mg/dL    Albumin 2.9 (L) 3.4 - 5.0 g/dL    Protein Total 6.5 (L) 6.8 - 8.8 g/dL    Alkaline Phosphatase 80 40 - 150 U/L    ALT 22 0 - 50 U/L    AST 19 0 - 45 U/L   CBC with platelets differential   Result Value Ref Range    WBC 2.7 (L) 4.0 - 11.0 10e9/L    RBC Count 3.42 (L) 3.8 - 5.2 " 10e12/L    Hemoglobin 9.9 (L) 11.7 - 15.7 g/dL    Hematocrit 31.2 (L) 35.0 - 47.0 %    MCV 91 78 - 100 fl    MCH 28.9 26.5 - 33.0 pg    MCHC 31.7 31.5 - 36.5 g/dL    RDW 15.9 (H) 10.0 - 15.0 %    Platelet Count 238 150 - 450 10e9/L    Diff Method Automated Method     % Neutrophils 67.7 %    % Lymphocytes 20.9 %    % Monocytes 8.1 %    % Eosinophils 3.3 %    % Basophils 0.0 %    % Immature Granulocytes 0.0 %    Nucleated RBCs 0 0 /100    Absolute Neutrophil 1.9 1.6 - 8.3 10e9/L    Absolute Lymphocytes 0.6 (L) 0.8 - 5.3 10e9/L    Absolute Monocytes 0.2 0.0 - 1.3 10e9/L    Absolute Eosinophils 0.1 0.0 - 0.7 10e9/L    Absolute Basophils 0.0 0.0 - 0.2 10e9/L    Abs Immature Granulocytes 0.0 0 - 0.4 10e9/L    Absolute Nucleated RBC 0.0        Imaging Studies:      Chest x-ray with Dr. Stout shows plueral effusion and recommends against a thoracentesis at this time as there is not a big change from the previous 3/22/2018.    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  She underwent treatment then was found to have metastasis to the right lung in November 2017.  She was seen at the [facility] and felt this was a Phyllodes tumor, with recommendation for gemcitabine and docetaxel.  She received 3 cycles with progression of disease.  She is now being treated with olaratumab and doxil and will initiate cycle 3 day 1 today.  She will return for evaluation prior to cycle 4 with labs per treatment plan along with CT chest, abdomen and pelvis.    #2  SOB:  CXR per radiologist shows possible increase in pleural effusion but difficult to decipher related to disease.  Will hold off on attempt at thoracentesis at this point.  Will do a prednisone burst for 5 days.    #3  Abdominal Pain:  Will obtain an  abdominal ultrasound next week and will be having a CT prior to her next cycle.  Question gallbladder etiology.    #4  Cough:  She'll continue on the Cheratussin as needed andDextromethorphan at HS as a suppressant.  Will try a prednisone burst of 20mg daily for 5 days to see if this helps.  She will let us know if it does and we may consider a low dose daily for control.  May try and advair inhaler also.  CT will be done prior to cycle 4 for further evaluation.     #4  Anemia:  Likely related to her disease and contributing to her SOB somewhat.  She will however, continue with 1 iron tablet daily.    #5  Decreased appetite:  Albumin is low.  Slight weight loss over the past couple months.  Encouraged protein shakes daily.  May consider an appetite stimulant.  Will reassess at her next visit.    I encouraged patient to call with any questions or concerns.    Ceci TOMLIN, FNP-BC, AOCNP

## 2018-04-27 NOTE — PROGRESS NOTES
Patient is a 66 y/o  here accompanied by spouse today for infusion of olaratumab/doxil under the orders of Dr. Celis.     Todays lab values: WBC 2.7, ANC 1.9, , HGB 9.9, AST 19, ALT 22, Alkaline Phosphatase 80, Creatinine 0.66.     Patient meets parameters for today's infusion.  Denies questions or concerns regarding today's infusion and/or medications being administered.      Patient identified with two identifiers, order verified, and verbal consent for today's infusion obtained from patient. Written consent for treatment is on file and valid.    1158 IV pump verified with olaratumab 1330 mg dose, drug, and rate of administration by second RN, Dianne Elder. Infusion administered per protocol. Patient tolerated infusion well, no signs or symptoms of adverse reaction noted. Patient denies pain nor discomfort.     1334 IV pump verified with Doxil 60 mg dose, drug, and rate of administration by second RN, Erlinda Tejeda. Infusion administered per protocol.

## 2018-04-27 NOTE — PROGRESS NOTES
Patient tolerated infusion well, no signs or symptoms of adverse reaction noted. Patient denies pain nor discomfort.     Needle removed, tip intact. Site clean, dry and intact. Covered with a sterile bandage, slight pressure applied for 30 seconds. Pt instructed to leave bandage intact for a minimum of one hour, and to call with questions or concerns. Copy of appointments, discharge instructions, and after visit summary (AVS) provided to patient. Patient states understanding, discharged ambulatory.

## 2018-04-27 NOTE — MR AVS SNAPSHOT
After Visit Summary   4/27/2018    Ana Simmons    MRN: [id number]           Patient Information     Date Of Birth          1950        Visit Information        Provider Department      4/27/2018 9:00 AM Ceci Amaral NP [facility]        Today's Diagnoses     Phyllodes neoplasm of breast    -  1    Malignant neoplasm metastatic to right lung (H)        SOB (shortness of breath)        Cough        RUQ abdominal pain          Care Instructions    We would like to see you back per your calender.  Will plan scans after this cycle and followup with Dr. Celis for the results. Our diagnostic imaging department will call you to schedule your CT scan(s).  Your prescription for prednisone has been sent to:   [facility] - [place], [place] - [address]  [address]  [place] [address]  Phone: [phone] Fax: [phone]   When you are in need of a refill, please call your pharmacy and they will send us a request. If you have any questions please call [phone]    Other instructions: Start Prednisone 20mg daily for 4 days  I would like you to have an ultrasound of your abdomen and we will call with results.          Follow-ups after your visit        Your next 10 appointments already scheduled     May 04, 2018  9:00 AM CDT   US ABDOMEN LIMITED with HIUS1   HI ULTRASOUND ([facility] )    [address] [place] [id number] [phone]           Please bring a list of your medicines (including vitamins, minerals and over-the-counter drugs). Also, tell your doctor about any allergies you may have. Wear comfortable clothes and leave your valuables at home.  Adults: No eating or drinking for 8 hours before the exam. You may take medicine with a small sip of water.  Children: - Children 6+ years: No food or drink for 6 hours before exam. - Children 1-5 years: No food or drink for 4 hours before exam. - Infants, breast-fed: may have breast milk  up to 2 hours before exam. - Infants, formula: may have bottle until 4 hours before exam.  Please call the Imaging Department at your exam site with any questions.            May 04, 2018  9:30 AM CDT   Level 4 with HC INF RM 3308   [facility] ([facility] )    [address]  [facility] [address]   [phone]            May 11, 2018 12:00 PM CDT   (Arrive by 11:00 AM)   CT CHEST/ABDOMEN/PELVIS W CONTRAST with HICT1   HI CT SCAN ([facility] )    [address] [id number] [phone]           Please bring any scans or X-rays taken at other hospitals, if similar tests were done. Also bring a list of your medicines, including vitamins, minerals and over-the-counter drugs. It is safest to leave personal items at home.  Be sure to tell your doctor:   If you have any allergies.   If there s any chance you are pregnant.   If you are breastfeeding.  How to prepare:   Do not eat or drink for 2 hours before your exam. If you need to take medicine, you may take it with small sips of water. (We may ask you to take liquid medicine as well.)   Please wear loose clothing, such as a sweat suit or jogging clothes. Avoid snaps, zippers and other metal. We may ask you to undress and put on a hospital gown.  Please arrive 30 minutes early for your CT. Once in the department you might be asked to drink water 15-20 minutes prior to your exam.  If indicated you may be asked to drink an oral contrast in advance of your CT.  If this is the case, the imaging team will let you know or be in contact with you prior to your appointment  Patients over 70 or patients with diabetes or kidney problems:   If you haven t had a blood test (creatinine test) within the last 30 days, the Cardiologist/Radiologist may require you to get this test prior to your exam.  If you have diabetes:   Continue to take your metformin medication on the day of your exam  If you have any questions,  please call the Imaging Department where you will have your exam.            May 15, 2018  8:00 AM CDT   Level O with HC INF RM 3312   [facility] [place] ([facility] [place] )    [address]  [place] [address]   [phone]            May 15, 2018  8:30 AM CDT   (Arrive by 8:15 AM)   Return Visit with Mickie Celis MD   [facility] [place] ([facility] [place] )    [address]  [place] [address]   [phone]            May 18, 2018  9:30 AM CDT   Level 4 with HC INF RM 3306   [facility] [place] ([facility] [place] )    [address]  [place] [address]   [phone]            May 25, 2018  9:30 AM CDT   Level 4 with HC INF RM 3310   [facility] [place] ([facility] [place] )    [address]  [place] [address]   [phone]            Jun 08, 2018  9:00 AM CDT   Level O with HC INF RM 3310   [facility] [place] ([facility] [place] )    [address]  [place] [address]   [phone]            Jun 08, 2018  9:30 AM CDT   (Arrive by 9:15 AM)   Return Visit with Mickie Celis MD   [facility] [place] ([facility] [place] )    [address]  [place] [address]   [phone]            Jun 08, 2018 10:00 AM CDT   Level 4 with HC INF RM 3310   [facility] [place] ([facility] [place] )    [address]  [place] [address]   [phone]              Future tests that were ordered for you today     Open Future Orders        Priority Expected Expires Ordered    CT Chest/Abdomen/Pelvis w Contrast Routine 5/11/2018 4/27/2019 4/27/2018    US Abdomen Limited Routine 5/1/2018 4/27/2019 4/27/2018            Who to contact     If you have questions or need follow up information about today's clinic visit or your schedule please contact [facility] directly at [phone].  Normal or non-critical lab and imaging results will be communicated to you  "by [employer], letter or phone within 4 business days after the clinic has received the results. If you do not hear from us within 7 days, please contact the clinic through [employer] or phone. If you have a critical or abnormal lab result, we will notify you by phone as soon as possible.  Submit refill requests through [employer] or call your pharmacy and they will forward the refill request to us. Please allow 3 business days for your refill to be completed.          Additional Information About Your Visit        [employer] Information     [employer] gives you secure access to your electronic health record. If you see a primary care provider, you can also send messages to your care team and make appointments. If you have questions, please call your primary care clinic.  If you do not have a primary care provider, please call [phone] and they will assist you.        Care EveryWhere ID     This is your Care EveryWhere ID. This could be used by other organizations to access your [place] medical records  [id number]        Your Vitals Were     Pulse Temperature Respirations Height Pulse Oximetry BMI (Body Mass Index)    106 97  F (36.1  C) (Tympanic) 18 1.651 m (5' 5\") 91% 32.05 kg/m2       Blood Pressure from Last 3 Encounters:   04/27/18 136/62   04/27/18 136/62   04/27/18 136/62    Weight from Last 3 Encounters:   04/27/18 87.4 kg (192 lb 9.6 oz)   04/27/18 87.4 kg (192 lb 9.6 oz)   04/27/18 87.4 kg (192 lb 9.6 oz)              We Performed the Following     XR Chest 2 Views          Today's Medication Changes          These changes are accurate as of 4/27/18 11:01 AM.  If you have any questions, ask your nurse or doctor.               Start taking these medicines.        Dose/Directions    predniSONE 20 MG tablet   Commonly known as:  DELTASONE   Used for:  Malignant neoplasm metastatic to right lung (H), SOB (shortness of breath), Cough   Started by:  Ceci Amaral NP        Dose:  20 mg   Start taking on:  " 4/28/2018   Take 1 tablet (20 mg) by mouth daily for 4 doses   Quantity:  4 tablet   Refills:  0            Where to get your medicines      These medications were sent to [facility] - [place], [place] - [address]  [address], [facility] [address]     Phone:  [phone]     predniSONE 20 MG tablet                Primary Care Provider Office Phone # Fax #    Mya Park [phone]       [facility] [address]   [facility] [id number]        Equal Access to Services     [facility]: Hadii aad ku hadasho Soomaali, waaxda luqadaha, qaybta kaalmada adeegyada, waxay mariaa nieves . So [facility] [phone].    ATENCIÓN: Si habla español, tiene a stiles disposición servicios gratuitos de asistencia lingüística. [facility] [phone].    We comply with applicable federal civil rights laws and [place] laws. We do not discriminate on the basis of race, color, national origin, age, disability, sex, sexual orientation, or gender identity.            Thank you!     Thank you for choosing [facility]  for your care. Our goal is always to provide you with excellent care. Hearing back from our patients is one way we can continue to improve our services. Please take a few minutes to complete the written survey that you may receive in the mail after your visit with us. Thank you!             Your Updated Medication List - Protect others around you: Learn how to safely use, store and throw away your medicines at www.disposemymeds.org.          This list is accurate as of 4/27/18 11:01 AM.  Always use your most recent med list.                   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), Cough, Malignant neoplasm of lower-outer quadrant of right breast of female, estrogen receptor negative (H), Ductal carcinoma in situ (DCIS) of right breast, Phyllodes neoplasm of breast, Anxiety and depression       IMODIUM A-D 2 MG tablet   Generic drug:  loperamide      Take 2 mg by mouth 4 times daily as needed for diarrhea    Phyllodes neoplasm of breast, Malignant neoplasm metastatic to right lung (H), Adjustment disorder with mixed anxiety and depressed mood, 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)       lidocaine-prilocaine cream    EMLA    30 g    Apply 30 g topically as needed for moderate pain    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)       LORazepam 0.5 MG tablet    ATIVAN    30 tablet    Take 1 tablet (0.5 mg) by mouth every 4 hours as needed (Anxiety, Nausea/Vomiting or Sleep)    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)       METFORMIN HCL PO      Take 500 mg by mouth daily (with breakfast)        nystatin Powd     1 each    1 Application 2 times daily    Phyllodes neoplasm of breast, Malignant neoplasm metastatic to right lung (H), Adjustment disorder with mixed anxiety and depressed mood, 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)       predniSONE 20 MG tablet   Start taking on:  4/28/2018     DELTASONE    4 tablet    Take 1 tablet (20 mg) by mouth daily for 4 doses    Malignant neoplasm metastatic to right lung (H), SOB (shortness of breath), Cough       prochlorperazine 10 MG tablet    COMPAZINE    30 tablet    Take 1 tablet (10 mg) by mouth every 6 hours as needed (Nausea/Vomiting)    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)       sertraline 50 MG tablet    ZOLOFT    180 tablet    Take 2 tablets (100 mg) by mouth daily    Phyllodes neoplasm of breast, Malignant neoplasm metastatic to right lung (H), Adjustment disorder with mixed anxiety and depressed mood, 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

## 2018-04-27 NOTE — MR AVS SNAPSHOT
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          Follow-ups after your visit        Your next 10 appointments already scheduled     May 04, 2018  9:00 AM CDT   US ABDOMEN LIMITED with HIUS1   HI ULTRASOUND ([facility] )    [address] [place] [address]   [phone]           Please bring a list of your medicines (including vitamins, minerals and over-the-counter drugs). Also, tell your doctor about any allergies you may have. Wear comfortable clothes and leave your valuables at home.  Adults: No eating or drinking for 8 hours before the exam. You may take medicine with a small sip of water.  Children: - Children 6+ years: No food or drink for 6 hours before exam. - Children 1-5 years: No food or drink for 4 hours before exam. - Infants, breast-fed: may have breast milk up to 2 hours before exam. - Infants, formula: may have bottle until 4 hours before exam.  Please call the Imaging Department at your exam site with any questions.            May 04, 2018  9:30 AM CDT   Level 4 with HC INF RM 3308   [facility] ([facility] )    [address]  [facility] [address]   [phone]            May 11, 2018 12:00 PM CDT   (Arrive by 11:00 AM)   CT CHEST/ABDOMEN/PELVIS W CONTRAST with HICT1   HI CT SCAN ([facility] )    [address] [id number] [phone]           Please bring any scans or X-rays taken at other hospitals, if  similar tests were done. Also bring a list of your medicines, including vitamins, minerals and over-the-counter drugs. It is safest to leave personal items at home.  Be sure to tell your doctor:   If you have any allergies.   If there s any chance you are pregnant.   If you are breastfeeding.  How to prepare:   Do not eat or drink for 2 hours before your exam. If you need to take medicine, you may take it with small sips of water. (We may ask you to take liquid medicine as well.)   Please wear loose clothing, such as a sweat suit or jogging clothes. Avoid snaps, zippers and other metal. We may ask you to undress and put on a hospital gown.  Please arrive 30 minutes early for your CT. Once in the department you might be asked to drink water 15-20 minutes prior to your exam.  If indicated you may be asked to drink an oral contrast in advance of your CT.  If this is the case, the imaging team will let you know or be in contact with you prior to your appointment  Patients over 70 or patients with diabetes or kidney problems:   If you haven t had a blood test (creatinine test) within the last 30 days, the Cardiologist/Radiologist may require you to get this test prior to your exam.  If you have diabetes:   Continue to take your metformin medication on the day of your exam  If you have any questions, please call the Imaging Department where you will have your exam.            May 15, 2018  8:00 AM CDT   Level O with HC INF RM 3312   [facility] ([facility] [place] )    [address]  [place] [address]   [phone]            May 15, 2018  8:30 AM CDT   (Arrive by 8:15 AM)   Return Visit with Mickie Celis MD   [facility] ([facility] [place] )    [address]  [place] [address]   [phone]            May 18, 2018  9:30 AM CDT   Level 4 with HC INF RM 3306   [facility] ([facility] [place] )    [address]  [place]  [address]   [phone]            May 25, 2018  9:30 AM CDT   Level 4 with HC INF RM 3310   [facility] [place] ([facility] [place] )    [address]  [place] [address]   [phone]            Jun 08, 2018  9:00 AM CDT   Level O with HC INF RM 3310   [facility] [place] ([facility] [place] )    [address]  [place] [address]   [phone]            Jun 08, 2018  9:30 AM CDT   (Arrive by 9:15 AM)   Return Visit with Mickie Celis MD   [facility] [place] ([facility] [place] )    [address]  [place] [address]   [phone]            Jun 08, 2018 10:00 AM CDT   Level 4 with HC INF RM 3310   [facility] [place] ([facility] [place] )    [address]  [place] [address]   [phone]              Future tests that were ordered for you today     Open Future Orders        Priority Expected Expires Ordered    CT Chest/Abdomen/Pelvis w Contrast Routine 5/11/2018 4/27/2019 4/27/2018    US Abdomen Limited Routine 5/1/2018 4/27/2019 4/27/2018            Who to contact     If you have questions or need follow up information about today's clinic visit or your schedule please contact [facility] directly at [phone].  Normal or non-critical lab and imaging results will be communicated to you by MyChart, letter or phone within 4 business days after the clinic has received the results. If you do not hear from us within 7 days, please contact the clinic through [employer] or phone. If you have a critical or abnormal lab result, we will notify you by phone as soon as possible.  Submit refill requests through Tractive or call your pharmacy and they will forward the refill request to us. Please allow 3 business days for your refill to be completed.          Additional Information About Your Visit        MyChart Information     Tractive gives you secure access to your electronic health record. If you see a primary care  "provider, you can also send messages to your care team and make appointments. If you have questions, please call your primary care clinic.  If you do not have a primary care provider, please call [phone] and they will assist you.        Care EveryWhere ID     This is your Care EveryWhere ID. This could be used by other organizations to access your [place] medical records  [id number]        Your Vitals Were     Pulse Temperature Respirations Height Pulse Oximetry BMI (Body Mass Index)    100 97  F (36.1  C) (Tympanic) 18 1.651 m (5' 5\") 91% 32.05 kg/m2       Blood Pressure from Last 3 Encounters:   04/27/18 132/62   04/27/18 136/62   04/27/18 136/62    Weight from Last 3 Encounters:   04/27/18 87.4 kg (192 lb 9.6 oz)   04/27/18 87.4 kg (192 lb 9.6 oz)   04/27/18 87.4 kg (192 lb 9.6 oz)              Today, you had the following     No orders found for display         Today's Medication Changes          These changes are accurate as of 4/27/18  3:08 PM.  If you have any questions, ask your nurse or doctor.               Start taking these medicines.        Dose/Directions    predniSONE 20 MG tablet   Commonly known as:  DELTASONE   Used for:  Malignant neoplasm metastatic to right lung (H), SOB (shortness of breath), Cough   Started by:  Ceci Amaral NP        Dose:  20 mg   Start taking on:  4/28/2018   Take 1 tablet (20 mg) by mouth daily for 4 doses   Quantity:  4 tablet   Refills:  0            Where to get your medicines      These medications were sent to [facility] [place], [place] - [address]  [address], [facility] [address]     Phone:  [phone]     predniSONE 20 MG tablet                Primary Care Provider Office Phone # Fax #    Mya SARAH Park [phone]       [facility] [address]   [facility] [id number]        Equal Access to Services     JOSE MARKS AH: Rocio Meredith, momo johnson, qarandy zavala, " candy campbellcuong cruz'aan ah. So [facility] [phone].    ATENCIÓN: Si ayushla justina, tiene a stiles disposición servicios gratuitos de asistencia lingüística. Clint khan [phone].    We comply with applicable federal civil rights laws and [place] laws. We do not discriminate on the basis of race, color, national origin, age, disability, sex, sexual orientation, or gender identity.            Thank you!     Thank you for choosing [facility]  for your care. Our goal is always to provide you with excellent care. Hearing back from our patients is one way we can continue to improve our services. Please take a few minutes to complete the written survey that you may receive in the mail after your visit with us. Thank you!             Your Updated Medication List - Protect others around you: Learn how to safely use, store and throw away your medicines at www.disposemymeds.org.          This list is accurate as of 4/27/18  3:08 PM.  Always use your most recent med list.                   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)       guaiFENesin-codeine 100-10 MG/5ML Soln solution    CHERATUSSIN AC    420 mL    Take 5-10 mLs by mouth every 4 hours as needed for cough    Triple  negative malignant neoplasm of breast (H), Cough, Malignant neoplasm of lower-outer quadrant of right breast of female, estrogen receptor negative (H), Ductal carcinoma in situ (DCIS) of right breast, Phyllodes neoplasm of breast, Anxiety and depression       IMODIUM A-D 2 MG tablet   Generic drug:  loperamide      Take 2 mg by mouth 4 times daily as needed for diarrhea    Phyllodes neoplasm of breast, Malignant neoplasm metastatic to right lung (H), Adjustment disorder with mixed anxiety and depressed mood, Triple negative malignant neoplasm of breast (H)       lidocaine-prilocaine cream    EMLA    30 g    Apply 30 g topically as needed for moderate pain    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)       LORazepam 0.5 MG tablet    ATIVAN    30 tablet    Take 1 tablet (0.5 mg) by mouth every 4 hours as needed (Anxiety, Nausea/Vomiting or Sleep)    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)       METFORMIN HCL PO      Take 500 mg by mouth daily (with breakfast)        nystatin Powd     1 each    1 Application 2 times daily    Phyllodes neoplasm of breast, Malignant neoplasm metastatic to right lung (H), Adjustment disorder with mixed anxiety and depressed mood, 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 breast (H)       predniSONE 20 MG tablet   Start taking on:  4/28/2018    DELTASONE    4 tablet    Take 1 tablet (20 mg) by mouth daily for 4 doses    Malignant neoplasm metastatic to right lung (H), SOB (shortness of breath), Cough       prochlorperazine 10 MG tablet    COMPAZINE    30 tablet    Take 1 tablet (10 mg) by mouth every 6 hours as needed (Nausea/Vomiting)    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)       sertraline 50 MG tablet    ZOLOFT    180 tablet    Take 2 tablets (100 mg) by mouth daily    Phyllodes neoplasm of breast, Malignant neoplasm metastatic to right lung (H), Adjustment disorder with mixed anxiety and depressed mood,  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

## 2018-04-27 NOTE — PROGRESS NOTES
Patients left  sided power port accessed using non-coring, 19 gauge, 3/4 inch needle.Mask donned by caregiver: yes Site prepped with CHG: yes Labs drawn: yes Dressing applied using aseptic technique: yes. Port accessed per facility protocol. Port flushed easily, blood return noted.  No signs and symptoms of infection or infiltration.  Port flushed 2 mL's normal saline, blood return noted, flushed with 8  mLs Normal Saline, 10 mLs blood discarded.  2 tubes taken for ordered labs, port flushed with 20 mLs normal saline.  Port left accessed as patient has appointment with Nathalia Amaral, and is then due for chemo if parameters are met.  Patient discharged with no complaints. Vera Cochran RN

## 2018-04-27 NOTE — NURSING NOTE
"Chief Complaint   Patient presents with     RECHECK     Follow up phyllodes neoplasm of breast       Initial /62  Pulse 106  Temp 97  F (36.1  C) (Tympanic)  Resp 18  Ht 1.651 m (5' 5\")  Wt 87.4 kg (192 lb 9.6 oz)  SpO2 93%  BMI 32.05 kg/m2 Estimated body mass index is 32.05 kg/(m^2) as calculated from the following:    Height as of this encounter: 1.651 m (5' 5\").    Weight as of this encounter: 87.4 kg (192 lb 9.6 oz).  Medication Reconciliation: complete   Immunization reviewed, advanced directives, pain 4/10 right rib cage to right shoulder intermittent aching, PHQ9 = 1.  Cristina Dye LPN      "

## 2018-04-27 NOTE — MR AVS SNAPSHOT
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)          Care Instructions    See you back as planned          Follow-ups after your visit        Your next 10 appointments already scheduled     Apr 27, 2018  9:00 AM CDT   (Arrive by 8:45 AM)   Return Visit with Ceci Amaral NP   [facility] [place] ([facility] [place] )    [address]  [place] [address]   [phone]            Apr 27, 2018  9:30 AM CDT   Level 4 with HC INF RM 3308   [facility] [place] ([facility] [place] )    [address]  [place] [address]   [phone]            May 04, 2018  9:30 AM CDT   Level 4 with HC INF RM 3308   [facility] [place] ([facility] [place] )    [address]  [place] [address]   [phone]            May 18, 2018  8:30 AM CDT   Level O with HC INF RM 3306   [facility] [place] ([facility] [place] )    [address]  [place] [address]   [phone]            May 18, 2018  9:00 AM CDT   (Arrive by 8:45 AM)   Return Visit with Ceci Amaral NP   [facility] [place] ([facility] [place] )    [address]  [place] [address]   [phone]            May 18, 2018  9:30 AM CDT   Level 4 with HC INF RM 3306   [facility] [place] ([facility] [place] )    [address]  [place] [address]   [phone]            May 25, 2018  9:30 AM CDT   Level 4 with HC INF RM 3310   [facility] [place] ([facility] [place] )    [address]  [place] [address]   [phone]            Jun 08, 2018  8:30 AM CDT   Level O with HC INF RM 3312    [facility] [place] ([facility] [place] )    [address]  [place] [address]   [phone]            Jun 08, 2018  9:00 AM CDT   (Arrive by 8:45 AM)   Return Visit with Ceci Amaral NP   [facility] [place] ([facility] [place] )    [address]  [place] [address]   [phone]            Jun 08, 2018  9:30 AM CDT   Level 4 with HC INF RM 3310   [facility] [place] ([facility] [place] )    [address]  [place] [address]   [phone]              Who to contact     If you have questions or need follow up information about today's clinic visit or your schedule please contact [facility] directly at [phone].  Normal or non-critical lab and imaging results will be communicated to you by MyChart, letter or phone within 4 business days after the clinic has received the results. If you do not hear from us within 7 days, please contact the clinic through [employer] or phone. If you have a critical or abnormal lab result, we will notify you by phone as soon as possible.  Submit refill requests through SwipeClock or call your pharmacy and they will forward the refill request to us. Please allow 3 business days for your refill to be completed.          Additional Information About Your Visit        MyChart Information     Sw[employer] gives you secure access to your electronic health record. If you see a primary care provider, you can also send messages to your care team and make appointments. If you have questions, please call your primary care clinic.  If you do not have a primary care provider, please call [phone] and they will assist you.        Care EveryWhere ID     This is your Care EveryWhere ID. This could be used by other organizations to access your [place] medical records  [id number]        Your Vitals Were     Temperature Respirations Height Pulse Oximetry BMI (Body Mass Index)       97  F (36.1  C) (Oral) 20 1.651  "m (5' 5\") 93% 32.05 kg/m2        Blood Pressure from Last 3 Encounters:   04/27/18 136/62   04/13/18 128/67   04/06/18 124/61    Weight from Last 3 Encounters:   04/27/18 87.4 kg (192 lb 9.6 oz)   04/13/18 87.2 kg (192 lb 4.8 oz)   04/06/18 88.1 kg (194 lb 3.2 oz)              We Performed the Following     CBC with platelets differential     Comprehensive metabolic panel        Primary Care Provider Office Phone # Fax #    Mya Park [phone]       [facility] [address]   [facility] [id number]        Equal Access to Services     JOSE MARKS : Hadii janette garciao Soavril, waaxda luqadaha, qaybta kaalmada adeegyada, candy nieves . So [facility] [phone].    ATENCIÓN: Si habla español, tiene a stiles disposición servicios gratuitos de asistencia lingüística. [facility] [phone].    We comply with applicable federal civil rights laws and [place] laws. We do not discriminate on the basis of race, color, national origin, age, disability, sex, sexual orientation, or gender identity.            Thank you!     Thank you for choosing [facility]  for your care. Our goal is always to provide you with excellent care. Hearing back from our patients is one way we can continue to improve our services. Please take a few minutes to complete the written survey that you may receive in the mail after your visit with us. Thank you!             Your Updated Medication List - Protect others around you: Learn how to safely use, store and throw away your medicines at www.disposemymeds.org.          This list is accurate as of 4/27/18  8:47 AM.  Always use your most recent med list.                   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), Cough, Malignant neoplasm of lower-outer quadrant of right breast of female, estrogen receptor negative (H), Ductal carcinoma in situ (DCIS) of right breast, Phyllodes neoplasm of breast, Anxiety and depression       IMODIUM A-D 2 MG tablet   Generic drug:  loperamide      Take 2 mg by mouth 4 times daily as needed for diarrhea    Phyllodes neoplasm of breast, Malignant neoplasm metastatic to right lung (H), Adjustment disorder with mixed anxiety and depressed mood, Triple negative malignant neoplasm of breast (H)       lidocaine-prilocaine cream    EMLA    30 g    Apply 30 g topically as needed for moderate pain    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)       LORazepam 0.5 MG tablet    ATIVAN    30 tablet    Take 1 tablet (0.5 mg) by mouth every 4 hours as needed (Anxiety, Nausea/Vomiting or Sleep)    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)       METFORMIN HCL PO      Take 500 mg by mouth daily (with breakfast)        nystatin Powd     1 each    1 Application 2 times daily    Phyllodes neoplasm of breast, Malignant neoplasm metastatic to right lung (H), Adjustment disorder with mixed anxiety and depressed mood, Triple negative malignant neoplasm of breast (H)        prochlorperazine 10 MG tablet    COMPAZINE    30 tablet    Take 1 tablet (10 mg) by mouth every 6 hours as needed (Nausea/Vomiting)    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)       sertraline 50 MG tablet    ZOLOFT    180 tablet    Take 2 tablets (100 mg) by mouth daily    Phyllodes neoplasm of breast, Malignant neoplasm metastatic to right lung (H), Adjustment disorder with mixed anxiety and depressed mood, 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

## 2018-05-02 PROBLEM — Z79.811 AROMATASE INHIBITOR USE: Status: RESOLVED | Noted: 2017-03-22 | Resolved: 2018-01-01

## 2018-05-04 NOTE — PROGRESS NOTES
Patient is a 67 year old female here accompanied by self today for infusion of Olaratumab under the orders of Dr. Celis.  Left sided power port accessed with 19 gauge 3/4 inch 90 degree bent non coring power needle.  Line flushed with 10 cc's normal saline.  Needle secured with sterile transparent dressing.  10 cc's blood discarded, and blood taken for 2 tubes of ordered labs.  Patient tolerated well.  Denies pain and discomfort at this time.  Port flushes easily without resistance.    Hand hygiene performed: yes   Mask donned by caregiver: yes Site prepped with CHG: yes Labs drawn: yes Dressing applied using aseptic technique: yes     Today's lab values: WBC 7.0, ANC 5.3, , HGB 11.7, INR 1.68.     Patient meets parameters for today's infusion. However with new/worsening pain and SOB, reviewed status with Dr Celis after reviewing abdominal ultrasound had this am. Patient has had stabbing pain in right side, sometimes with movement and increased SOB, sometimes at rest (like today on the ride in). Reports it has made her have to take more breaks and lie down more often, though rates only at a 4 at its worst. Prior to finding out scan results, this nurse found patient in waiting area in tears, reporting she was scared that chemo isn't working. Sat with her, offered compassion and allowed to verbalize feelings. After scan results reviewed, patient calmed considerably with dx of gallstones. Dr Celis reviewed all, spoke with patient. Notes no reason to hold treatment today as no sign of infection. Education provided to patient and spouse, written and verbal, on gallstones and low fat diet, when to call with symptoms and when to be seen in ER closer to home (fever, etc). Patient encouraged to use cough medicine prescribed to manage sx. Lungs are clear throughout. Education on O2 and energy conservation. Will be scanned and seen by provider prior to next cycle. Care coordinator updated on status as well and  spoke with patient. Denies questions or concerns regarding today's infusion and/or medications being administered.      Independent dose check done.    Patient identified with two identifiers, order verified, and verbal consent for today's infusion obtained from patient. Written consent for treatment is on file and valid.    1112: IV pump verified with orlaratumab 1330mg dose, drug, and rate of administration by second RN, Aimee Saunders. Infusion administered per protocol. Patient tolerated infusion well, no signs or symptoms of adverse reaction noted. Patient denies pain nor discomfort.     Needle removed, tip intact. Site clean, dry and intact. Covered with a sterile bandage, slight pressure applied for 30 seconds. Pt instructed to leave bandage intact for a minimum of one hour, and to call with questions or concerns. Copy of appointments, discharge instructions, and after visit summary (AVS) provided to patient. Patient states understanding, discharged ambulatory.

## 2018-05-04 NOTE — PATIENT INSTRUCTIONS
Olaratumab injection  Brand Name: LARTRUVO  What is this medicine?  OLARATUMAB (oh lar a tue mab) is a monoclonal antibody. It is used to treat soft-tissue sarcoma.  How should I use this medicine?  This medicine is for infusion into a vein. It is given by a health care professional in a hospital or clinic setting.  Talk to your pediatrician regarding the use of this medicine in children. Special care may be needed.  What side effects may I notice from receiving this medicine?  Side effects that you should report to your doctor or health care professional as soon as possible:    allergic reactions like skin rash, itching or hives, swelling of the face, lips, or tongue    breathing problems    facial flushing    low blood counts - this medicine may decrease the number of white blood cells, red blood cells and platelets. You may be at increased risk for infections and bleeding.    pain, tingling, numbness in the hands or feet    signs and symptoms of low blood pressure like dizziness; feeling faint or lightheaded, falls; unusually weak or tired    signs of infection - fever or chills, cough, sore throat, pain or difficulty passing urine    signs of decreased platelets or bleeding - bruising, pinpoint red spots on the skin, black, tarry stools, blood in the urine  Side effects that usually do not require medical attention (report to your doctor or health care professional if they continue or are bothersome):    diarrhea    hair loss    loss of appetite    nausea    mouth sores    muscle pain    stomach pain    vomiting  What may interact with this medicine?  Interactions have not been studied.  Give your health care provider a list of all the medicines, herbs, non-prescription drugs, or dietary supplements you use. Also tell them if you smoke, drink alcohol, or use illegal drugs. Some items may interact with your medicine.  What if I miss a dose?  It is important not to miss your dose. Call your doctor or health care  professional if you are unable to keep an appointment.  Where should I keep my medicine?  This drug is given in a hospital or clinic and will not be stored at home.  What should I tell my health care provider before I take this medicine?  They need to know if you have any of these conditions:    an unusual or allergic reaction to olaratumab, other medicines, foods, dyes, or preservatives    pregnant or trying to get pregnant    breast-feeding  What should I watch for while using this medicine?  Your condition will be monitored carefully while you are receiving this medicine.  You may need blood work done while you are taking this medicine.  This medicine can cause serious allergic reactions. To reduce your risk you may need to take medicine before treatment with this medicine. Take your medicine as directed.  Do not become pregnant while taking this medicine or for 3 months after stopping it. Women should inform their doctor if they wish to become pregnant or think they might be pregnant. There is a potential for serious side effects to an unborn child. Talk to your health care professional or pharmacist for more information. Do not breast-feed an infant while taking this medicine or for 3 months after stopping it.  This may interfere with the ability to father a child. You should talk to your doctor or health care professional if you are concerned about your fertility.  NOTE:This sheet is a summary. It may not cover all possible information. If you have questions about this medicine, talk to your doctor, pharmacist, or health care provider. Copyright  2018 [employer]

## 2018-05-04 NOTE — MR AVS SNAPSHOT
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  OLARATUMAB (oh lar a tue mab) is a monoclonal antibody. It is used to treat soft-tissue sarcoma.  How should I use this medicine?  This medicine is for infusion into a vein. It is given by a health care professional in a hospital or clinic setting.  Talk to your pediatrician regarding the use of this medicine in children. Special care may be needed.  What side effects may I notice from receiving this medicine?  Side effects that you should report to your doctor or health care professional as soon as possible:    allergic reactions like skin rash, itching or hives, swelling of the face, lips, or tongue    breathing problems    facial flushing    low blood counts - this medicine may decrease the number of white blood cells, red blood cells and platelets. You may be at increased risk for infections and bleeding.    pain, tingling, numbness in the hands or feet    signs and symptoms of low blood pressure like dizziness; feeling faint or lightheaded, falls; unusually weak or tired    signs of infection - fever or chills, cough, sore throat, pain or difficulty passing urine    signs of decreased platelets or bleeding - bruising, pinpoint red spots on the skin, black, tarry stools, blood in the urine  Side effects that usually do not require medical attention (report to your doctor or health care professional if they continue or are  bothersome):    diarrhea    hair loss    loss of appetite    nausea    mouth sores    muscle pain    stomach pain    vomiting  What may interact with this medicine?  Interactions have not been studied.  Give your health care provider a list of all the medicines, herbs, non-prescription drugs, or dietary supplements you use. Also tell them if you smoke, drink alcohol, or use illegal drugs. Some items may interact with your medicine.  What if I miss a dose?  It is important not to miss your dose. Call your doctor or health care professional if you are unable to keep an appointment.  Where should I keep my medicine?  This drug is given in a hospital or clinic and will not be stored at home.  What should I tell my health care provider before I take this medicine?  They need to know if you have any of these conditions:    an unusual or allergic reaction to olaratumab, other medicines, foods, dyes, or preservatives    pregnant or trying to get pregnant    breast-feeding  What should I watch for while using this medicine?  Your condition will be monitored carefully while you are receiving this medicine.  You may need blood work done while you are taking this medicine.  This medicine can cause serious allergic reactions. To reduce your risk you may need to take medicine before treatment with this medicine. Take your medicine as directed.  Do not become pregnant while taking this medicine or for 3 months after stopping it. Women should inform their doctor if they wish to become pregnant or think they might be pregnant. There is a potential for serious side effects to an unborn child. Talk to your health care professional or pharmacist for more information. Do not breast-feed an infant while taking this medicine or for 3 months after stopping it.  This may interfere with the ability to father a child. You should talk to your doctor or health care professional if you are concerned about your fertility.  NOTE:This sheet is a  summary. It may not cover all possible information. If you have questions about this medicine, talk to your doctor, pharmacist, or health care provider. Copyright  2018 [employer]vier                Follow-ups after your visit        Your next 10 appointments already scheduled     May 11, 2018 12:00 PM CDT   (Arrive by 11:00 AM)   CT CHEST/ABDOMEN/PELVIS W CONTRAST with HICT1   HI CT SCAN ([facility] )    [address] [id number] [phone]           Please bring any scans or X-rays taken at other hospitals, if similar tests were done. Also bring a list of your medicines, including vitamins, minerals and over-the-counter drugs. It is safest to leave personal items at home.  Be sure to tell your doctor:   If you have any allergies.   If there s any chance you are pregnant.   If you are breastfeeding.  How to prepare:   Do not eat or drink for 2 hours before your exam. If you need to take medicine, you may take it with small sips of water. (We may ask you to take liquid medicine as well.)   Please wear loose clothing, such as a sweat suit or jogging clothes. Avoid snaps, zippers and other metal. We may ask you to undress and put on a hospital gown.  Please arrive 30 minutes early for your CT. Once in the department you might be asked to drink water 15-20 minutes prior to your exam.  If indicated you may be asked to drink an oral contrast in advance of your CT.  If this is the case, the imaging team will let you know or be in contact with you prior to your appointment  Patients over 70 or patients with diabetes or kidney problems:   If you haven t had a blood test (creatinine test) within the last 30 days, the Cardiologist/Radiologist may require you to get this test prior to your exam.  If you have diabetes:   Continue to take your metformin medication on the day of your exam  If you have any questions, please call the Imaging Department where you will have your exam.            May 15,  2018  8:00 AM CDT   Level O with HC INF RM 3312   [facility] [place] ([facility] [place] )    [address]  [place] [address]   [phone]            May 15, 2018  8:30 AM CDT   (Arrive by 8:15 AM)   Return Visit with Mickie Celis MD   [facility] [place] ([facility] [place] )    [address]  [place] [address]   [phone]            May 18, 2018  9:30 AM CDT   Level 4 with HC INF RM 3306   [facility] [place] ([facility] [place] )    [address]  [place] [address]   [phone]            May 25, 2018  9:30 AM CDT   Level 4 with HC INF RM 3310   [facility] [place] ([facility] [place] )    [address]  [place] [address]   [phone]            Jun 08, 2018  9:00 AM CDT   Level O with HC INF RM 3310   [facility] [place] ([facility] [place] )    [address]  [place] [address]   [phone]            Jun 08, 2018  9:30 AM CDT   (Arrive by 9:15 AM)   Return Visit with Mickie Celis MD   [facility] [place] ([facility] [place] )    [address]  [place] [address]   [phone]            Jun 08, 2018 10:00 AM CDT   Level 4 with HC INF RM 3310   [facility] [place] ([facility] [place] )    [address]  [place] [address]   [phone]            Ortega 15, 2018  9:30 AM CDT   Level 4 with HC INF RM 3308   [facility] [place] ([facility] [place] )    [address]  [place] [address]   [phone]            Jun 29, 2018  9:30 AM CDT   Level 4 with HC INF RM 3308   [facility] [place] ([facility] [place] )    [address]  [place] [address]   [phone]              Who to contact     If you have questions or need follow up information about today's clinic visit or your schedule please contact [facility] directly at [phone].  Normal or non-critical lab and imaging results  "will be communicated to you by MyChart, letter or phone within 4 business days after the clinic has received the results. If you do not hear from us within 7 days, please contact the clinic through [email or web address] or phone. If you have a critical or abnormal lab result, we will notify you by phone as soon as possible.  Submit refill requests through [email or web address] or call your pharmacy and they will forward the refill request to us. Please allow 3 business days for your refill to be completed.          Additional Information About Your Visit        [email or web address] Information     [email or web address] gives you secure access to your electronic health record. If you see a primary care provider, you can also send messages to your care team and make appointments. If you have questions, please call your primary care clinic.  If you do not have a primary care provider, please call [phone] and they will assist you.        Care EveryWhere ID     This is your Care EveryWhere ID. This could be used by other organizations to access your [facility] medical records  [id number]        Your Vitals Were     Pulse Temperature Respirations Height Pulse Oximetry BMI (Body Mass Index)    105 97.4  F (36.3  C) (Oral) 22 1.651 m (5' 5\") 95% 31.5 kg/m2       Blood Pressure from Last 3 Encounters:   05/04/18 126/60   04/27/18 132/62   04/27/18 136/62    Weight from Last 3 Encounters:   05/04/18 85.9 kg (189 lb 4.8 oz)   04/27/18 87.4 kg (192 lb 9.6 oz)   04/27/18 87.4 kg (192 lb 9.6 oz)              We Performed the Following     CBC with platelets differential     INR        Primary Care Provider Office Phone # Fax #    Yma J Vivian [phone]       [facility] [address]   [address] [facility] [id number]        Equal Access to Services     ASHLEY MARKS : Rocio Meredith, wavanda alex, qaybta kaalmada lucas, candy gil. So [facility] [phone].    ATENCIÓN: Si habla español, tiene a stiles disposición " servicios gratuitos de asistencia lingüística. Clint khan [phone].    We comply with applicable federal civil rights laws and [place] laws. We do not discriminate on the basis of race, color, national origin, age, disability, sex, sexual orientation, or gender identity.            Thank you!     Thank you for choosing [facility]  for your care. Our goal is always to provide you with excellent care. Hearing back from our patients is one way we can continue to improve our services. Please take a few minutes to complete the written survey that you may receive in the mail after your visit with us. Thank you!             Your Updated Medication List - Protect others around you: Learn how to safely use, store and throw away your medicines at www.disposemymeds.org.          This list is accurate as of 5/4/18 12:33 PM.  Always use your most recent med list.                   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), Cough, Malignant neoplasm of lower-outer quadrant of right breast of female,  estrogen receptor negative (H), Ductal carcinoma in situ (DCIS) of right breast, Phyllodes neoplasm of breast, Anxiety and depression       IMODIUM A-D 2 MG tablet   Generic drug:  loperamide      Take 2 mg by mouth 4 times daily as needed for diarrhea    Phyllodes neoplasm of breast, Malignant neoplasm metastatic to right lung (H), Adjustment disorder with mixed anxiety and depressed mood, 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 breast (H)       lidocaine-prilocaine cream    EMLA    30 g    Apply 30 g topically as needed for moderate pain    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)       LORazepam 0.5 MG tablet    ATIVAN    30 tablet    Take 1 tablet (0.5 mg) by mouth every 4 hours as needed (Anxiety, Nausea/Vomiting or Sleep)    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)       METFORMIN HCL PO      Take 500 mg by mouth daily (with breakfast)        nystatin Powd     1 each    1 Application 2 times daily    Phyllodes neoplasm of breast, Malignant neoplasm metastatic to right lung (H), Adjustment disorder with mixed anxiety and depressed mood, 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 breast (H)       prochlorperazine 10 MG tablet    COMPAZINE    30 tablet    Take 1 tablet (10 mg) by mouth every 6 hours as needed (Nausea/Vomiting)    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)       sertraline 50 MG tablet    ZOLOFT    180 tablet    Take 2 tablets (100 mg) by mouth daily    Phyllodes neoplasm of breast, Malignant neoplasm metastatic to right lung (H), Adjustment disorder with mixed anxiety and depressed mood, 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

## 2018-05-07 NOTE — TELEPHONE ENCOUNTER
"Patient left message on [facility] voicemail asking for Nathalia, saying \"her symptoms from last week are still very sharp, wondering what I should do.\"  Stated she still has side pain and some shortness of breath.  Route to Oncology pool for nurse to review and address.  "

## 2018-05-07 NOTE — TELEPHONE ENCOUNTER
I returned patient call states having ongoing constant dense pain not sharp pain. States rib pain area is tender but continues to cough ongoing. Did hv a ultrasound and showed gall stone and is monitoring diet for gall stones. No fever, no nausea, no vomiting, continues to have shortness of breath and cough. Is scheduled for CT Scan Friday and follow up with DR Celis on 5/15/2018. I instructed patient I would talk to DR Celis as Nathalia is out of the office and if symptoms worsen should be seen at nearest ER. Patient also needs her Cheratussin refilled

## 2018-05-07 NOTE — TELEPHONE ENCOUNTER
Per DR Celis If no nausea, vomiting, or diahrrea and no fever proceed with Ct Scan on Friday if conditions worsen should go to nearest ER. Patient notified and will forward to Abeba to do refill on cough medication.

## 2018-05-10 NOTE — TELEPHONE ENCOUNTER
Rec'd call from pt stating she was in the ER last night in [place] due to intense pain from her gall stones. Pt states she was put on Dilaudid 1mg every 4 hours PRN. Pt states her pain is better with the Dilaudid. Pt requesting referral to General Surgery. Informed pt this RN would discuss with nurse practitioner and give her a call back.     Discussed with Nathalia Amaral NP. Per Nathalia, have pt do CT Scan tomorrow as planned. We will review the CT and decide the plan from there. If CT is positive for gall bladder and gall stones, will order a HIDA Scan and place a Gen Surg referral. If negative, will have pt follow up with Dr. Celis to discuss further plans.     Call placed to pt. Informed her of the plan above. Pt verbalized understanding and agrees with this plan.

## 2018-05-11 NOTE — PATIENT INSTRUCTIONS
We will contact the [facility] to have you scheduled with Dr. Tineo for opinion regarding options for treatment.  We will see you back after this appointment or sooner if needed.   If you have any questions please call [phone]  Other instructions:  Prescription given for Diluadid; please notify me when you need a refill.

## 2018-05-11 NOTE — PROGRESS NOTES
Oncology Follow-up Visit:  May 11, 2018    Reason for Visit:  Patient presents with:  RECHECK: follow up/scan results     Nursing Note and documentation reviewed: yes    HPI:  This is a 67-year-old female patient who presents to the oncology/hematology clinic today accompanied by her  after her CT scan in a nonroutine fashion per my request.  Patient stopped one of the nurses in the [place] prior to her CT scan requesting a refill on her pain medication for the right upper quadrant pain she's been experiencing.      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  She underwent treatment completing this 11/2016 and was found to have metastasis to the right lung in November 2017.  Patient was evaluated by Dr. 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  He recommended docetaxel with gemcitabine and this was initiated on 1/3/2018 and she was found to have further progression after 3 cycles.  She is now being treated with olaratumab and doxil.    She presented to the emergency room in [facility] with increasing right upper quadrant pain and was placed on Dilaudid 2 mg tablet every 4 hours.  Patient presents to the clinic today in a wheelchair.  She describes a lot of pain in the right upper quadrant and states that the Diluadid does help.  She states when she tries to go longer than 4 hours for the pain medication the pain becomes worse.  She has fairly good pain relief when she's on it every 4 hours.  She does continue with shortness of breath with any activity is fairly comfortable at rest.  She also does continue with cough and states this bothers her and she feels she is unable to do much.  She is frustrated in  regards to her current status.  She also tells me today that her appetite is poor and things don't taste good.  She denies any fevers or any actual chest pain.    Oncologic History: Patient underwent mammogram in January 2016 which revealed abnormalities of the right breast.  On 02/09/2016, a stereotactic biopsy of the right breast lesion at the 8 o'clock position revealed grade 3 DCIS of a solid micropapillary subtype.  Estrogen receptors were positive.  Also a biopsy of the right breast lesion at the 9 o'clock position revealed a grade 2 DCIS of the solid micropapillary subtype.  Estrogen receptors were positive.  She underwent bilateral mastectomies on 3/30/16 by Dr. Sara Cooley at the [facility] with pathology revealing a 2 cm metaplastic carcinoma with epithelial mesenchymal components including adenocarcinoma.  There was also angiosarcomatous, liposarcomatous, malignant spindle cell and chondroid differentiation.  The tumor in the lower quadrant was distinct in the 2 DCIS lesions noted in the right breast.  Estrogen receptor positive at 2% with progesterone receptor negative; tumor was HER-2/renetta negative.  In terms of her DCIS, there was a 1.5 cm DCIS in the right breast as well as a smaller focus noted.  There was a sentinel lymph node that was negative. Chemotherapy was recommended.      She was evaluated by Dr. Lam with [facility] on 4/18/2016 he felt patient had a metaplastic carcinoma as well as DCIS of the right breast.  He did note that there was no consensus best treatment options for this patient, but he felt given that she likely had triple negative disease and if she had a pure ductal adenocarcinoma, he would favor treating her a triple-negative breast cancer that is greater than 0.5 cm.  He felt that dose-dense AC x 4 cycles plus Taxol weekly x 12 weeks would be ideal.  She was evaluated here by Dr. Celis with Medical Oncology on 5/3/2016 as she  wanted to receive treatment closer to home.      She underwent treatment and was found to have metastasis to the right lung in November 2017.  Patient was evaluated by Dr. 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  He recommended docetaxel with gemcitabine and this was initiated on 1/3/2018 and administered until 3/1/2018.    She was found to have further progression 3/12/2018 and Dr. Celis had spoke with Dr. Castelan at the [facility] who recommended Olaratumab and Doxil.  This was started 3/15/2018.      Current Chemo Regime/TX:  Olaratumab 15mg/kg day1 and day 8 with doxil 30mg/m2 day1 every 21 days initiated 3/15/2018.  Current Cycle:  n/a  # of completed cycles: 3      Previous treatment:   1. Adriamycin 60mg/m2 and Cytoxan 600mg/m2 every 2 weeks with Neulasta support 6mg SQ injection day 2 x 4 cycles  2. Taxol 80mg/m2 weekly x 12 weeks; arimidex 1mg daily initiated 11/2016  3. Gemcitabine 900mg/m2 with docetaxel 75mg/m2 day 1 and day 8 every 21 days with nuelasta support day 9 x 3 cycles    Past Medical History:   Diagnosis Date     Breast cancer ([facility])      DMII (diabetes mellitus, type 2) ([facility])      HLD (hyperlipidemia)      Nonhealing surgical wound        Past Surgical History:   Procedure Laterality Date     APPENDECTOMY OPEN       C VAGINAL HYSTERECTOMY       COLONOSCOPY - [facility] SCAN  01/01/2006    Colonoscopy 2006;  02-16     INSERT PORT VASCULAR ACCESS Left 6/2/2016    Procedure: INSERT PORT VASCULAR ACCESS;  Surgeon: Micheline Davis MD;  Location: [facility] OR     MASTECTOMY Bilateral        Family History   Problem Relation Age of Onset     Lung Cancer Father      Myocardial Infarction Father      Coronary Artery Disease Father      DIABETES Father      Hyperlipidemia Father      Hypertension Father      Prostate Cancer Paternal Grandfather      DIABETES Mother      Hyperlipidemia Mother       Thyroid Disease Mother      Thyroid Disease Maternal Grandmother      Thyroid Disease Daughter      Breast Cancer No family hx of      Colon Cancer No family hx of      Depression No family hx of      Asthma No family hx of      CEREBROVASCULAR DISEASE No family hx of      Genetic Disorder No family hx of        Social History     Social History     Marital status:      Spouse name: Braden     Number of children: 2     Years of education: N/A     Occupational History      Retired     Social History Main Topics     Smoking status: Former Smoker     Smokeless tobacco: Never Used     Alcohol use No      Comment: 1 kamille/ night, with chemo is not drinking     Drug use: No     Sexual activity: Not on file     Other Topics Concern     Parent/Sibling W/ Cabg, Mi Or Angioplasty Before 65f 55m? Yes     father     Social History Narrative       Current Outpatient Prescriptions   Medication     albuterol (PROAIR HFA/PROVENTIL HFA/VENTOLIN HFA) 108 (90 BASE) MCG/ACT Inhaler     calcium-vitamin D (CALTRATE) 600-400 MG-UNIT per tablet     cyanocobalamin (VITAMIN  B-12) 1000 MCG tablet     dexamethasone (DECADRON) 4 MG tablet     guaiFENesin-codeine (CHERATUSSIN AC) 100-10 MG/5ML SOLN solution     HYDROmorphone (DILAUDID) 2 MG tablet     lidocaine-prilocaine (EMLA) cream     loperamide (IMODIUM A-D) 2 MG tablet     LORazepam (ATIVAN) 0.5 MG tablet     METFORMIN HCL PO     nystatin POWD     order for DME     prochlorperazine (COMPAZINE) 10 MG tablet     Pyridoxine HCl (VITAMIN B6 PO)     sertraline (ZOLOFT) 50 MG tablet     SIMVASTATIN PO     warfarin (COUMADIN) 1 MG tablet     No current facility-administered medications for this visit.      Facility-Administered Medications Ordered in Other Visits   Medication     heparin 100 UNIT/ML injection 5 mL     sodium chloride (PF) 0.9% PF flush 10-20 mL        Allergies   Allergen Reactions     Cats        Review Of Systems:  Constitutional: denies fever, chills, and  "night sweats.  Eyes: denies blurred or double vision  Ears/Nose/Throat: denies ear pain, nose problems, difficulty swallowing  Respiratory: see HPI  Skin: denies rash, lesions  Cardiovascular: denies chest pain, palpitations, edema  Gastrointestinal: see HPI  Genitourinary: denies difficulty with urination, blood in urine  Musculoskeletal: see HPI  Neurologic: denies lightheadedness, headaches, numbness or tingling  Psychiatric: see HPI  Hematologic/Lymphatic/Immunologic: denies easy bruising, easy bleeding, lumps or bumps noted  Endocrine: Denies increased thirst    ECOG Performance Status: 2    Physical Exam:  BP (!) 138/91  Pulse 102  Temp 98.2  F (36.8  C) (Tympanic)  Resp 18  Ht 1.651 m (5' 5\")  SpO2 (!) 88%    GENERAL APPEARANCE: Healthy, alert and in no acute distress.  HEENT: Normocephalic, Sclerae anicteric. Oropharynx without ulcers, lesions, or thrush.  NECK:  No asymmetry or masses, no thyromegaly.  LYMPHATICS: No palpable cervical, supraclavicular, axillary, or inguinal nodes   RESP: Lungs clear to auscultation bilaterally but dim throughout right lobes, respirations slightly labored at rest but increased labored breathing with ambulation-see nurses notes of O2 sats with ambulation  CARDIOVASCULAR: Regular rate and rhythm. Normal S1, S2; no murmur, gallop, or rub.  ABDOMEN: Soft, nontender. Bowel sounds auscultated all 4 quadrants. No palpable organomegaly or masses.  NEURO: Alert and oriented x 3.  Gait steady.  PSYCHIATRIC: Mentation and affect appear normal.  Mood appropriate but teary eyed through our difficult conversation today.    Laboratory:  Results for orders placed or performed in visit on 05/11/18   CBC with platelets differential   Result Value Ref Range    WBC 6.0 4.0 - 11.0 10e9/L    RBC Count 4.00 3.8 - 5.2 10e12/L    Hemoglobin 11.5 (L) 11.7 - 15.7 g/dL    Hematocrit 36.0 35.0 - 47.0 %    MCV 90 78 - 100 fl    MCH 28.8 26.5 - 33.0 pg    MCHC 31.9 31.5 - 36.5 g/dL    RDW 16.3 (H) " 10.0 - 15.0 %    Platelet Count 213 150 - 450 10e9/L    Diff Method Automated Method     % Neutrophils 82.5 %    % Lymphocytes 11.5 %    % Monocytes 4.5 %    % Eosinophils 1.0 %    % Basophils 0.2 %    % Immature Granulocytes 0.3 %    Nucleated RBCs 0 0 /100    Absolute Neutrophil 4.9 1.6 - 8.3 10e9/L    Absolute Lymphocytes 0.7 (L) 0.8 - 5.3 10e9/L    Absolute Monocytes 0.3 0.0 - 1.3 10e9/L    Absolute Eosinophils 0.1 0.0 - 0.7 10e9/L    Absolute Basophils 0.0 0.0 - 0.2 10e9/L    Abs Immature Granulocytes 0.0 0 - 0.4 10e9/L    Absolute Nucleated RBC 0.0    Comprehensive metabolic panel   Result Value Ref Range    Sodium 136 133 - 144 mmol/L    Potassium 3.7 3.4 - 5.3 mmol/L    Chloride 100 94 - 109 mmol/L    Carbon Dioxide 26 20 - 32 mmol/L    Anion Gap 10 3 - 14 mmol/L    Glucose 121 (H) 70 - 99 mg/dL    Urea Nitrogen 12 7 - 30 mg/dL    Creatinine 0.63 0.52 - 1.04 mg/dL    GFR Estimate >90 >60 mL/min/1.7m2    GFR Estimate If Black >90 >60 mL/min/1.7m2    Calcium 8.9 8.5 - 10.1 mg/dL    Bilirubin Total 0.3 0.2 - 1.3 mg/dL    Albumin 2.9 (L) 3.4 - 5.0 g/dL    Protein Total 7.0 6.8 - 8.8 g/dL    Alkaline Phosphatase 95 40 - 150 U/L    ALT 26 0 - 50 U/L    AST 24 0 - 45 U/L   Amylase   Result Value Ref Range    Amylase 32 30 - 110 U/L   Lipase   Result Value Ref Range    Lipase 62 (L) 73 - 393 U/L   INR   Result Value Ref Range    INR 1.27 (H) 0.80 - 1.20       Imaging Studies:      Results for orders placed or performed during the hospital encounter of 05/11/18   CT Chest/Abdomen/Pelvis w Contrast    Narrative    PROCEDURE: CT CHEST/ABDOMEN/PELVIS W CONTRAST 5/11/2018 12:08 PM    HISTORY: response to therapy/phyllodes tumor of breast mets to right  lung/right upper quadrant abdominal pain; Phyllodes neoplasm of  breast; Malignant neoplasm metastatic to right lung (H); Cough; RUQ  abdominal pain; Triple negative malignant neoplasm of breast (H);  Shortness of breath    COMPARISONS: March 12, 2018    Meds/Dose Given:  ISOVUE 300 / 100ml    TECHNIQUE: CT scan of the chest abdomen and pelvis with sagittal  coronal reconstructions    FINDINGS: There is a large mass in the right lungs. This mass is  enlarged as compared to March 2018. The right lung mass and transverse  diameters enlarged from 13 cm to 17 cm. There is significant  compression of the right atrium mild by the mass. Mediastinal  structures are shifted from right to left. There is a small  right-sided pleural effusion. No left lung masses are noted. The  mediastinal lymph nodes are normal in caliber degenerative changes are  present in the thoracic spine    CT scan of the abdomen and pelvis liver spleen pancreas appears  normal. There are questionable gallstones in the fundus of the  gallbladder.. There are no adrenal masses. Right left kidneys are free  of masses or hydronephrosis. The periaortic lymph nodes are normal in  caliber. Intraperitoneally no masses are seen. In the pelvis the  bladder and rectum appear normal. Regional skeleton is intact.         Impression    IMPRESSION: Significant  enlargement of the right chest mass as  compared to previous examination in March 2018. On today's examination  there is significant compression of mediastinal structures. 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  She underwent treatment then was found to have metastasis to the right lung in November 2017.  She was seen at the [facility] by Dr. Tineo and felt this was a Phyllodes tumor, with recommendation for gemcitabine and docetaxel.  She received 3 cycles with progression of disease.  She was then treated with olaratumab and  doxil per recommendation of Dr. Castelan with now further progression.  She will return to the [facility] for recommendations or possible clinical trial.  We will se placido after this visit.     #2  SOB:  Related to worsening disease and subsequent pleural effusion.  O2 sat dropped to 87% with ambulation and O2 applied with increase in sats and less SOB.  We will prescribed home O2 at 2L/min to use PRN and will ambulation.     #3  Abdominal Pain: Related to progression of disease.  She will continue the dilaudid at 2mg every 4 hours.  Will need to look at switching to a long acting pain medication.  Will get the appointment for [place] and may need to follow up next week pending that appointment.     #4  Cough:  Related to progression.  She will continue the cough meds as needed.  No relief with the Prednisone.    #5  Advanced Care Planning:   Long discussion with patient and  in regards to the progression of disease and status. She wishes to be a DNR/DNI code status-POLST completed, signed and copied and original given to patient and copy sent to scanning.    #6  Long term anticoagulant use:  She was placed on coumadin 1mg daily for her port-a-cath as previously had difficulty with getting blood related to recurring  fibrin sheath formation.  She will continue at this point.    I encouraged patient to call with any questions or concerns.    Ceci TOMLIN, FNP-BC, AOCNP

## 2018-05-11 NOTE — PROGRESS NOTES
Port a Cath accessed  Hand hygeine performed: Yes  Mask donned by caregiver: Yes  Site prepped with CHG: Yes  Biopatch used: No  Labs drawn: No  Dressing applied using aseptic technique: Yes

## 2018-05-11 NOTE — TELEPHONE ENCOUNTER
"Patient in today for CT scan. Spoke with patient and she reports she was in the ER this week for gallbladder pain, given Hydromorphone which is effective but she doesn't think she will have enough to get her through until her next Oncology appointment Tuesday next week, and that pain has been \"pretty bad.\" Patient appears more pale, less upbeat and is hunched over in chair to compensate for the pain. Explained would review with Nathalia Amaral NP and call them on cell phone. Reviewed with Nathalia Amaral and she notes she will fill Hydromorphone but also wants patient to come to Oncology prior to leaving as she wants to see her CT scan results and address pain/concerns this date. Patient called and updated.   "

## 2018-05-11 NOTE — TELEPHONE ENCOUNTER
Written order faxed to [facility] in [place] for Oxygen 2L/ Min per NC.   At Rest 94% and Walking 150 feet 88% 02 sat. Walked patient with oxygen 2L/Min per NC 02 95%   Cristina Dye LPN

## 2018-05-11 NOTE — MR AVS SNAPSHOT
After Visit Summary   5/11/2018    Ana Simmons    MRN: [id number]           Patient Information     Date Of Birth          1950        Visit Information        Provider Department      5/11/2018 12:30 PM Ceci Amaral NP [facility]        Today's Diagnoses     Pleural effusion    -  1    Malignant neoplasm of lower-outer quadrant of right breast of female, estrogen receptor negative (H)        Phyllodes neoplasm of breast        Malignant neoplasm metastatic to right lung (H)        RUQ abdominal pain        Long term current use of anticoagulant therapy          Care Instructions    We will contact the [facility] to have you scheduled with Dr. Tineo for opinion regarding options for treatment.  We will see you back after this appointment or sooner if needed.   If you have any questions please call [phone]  Other instructions:  Prescription given for Diluadid; please notify me when you need a refill.          Follow-ups after your visit        Your next 10 appointments already scheduled     May 15, 2018  8:00 AM CDT   Level O with HC INF RM 3312   [facility] [place] ([facility] [place] )    [address]  [place] [address]   [phone]            May 15, 2018  8:30 AM CDT   (Arrive by 8:15 AM)   Return Visit with Mickie Celis MD   [facility] [place] ([facility] [place] )    [address]  [place] [address]   [phone]            May 18, 2018  9:30 AM CDT   Level 4 with HC INF RM 3306   [facility] ([facility] [place] [facility]    [address]  [place] [address]   [phone]            May 25, 2018  9:30 AM CDT   Level 4 with HC INF RM 3310   [facility] ([facility] [place] )    [address]  [place] [address]   [phone]            Jun 08, 2018  9:00 AM CDT   Level O with HC INF RM 3310   [facility] ([facility] [place] [facility]     [address]  [place] [address]   [phone]            Jun 08, 2018  9:30 AM CDT   (Arrive by 9:15 AM)   Return Visit with Mickie Celis MD   [facility] [place] ([facility] [place] )    [address]  [place] [address]   [phone]            Jun 08, 2018 10:00 AM CDT   Level 4 with HC INF RM 3310   [facility] [place] ([facility] [place] )    [address]  [place] [address]   [phone]            Ortega 15, 2018  9:30 AM CDT   Level 4 with HC INF RM 3308   [facility] [place] ([facility] [place] )    [address]  [place] [address]   [phone]            Jun 29, 2018  8:30 AM CDT   Level O with HC INF RM 3308   [facility] [place] ([facility] [place] )    [address]  [place] [address]   [phone]            Jun 29, 2018  9:30 AM CDT   Level 4 with HC INF RM 3308   [facility] [place] ([facility] [place] )    [address]  [place] [address]   [phone]              Who to contact     If you have questions or need follow up information about today's clinic visit or your schedule please contact [facility] directly at [phone].  Normal or non-critical lab and imaging results will be communicated to you by [employer], letter or phone within 4 business days after the clinic has received the results. If you do not hear from us within 7 days, please contact the clinic through [employer] or phone. If you have a critical or abnormal lab result, we will notify you by phone as soon as possible.  Submit refill requests through [employer] or call your pharmacy and they will forward the refill request to us. Please allow 3 business days for your refill to be completed.          Additional Information About Your Visit        [employer] Information     [employer] gives you secure access to your electronic health record. If you see a primary care provider, you can also send messages to  "your care team and make appointments. If you have questions, please call your primary care clinic.  If you do not have a primary care provider, please call [phone] and they will assist you.        Care EveryWhere ID     This is your Care EveryWhere ID. This could be used by other organizations to access your [facility] medical records  [id number]        Your Vitals Were     Pulse Temperature Respirations Height Pulse Oximetry       102 98.2  F (36.8  C) (Tympanic) 18 1.651 m (5' 5\") 88%        Blood Pressure from Last 3 Encounters:   05/11/18 (!) 138/91   05/04/18 126/60   04/27/18 132/62    Weight from Last 3 Encounters:   05/04/18 85.9 kg (189 lb 4.8 oz)   04/27/18 87.4 kg (192 lb 9.6 oz)   04/27/18 87.4 kg (192 lb 9.6 oz)              We Performed the Following     Amylase     CBC with platelets differential     Comprehensive metabolic panel     INR     Lipase          Today's Medication Changes          These changes are accurate as of 5/11/18  4:00 PM.  If you have any questions, ask your nurse or doctor.               Start taking these medicines.        Dose/Directions    HYDROmorphone 2 MG tablet   Commonly known as:  DILAUDID   Used for:  Malignant neoplasm of lower-outer quadrant of right breast of female, estrogen receptor negative (H), Phyllodes neoplasm of breast, Malignant neoplasm metastatic to right lung (H), Abdominal pain, right upper quadrant   Started by:  Ceci Amaral, NP        Dose:  2 mg   Take 1 tablet (2 mg) by mouth every 4 hours as needed for severe pain   Quantity:  60 tablet   Refills:  0       order for DME   Used for:  Malignant neoplasm of lower-outer quadrant of right breast of female, estrogen receptor negative (H), Phyllodes neoplasm of breast, Malignant neoplasm metastatic to right lung (H), RUQ abdominal pain, Long term current use of anticoagulant therapy, Pleural effusion   Started by:  Ceci Amaral NP        Equipment being ordered: Oxygen 2L/ MIN " per NC  At rest 90% O2 sat 88% walking 150 feet, placed on oxygen 2L/Min per NC 02 sat returned to 95%   Quantity:  1 Device   Refills:  0            Where to get your medicines      Some of these will need a paper prescription and others can be bought over the counter.  Ask your nurse if you have questions.     Bring a paper prescription for each of these medications     HYDROmorphone 2 MG tablet    order for DME                Primary Care Provider Office Phone # Fax #    Mya Park [phone]       [facility] [address]   [facility] [address]        Equal Access to Services     [facility] Center[facility]ORDALIZA : Hadii janette garcía hadasho Soomaali, waaxda luqadaha, qaybta kaalmada adecuongyakimber, candy nieves . So [facility] [phone].    ATENCIÓN: Si habla español, tiene a stiles disposición servicios gratuitos de asistencia lingüística. [facility] [phone].    We comply with applicable federal civil rights laws and [place] laws. We do not discriminate on the basis of race, color, national origin, age, disability, sex, sexual orientation, or gender identity.            Thank you!     Thank you for choosing [facility]  for your care. Our goal is always to provide you with excellent care. Hearing back from our patients is one way we can continue to improve our services. Please take a few minutes to complete the written survey that you may receive in the mail after your visit with us. Thank you!             Your Updated Medication List - Protect others around you: Learn how to safely use, store and throw away your medicines at www.disposemymeds.org.          This list is accurate as of 5/11/18  4:00 PM.  Always use your most recent med list.                   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), Cough, Malignant neoplasm of lower-outer quadrant of right breast of female, estrogen receptor negative (H), Ductal carcinoma in situ (DCIS) of right breast, Phyllodes neoplasm of breast, Anxiety and depression       HYDROmorphone 2 MG tablet    DILAUDID    60 tablet    Take 1 tablet (2 mg) by mouth every 4 hours as needed for severe pain    Malignant neoplasm of lower-outer quadrant of right breast of female, estrogen receptor negative (H), Phyllodes neoplasm of breast, Malignant neoplasm metastatic to right lung (H), Abdominal pain, right upper quadrant       IMODIUM A-D 2 MG tablet   Generic drug:  loperamide      Take 2 mg by mouth 4 times daily as needed for diarrhea    Phyllodes neoplasm of breast, Malignant neoplasm metastatic to right lung (H), Adjustment disorder with mixed anxiety and depressed mood, 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)       lidocaine-prilocaine cream    EMLA    30 g    Apply 30 g topically as needed for moderate pain    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)       LORazepam 0.5 MG tablet    ATIVAN    30 tablet    Take 1 tablet (0.5 mg) by mouth every 4 hours as needed (Anxiety, Nausea/Vomiting or Sleep)    Phyllodes neoplasm  of breast, 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)       METFORMIN HCL PO      Take 500 mg by mouth daily (with breakfast)        nystatin Powd     1 each    1 Application 2 times daily    Phyllodes neoplasm of breast, Malignant neoplasm metastatic to right lung (H), Adjustment disorder with mixed anxiety and depressed mood, 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)       order for DME     1 Device    Equipment being ordered: Oxygen 2L/ MIN per NC  At rest 90% O2 sat 88% walking 150 feet, placed on oxygen 2L/Min per NC 02 sat returned to 95%    Malignant neoplasm of lower-outer quadrant of right breast of female, estrogen receptor negative (H), Phyllodes neoplasm of breast, Malignant neoplasm metastatic to right lung (H), RUQ abdominal pain, Long term current use of anticoagulant therapy, Pleural effusion       prochlorperazine 10 MG tablet    COMPAZINE    30 tablet    Take 1 tablet (10 mg) by mouth every 6 hours as needed (Nausea/Vomiting)    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)       sertraline 50 MG tablet    ZOLOFT    180 tablet    Take 2 tablets (100 mg) by mouth daily    Phyllodes neoplasm of breast, Malignant neoplasm metastatic to right lung (H), Adjustment disorder with mixed anxiety and depressed mood, 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

## 2018-05-14 NOTE — TELEPHONE ENCOUNTER
Please call her and let her know she could try the dextromethorphan the AM to see if it calms her cough-could also try chlorpheniramine OTC-its an antihistamine and may help.  If she doesn't get to the [facility] this week-I would like to see her at the end of the week for her pain control.

## 2018-05-17 NOTE — PROGRESS NOTES
Oncology Follow-up Visit:  May 17, 2018    Reason for Visit:  Patient presents with:  RECHECK     Nursing Note and documentation reviewed: yes    HPI:   This is a 67-year-old female patient who presents to the oncology/hematology clinic today accompanied by her  for status check.  She was seen last week for results of her CT scan and was noted to have further progression.  She was to be seen at the [facility] for further recommendations and has not gone as of yet.      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  She underwent treatment completing this 11/2016 and was found to have metastasis to the right lung in November 2017.  Patient was evaluated by Dr. 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  He recommended docetaxel with gemcitabine and this was initiated on 1/3/2018 and she was found to have further progression after 3 cycles.  She was then treated with olaratumab and doxil.    She tells me today that she does seem to have increasing shortness of breath.  She feels she is able to do very little activity before needing to rest.  She is basically been using the oxygen consistently for the last 2 days as she does feel her shortness of breath is less with the oxygen on.  She remains at 2 L via nasal cannula.  Coughing is essentially unchanged and she feels she has about 5-6 bouts of coughing throughout the day.  She did not take the dextromethorphan in the morning as I had recommended related to the fact she is nervous about suppressing the cough.  She denies any further nausea and actually states her appetite is a little improved.  She continues with the Dilaudid for the right upper quadrant  pain using this 2 mg every 4 hours essentially around-the-clock.  She describes the pain as a toothache at times increases to a level 8-9 on a 0-10 scale; states it goes up the front of her chest to her shoulder or up the back to her shoulder.  She denies any fevers.    She is very frustrated that her appointment with the [facility] is not for 3 weeks.       Oncologic History: Patient underwent mammogram in January 2016 which revealed abnormalities of the right breast.  On 02/09/2016, a stereotactic biopsy of the right breast lesion at the 8 o'clock position revealed grade 3 DCIS of a solid micropapillary subtype.  Estrogen receptors were positive.  Also a biopsy of the right breast lesion at the 9 o'clock position revealed a grade 2 DCIS of the solid micropapillary subtype.  Estrogen receptors were positive.  She underwent bilateral mastectomies on 3/30/16 by Dr. 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  There was also angiosarcomatous, liposarcomatous, malignant spindle cell and chondroid differentiation.  The tumor in the lower quadrant was distinct in the 2 DCIS lesions noted in the right breast.  Estrogen receptor positive at 2% with progesterone receptor negative; tumor was HER-2/renetta negative.  In terms of her DCIS, there was a 1.5 cm DCIS in the right breast as well as a smaller focus noted.  There was a sentinel lymph node that was negative. Chemotherapy was recommended.       She was evaluated by Dr. Lam with [facility] on 4/18/2016 he felt patient had a metaplastic carcinoma as well as DCIS of the right breast.  He did note that there was no consensus best treatment options for this patient, but he felt given that she likely had triple negative disease and if she had a pure ductal adenocarcinoma, he would favor treating her a triple-negative breast  cancer that is greater than 0.5 cm.  He felt that dose-dense AC x 4 cycles plus Taxol weekly x 12 weeks would be ideal.  She was evaluated here by Dr. Celis with Medical Oncology on 5/3/2016 as she wanted to receive treatment closer to home.      She underwent treatment and was found to have metastasis to the right lung in November 2017.  Patient was evaluated by Dr. 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  He recommended docetaxel with gemcitabine and this was initiated on 1/3/2018 and administered until 3/1/2018.     She was found to have further progression 3/12/2018 and Dr. Celis had spoke with Dr. Castelan at the [facility] who recommended Olaratumab and Doxil.  This was started 3/15/2018.      Current Chemo Regime/TX:  n/a  Current Cycle:  n/a  # of completed cycles: n/a      Previous treatment:   1. Adriamycin 60mg/m2 and Cytoxan 600mg/m2 every 2 weeks with Neulasta support 6mg SQ injection day 2 x 4 cycles  2. Taxol 80mg/m2 weekly x 12 weeks; arimidex 1mg daily initiated 11/2016  3. Gemcitabine 900mg/m2 with docetaxel 75mg/m2 day 1 and day 8 every 21 days with nuelasta support day 9 x 3 cycles with last cycle 3/1/2018  4. Olaratumab 15mg/kg day1 and day 8 with doxil 30mg/m2 day1 every 21 days initiated 3/15/2018 x 3 cycles with last cycle 5/4/2018    Past Medical History:   Diagnosis Date     Breast cancer (H)      DMII (diabetes mellitus, type 2) (H)      HLD (hyperlipidemia)      Nonhealing surgical wound        Past Surgical History:   Procedure Laterality Date     APPENDECTOMY OPEN       C VAGINAL HYSTERECTOMY       COLONOSCOPY - HIM SCAN  01/01/2006    Colonoscopy 2006;  02-16     INSERT PORT VASCULAR ACCESS Left 6/2/2016    Procedure: INSERT PORT VASCULAR ACCESS;  Surgeon: Micheline Davis MD;  Location: [facility] OR     MASTECTOMY Bilateral        Family History   Problem Relation Age of  Onset     Lung Cancer Father      Myocardial Infarction Father      Coronary Artery Disease Father      DIABETES Father      Hyperlipidemia Father      Hypertension Father      Prostate Cancer Paternal Grandfather      DIABETES Mother      Hyperlipidemia Mother      Thyroid Disease Mother      Thyroid Disease Maternal Grandmother      Thyroid Disease Daughter      Breast Cancer No family hx of      Colon Cancer No family hx of      Depression No family hx of      Asthma No family hx of      CEREBROVASCULAR DISEASE No family hx of      Genetic Disorder No family hx of        Social History     Social History     Marital status:      Spouse name: Braden     Number of children: 2     Years of education: N/A     Occupational History      Retired     Social History Main Topics     Smoking status: Former Smoker     Smokeless tobacco: Never Used     Alcohol use No      Comment: 1 kamille/ night, with chemo is not drinking     Drug use: No     Sexual activity: Not on file     Other Topics Concern     Parent/Sibling W/ Cabg, Mi Or Angioplasty Before 65f 55m? Yes     father     Social History Narrative       Current Outpatient Prescriptions   Medication     albuterol (PROAIR HFA/PROVENTIL HFA/VENTOLIN HFA) 108 (90 BASE) MCG/ACT Inhaler     calcium-vitamin D (CALTRATE) 600-400 MG-UNIT per tablet     cyanocobalamin (VITAMIN  B-12) 1000 MCG tablet     dexamethasone (DECADRON) 4 MG tablet     guaiFENesin-codeine (CHERATUSSIN AC) 100-10 MG/5ML SOLN solution     HYDROmorphone (DILAUDID) 2 MG tablet     METFORMIN HCL PO     nystatin POWD     order for DME     Pyridoxine HCl (VITAMIN B6 PO)     sertraline (ZOLOFT) 50 MG tablet     SIMVASTATIN PO     warfarin (COUMADIN) 1 MG tablet     lidocaine-prilocaine (EMLA) cream     loperamide (IMODIUM A-D) 2 MG tablet     LORazepam (ATIVAN) 0.5 MG tablet     prochlorperazine (COMPAZINE) 10 MG tablet     No current facility-administered medications for this visit.      "    Allergies   Allergen Reactions     Cats        ECOG Performance Status: 3 related to SOB    Physical Exam:  Pulse 96  Temp 98.8  F (37.1  C) (Tympanic)  Resp 18  Ht 1.651 m (5' 5\")  Wt 87.3 kg (192 lb 8 oz)  SpO2 99%  BMI 32.03 kg/m2    GENERAL APPEARANCE: Healthy, alert and in no acute distress.  HEENT: Normocephalic, Sclerae anicteric. Oropharynx without ulcers, lesions, or thrush.  NECK: No asymmetry or masses, no thyromegaly.  LYMPHATICS: No palpable cervical, supraclavicular, axillary, or inguinal nodes   RESP: Lungs clear to auscultation bilaterally but very decreased breath sounds to right lobes, respirations regular and easy-on O2  CARDIOVASCULAR: Regular rate and rhythm. Normal S1, S2; no murmur, gallop, or rub.  MUSCULOSKELETAL: Extremities without gross deformities noted. Mild edema of bilateral lower ankles and feet  NEURO: Alert and oriented x 3.  Gait steady.  PSYCHIATRIC: Mentation and affect appear normal.  Mood appropriate.    Laboratory:  None for today      Imaging Studies:  None for today      ASSESSMENT/PLAN:    #1  Neoplasm related pain:  She is currently taking 10-12 mg of Dilaudid in a 24-hour period which converts to 40 mg of morphine.  We'll discontinue the Dilaudid and place her on MS Contin 15 mg extended release every 12 hours.    She'll use MSIR immediate release 15 mg as needed every 4 hours for breakthrough pain.  This was discussed in depth with patient and also placed instructions on her AVS.  I also did recommend ibuprofen 400 mg every 4 hours as needed and explained this may have a synergistic effect with the morphine.  We will follow up with patient next week via phone call.    #2 Cough: I did recommend her try the dextromethorphan in the morning to see if this may calm the cough down.  She may also use OTC antihistamine to see if this helps.  She will continue with the cheritussin as needed.    #3  SOB: Her O2 sat did drop to 90% with ambulation on 2Liters.   Will " continue the O2 as needed and explained she may increase to 3 liters if needed and she finds benefit from this with activity.      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  She underwent treatment then was found to have metastasis to the right lung in November 2017.  She was seen at the [facility] by Dr. Tineo and felt this was a Phyllodes tumor, with recommendation for gemcitabine and docetaxel.  She received 3 cycles with progression of disease.  She was then treated with olaratumab and doxil per recommendation of Dr. Castelan with now further progression.  We will discuss with Dr. Celis her planned June 8th visit and call her Monday to discuss his plan.    I encouraged patient to call with any questions or concerns.        Ceci TOMLIN, FNP-BC, AOCNP

## 2018-05-17 NOTE — TELEPHONE ENCOUNTER
"Clinic Care Coordination Contact  Care Team Conversations    OCC received a phone call from the pt's dtr-Shruthi Coello ([phone]) at the end of the day on 5/16/18.  She was inquiring about the status of the [facility] referral.  She stated concern that it was nearly a week and with the \"aggressive nature of Mom's cancer - why haven't we heard anything yet\"?  This nurse informed her that the staff who would be working on the referral had left for the day; that this nurse would follow up on 5/18/18.  She stated understanding and agreed to the plan .  Call ended.    Aimee Saunders RN  [facility] Care Coordinator      "

## 2018-05-17 NOTE — TELEPHONE ENCOUNTER
Clinic Care Coordination Contact  Care Team Conversations    OCC RN telephoned the pt's dtr - Shruthi Coello, in follow up to the [facility] referral.  Upon researching the question, she was informed that the [facility] verified today that all documents/imaging pushed were received.  [facility] was informed that the information is being reviewed by Dr Tineo, whom the pt has seen in the past.  She was informed that there was no further information received and that this process could take up to a week.  She stated concern with the length of time it takes - this writer acknowledged her concern.  She stated concern about the walking distance at the [facility] and the potential need for a wheelchair temporarily while there.  She stated the pt has an appt with MEGHAN Amaral NP - [facility] on 5/18/18.  She asked that this issue be considered during the appt tomorrow.  This writer informed her her concern would be noted and discussed tomorrow. The call ended with questions answered and understanding stated.      Aimee Saunders RN  [facility] Care Coordinator

## 2018-05-17 NOTE — TELEPHONE ENCOUNTER
5/17/2018    I called Ana today to review her OvaNext genetic testing results. Ana stated that she was currently unable to talk over the phone, as she has company over today. She requested I call her tomorrow morning to review results in more detail. She denied having any particular questions at this time.    Jerri Cueto MS, [facility]  Licensed Genetic Counselor  Office: [phone]  Pager: [phone]

## 2018-05-18 NOTE — Clinical Note
"Please print and send a copy of this letter and the patient's genetic testing report to the patient.    Please enclose test report: \"Send outs misc test [[id number]] (Order [id number])\"  "

## 2018-05-18 NOTE — MR AVS SNAPSHOT
After Visit Summary   5/18/2018    Ana Simmons    MRN: [id number]           Patient Information     Date Of Birth          1950        Visit Information        Provider Department      5/18/2018 1:00 PM Ceci Amaral NP [facility]        Today's Diagnoses     Phyllodes neoplasm of breast    -  1    Malignant neoplasm metastatic to right lung (H)        Neoplasm related pain          Care Instructions    We will have Dr. Celis call the [facility] on Monday and we will call you with the plan.  Stop the dilaudid.  Take the MS Contin (15mg long acting) pain medication every 12 hours; use the MS IR (short acting 15mg tablet) as needed for breakthrough pain.  Take 400mg Ibuprofen every 4 hours as needed.          Follow-ups after your visit        Who to contact     If you have questions or need follow up information about today's clinic visit or your schedule please contact [facility] directly at [phone].  Normal or non-critical lab and imaging results will be communicated to you by Damballahart, letter or phone within 4 business days after the clinic has received the results. If you do not hear from us within 7 days, please contact the clinic through [employer] or phone. If you have a critical or abnormal lab result, we will notify you by phone as soon as possible.  Submit refill requests through [employer] or call your pharmacy and they will forward the refill request to us. Please allow 3 business days for your refill to be completed.          Additional Information About Your Visit        MyChart Information     [employer] gives you secure access to your electronic health record. If you see a primary care provider, you can also send messages to your care team and make appointments. If you have questions, please call your primary care clinic.  If you do not have a primary care provider, please call [phone] and they will assist you.        Care EveryWhere ID   "   This is your Care EveryWhere ID. This could be used by other organizations to access your [facility] medical records  [id number]        Your Vitals Were     Pulse Temperature Respirations Height Pulse Oximetry BMI (Body Mass Index)    96 98.8  F (37.1  C) (Tympanic) 18 1.651 m (5' 5\") 99% 32.03 kg/m2       Blood Pressure from Last 3 Encounters:   05/11/18 (!) 138/91   05/04/18 126/60   04/27/18 132/62    Weight from Last 3 Encounters:   05/18/18 87.3 kg (192 lb 8 oz)   05/04/18 85.9 kg (189 lb 4.8 oz)   04/27/18 87.4 kg (192 lb 9.6 oz)              Today, you had the following     No orders found for display         Today's Medication Changes          These changes are accurate as of 5/18/18  2:07 PM.  If you have any questions, ask your nurse or doctor.               Start taking these medicines.        Dose/Directions    * morphine 15 MG 12 hr tablet   Commonly known as:  MS CONTIN   Used for:  Phyllodes neoplasm of breast, Malignant neoplasm metastatic to right lung (H), Neoplasm related pain   Started by:  Ceci Amaral NP        Dose:  15 mg   Take 1 tablet (15 mg) by mouth every 12 hours   Quantity:  60 tablet   Refills:  0       * morphine 15 MG IR tablet   Commonly known as:  MSIR   Used for:  Phyllodes neoplasm of breast, Malignant neoplasm metastatic to right lung (H), Neoplasm related pain   Started by:  Ceci Amaral NP        Dose:  15 mg   Take 1 tablet (15 mg) by mouth every 4 hours as needed for moderate to severe pain   Quantity:  60 tablet   Refills:  0       * Notice:  This list has 2 medication(s) that are the same as other medications prescribed for you. Read the directions carefully, and ask your doctor or other care provider to review them with you.         Where to get your medicines      Some of these will need a paper prescription and others can be bought over the counter.  Ask your nurse if you have questions.     Bring a paper prescription for each of these " medications     morphine 15 MG 12 hr tablet    morphine 15 MG IR tablet               Information about OPIOIDS     PRESCRIPTION OPIOIDS: WHAT YOU NEED TO KNOW   You have a prescription for an opioid (narcotic) pain medicine. Opioids can cause addiction. If you have a history of chemical dependency of any type, you are at a higher risk of becoming addicted to opioids. Only take this medicine after all other options have been tried. Take it for as short a time and as few doses as possible.     Do not:    Drive. If you drive while taking these medicines, you could be arrested for driving under the influence (DUI).    Operate heavy machinery    Do any other dangerous activities while taking these medicines.     Drink any alcohol while taking these medicines.      Take with any other medicines that contain acetaminophen. Read all labels carefully. Look for the word  acetaminophen  or  Tylenol.  Ask your pharmacist if you have questions or are unsure.    Store your pills in a secure place, locked if possible. We will not replace any lost or stolen medicine. If you don t finish your medicine, please throw away (dispose) as directed by your pharmacist. The [place] Pollution Control Agency has more information about safe disposal: https://www.pca.[facility].[place].us/living-green/managing-unwanted-medications    All opioids tend to cause constipation. Drink plenty of water and eat foods that have a lot of fiber, such as fruits, vegetables, prune juice, apple juice and high-fiber cereal. Take a laxative (Miralax, milk of magnesia, Colace, Senna) if you don t move your bowels at least every other day.          Primary Care Provider Office Phone # Fax #    [place]a [place] [phone]       [facility] [address]   [facility] [facility] [address]        Equal Access to Services     [facility] SELINA AH: Rocio Meredith, wajaxson johnson, qacandy fitch.  So [facility] [phone].    ATENCIÓN: Si warren horn, tiene a stiles disposición servicios gratuitos de asistencia lingüística. Clint khan [phone].    We comply with applicable federal civil rights laws and [place] laws. We do not discriminate on the basis of race, color, national origin, age, disability, sex, sexual orientation, or gender identity.            Thank you!     Thank you for choosing [facility]  for your care. Our goal is always to provide you with excellent care. Hearing back from our patients is one way we can continue to improve our services. Please take a few minutes to complete the written survey that you may receive in the mail after your visit with us. Thank you!             Your Updated Medication List - Protect others around you: Learn how to safely use, store and throw away your medicines at www.disposemymeds.org.          This list is accurate as of 5/18/18  2:07 PM.  Always use your most recent med list.                   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), Cough,  Malignant neoplasm of lower-outer quadrant of right breast of female, estrogen receptor negative (H), Ductal carcinoma in situ (DCIS) of right breast, Phyllodes neoplasm of breast, Anxiety and depression       HYDROmorphone 2 MG tablet    DILAUDID    60 tablet    Take 1 tablet (2 mg) by mouth every 4 hours as needed for severe pain    Malignant neoplasm of lower-outer quadrant of right breast of female, estrogen receptor negative (H), Phyllodes neoplasm of breast, Malignant neoplasm metastatic to right lung (H), Abdominal pain, right upper quadrant       IMODIUM A-D 2 MG tablet   Generic drug:  loperamide      Take 2 mg by mouth 4 times daily as needed for diarrhea    Phyllodes neoplasm of breast, Malignant neoplasm metastatic to right lung (H), Adjustment disorder with mixed anxiety and depressed mood, 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)       lidocaine-prilocaine cream    EMLA    30 g    Apply 30 g topically as needed for moderate pain    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)       LORazepam 0.5 MG tablet    ATIVAN    30 tablet    Take 1 tablet (0.5 mg) by mouth every 4 hours as needed (Anxiety, Nausea/Vomiting or Sleep)    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)       METFORMIN HCL PO      Take 500 mg by mouth daily (with breakfast)        * morphine 15 MG 12 hr tablet    MS CONTIN    60 tablet    Take 1 tablet (15 mg) by mouth every 12 hours    Phyllodes neoplasm of breast, Malignant neoplasm metastatic to right lung (H), Neoplasm related pain       * morphine 15 MG IR tablet    MSIR    60 tablet    Take 1 tablet (15 mg) by mouth every 4 hours as needed for moderate to severe pain    Phyllodes neoplasm of breast, Malignant neoplasm metastatic to right lung (H), Neoplasm related pain       nystatin Powd     1 each    1 Application 2 times daily    Phyllodes neoplasm of breast, Malignant neoplasm metastatic to right lung (H), Adjustment disorder with  mixed anxiety and depressed mood, 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)       order for DME     1 Device    Equipment being ordered: Oxygen 2L/ MIN per NC  At rest 90% O2 sat 88% walking 150 feet, placed on oxygen 2L/Min per NC 02 sat returned to 95%    Malignant neoplasm of lower-outer quadrant of right breast of female, estrogen receptor negative (H), Phyllodes neoplasm of breast, Malignant neoplasm metastatic to right lung (H), RUQ abdominal pain, Long term current use of anticoagulant therapy, Pleural effusion       prochlorperazine 10 MG tablet    COMPAZINE    30 tablet    Take 1 tablet (10 mg) by mouth every 6 hours as needed (Nausea/Vomiting)    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)       sertraline 50 MG tablet    ZOLOFT    180 tablet    Take 2 tablets (100 mg) by mouth daily    Phyllodes neoplasm of breast, Malignant neoplasm metastatic to right lung (H), Adjustment disorder with mixed anxiety and depressed mood, 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       * Notice:  This list has 2 medication(s) that are the same as other medications prescribed for you. Read the directions carefully, and ask your doctor or other care provider to review them with you.

## 2018-05-18 NOTE — LETTER
Cancer Risk Management  Program [facility]  [facility]  Mailing Address  Cancer Risk Management Program  [address] [address]    New patient appointments  [phone]  June 4, 2018    Ana Simmons  [address] [address] [facility] [address]      Dear Ana,    It was a pleasure speaking with you over the phone recently regarding your genetic testing results. Here is a copy of the progress note summarizing our conversation. If you have any additional questions, please feel free to call.    5/18/2018    Referring Provider: MARCO ANTONIO YoungBC    Presenting Information:  I spoke to Ana by phone today to discuss her genetic testing results. Her blood was drawn on 4/25/2018. BRCA1/2 Analyses with the OvaNext panel was ordered from [facility]. 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 This variant is called p.S79P (also known as c.235T>C). No other variants or mutations were found in the RAD51C gene.      Of note, Ana tested negative for mutations in the following genes by sequencing and deletion/duplication analysis: STEPHENIE, BARD1, BRCA1, BRCA2, BRIP1, CDH1, CHEK2, DICER1, EPCAM (deletions/duplications only), MLH1, MRE11A, MSH2, MSH6, MUTYH, NBN, NF1, PALB2, PMS2, PTEN, RAD50, RAD51D, SMARCA4, STK11, and TP53. Testing did not detect an identifiable mutation associated with  Hereditary Breast and Ovarian Cancer syndrome (BRCA1, BRCA2), Jean syndrome (MLH1, MSH2, MSH6, PMS2,  "EPCAM), Hereditary Diffuse Gastric Cancer (CDH1), Cowden syndrome (PTEN), Li Fraumeni syndrome (TP53), Peutz-Jeghers syndrome (STK11), MUTYH Associated Polyposis (MUTYH), or Neurofibromatosis type 1 (NF1).      We reviewed the autosomal dominant inheritance of these genes.      Ana cannot pass on a mutation in any of these genes to her children based on this test result. Mutations in these genes do not skip generations.      A copy of the test report can be found in the Laboratory tab, dated 4/25/2018, and named \"SEND OUTS MISC TEST\". The report is scanned in as a linked document.     Interpretation:  We discussed several different interpretations of this inconclusive test result. It is not clear if this variant in the RAD51C gene is associated with increased cancer risk.  1. This variant may be a benign change that does not increase cancer risk.  2. This variant may be a harmful mutation that increases certain cancer risks.    Harmful mutations in the RAD51C gene are associated with an increased risk for ovarian cancer, as well as a possible increased risk for breast cancer. Male cancer risks associated with a harmful RAD51C mutation are unclear at this time.    In rare situations in which both parents have a harmful mutation in the RAD51C gene, their children each have a 25% risk for Fanconi anemia. Fanconi anemia is a rare condition with onset in childhood that often results in physical abnormalities, growth retardation, bone marrow failure, and increased risk for cancer. If this variant is later classified as harmful, Ana and other relatives carrying the RAD51C mutation would be considered carriers for Fanconi anemia. In that case, genetic counseling and genetic testing may be advised for their partners.    The laboratory is working to determine if this variant is harmful or benign, and they will contact me if it is reclassified. If this variant is determined to be a benign change, there may be a different " currently unidentifiable gene or combination of genes and environment that are associated with the cancers in Ana and/or her relatives. If that is the case, additional genetic testing may be available in the future that can identify a genetic cause for Ana's breast cancer. As such, Ana is encouraged to contact me annually to review any new genetic testing options that may be appropriate for her.     It is also important to consider that other relatives, such as her niece with a bone cancer and/or maternal first cousin with uterine cancer, may have a mutation in one of the genes tested and Ana did not inherit it. If that is the case, Ana's breast cancer may be a sporadic cancer caused by random cellular changes.    Inheritance:  We reviewed the autosomal dominant inheritance of this variant in the RAD51C gene. We discussed that Ana has a 50% chance to pass this variant to each of her children. Likewise, each of her siblings has a 50% risk of having the same variant. Because it is unclear what, if any, risk is associated with this variant, clinical genetic testing for this RAD51C variant alone is not recommended for relatives.    Screening:  Based on this inconclusive test result, it is important for Ana and her relatives to refer back to the family history for appropriate cancer screening.      Ana should continue to follow all breast cancer treatment and future screening recommendations as made by her medical provides.    Ana s close female relatives remain at increased risk for breast cancer given their family history. Breast cancer screening is generally recommended to begin approximately 10 years younger than the earliest age of breast cancer diagnosis in the family, or at age 40, whichever comes first. In this family, screening may begin at age 40. Breast screening options should be discussed with an individual's primary care provider and a genetic counselor, to determine what  screening is appropriate, or if additional screening (such as breast MRI) is necessary based on personal/family history factors.    Other population cancer screening options, such as those recommended by the American Cancer Society and the National Comprehensive Cancer Network (NCCN), are also appropriate for Ana and her family. These screening recommendations may change if there are changes to Ana's personal and/or family history. Final screening recommendations should be made by each individual's managing physician.      Additional Testing Considerations:  Although Ana's genetic testing result was inconclusive, other relatives may still carry a gene mutation associated with hereditary cancer. Genetic counseling is recommended for her niece with a bone cancer and the siblings of her maternal first cousin with uterine cancer to discuss their genetic testing options. If these relatives do pursue genetic testing, Ana is encouraged to contact me so that we may review the impact of their test results on Ana.    Summary:  We do not have an explanation for her breast cancer. Because of that, it is important that she continue with cancer screening based on her personal and family history as discussed above.    Genetic testing is rapidly advancing, and new cancer susceptibility genes will most likely be identified in the future. Therefore, I encouraged Ana to contact me annually or if there are changes in her personal or family history. This may change how we assess her cancer risk, screening, and the testing we would offer.    Plan:  1. Ana will be mailed a copy of her test results.    2. She plans to follow-up with her medical providers, including Ceci Amaral, [facility].  3. She should contact me annually, or sooner if her family history changes.  4. I will contact Ana if the laboratory informs me that this RAD51C variant has been reclassified. This may change screening and testing  recommendations for relatives.    If Ana has any further questions, I encouraged her to contact me at [phone].    Jerri Cueto MS, [facility]  Licensed Genetic Counselor  Office: [phone]  Pager: [phone]

## 2018-05-18 NOTE — TELEPHONE ENCOUNTER
"5/18/2018    Referring Provider: Ceci Amaral [facility]    Presenting Information:  I spoke to Ana by phone today to discuss her genetic testing results. Her blood was drawn on 4/25/2018. BRCA1/2 Analyses with the OvaNext panel was ordered  from [facility]. 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 This variant is called p.S79P (also known as c.235T>C). No other variants or mutations were found in the RAD51C gene.      Of note, Ana tested negative for mutations in the following genes by sequencing and deletion/duplication analysis: STEPHENIE, BARD1, BRCA1, BRCA2, BRIP1, CDH1, CHEK2, DICER1, EPCAM (deletions/duplications only), MLH1, MRE11A, MSH2, MSH6, MUTYH, NBN, NF1, PALB2, PMS2, PTEN, RAD50, RAD51D, SMARCA4, STK11, and TP53. Testing did not detect an identifiable mutation associated with  Hereditary Breast and Ovarian Cancer syndrome (BRCA1, BRCA2), Jean syndrome (MLH1, MSH2, MSH6, PMS2, EPCAM), Hereditary Diffuse Gastric Cancer (CDH1), Cowden syndrome (PTEN), Li Fraumeni syndrome (TP53), Peutz-Jeghers syndrome (STK11), MUTYH Associated Polyposis (MUTYH), or Neurofibromatosis type 1 (NF1).      We reviewed the autosomal dominant inheritance of these genes.      Ana cannot pass on a mutation in any of these genes to her children based on this test result. Mutations in these genes do not skip generations.      A copy of the test report can be found in the Laboratory tab, dated 4/25/2018, and named \"SEND OUTS MISC TEST\". The report is scanned in as a linked document.     Interpretation:  We discussed several different interpretations of this inconclusive test result. It is not clear if this variant in the RAD51C " gene is associated with increased cancer risk.  1. This variant may be a benign change that does not increase cancer risk.  2. This variant may be a harmful mutation that increases certain cancer risks.    Harmful mutations in the RAD51C gene are associated with an increased risk for ovarian cancer, as well as a possible increased risk for breast cancer. Male cancer risks associated with a harmful RAD51C mutation are unclear at this time.    In rare situations in which both parents have a harmful mutation in the RAD51C gene, their children each have a 25% risk for Fanconi anemia. Fanconi anemia is a rare condition with onset in childhood that often results in physical abnormalities, growth retardation, bone marrow failure, and increased risk for cancer. If this variant is later classified as harmful, Ana and other relatives carrying the RAD51C mutation would be considered carriers for Fanconi anemia. In that case, genetic counseling and genetic testing may be advised for their partners.    The laboratory is working to determine if this variant is harmful or benign, and they will contact me if it is reclassified. If this variant is determined to be a benign change, there may be a different currently unidentifiable gene or combination of genes and environment that are associated with the cancers in Ana and/or her relatives. If that is the case, additional genetic testing may be available in the future that can identify a genetic cause for Олегs breast cancer. As such, Ana is encouraged to contact me annually to review any new genetic testing options that may be appropriate for her.     It is also important to consider that other relatives, such as her niece with a bone cancer and/or maternal first cousin with uterine cancer, may have a mutation in one of the genes tested and Ana did not inherit it. If that is the case, Олегs breast cancer may be a sporadic cancer caused by random cellular  changes.    Inheritance:  We reviewed the autosomal dominant inheritance of this variant in the RAD51C gene. We discussed that Ana has a 50% chance to pass this variant to each of her children. Likewise, each of her siblings has a 50% risk of having the same variant. Because it is unclear what, if any, risk is associated with this variant, clinical genetic testing for this RAD51C variant alone is not recommended for relatives.    Screening:  Based on this inconclusive test result, it is important for Ana and her relatives to refer back to the family history for appropriate cancer screening.      Ana should continue to follow all breast cancer treatment and future screening recommendations as made by her medical provides.    Ana s close female relatives remain at increased risk for breast cancer given their family history. Breast cancer screening is generally recommended to begin approximately 10 years younger than the earliest age of breast cancer diagnosis in the family, or at age 40, whichever comes first. In this family, screening may begin at age 40. Breast screening options should be discussed with an individual's primary care provider and a genetic counselor, to determine what screening is appropriate, or if additional screening (such as breast MRI) is necessary based on personal/family history factors.    Other population cancer screening options, such as those recommended by the American Cancer Society and the National Comprehensive Cancer Network (NCCN), are also appropriate for Ana and her family. These screening recommendations may change if there are changes to Ana's personal and/or family history. Final screening recommendations should be made by each individual's managing physician.      Additional Testing Considerations:  Although Ana's genetic testing result was inconclusive, other relatives may still carry a gene mutation associated with hereditary cancer. Genetic counseling is  recommended for her niece with a bone cancer and the siblings of her maternal first cousin with uterine cancer to discuss their genetic testing options. If these relatives do pursue genetic testing, Ana is encouraged to contact me so that we may review the impact of their test results on Ana.    Summary:  We do not have an explanation for her breast cancer. Because of that, it is important that she continue with cancer screening based on her personal and family history as discussed above.    Genetic testing is rapidly advancing, and new cancer susceptibility genes will most likely be identified in the future. Therefore, I encouraged Ana to contact me annually or if there are changes in her personal or family history. This may change how we assess her cancer risk, screening, and the testing we would offer.    Plan:  1. Ana will be mailed a copy of her test results.    2. She plans to follow-up with her medical providers, including Ceci Amaral, [facility].  3. She should contact me annually, or sooner if her family history changes.  4. I will contact Ana if the laboratory informs me that this RAD51C variant has been reclassified. This may change screening and testing recommendations for relatives.    If Ana has any further questions, I encouraged her to contact me at [phone].    Jerri Cueto MS, [facility]  Licensed Genetic Counselor  Office: [phone]  Pager: [phone]

## 2018-05-18 NOTE — PATIENT INSTRUCTIONS
We will have Dr. Celis call the [facility] on Monday and we will call you with the plan.  Stop the dilaudid.  Take the MS Contin (15mg long acting) pain medication every 12 hours; use the MS IR (short acting 15mg tablet) as needed for breakthrough pain.  Take 400mg Ibuprofen every 4 hours as needed.

## 2018-05-18 NOTE — NURSING NOTE
"Chief Complaint   Patient presents with     RECHECK       Initial Pulse 96  Temp 98.8  F (37.1  C) (Tympanic)  Resp 18  Ht 1.651 m (5' 5\")  Wt 87.3 kg (192 lb 8 oz)  SpO2 99%  BMI 32.03 kg/m2 Estimated body mass index is 32.03 kg/(m^2) as calculated from the following:    Height as of this encounter: 1.651 m (5' 5\").    Weight as of this encounter: 87.3 kg (192 lb 8 oz).  Medication Reconciliation: complete   Immunization reviewed and up to date, has info on advanced directives, pain 4/10 right flank pain. PHQ9 = 1.    Cristina Dye LPN    "

## 2018-05-22 NOTE — TELEPHONE ENCOUNTER
Please let her know the swelling can be from multiple things as we had dicussed.  She needs to take the breakthrough pain med as often as she needs and keep track so we can increase the long acting as appropriate.  Can't increase it just yet though.  Be sure she is on to see Dr. Celis next week.

## 2018-05-22 NOTE — TELEPHONE ENCOUNTER
I called to see how patient was doing she states she is going to the [place] Thursday and will be leaving Wednesday night. Patient states her feet are still swelling and she is taking her pain medication MS contin 15 mg po every 12 hours at 6:30 am and 6:30 pm but states by 8:00 am is having to use the immediate release as her pain climbs to 5/10. Please advise

## 2018-05-23 NOTE — PROGRESS NOTES
Clinic Care Coordination Contact  Care Team Conversations    OCC RN received a call from the pt's dtr, Shruthi lovell.  She reports the pt was with increased SOB - at times labored.  She states she is using O2 2L/min via NC continuous.  She reported the pt just started taking MS Contin at 6:30pm on 5/20.  States she is aware to take this med every 12 hrs.  States she has MS-IR for breakthrough pain every 4 hours but pt states hesitancy to use for fear of addiction.  She requested this nurse contact the pt.  This nurse also informed her that Dr Celis has placed a call to the [facility] to see if the 6/8/18 appt could be moved up and as of this call, information had not been known.  This call ended with questions answered and understanding stated.    Aimee Saunders RN  [facility] Oncology Care Coordinator

## 2018-05-23 NOTE — TELEPHONE ENCOUNTER
Please look for notes from [facility] by Tuesday so I can discuss with Dr. Celis and he can place plan.

## 2018-05-23 NOTE — PROGRESS NOTES
Clinic Care Coordination Contact  Care Team Conversations    OCC RN telephoned pt to discuss her pain and to update her on the [facility] appt.  She was informed her [facility] appt was changed to 5/24/18.  Pt stated she had just gotten off the phone with the [facility] who informed her she would be seeing a Physician Assistant in Medical Oncology at 8:20am-5/24/18.  She was informed she will have labwork drawn at 7:20am.  The pt was happy with this new development.  She stated overnight accommodations were being managed by her family.  Also discussed was her pain management.  She described her pain as 5/10.  She stated she would tolerate her pain at 2/10 or less.  She stated as of the time of this call she had only taken the breakthrough med x 1 all day.  Discussed comfort vs addiction.  She stated she could take the med every 4 hrs.  This nurse brought the conversation back to how to assess her pain using the 0-10 pain scale.  She was coached that if 2 is ok, if she waits to take the med when she is 5, that by the time the med works, her pain has increased more.  We discussed that at 3-4 she should be taking her breakthrough pain med so as to stay ahead or on top of her pain.  Discussed how to dose her pain around the 12 hr MS Contin.  Pt thanked this writer for the information and stated she would try this new method.  The call ended with questions answered and understanding stated.      Aimee Saunders RN  [facility] Oncology Care Coordinator

## 2018-05-30 NOTE — TELEPHONE ENCOUNTER
"Call placed to patient for after visit to [place]-Dr. Adler and Shelbie Child PA-C. Pt reports it went ok. States it was sort of frustrating. Informed pt that we received the notes from the [place] and Nathalia would like to discuss the appointments and come up with a plan. Pt agreed with this plan.     Inquired about pt's pain. Pt reports \"it's ok.\" states that the morphine seems to be help, taking her breakthrough medication every 3 hours or so. Asked if that was pushing through some pain before she takes another breakthrough medication. Pt states that some days it feels like she doesn't need to take it, but other days it seems to not be enough. Informed pt this RN would let the NP know about her pain, but would be able to discuss with provider tomorrow at her appt. Pt agreed with plan.    Pt sounded very SOB and was taking breaths in between words. States she is on 2.5L of O2. Informed pt that her orders read 2-3LPM and if pt feels like 2.5L is not helping, she can bump up her O2 to 3LPM. Pt states she hasn't been eating too much as her appetite is poor and nothing tastes good. Pt states she has starting drinking the Boost Shakes. Will also inform provider of this.     Offered pt the 1330 appointment with Nathalia Amaral, [facility] for tomorrow and pt accepted this appointment. All questions and concerns have been addressed.   "

## 2018-05-31 NOTE — MR AVS SNAPSHOT
After Visit Summary   5/31/2018    Ana Simmons    MRN: [id number]           Patient Information     Date Of Birth          1950        Visit Information        Provider Department      5/31/2018 1:30 PM Ceci Amaral NP [facility]        Today's Diagnoses     Decreased appetite    -  1    Phyllodes neoplasm of breast        Malignant neoplasm metastatic to right lung (H)        Neoplasm related pain        Edema, unspecified type          Care Instructions    We would like to see you back in 1 week. Please come 30 minutes prior for lab work.  Your prescription for lasix has been sent to:   [facility] [address]  [address]  [place] [address]  Phone: [phone] Fax: [phone]   When you are in need of a refill, please call your pharmacy and they will send us a request. If you have any questions please call [phone]  Other instructions:    1.  Prescriptions given for MS contin, MS IR, Marinol  2.  Try Miralax after 2 days of no BM; if no BM with the Miralax-try dulcolax  3.  Try Marinol for appetite  4.  Referral for radiation therapy sent          Follow-ups after your visit        Additional Services     RADIATION THERAPY REFERRAL       Your provider has referred you to: Dr. Deluna to discuss possible radiation therapy    Please be aware that coverage of these services is subject to the terms and limitations of your health insurance plan.  Call member services at your health plan with any benefit or coverage questions.      Please bring the following to your appointment:    >>   Any x-rays, CTs or MRIs which have been performed.  Contact the facility where they were done to arrange for  prior to your scheduled appointment.   >>   List of current medications   >>   This referral request   >>   Any documents/labs given to you for this referral                  Your next 10 appointments already scheduled     Jun 01,  2018 10:30 AM CDT   CONSULT with Khanh Deluna MD   [facility] ([facility] )    [address] [place] [id number] [phone]            Jun 06, 2018 12:30 PM CDT   Level O with HC INF RM 3314   [facility] ([facility] )    [address]  [facility] [address]   [phone]            Jun 06, 2018  1:00 PM CDT   (Arrive by 12:45 PM)   Return Visit with Ceci Amaral NP   [facility] ([facility] )    [address]  [facility] [address]   [phone]              Who to contact     If you have questions or need follow up information about today's clinic visit or your schedule please contact [facility] directly at [phone].  Normal or non-critical lab and imaging results will be communicated to you by MyChart, letter or phone within 4 business days after the clinic has received the results. If you do not hear from us within 7 days, please contact the clinic through [employer] or phone. If you have a critical or abnormal lab result, we will notify you by phone as soon as possible.  Submit refill requests through [employer] or call your pharmacy and they will forward the refill request to us. Please allow 3 business days for your refill to be completed.          Additional Information About Your Visit        [employer] Information     [employer] gives you secure access to your electronic health record. If you see a primary care provider, you can also send messages to your care team and make appointments. If you have questions, please call your primary care clinic.  If you do not have a primary care provider, please call [phone] and they will assist you.        Care EveryWhere ID     This is your Care EveryWhere ID. This could be used by other organizations to access your [place] medical records  [id number]        Your Vitals Were     Pulse Temperature Respirations Height Pulse Oximetry BMI  "(Body Mass Index)    80 96.7  F (35.9  C) (Tympanic) 18 1.651 m (5' 5\") 99% 32.98 kg/m2       Blood Pressure from Last 3 Encounters:   05/31/18 139/77   05/11/18 (!) 138/91   05/04/18 126/60    Weight from Last 3 Encounters:   05/31/18 89.9 kg (198 lb 3.2 oz)   05/18/18 87.3 kg (192 lb 8 oz)   05/04/18 85.9 kg (189 lb 4.8 oz)              We Performed the Following     RADIATION THERAPY REFERRAL          Today's Medication Changes          These changes are accurate as of 5/31/18  3:43 PM.  If you have any questions, ask your nurse or doctor.               Start taking these medicines.        Dose/Directions    dronabinol 2.5 MG capsule   Commonly known as:  MARINOL   Used for:  Decreased appetite, Phyllodes neoplasm of breast, Malignant neoplasm metastatic to right lung (H), Neoplasm related pain, Edema, unspecified type   Started by:  Ceci Amaral NP        Dose:  2.5 mg   Take 1 capsule (2.5 mg) by mouth 2 times daily (before meals)   Quantity:  60 capsule   Refills:  0       furosemide 20 MG tablet   Commonly known as:  LASIX   Used for:  Edema, unspecified type, Phyllodes neoplasm of breast, Malignant neoplasm metastatic to right lung (H), Neoplasm related pain, Decreased appetite   Started by:  Ceci Amaarl NP        Dose:  10 mg   Take 0.5 tablets (10 mg) by mouth 2 times daily   Quantity:  60 tablet   Refills:  0         These medicines have changed or have updated prescriptions.        Dose/Directions    * morphine 15 MG IR tablet   Commonly known as:  MSIR   This may have changed:  Another medication with the same name was changed. Make sure you understand how and when to take each.   Used for:  Phyllodes neoplasm of breast, Malignant neoplasm metastatic to right lung (H), Neoplasm related pain, Edema, unspecified type, Decreased appetite   Changed by:  Ceci Amaral NP        Dose:  15 mg   Take 1 tablet (15 mg) by mouth every 4 hours as needed for moderate to severe pain "   Quantity:  60 tablet   Refills:  0       * morphine 30 MG 12 hr tablet   Commonly known as:  MS CONTIN   This may have changed:    - medication strength  - how much to take   Used for:  Neoplasm related pain, Phyllodes neoplasm of breast, Malignant neoplasm metastatic to right lung (H), Edema, unspecified type, Decreased appetite   Changed by:  Ceci Amaral NP        Dose:  30 mg   Take 1 tablet (30 mg) by mouth every 12 hours   Quantity:  60 tablet   Refills:  0       * Notice:  This list has 2 medication(s) that are the same as other medications prescribed for you. Read the directions carefully, and ask your doctor or other care provider to review them with you.      Stop taking these medicines if you haven't already. Please contact your care team if you have questions.     HYDROmorphone 2 MG tablet   Commonly known as:  DILAUDID   Stopped by:  Ceci Amaral NP                Where to get your medicines      These medications were sent to [facility] [place] [address]  [address] [place] [address] [place], [facility] [address]     Phone:  [phone]     furosemide 20 MG tablet         Some of these will need a paper prescription and others can be bought over the counter.  Ask your nurse if you have questions.     Bring a paper prescription for each of these medications     dronabinol 2.5 MG capsule    morphine 15 MG IR tablet    morphine 30 MG 12 hr tablet               Information about OPIOIDS     PRESCRIPTION OPIOIDS: WHAT YOU NEED TO KNOW   You have a prescription for an opioid (narcotic) pain medicine. Opioids can cause addiction. If you have a history of chemical dependency of any type, you are at a higher risk of becoming addicted to opioids. Only take this medicine after all other options have been tried. Take it for as short a time and as few doses as possible.     Do not:    Drive. If you drive while taking these medicines, you could be arrested for driving under the influence  (DUI).    Operate heavy machinery    Do any other dangerous activities while taking these medicines.     Drink any alcohol while taking these medicines.      Take with any other medicines that contain acetaminophen. Read all labels carefully. Look for the word  acetaminophen  or  Tylenol.  Ask your pharmacist if you have questions or are unsure.    Store your pills in a secure place, locked if possible. We will not replace any lost or stolen medicine. If you don t finish your medicine, please throw away (dispose) as directed by your pharmacist. The [place] Pollution [employer] Agency has more information about safe disposal: https://www.pca.[facility].[place].us/living-green/managing-unwanted-medications    All opioids tend to cause constipation. Drink plenty of water and eat foods that have a lot of fiber, such as fruits, vegetables, prune juice, apple juice and high-fiber cereal. Take a laxative (Miralax, milk of magnesia, Colace, Senna) if you don t move your bowels at least every other day.          Primary Care Provider Office Phone # Fax #    Mya Smallwoodbartolome [phone]       [facility] [address]   [facility] [id number]        Equal Access to Services     ASHLEY MARKS : Hadii aad ricky hadasho Somauriceali, waaxda luqadaha, qaybta kaalmada adeegyada, candy nieves . So [facility] [phone].    ATENCIÓN: Si habla español, tiene a stiles disposición servicios gratuitos de asistencia lingüística. Clint al [phone].    We comply with applicable federal civil rights laws and [place] laws. We do not discriminate on the basis of race, color, national origin, age, disability, sex, sexual orientation, or gender identity.            Thank you!     Thank you for choosing [facility]  for your care. Our goal is always to provide you with excellent care. Hearing back from our patients is one way we can continue to improve our services. Please take a few minutes to  complete the written survey that you may receive in the mail after your visit with us. Thank you!             Your Updated Medication List - Protect others around you: Learn how to safely use, store and throw away your medicines at www.disposemymeds.org.          This list is accurate as of 5/31/18  3:43 PM.  Always use your most recent med list.                   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)       DOCUSATE SODIUM PO      Take 200 mg by mouth daily    Phyllodes neoplasm of breast, Malignant neoplasm metastatic to right lung (H), Neoplasm related pain, Edema, unspecified type, Decreased appetite       dronabinol 2.5 MG capsule    MARINOL    60 capsule    Take 1 capsule (2.5 mg) by mouth 2 times daily (before meals)    Decreased appetite, Phyllodes neoplasm of breast, Malignant neoplasm metastatic to right lung (H), Neoplasm related pain, Edema, unspecified type       furosemide 20 MG tablet    LASIX    60 tablet    Take 0.5 tablets (10 mg) by mouth 2 times daily    Edema, unspecified type, Phyllodes neoplasm of breast, Malignant neoplasm metastatic to right lung (H), Neoplasm related pain, Decreased appetite       guaiFENesin-codeine 100-10 MG/5ML Soln solution    CHERATUSSIN AC    420 mL     Take 5-10 mLs by mouth every 4 hours as needed for cough    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), Cough, Malignant neoplasm of lower-outer quadrant of right breast of female, estrogen receptor negative (H), Ductal carcinoma in situ (DCIS) of right breast, Phyllodes neoplasm of breast, Anxiety and depression       IMODIUM A-D 2 MG tablet   Generic drug:  loperamide      Take 2 mg by mouth 4 times daily as needed for diarrhea    Phyllodes neoplasm of breast, Malignant neoplasm metastatic to right lung (H), Adjustment disorder with mixed anxiety and depressed mood, 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)       lidocaine-prilocaine cream    EMLA    30 g    Apply 30 g topically as needed for moderate pain    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)       LORazepam 0.5 MG tablet    ATIVAN    30 tablet    Take 1 tablet (0.5 mg) by mouth every 4 hours as needed (Anxiety, Nausea/Vomiting or Sleep)    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)       METFORMIN HCL PO      Take 500 mg by mouth daily (with breakfast)        * morphine 15 MG IR tablet    MSIR    60 tablet    Take 1 tablet (15 mg) by mouth every 4 hours as needed for moderate to severe pain    Phyllodes neoplasm of breast, Malignant neoplasm metastatic to right lung (H), Neoplasm related pain, Edema, unspecified type, Decreased appetite       * morphine 30 MG 12 hr tablet    MS CONTIN    60 tablet    Take 1 tablet (30 mg) by mouth every 12 hours    Neoplasm related pain, Phyllodes neoplasm of breast, Malignant neoplasm metastatic to right lung (H), Edema, unspecified type, Decreased appetite       nystatin Powd     1 each    1 Application 2 times daily    Phyllodes neoplasm of breast, Malignant neoplasm metastatic to right lung (H), Adjustment disorder with mixed anxiety and depressed mood, 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)       order for DME     1 Device    Equipment being  ordered: Oxygen 2L/ MIN per NC  At rest 90% O2 sat 88% walking 150 feet, placed on oxygen 2L/Min per NC 02 sat returned to 95%    Malignant neoplasm of lower-outer quadrant of right breast of female, estrogen receptor negative (H), Phyllodes neoplasm of breast, Malignant neoplasm metastatic to right lung (H), RUQ abdominal pain, Long term current use of anticoagulant therapy, Pleural effusion       order for DME     1 Units    Equipment being ordered: Wheelchair with leg rests    Phyllodes neoplasm of breast, Malignant neoplasm metastatic to right lung (H), Neoplasm related pain       order for DME     1 Units    Equipment being ordered: Oxygen Ceci Amaral [address] [place] [address] P: [phone] F:[phone] NPI: [id number] Classification: Nurse Practitioner, Medical Oncology  Patient Info: Ana Simmons [id number] [facility] [address] : 1950  Portable Oxygen Concentrator with battery 2-3 Liters per minute via nasal cannula continuously to keep O2Sat about 92%    Phyllodes neoplasm of breast, Malignant neoplasm metastatic to right lung (H), Neoplasm related pain, Low oxygen saturation       prochlorperazine 10 MG tablet    COMPAZINE    30 tablet    Take 1 tablet (10 mg) by mouth every 6 hours as needed (Nausea/Vomiting)    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)       sennosides 8.6 MG tablet    SENOKOT     Take 1 tablet by mouth daily    Phyllodes neoplasm of breast, Malignant neoplasm metastatic to right lung (H), Neoplasm related pain, Edema, unspecified type, Decreased appetite       sertraline 50 MG tablet    ZOLOFT    180 tablet    Take 2 tablets (100 mg) by mouth daily    Phyllodes neoplasm of breast, Malignant neoplasm metastatic to right lung (H), Adjustment disorder with mixed anxiety and depressed mood, 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)       SIMVASTATIN PO      Take 20 mg by mouth At Bedtime        VITAMIN B6 PO       Take 50 mg by mouth daily    Anemia, unspecified type, Malignant neoplasm of lower-outer quadrant of right female breast (H), DCIS (ductal carcinoma in situ), right, Postmenopausal status, Aromatase inhibitor use, Encounter for monitoring cardiotoxic drug therapy       warfarin 1 MG tablet    COUMADIN    90 tablet    Take 1 tablet (1 mg) by mouth daily    Ductal carcinoma in situ (DCIS) of right breast, Phyllodes neoplasm of breast, Malignant neoplasm metastatic to right lung (H)       * Notice:  This list has 2 medication(s) that are the same as other medications prescribed for you. Read the directions carefully, and ask your doctor or other care provider to review them with you.

## 2018-05-31 NOTE — NURSING NOTE
"Chief Complaint   Patient presents with     RECHECK     Follow up Malignant neoplasm metastatic to right lung and follow up [place]       Initial /77  Pulse 80  Temp 96.7  F (35.9  C) (Tympanic)  Resp 18  Ht 1.651 m (5' 5\")  Wt 89.9 kg (198 lb 3.2 oz)  SpO2 99%  BMI 32.98 kg/m2 Estimated body mass index is 32.98 kg/(m^2) as calculated from the following:    Height as of this encounter: 1.651 m (5' 5\").    Weight as of this encounter: 89.9 kg (198 lb 3.2 oz).  Medication Reconciliation: complete   Immunizations reviewed and up to date, advanced directives on file, pain = 7 lower to upper back, Oxygen 3L/MIN per NC, Declined PHQ9 .    Cristina Dye LPN    "

## 2018-05-31 NOTE — PATIENT INSTRUCTIONS
We would like to see you back in 1 week. Please come 30 minutes prior for lab work.  Your prescription for lasix has been sent to:   [facility] - [place] - [place] [address]  Phone: [phone] Fax: [phone]   When you are in need of a refill, please call your pharmacy and they will send us a request. If you have any questions please call [phone]  Other instructions:    1.  Prescriptions given for MS contin, MS IR, Marinol  2.  Try Miralax after 2 days of no BM; if no BM with the Miralax-try dulcolax  3.  Try Marinol for appetite  4.  Referral for radiation therapy sent

## 2018-05-31 NOTE — PROGRESS NOTES
Oncology Follow-up Visit:  May 31, 2018    Reason for Visit:  Patient presents with:  RECHECK: Follow up Malignant neoplasm metastatic to right lung and follow up [place]     Nursing Note and documentation reviewed: yes    HPI:   This is a 67-year-old female patient who presents to the oncology/hematology clinic today accompanied by her  for status check.      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  She underwent treatment completing this 11/2016 and was found to have metastasis to the right lung in November 2017.  Patient was evaluated by Dr. 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  He recommended docetaxel with gemcitabine and this was initiated on 1/3/2018 and she was found to have further progression after 3 cycles.  She was then treated with olaratumab and doxil with further progression.     She presents today accompanied by her  and 2 daughters to discuss her return from the [facility] and options.    She admits to having increasing shortness of breath and questions whether or not she can increase the oxygen liters.  She is currently on 3 L/m.  She states her cough is actually improved some.  Appetite is been fairly poor and she is dealing with constipation for the last couple days.  She has tried Senokot-S but is unsure of how many she can take.  She continues to have quite a bit of discomfort and is taking the MS Contin 15 mg every 12 hours and MSIR 15 mg tablets 3-4 times throughout the day.  States she actually is sleeping fairly well.  She has increased swelling to her feet.  She questions whether or not she can use the Ativan at night for sleep as needed if she is taking the  morphine.    She returned from the [facility] telling me that they offered her possible surgical consult but questions whether or not this would truly provide quality of life.  We also discussed the different chemotherapy or possible consult with radiation therapy for palliative radiation to decrease her symptoms.  Patient states today she is just unsure what she wants to do and she is getting very tired of of everything.    Oncologic History: Patient underwent mammogram in January 2016 which revealed abnormalities of the right breast.  On 02/09/2016, a stereotactic biopsy of the right breast lesion at the 8 o'clock position revealed grade 3 DCIS of a solid micropapillary subtype.  Estrogen receptors were positive.  Also a biopsy of the right breast lesion at the 9 o'clock position revealed a grade 2 DCIS of the solid micropapillary subtype.  Estrogen receptors were positive.  She underwent bilateral mastectomies on 3/30/16 by Dr. Sara Cooley at the [facility] with pathology revealing a 2 cm metaplastic carcinoma with epithelial mesenchymal components including adenocarcinoma.  There was also angiosarcomatous, liposarcomatous, malignant spindle cell and chondroid differentiation.  The tumor in the lower quadrant was distinct in the 2 DCIS lesions noted in the right breast.  Estrogen receptor positive at 2% with progesterone receptor negative; tumor was HER-2/renetta negative.  In terms of her DCIS, there was a 1.5 cm DCIS in the right breast as well as a smaller focus noted.  There was a sentinel lymph node that was negative. Chemotherapy was recommended.        She was evaluated by Dr. Lam with [facility] on 4/18/2016 he felt patient had a metaplastic carcinoma as well as DCIS of the right breast.  He did note that there was no consensus best treatment options for this patient, but he felt given that she likely had triple negative disease and if she had a pure ductal  adenocarcinoma, he would favor treating her a triple-negative breast cancer that is greater than 0.5 cm.  He felt that dose-dense AC x 4 cycles plus Taxol weekly x 12 weeks would be ideal.  She was evaluated here by Dr. Celis with [facility] on 5/3/2016 as she wanted to receive treatment closer to home.      She underwent treatment and was found to have metastasis to the right lung in November 2017.  Patient was evaluated by Dr. 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  He recommended docetaxel with gemcitabine and this was initiated on 1/3/2018 and administered until 3/1/2018.      She was found to have further progression 3/12/2018 and Dr. Celis had spoke with Dr. Castelan at the [facility] who recommended Olaratumab and Doxil.  This was started 3/15/2018.      Current Chemo Regime/TX:  n/a  Current Cycle:  n/a  # of completed cycles: n/a      Previous treatment:   1. Adriamycin 60mg/m2 and Cytoxan 600mg/m2 every 2 weeks with Neulasta support 6mg SQ injection day 2 x 4 cycles  2. Taxol 80mg/m2 weekly x 12 weeks; arimidex 1mg daily initiated 11/2016  3. Gemcitabine 900mg/m2 with docetaxel 75mg/m2 day 1 and day 8 every 21 days with nuelasta support day 9 x 3 cycles with last cycle 3/1/2018  4. Olaratumab 15mg/kg day1 and day 8 with doxil 30mg/m2 day1 every 21 days initiated 3/15/2018 x 3 cycles with last cycle 5/4/2018    Past Medical History:   Diagnosis Date     Breast cancer (H)      DMII (diabetes mellitus, type 2) (H)      HLD (hyperlipidemia)      Nonhealing surgical wound        Past Surgical History:   Procedure Laterality Date     APPENDECTOMY OPEN       C VAGINAL HYSTERECTOMY       COLONOSCOPY - HIM SCAN  01/01/2006    Colonoscopy 2006;  02-16     INSERT PORT VASCULAR ACCESS Left 6/2/2016    Procedure: INSERT PORT VASCULAR ACCESS;  Surgeon: Micheline Davis MD;  Location: [facility]      MASTECTOMY Bilateral        Family History   Problem Relation Age of Onset     Lung Cancer Father      Myocardial Infarction Father      Coronary Artery Disease Father      DIABETES Father      Hyperlipidemia Father      Hypertension Father      Prostate Cancer Paternal Grandfather      DIABETES Mother      Hyperlipidemia Mother      Thyroid Disease Mother      Thyroid Disease Maternal Grandmother      Thyroid Disease Daughter      Breast Cancer No family hx of      Colon Cancer No family hx of      Depression No family hx of      Asthma No family hx of      CEREBROVASCULAR DISEASE No family hx of      Genetic Disorder No family hx of        Social History     Social History     Marital status:      Spouse name: Braden     Number of children: 2     Years of education: N/A     Occupational History      Retired     Social History Main Topics     Smoking status: Former Smoker     Smokeless tobacco: Never Used     Alcohol use No      Comment: 1 kamille/ night, with chemo is not drinking     Drug use: No     Sexual activity: Not on file     Other Topics Concern     Parent/Sibling W/ Cabg, Mi Or Angioplasty Before 65f 55m? Yes     father     Social History Narrative       Current Outpatient Prescriptions   Medication     calcium-vitamin D (CALTRATE) 600-400 MG-UNIT per tablet     cyanocobalamin (VITAMIN  B-12) 1000 MCG tablet     DOCUSATE SODIUM PO     METFORMIN HCL PO     morphine (MS CONTIN) 15 MG 12 hr tablet     morphine (MSIR) 15 MG IR tablet     order for DME     order for DME     order for DME     Pyridoxine HCl (VITAMIN B6 PO)     sertraline (ZOLOFT) 50 MG tablet     SIMVASTATIN PO     warfarin (COUMADIN) 1 MG tablet     albuterol (PROAIR HFA/PROVENTIL HFA/VENTOLIN HFA) 108 (90 BASE) MCG/ACT Inhaler     dexamethasone (DECADRON) 4 MG tablet     guaiFENesin-codeine (CHERATUSSIN AC) 100-10 MG/5ML SOLN solution     lidocaine-prilocaine (EMLA) cream     loperamide (IMODIUM A-D) 2 MG tablet      "LORazepam (ATIVAN) 0.5 MG tablet     nystatin POWD     prochlorperazine (COMPAZINE) 10 MG tablet     sennosides (SENOKOT) 8.6 MG tablet     No current facility-administered medications for this visit.         Allergies   Allergen Reactions     Cats        Review Of Systems:  See HPI    ECOG Performance Status: 3    Physical Exam:  /77  Pulse 80  Temp 96.7  F (35.9  C) (Tympanic)  Resp 18  Ht 1.651 m (5' 5\")  Wt 89.9 kg (198 lb 3.2 oz)  SpO2 99%  BMI 32.98 kg/m2    GENERAL APPEARANCE: , alert and in no acute distress.  HEENT: Normocephalic, Sclerae anicteric.  NECK: No asymmetry or masses, no thyromegaly.  RESP: right lung with no aeration, left lung clear; rspirations are somewhat labored on O2.CARDIOVASCULAR: Regular rate and rhythm. Normal S1, S2; no murmur, gallop, or rub.  MUSCULOSKELETAL: Extremities without gross deformities noted. Moderate edema of bilateral feet and ankles.  NEURO: Alert and oriented x 3.   PSYCHIATRIC: Mentation and affect appear normal.  Mood appropriate.    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  She underwent treatment then was found to have metastasis to the right lung in November 2017.  She was seen at the [facility] by Dr. Tineo and felt this was a Phyllodes tumor, with recommendation for gemcitabine and docetaxel.  She received 3 cycles with progression of disease.  She was then treated with olaratumab and doxil per recommendation of Dr. Castelan with now further progression.  We discussed a consult with radiation therapy per recommendation of [facility] and she is in favor of this.  She will see Dr. Deluna tomorrow at 10:30 AM.    #2 pain: Will increase her MS Contin " to 30 mg every 12 hours and prescription was given.  She was also instructed she could take the MSIR 15 mg every 3 hours as needed and a new prescription was given.    #3 shortness of breath: I informed her she could increase her oxygen as needed to keep herself comfortable and we discussed this extensively.    #4 decreased appetite: We discussed this in relation to her disease and her decreased albumin and swelling.  We'll give her some samples of the Mighty shakes to see if she can tolerate these.  I also prescribed Marinol 2.5 mg twice a day that she may try if she chooses.    #4 edema: We'll initiate Lasix 10 mg twice a day and she was instructed to take the afternoon dose at 2 or 3 PM.  We discussed that this may help the swelling in her feet and may also help with her breathing.    #5 primary insomnia: difficulty falling asleep. I informed her she could take the Ativan at bedtime as needed if only she hasn't taken any short acting morphine at the same time.    #6 constipation: I gave her the laxative protocol once again and discussed that she can take 3 senna S  twice a day and if she does not have a bowel movement in 24-48 hours she should add MiraLax once a day.  I informed her she may also need to try Dulcolax if she is unable to have a bowel movement using the protocol.     I encouraged patient to call with any questions or concerns.    Approximately 70 minutes spent with the patient, with >75% of this time spent in counseling regarding her disease and it's progression and the various options for further management but the pro's and cons to all.  We spent time in discussion of the importance of symptom management and she desire quality of life.    Ceci TOMLIN, FNP-BC, AOCNP

## 2018-06-01 NOTE — TELEPHONE ENCOUNTER
Telephone call to patient as she canceled her radiation therapy consult this morning.  Patient states she does not want to go forth with radiation therapy consult and she would like us to contact the hospice in [place] to help manage her symptoms.  She is aware she does not need to come for the appointment next week but I did ask that she or her family members call with any questions they may have.  Patient was very appreciative of the phone call.

## 2018-06-04 NOTE — TELEPHONE ENCOUNTER
Clinic Care Coordination Contact  Care Team Conversations    OCC RN received a phone call today from [place]lee - Care Coordinator - [facility].  She states the pt was admitted to their hospital over the weekend.  She stated the family was not wanting to take her home until they knew when Homecare/Hospice was going to be seeing the pt.  She stated they had been in contact with [facility] and they were awaiting the referral from the [facility] Oncology dept.  She was informed the referral would be sent immediately.  She stated the pt can be discharged when the homecare arrangements are known.  This call ended with understanding stated and questions answered.    Aimee Saunders RN  [facility] Oncology Care Coordinator

## 2018-06-04 NOTE — TELEPHONE ENCOUNTER
Clinic Care Coordination Contact  Care Team Conversations    Homecare/Hospice Care Referral sent to [facility] via fax.  This nurse then contacted [facility]izio - [facility] to discuss and answer any questions she may have pertaining to this pt and the referral.  She stated she had just spoken with [facility] and received the information needed.  She stated the plan is for homecare to see the pt in her home tomorrow am.  This call ended with questions answered and understanding stated.  MEGHAN Amaral informed of the plan.    Aimee Saunders RN  [facility] Oncology Care Coordinator

## 2018-06-06 NOTE — TELEPHONE ENCOUNTER
I called to see how Ana is doing, I spoke with her daughter Britni and states she is on hospice and on morphine, and ativan, and break through medications to comfort her. She has declined very rapidly, and is sleeping most of the time. Family at patients bed side and have the support of hospice.   Cristina Dye LPN

## 2018-06-08 NOTE — TELEPHONE ENCOUNTER
Stopping all oral meds giving comfort meds rectally. Family at bedside. Family feels like her time is near to pass. I comforted family and let them know if they need anything to call us

## 2018-09-14 NOTE — NURSING NOTE
"Chief Complaint   Patient presents with     RECHECK     follow up/scan results       Initial BP (!) 138/91  Pulse 102  Temp 98.2  F (36.8  C) (Tympanic)  Resp 18  Ht 1.651 m (5' 5\")  SpO2 94% Estimated body mass index is 31.5 kg/(m^2) as calculated from the following:    Height as of 5/4/18: 1.651 m (5' 5\").    Weight as of 5/4/18: 85.9 kg (189 lb 4.8 oz).  Medication Reconciliation: complete    Patient was assessed using the NCCN psychosocial distress thermometer. Patient rated the score as a 0/10. Patient rated current stressors as none. Stressors will be brought to the attention of provider or Oncology RN Care Coordinator for a score of 6 or greater or per nurses discretion.               Abeba Cox, RN    "
At Rest 94% and Walking 150 feet 88% 02 sat. Walked patient with oxygen 2L/Min per NC 02 95%   Cristina Dye LPN    
None

## 2018-11-29 NOTE — PATIENT INSTRUCTIONS
Discharge Instructions for Infusion Therapy/Chemotherapy Department:    Your doctor has prescribed the following medication(s) gemzar/docetaxel/neulasta to treat your cancer.    All treatments have potential side effects, but they don't all happen to everyone.  If you have any questions, problems, or concerns, we want you to call us.  You can reach the Infusion Nurses at [phone] Monday - Friday 8:00am to 4:30pm or the Health Unit Coordinator at [phone] Monday - Friday 8:00am to 5:00pm.      After hours, evenings, weekends, and holidays please call Urgent Care [phone] or toll free [phone] or go to your nearest Emergency Department.    IV, PICC line or Port access site care or injection site care:   Please report signs of infection or infiltration;  *Redness     *Swelling/puffiness  *Pain/tenderness   *Fever 100.4 F or higher  *Drainage         Possible side effects include:  *Diarrhea     *Allergic Reaction  *Constipation    *Drop in blood counts  *Depression/Anxiey   *Nausea/Vomiting  *Weakness/Fatigue   *Cold intolerance  *Skin changes/Rash   *Stomatitis (mouth sores)  *Loss of appetite/Taste changes *Weight gain/Swelling (edema)  *Hand-Foot syndrome   *Hypertension (high blood pressure)  *Abdominal pain    *Peripheral Neuropathy (numbness/tingling in hands/feet)    YOU NEED TO CALL US OR GO TO YOUR NEAREST URGENT CARE/EMERGENCY DEPARTMENT IF ANY OF THE FOLLOWING OCCUR:     *You have a fever of 100.4 F or higher.      *You are experiencing uncontrolled vomiting while taking your  anti-nausea medications.      *You are having watery diarrhea (4-6 loose stools in a 24 hour  period).      *You are experiencing a severe rash or skin changes.      *You have mouth sores or a sore throat.      *You have severe constipation (no BM for 3 days).      ANY questions or problems, PLEASE do not hesitate to call us!!     No

## 2019-02-02 NOTE — PHARMACY-CONSULT NOTE
Current medications reviewed for potential drug interactions and appropriateness. Patient had been on chemotherapy with the same take home medications prior to this cycle.  She stated that she has no questions regarding her take home prescriptions or her current chemotherapy regimen        
34

## 2021-03-27 NOTE — MR AVS SNAPSHOT
After Visit Summary   1/10/2018    Ana Simomns    MRN: [id number]           Patient Information     Date Of Birth          1950        Visit Information        Provider Department      1/10/2018 8:30 AM HC INF RM 3310 [facility] [place]        Today's Diagnoses     Triple negative malignant neoplasm of breast (H)    -  1    Shortness of breath           Follow-ups after your visit        Your next 10 appointments already scheduled     Jan 25, 2018  8:30 AM CST   Level 3 with HC INF RM 3306   [facility] [place] ([facility] [place] )    [address]  [place] [address]   [phone]            Feb 01, 2018  8:30 AM CST   Level O with HC INF RM 3306   [facility] [place] ([facility] [place] )    [address]  [place] [address]   [phone]            Feb 01, 2018  9:00 AM CST   (Arrive by 8:45 AM)   Return Visit with Ceci Amaral NP   [facility] [place] ([facility] [place] )    [address]  [place] [address]   [phone]            Feb 01, 2018  9:30 AM CST   Level 4 with HC INF RM 3306   [facility] [place] ([facility] [place] )    [address]  [place] [address]   [phone]            Feb 15, 2018  8:30 AM CST   Level 3 with HC INF RM 3306   [facility] [place] ([facility] [place] )    [address]  [place] [address]   [phone]            Feb 22, 2018  8:30 AM CST   Level O with HC INF RM 3308   [facility] [place] ([facility] [place] )    [address]  [place] [address]   [phone]            Feb 22, 2018  9:00 AM CST   (Arrive by 8:45 AM)   Return Visit with Ceci Amaral NP   [facility] [place] ([facility] [place] )    [address]  [place] [address]   [phone]            Feb 22, 2018  9:30 AM CST   Level 4 with HC INF RM 3308   [facility] [place] ([facility] [place]  ")    [address] [place] [address]   [phone]              Future tests that were ordered for you today     Open Standing Orders        Priority Remaining Interval Expires Ordered    Oxygen: Nasal cannula, Oxygen mask STAT 22672/[id number] CONTINUOUS  1/10/2018            Who to contact     If you have questions or need follow up information about today's clinic visit or your schedule please contact [facility] directly at [phone].  Normal or non-critical lab and imaging results will be communicated to you by EchoPixelhart, letter or phone within 4 business days after the clinic has received the results. If you do not hear from us within 7 days, please contact the clinic through MDSave or phone. If you have a critical or abnormal lab result, we will notify you by phone as soon as possible.  Submit refill requests through MDSave or call your pharmacy and they will forward the refill request to us. Please allow 3 business days for your refill to be completed.          Additional Information About Your Visit        MDSave Information     MDSave gives you secure access to your electronic health record. If you see a primary care provider, you can also send messages to your care team and make appointments. If you have questions, please call your primary care clinic.  If you do not have a primary care provider, please call [phone] and they will assist you.        Care EveryWhere ID     This is your Care EveryWhere ID. This could be used by other organizations to access your [facility] medical records  [id number]        Your Vitals Were     Pulse Temperature Respirations Height Pulse Oximetry BMI (Body Mass Index)    105 96.9  F (36.1  C) (Oral) 24 1.651 m (5' 5\") 89% 35.49 kg/m2       Blood Pressure from Last 3 Encounters:   01/10/18 (!) 194/115   01/10/18 (!) 157/91   01/03/18 184/78    Weight from Last 3 Encounters:   01/10/18 96.8 kg (213 lb 4.8 oz)   01/03/18 96.2 kg (212 lb)   12/29/17 97.3 kg " (214 lb 6.4 oz)              We Performed the Following     CBC with platelets differential     Comprehensive metabolic panel     D-Dimer ([facility])     Hepatic panel        Primary Care Provider Office Phone # Fax #    Mya Park [phone]       [facility] [address]   [facility] [id number]        Equal Access to Services     [facility] HospitalFIORDALIZA : Hadii aad ku hadasho Soomaali, waaxda luqadaha, qaybta kaalmada adeegyada, waxay idiin hayaan adeeg khayanajacqueline laravin . So [facility] [phone].    ATENCIÓN: Si habla español, tiene a stiles disposición servicios gratuitos de asistencia lingüística. Llame al [phone].    We comply with applicable federal civil rights laws and [place] laws. We do not discriminate on the basis of race, color, national origin, age, disability, sex, sexual orientation, or gender identity.            Thank you!     Thank you for choosing [facility]  for your [facility]. Our goal is always to provide you with excellent care. Hearing back from our patients is one way we can continue to improve our services. Please take a few minutes to complete the written survey that you may receive in the mail after your visit with us. Thank you!             Your Updated Medication List - Protect others around you: Learn how to safely use, store and throw away your medicines at www.disposemymeds.org.          This list is accurate as of: 1/10/18 10:05 AM.  Always use your most recent med list.                   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)       cyanocobalamin 1000 MCG tablet    vitamin  B-12      Take 250 mcg by mouth daily    Triple negative malignant neoplasm of breast (H)       dexamethasone 4 MG tablet    DECADRON    4 tablet    Take 2 tablets (8 mg) by mouth See Admin Instructions    Triple negative malignant neoplasm of breast (H), Cough       guaiFENesin-codeine 100-10 MG/5ML Soln solution    CHERATUSSIN AC    420 mL    Take 5-10 mLs by mouth every 4 hours as needed for cough    Triple negative malignant neoplasm of breast (H), Cough       lidocaine-prilocaine cream    EMLA    30 g    Apply 30 g topically as needed for moderate pain    Triple negative malignant neoplasm of breast (H)       LORazepam 0.5 MG tablet    ATIVAN    30 tablet    Take 1 tablet (0.5 mg) by mouth every 4 hours as needed (Anxiety, Nausea/Vomiting or Sleep)    Triple negative malignant neoplasm of breast (H)       METFORMIN HCL PO      Take 500 mg by mouth daily (with breakfast)        prochlorperazine 10 MG tablet    COMPAZINE    30 tablet    Take 1 tablet (10 mg) by mouth every 6 hours as needed (Nausea/Vomiting)    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          intact

## 2022-11-25 NOTE — PROGRESS NOTES
Oncology Follow-up Visit:  November 21, 2017    Reason for Visit:  Patient presents with:  Cancer/recheck: 2 month follow up/ triple neg breast cancer     Nursing Note and documentation reviewed: yes    HPI:  This is a 67-year-old female patient who presents to the oncology/hematology clinic today in follow up of breast cancer.   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 The patient is essentially triple-negative breast cancer.  She underwent treatment and is currently on Arimidex 1mg daily for the DCIS which was initiated 11/22/2016.      Patient presents to the clinic today telling me she is feeling good.  She tells me she does have hot flashes that occur after she takes her Arimidex but otherwise no further issues with hot flashes or night sweats.  She denies any joint pain and denies any headaches.  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  States she felt the discomfort as though it was a stretching feeling and pain when she would take a deep breath.  She denies any pain in this area now today.  She denies any shortness of breath or cough and denies any fevers.  She continues to be off of the iron for the past 2 months.  He does question having her port removed pending the results of today's CT scan.  CT scan was completed 2 months ago did show a new 8.5 mm nodule the patient had an upper respiratory symptoms at that time.  Repeat was completed today prior to this visit.    Oncologic History:   Patient underwent mammogram in January 2016 which revealed abnormalities of the right breast.  On 02/09/2016, a stereotactic biopsy of the right breast lesion at the 8 o'clock position revealed grade 3 DCIS of a solid  micropapillary subtype.  Estrogen receptors were positive.  Also a biopsy of the right breast lesion at the 9 o'clock position revealed a grade 2 DCIS of the solid micropapillary subtype.  Estrogen receptors were positive.  She underwent bilateral mastectomies on 3/30/16 by Dr. Sara Cooley at the [facility] with pathology revealing a 2 cm metaplastic carcinoma with epithelial mesenchymal components including adenocarcinoma.  There was also angiosarcomatous, liposarcomatous, malignant spindle cell and chondroid differentiation.  The tumor in the lower quadrant was distinct in the 2 DCIS lesions noted in the right breast.  Estrogen receptor positive at 2% with progesterone receptor negative; tumor was HER-2/renetta negative.  In terms of her DCIS, there was a 1.5 cm DCIS in the right breast as well as a smaller focus noted.  There was a sentinel lymph node that was negative. Chemotherapy was recommended.  She was evaluated by Dr. Lam with [facility] on 4/18/2016 he felt patient had a metaplastic carcinoma as well as DCIS of the right breast.  He did note that there was no consensus best treatment options for this patient, but he felt given that she likely had triple negative disease and if she had a pure ductal adenocarcinoma, he would favor treating her a triple-negative breast cancer that is greater than 0.5 cm.  He felt that dose-dense AC x 4 cycles plus Taxol weekly x 12 weeks would be ideal.  She was evaluated here by Dr. Celis with Medical Oncology on 5/3/2016 as she wanted to receive treatment closer to home.      Current Chemo Regime/TX:  arimidex 1mg daily initiated 11/2016  Current Cycle: n/a  # of completed cycles: n/a      Previous treatment:  Adriamycin 60mg/m2 and Cytoxan 600mg/m2 every 2 weeks with Neulasta support 6mg SQ injection day 2 x 4 cycles; Taxol 80mg/m2 weekly x 12 weeks    Past Medical History:   Diagnosis Date     Breast cancer (H)      DMII  (diabetes mellitus, type 2) (H)      HLD (hyperlipidemia)      Nonhealing surgical wound        Past Surgical History:   Procedure Laterality Date     APPENDECTOMY OPEN       C VAGINAL HYSTERECTOMY       INSERT PORT VASCULAR ACCESS Left 6/2/2016    Procedure: INSERT PORT VASCULAR ACCESS;  Surgeon: Micheline Davis MD;  Location: [facility] OR     MASTECTOMY Bilateral        Family History   Problem Relation Age of Onset     Lung Cancer Father      Prostate Cancer Paternal Grandfather        Social History     Social History     Marital status:      Spouse name: Braden     Number of children: 2     Years of education: N/A     Occupational History      Retired     Social History Main Topics     Smoking status: Former Smoker     Smokeless tobacco: Never Used     Alcohol use No      Comment: 1 kamille/ night, with chemo is not drinking     Drug use: No     Sexual activity: Not on file     Other Topics Concern     Not on file     Social History Narrative       Current Outpatient Prescriptions   Medication     sertraline (ZOLOFT) 50 MG tablet     Pyridoxine HCl (VITAMIN B6 PO)     anastrozole (ARIMIDEX) 1 MG tablet     calcium-vitamin D (CALTRATE) 600-400 MG-UNIT per tablet     cyanocobalamin (VITAMIN  B-12) 1000 MCG tablet     lidocaine-prilocaine (EMLA) cream     METFORMIN HCL PO     SIMVASTATIN PO     ferrous sulfate (IRON) 325 (65 FE) MG tablet     No current facility-administered medications for this visit.      Facility-Administered Medications Ordered in Other Visits   Medication     heparin 100 UNIT/ML injection 5 mL     heparin lock flush 10 UNIT/ML injection 5-10 mL     heparin lock flush 10 UNIT/ML injection 5-10 mL     sodium chloride (PF) 0.9% PF flush 10-20 mL     sodium chloride (PF) 0.9% PF flush 10-20 mL     heparin 100 UNIT/ML injection     heparin 100 UNIT/ML injection        Allergies   Allergen Reactions     Cats        Review Of Systems:  Constitutional: denies fever, weight changes,  "chills, and night sweats.  Eyes: denies blurred or double vision  Ears/Nose/Throat: denies ear pain, nose problems, difficulty swallowing  Respiratory: denies shortness of breath, cough   Skin: denies rash, lesions  Breast/Chest wall: denies pain, lumps   Cardiovascular: denies chest pain, palpitations, edema  Gastrointestinal: denies abdominal pain, bloating, nausea, vomiting, early satiety  Genitourinary: denies difficulty with urination, blood in urine  Musculoskeletal:denies new muscle pain, bone pain  Neurologic: denies lightheadedness, headaches, numbness or tingling  Psychiatric: denies anxiety, depression  Hematologic/Lymphatic/Immunologic: denies easy bruising, easy bleeding, lumps or bumps noted  Endocrine: Denies increased thirst    Physical Exam:  BP (!) 155/98 (BP Location: Left arm, Cuff Size: Adult Large)  Pulse 80  Temp 97.4  F (36.3  C) (Tympanic)  Resp 18  Ht 1.651 m (5' 5\")  Wt 96.2 kg (212 lb)  SpO2 96%  BMI 35.28 kg/m2    GENERAL APPEARANCE: Healthy, alert and in no acute distress.  HEENT: Normocephalic, Sclerae anicteric. Oropharynx without ulcers, lesions, or thrush.  NECK: Supple. No asymmetry or masses, no thyromegaly.  LYMPHATICS: No palpable cervical, supraclavicular, axillary, or inguinal nodes   RESP: Lungs clear to auscultation bilaterally, respirations regular and easy  CARDIOVASCULAR: Regular rate and rhythm. Normal S1, S2; no murmur, gallop, or rub.  ABDOMEN: Soft, nontender. Bowel sounds auscultated all 4 quadrants. No palpable organomegaly or masses.  BREAST/Chest wall: no concerning lumps, masses or tenderness bilaterally  MUSCULOSKELETAL: Extremities without gross deformities noted. No edema of bilateral lower extremities.  SKIN: No suspicious lesions or rashes.  NEURO: Alert and oriented x 3.  Gait steady.  PSYCHIATRIC: Mentation and affect appear normal.  Mood appropriate.    Laboratory:    Labs completed at [facility] on 11/16/2017:     CBC shows a " platelet count 133,000, absolute lymphocyte 0.9, absolute monocyte 0.2 otherwise within normal limits  CMP: Sodium 133, AST 35, ALT 53 otherwise essentially within normal limits    CA 27.29=11.7  Iron 96, TIBC 345, UIBC 249, iron 96, ferritin 73    Imaging Studies:      Results for orders placed or performed in visit on 06/22/17   CT Chest w Contrast    Narrative    CT CHEST W CONTRAST  11/22/2017 11:15 AM    CLINICAL HISTORY: Female, age 67 years,  follow up breast cancer/PET  scan 11/2016; Triple negative malignant neoplasm of breast (H); DCIS  (ductal carcinoma in situ), right;    Comparison:  Outside CT scan of the chest dated 9/14/2017 performed at  [facility].    TECHNIQUE:  CT was performed of the chest  with IV contrast.   Sagittal, coronal and axial reconstructions were reviewed.     FINDINGS:  Chest CT:   Lungs : Dependent atelectasis in the right lung, compressive  atelectasis from pleural-based mass anteriorly within the right lung.  Thyroid: The visualized portions are normal.  Heart and Great Vessels:  Normal.  Lymph Nodes:  Normal.  Pleura: 7.5 x 3.7 x 5.3 cm pleural-based mass anteriorly within the  right hemithorax has increased significantly in size, previously  measuring approximately 8.5 x 12 mm. There is decreased density  anteriorly suggests central necrosis.    The patient has also developed a small right-sided pleural effusion.    Bony Structures:  Mild degenerative changes of the thoracic spine.  Esophagus/stomach: Small hiatal hernia is unchanged.    Visualized portions of the abdomen:  Liver:  The visualized portions are normal.  Gallbladder: Evidence gallstones, unchanged.  Spleen: The visualized portions are normal.  Pancreas: The visualized portions are normal.  Adrenal Glands: Normal  Kidneys: The visualized portions are normal.  Ureters: Not included.  Abdominal Aorta: The visualized portions are normal.    Other Findings:  No lymphadenopathy or ascites in the  upper abdomen.      Impression    IMPRESSION:  1.   Significant interval enlargement of pleural-based mass anteriorly  within the right hemithorax. The mass currently measures 7.5 cm x 3.7  cm x 5.3 cm transverse, AP and craniocaudal dimensions respectively.  Central necrosis is appreciated.    2.  No evidence of new lung nodule nor evidence of developing  lymphadenopathy.    3.  Small right-sided pleural effusion which is new compared to prior  study.    4.  Cholelithiasis, similar in appearance.    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  The patient is essentially triple-negative breast cancer.  She is on arimidex 1mg daily for the DCIS. Enlarging mass on CT chest.  She will be scheduled for biopsy and follow up with these results.     #2  Aromatase Inhibitor Therapy:  DEXA scan completed 4/2017 was normal.  She will continue on the calcium and vitamin D.  Next DEXA scan will be completed in April 2019.      #3 Iron deficiency anemia: Resolved.  She will remain off of iron therapy.    #4  Lung Mass:  Significant increase in size of lung nodule.  Will arrange for biopsy at earliest time possibel related to rapid growth.  Patient will follow up for these results.    I encouraged patient to call with any questions or concerns.      Ceci Amaral  FNP-BC   Unknown

## 2024-04-07 NOTE — TELEPHONE ENCOUNTER
Called and LM for patient to call back.No answer.   Remains in NSR. d/w Dr. Can covering for Dr. Rodriguez. Will start low dose Toprol--potential adverse effects discussed. Mildly depressed TSH noted. Confirmatory FT4 and T3 sent. Remains in NSR. d/w Dr. Can covering for Dr. Rodriguez re: Gómez. Will start low dose Toprol--potential adverse effects discussed. Mildly depressed TSH noted. Confirmatory FT4 and T3 sent.